# Patient Record
Sex: MALE | Race: WHITE | Employment: OTHER | URBAN - METROPOLITAN AREA
[De-identification: names, ages, dates, MRNs, and addresses within clinical notes are randomized per-mention and may not be internally consistent; named-entity substitution may affect disease eponyms.]

---

## 2017-05-12 ENCOUNTER — HOSPITAL ENCOUNTER (OUTPATIENT)
Age: 57
Discharge: HOME | End: 2017-05-12
Payer: MEDICARE

## 2017-05-12 PROCEDURE — 93005 ELECTROCARDIOGRAM TRACING: CPT

## 2017-05-13 LAB
EKG ATRIAL RATE: 74 BPM
EKG P AXIS: 62 DEGREES
EKG P-R INTERVAL: 144 MS
EKG Q-T INTERVAL: 388 MS
EKG QRS DURATION: 104 MS
EKG QTC CALCULATION (BAZETT): 430 MS
EKG R AXIS: -33 DEGREES
EKG T AXIS: 58 DEGREES
EKG VENTRICULAR RATE: 74 BPM

## 2017-09-16 ENCOUNTER — APPOINTMENT (OUTPATIENT)
Dept: GENERAL RADIOLOGY | Age: 57
End: 2017-09-16
Payer: MEDICARE

## 2017-09-16 ENCOUNTER — HOSPITAL ENCOUNTER (EMERGENCY)
Age: 57
Discharge: HOME | End: 2017-09-16
Attending: EMERGENCY MEDICINE
Payer: MEDICARE

## 2017-09-16 VITALS
HEIGHT: 66 IN | SYSTOLIC BLOOD PRESSURE: 112 MMHG | DIASTOLIC BLOOD PRESSURE: 69 MMHG | OXYGEN SATURATION: 98 % | HEART RATE: 68 BPM | RESPIRATION RATE: 20 BRPM | WEIGHT: 170 LBS | TEMPERATURE: 97.2 F | BODY MASS INDEX: 27.32 KG/M2

## 2017-09-16 DIAGNOSIS — R50.9 FEVER IN ADULT: ICD-10-CM

## 2017-09-16 DIAGNOSIS — L03.116 CELLULITIS OF LEFT FOOT: Primary | ICD-10-CM

## 2017-09-16 LAB
ABSOLUTE EOS #: 0 K/UL (ref 0–0.4)
ABSOLUTE LYMPH #: 0.7 K/UL (ref 1–4.8)
ABSOLUTE MONO #: 0.7 K/UL (ref 0.1–1.2)
ANION GAP SERPL CALCULATED.3IONS-SCNC: 12 MMOL/L (ref 9–17)
BASOPHILS # BLD: 0 %
BASOPHILS ABSOLUTE: 0 K/UL (ref 0–0.2)
BUN BLDV-MCNC: 12 MG/DL (ref 6–20)
BUN/CREAT BLD: ABNORMAL (ref 9–20)
C-REACTIVE PROTEIN: 33.1 MG/L (ref 0–5)
CALCIUM SERPL-MCNC: 8.4 MG/DL (ref 8.6–10.4)
CHLORIDE BLD-SCNC: 102 MMOL/L (ref 98–107)
CO2: 26 MMOL/L (ref 20–31)
CREAT SERPL-MCNC: 0.75 MG/DL (ref 0.7–1.2)
DIFFERENTIAL TYPE: ABNORMAL
EOSINOPHILS RELATIVE PERCENT: 0 %
GFR AFRICAN AMERICAN: >60 ML/MIN
GFR NON-AFRICAN AMERICAN: >60 ML/MIN
GFR SERPL CREATININE-BSD FRML MDRD: ABNORMAL ML/MIN/{1.73_M2}
GFR SERPL CREATININE-BSD FRML MDRD: ABNORMAL ML/MIN/{1.73_M2}
GLUCOSE BLD-MCNC: 109 MG/DL (ref 70–99)
HCT VFR BLD CALC: 41.1 % (ref 41–53)
HEMOGLOBIN: 14.2 G/DL (ref 13.5–17.5)
LYMPHOCYTES # BLD: 10 %
MCH RBC QN AUTO: 31.9 PG (ref 26–34)
MCHC RBC AUTO-ENTMCNC: 34.5 G/DL (ref 31–37)
MCV RBC AUTO: 92.4 FL (ref 80–100)
MONOCYTES # BLD: 9 %
PDW BLD-RTO: 12.8 % (ref 12.5–15.4)
PLATELET # BLD: 175 K/UL (ref 140–450)
PLATELET ESTIMATE: ABNORMAL
PMV BLD AUTO: 7 FL (ref 6–12)
POTASSIUM SERPL-SCNC: 3.9 MMOL/L (ref 3.7–5.3)
RBC # BLD: 4.45 M/UL (ref 4.5–5.9)
RBC # BLD: ABNORMAL 10*6/UL
SEDIMENTATION RATE, ERYTHROCYTE: 8 MM (ref 0–10)
SEG NEUTROPHILS: 81 %
SEGMENTED NEUTROPHILS ABSOLUTE COUNT: 5.9 K/UL (ref 1.8–7.7)
SODIUM BLD-SCNC: 140 MMOL/L (ref 135–144)
WBC # BLD: 7.3 K/UL (ref 3.5–11)
WBC # BLD: ABNORMAL 10*3/UL

## 2017-09-16 PROCEDURE — 99283 EMERGENCY DEPT VISIT LOW MDM: CPT

## 2017-09-16 PROCEDURE — 80048 BASIC METABOLIC PNL TOTAL CA: CPT

## 2017-09-16 PROCEDURE — 85651 RBC SED RATE NONAUTOMATED: CPT

## 2017-09-16 PROCEDURE — 6370000000 HC RX 637 (ALT 250 FOR IP): Performed by: EMERGENCY MEDICINE

## 2017-09-16 PROCEDURE — 85025 COMPLETE CBC W/AUTO DIFF WBC: CPT

## 2017-09-16 PROCEDURE — 86140 C-REACTIVE PROTEIN: CPT

## 2017-09-16 PROCEDURE — 73630 X-RAY EXAM OF FOOT: CPT

## 2017-09-16 RX ORDER — ACETAMINOPHEN 325 MG/1
650 TABLET ORAL ONCE
Status: COMPLETED | OUTPATIENT
Start: 2017-09-16 | End: 2017-09-16

## 2017-09-16 RX ORDER — ACETAMINOPHEN 500 MG
500 TABLET ORAL EVERY 6 HOURS PRN
COMMUNITY
End: 2017-09-16

## 2017-09-16 RX ORDER — OXYCODONE HYDROCHLORIDE AND ACETAMINOPHEN 5; 325 MG/1; MG/1
2 TABLET ORAL ONCE
Status: COMPLETED | OUTPATIENT
Start: 2017-09-16 | End: 2017-09-16

## 2017-09-16 RX ORDER — ACETAMINOPHEN 325 MG/1
650 TABLET ORAL EVERY 6 HOURS PRN
Qty: 60 TABLET | Refills: 0 | Status: SHIPPED | OUTPATIENT
Start: 2017-09-16 | End: 2018-04-04

## 2017-09-16 RX ORDER — DOXYCYCLINE HYCLATE 100 MG
100 TABLET ORAL 2 TIMES DAILY
Qty: 14 TABLET | Refills: 0 | Status: ON HOLD | OUTPATIENT
Start: 2017-09-16 | End: 2017-10-18 | Stop reason: ALTCHOICE

## 2017-09-16 RX ORDER — DOXYCYCLINE HYCLATE 100 MG
100 TABLET ORAL ONCE
Status: COMPLETED | OUTPATIENT
Start: 2017-09-16 | End: 2017-09-16

## 2017-09-16 RX ADMIN — DOXYCYCLINE HYCLATE 100 MG: 100 TABLET ORAL at 04:05

## 2017-09-16 RX ADMIN — OXYCODONE HYDROCHLORIDE AND ACETAMINOPHEN 2 TABLET: 5; 325 TABLET ORAL at 02:35

## 2017-09-16 RX ADMIN — ACETAMINOPHEN 650 MG: 325 TABLET ORAL at 04:06

## 2017-09-16 ASSESSMENT — PAIN DESCRIPTION - PAIN TYPE
TYPE: ACUTE PAIN
TYPE: ACUTE PAIN

## 2017-09-16 ASSESSMENT — PAIN DESCRIPTION - LOCATION
LOCATION: TOE (COMMENT WHICH ONE)
LOCATION: TOE (COMMENT WHICH ONE)

## 2017-09-16 ASSESSMENT — PAIN DESCRIPTION - ONSET: ONSET: GRADUAL

## 2017-09-16 ASSESSMENT — PAIN SCALES - GENERAL
PAINLEVEL_OUTOF10: 4
PAINLEVEL_OUTOF10: 4
PAINLEVEL_OUTOF10: 2
PAINLEVEL_OUTOF10: 3

## 2017-09-16 ASSESSMENT — PAIN DESCRIPTION - PROGRESSION
CLINICAL_PROGRESSION: NOT CHANGED
CLINICAL_PROGRESSION: GRADUALLY IMPROVING

## 2017-09-16 ASSESSMENT — PAIN DESCRIPTION - ORIENTATION
ORIENTATION: RIGHT
ORIENTATION: RIGHT

## 2017-09-16 ASSESSMENT — PAIN DESCRIPTION - DESCRIPTORS: DESCRIPTORS: THROBBING

## 2017-09-16 ASSESSMENT — PAIN DESCRIPTION - FREQUENCY: FREQUENCY: INTERMITTENT

## 2017-09-19 ASSESSMENT — ENCOUNTER SYMPTOMS
ABDOMINAL PAIN: 0
WHEEZING: 0
VOMITING: 0
SORE THROAT: 0
DIARRHEA: 0
SHORTNESS OF BREATH: 0
CONSTIPATION: 0
COUGH: 0
RHINORRHEA: 0
NAUSEA: 0

## 2017-10-17 ENCOUNTER — HOSPITAL ENCOUNTER (OUTPATIENT)
Age: 57
Setting detail: OBSERVATION
Discharge: HOME | End: 2017-10-18
Attending: EMERGENCY MEDICINE | Admitting: EMERGENCY MEDICINE
Payer: MEDICARE

## 2017-10-17 DIAGNOSIS — R20.0 LEFT SIDED NUMBNESS: Primary | ICD-10-CM

## 2017-10-17 PROCEDURE — 99284 EMERGENCY DEPT VISIT MOD MDM: CPT

## 2017-10-18 ENCOUNTER — APPOINTMENT (OUTPATIENT)
Dept: MRI IMAGING | Age: 57
End: 2017-10-18
Payer: MEDICARE

## 2017-10-18 ENCOUNTER — APPOINTMENT (OUTPATIENT)
Dept: CT IMAGING | Age: 57
End: 2017-10-18
Payer: MEDICARE

## 2017-10-18 VITALS
HEART RATE: 65 BPM | HEIGHT: 66 IN | RESPIRATION RATE: 17 BRPM | BODY MASS INDEX: 27.32 KG/M2 | OXYGEN SATURATION: 97 % | DIASTOLIC BLOOD PRESSURE: 67 MMHG | SYSTOLIC BLOOD PRESSURE: 107 MMHG | TEMPERATURE: 97.8 F | WEIGHT: 170 LBS

## 2017-10-18 LAB
ABSOLUTE EOS #: 0.1 K/UL (ref 0–0.4)
ABSOLUTE IMMATURE GRANULOCYTE: NORMAL K/UL (ref 0–0.3)
ABSOLUTE LYMPH #: 1.5 K/UL (ref 1–4.8)
ABSOLUTE MONO #: 0.5 K/UL (ref 0.1–1.2)
ANION GAP SERPL CALCULATED.3IONS-SCNC: 12 MMOL/L (ref 9–17)
BASOPHILS # BLD: 0 %
BASOPHILS ABSOLUTE: 0 K/UL (ref 0–0.2)
BUN BLDV-MCNC: 7 MG/DL (ref 6–20)
BUN/CREAT BLD: NORMAL (ref 9–20)
CALCIUM SERPL-MCNC: 8.6 MG/DL (ref 8.6–10.4)
CHLORIDE BLD-SCNC: 104 MMOL/L (ref 98–107)
CO2: 28 MMOL/L (ref 20–31)
CREAT SERPL-MCNC: 0.7 MG/DL (ref 0.7–1.2)
DIFFERENTIAL TYPE: NORMAL
EOSINOPHILS RELATIVE PERCENT: 2 %
FOLATE: 13.8 NG/ML
GFR AFRICAN AMERICAN: >60 ML/MIN
GFR NON-AFRICAN AMERICAN: >60 ML/MIN
GFR SERPL CREATININE-BSD FRML MDRD: NORMAL ML/MIN/{1.73_M2}
GFR SERPL CREATININE-BSD FRML MDRD: NORMAL ML/MIN/{1.73_M2}
GLUCOSE BLD-MCNC: 90 MG/DL (ref 70–99)
HCT VFR BLD CALC: 43.9 % (ref 41–53)
HEMOGLOBIN: 15 G/DL (ref 13.5–17.5)
IMMATURE GRANULOCYTES: NORMAL %
LYMPHOCYTES # BLD: 25 %
MCH RBC QN AUTO: 31.8 PG (ref 26–34)
MCHC RBC AUTO-ENTMCNC: 34.3 G/DL (ref 31–37)
MCV RBC AUTO: 92.8 FL (ref 80–100)
MONOCYTES # BLD: 8 %
PDW BLD-RTO: 13.2 % (ref 12.5–15.4)
PLATELET # BLD: 163 K/UL (ref 140–450)
PLATELET ESTIMATE: NORMAL
PMV BLD AUTO: 6.6 FL (ref 6–12)
POTASSIUM SERPL-SCNC: 4.2 MMOL/L (ref 3.7–5.3)
RBC # BLD: 4.73 M/UL (ref 4.5–5.9)
RBC # BLD: NORMAL 10*6/UL
SEDIMENTATION RATE, ERYTHROCYTE: 4 MM (ref 0–10)
SEG NEUTROPHILS: 65 %
SEGMENTED NEUTROPHILS ABSOLUTE COUNT: 3.8 K/UL (ref 1.8–7.7)
SODIUM BLD-SCNC: 144 MMOL/L (ref 135–144)
TSH SERPL DL<=0.05 MIU/L-ACNC: 2.24 MIU/L (ref 0.3–5)
VITAMIN B-12: 375 PG/ML (ref 211–946)
WBC # BLD: 5.9 K/UL (ref 3.5–11)
WBC # BLD: NORMAL 10*3/UL

## 2017-10-18 PROCEDURE — 80048 BASIC METABOLIC PNL TOTAL CA: CPT

## 2017-10-18 PROCEDURE — G0378 HOSPITAL OBSERVATION PER HR: HCPCS

## 2017-10-18 PROCEDURE — 82746 ASSAY OF FOLIC ACID SERUM: CPT

## 2017-10-18 PROCEDURE — 70450 CT HEAD/BRAIN W/O DYE: CPT

## 2017-10-18 PROCEDURE — 84443 ASSAY THYROID STIM HORMONE: CPT

## 2017-10-18 PROCEDURE — 85025 COMPLETE CBC W/AUTO DIFF WBC: CPT

## 2017-10-18 PROCEDURE — 36415 COLL VENOUS BLD VENIPUNCTURE: CPT

## 2017-10-18 PROCEDURE — 99219 PR INITIAL OBSERVATION CARE/DAY 50 MINUTES: CPT | Performed by: PSYCHIATRY & NEUROLOGY

## 2017-10-18 PROCEDURE — 86038 ANTINUCLEAR ANTIBODIES: CPT

## 2017-10-18 PROCEDURE — 2580000003 HC RX 258: Performed by: EMERGENCY MEDICINE

## 2017-10-18 PROCEDURE — 82607 VITAMIN B-12: CPT

## 2017-10-18 PROCEDURE — 70551 MRI BRAIN STEM W/O DYE: CPT

## 2017-10-18 PROCEDURE — 85651 RBC SED RATE NONAUTOMATED: CPT

## 2017-10-18 RX ORDER — SODIUM CHLORIDE 0.9 % (FLUSH) 0.9 %
10 SYRINGE (ML) INJECTION PRN
Status: DISCONTINUED | OUTPATIENT
Start: 2017-10-18 | End: 2017-10-18 | Stop reason: HOSPADM

## 2017-10-18 RX ORDER — ACETAMINOPHEN 325 MG/1
650 TABLET ORAL EVERY 4 HOURS PRN
Status: DISCONTINUED | OUTPATIENT
Start: 2017-10-18 | End: 2017-10-18 | Stop reason: HOSPADM

## 2017-10-18 RX ORDER — SODIUM CHLORIDE 0.9 % (FLUSH) 0.9 %
10 SYRINGE (ML) INJECTION EVERY 12 HOURS SCHEDULED
Status: DISCONTINUED | OUTPATIENT
Start: 2017-10-18 | End: 2017-10-18 | Stop reason: HOSPADM

## 2017-10-18 RX ADMIN — Medication 10 ML: at 08:47

## 2017-10-18 ASSESSMENT — ENCOUNTER SYMPTOMS
SHORTNESS OF BREATH: 0
NAUSEA: 0
EYE PAIN: 0
VOMITING: 0
SORE THROAT: 1
ABDOMINAL PAIN: 0
PHOTOPHOBIA: 0
CHEST TIGHTNESS: 0

## 2017-10-18 NOTE — DISCHARGE SUMMARY
CDU Discharge Summary        Patient:  Joce Tolentino  YOB: 1960    MRN: [id number]   Acct: [de-identified]    Primary Care Physician: Garcia Walker MD    Admit date:  10/17/2017 11:45 PM  Discharge date: 10/18/2017  7:20 PM     Discharge Diagnoses:     1.) Acute onset of left lateral leg numbness as well as left radial forearm and hand numbness, unknown etiology, improved with rest will be followed by neurology outpatient. Follow-up:  Call today/tomorrow for a follow up appointment with Garcia Walker MD , or return to the Emergency Room with worsening symptoms    Stressed to patient the importance of following up with primary care doctor for further workup/management of symptoms. Pt verbalizes understanding and agrees with plan.     Discharge Medication Changes:       Medication List      CONTINUE taking these medications    acetaminophen 325 MG tablet  Commonly known as:  TYLENOL  Take 2 tablets by mouth every 6 hours as needed for Pain     ibuprofen 800 MG tablet  Commonly known as:  ADVIL;MOTRIN  Take 1 tablet by mouth every 8 hours as needed for Pain            Diet:   , advance as tolerated     Activity:  As tolerated    Consultants: IP CONSULT TO NEUROLOGY    Procedures:  Not indicated     Diagnostic Test:   Results for orders placed or performed during the hospital encounter of 10/17/17   CBC Auto Differential   Result Value Ref Range    WBC 5.9 3.5 - 11.0 k/uL    RBC 4.73 4.5 - 5.9 m/uL    Hemoglobin 15.0 13.5 - 17.5 g/dL    Hematocrit 43.9 41 - 53 %    MCV 92.8 80 - 100 fL    MCH 31.8 26 - 34 pg    MCHC 34.3 31 - 37 g/dL    RDW 13.2 12.5 - 15.4 %    Platelets 516 062 - 516 k/uL    MPV 6.6 6.0 - 12.0 fL    Differential Type NOT REPORTED     Seg Neutrophils 65 %    Lymphocytes 25 %    Monocytes 8 %    Eosinophils % 2 %    Basophils 0 %    Immature Granulocytes NOT REPORTED 0 %    Segs Absolute 3.80 1.8 - 7.7 k/uL    Absolute Lymph # 1.50 1.0 - 4.8 k/uL    Absolute Mono # 0.50 0.1 - 1.2 acute abnormality. SINUSES: Partial opacification of both maxillary sinuses SOFT TISSUES/SKULL:  No acute abnormality of the visualized skull or soft tissues. No acute intracranial abnormality. Mri Brain Wo Contrast    Result Date: 10/18/2017  EXAMINATION: MRI OF THE BRAIN WITHOUT CONTRAST  10/18/2017 7:38 am TECHNIQUE: Multiplanar multisequence MRI of the brain was performed without the administration of intravenous contrast. COMPARISON: Head CT 10/18/2017 HISTORY: ORDERING SYSTEM PROVIDED HISTORY: left sided numbness tingling Acute symptoms. Initial encounter. FINDINGS: INTRACRANIAL STRUCTURES/VENTRICLES: No acute infarction. No mass effect or midline shift. No abnormal extra-axial fluid collection. The ventricles and sulci are normal in size and configuration. The contents of the sella and suprasellar cistern appear unremarkable. The normal signal voids within the major intracranial vessels appear maintained. ORBITS: The visualized portion of the orbits demonstrate no acute abnormality. SINUSES: Left maxillary sinus mucosal retention cyst or polyp. Mild mucosal thickening of the paranasal sinuses. Tympanomastoid cavities are clear. BONES/SOFT TISSUES: The bone marrow signal intensity appears normal. The craniocervical junction is normal in appearance. No acute intracranial abnormality. Specifically, no acute infarction. Physical Exam:    General appearance - NAD, AOx 3   Lungs -CTAB, no R/R/R  Heart - RRR, no M/R/G  Abdomen - Soft, NT/ND  Neurological:  MAEx4, No focal motor deficit, sensory loss  Extremities - Cap refil <2 sec in all ext., no edema  Skin -warm, dry      Hospital Course:  Clinical course has improved, labs and imaging reviewed. Gretchen Welch originally presented to the hospital on 10/17/2017 11:45 PM with left lateral leg and left radial arm and hand numbness.   At that time it was determined that He required further observation and evaluation by neurology, will be

## 2017-10-18 NOTE — CONSULTS
-       Pupils reactive b/l. Visual fields intact to confrontation  III,IV,VI -  EOMs full, no afferent defect, no                      TRAVON, no ptosis  V     -     Normal facial sensation  VII    -     Normal facial symmetry  VIII   -     Intact hearing  IX,X -     Symmetrical palate  XI    -     Symmetrical shoulder shrug  XII   -     Midline tongue, no atrophy    MOTOR FUNCTION:  Normal bulk, normal tone, normal power;  no involuntary movements, no tremor   SENSORY FUNCTION:  Sensation to light touch decreased in the distal dorsal aspect of left hand and dorsal left index finger. Normal response to pin-prick, vibration, normal proprioception. CEREBELLAR FUNCTION:  Intact fine motor control over upper limbs   REFLEX FUNCTION:  Symmetric, no perverted reflex, no Babinski sign   STATION and GAIT  Not tested            Data:    Lab Results:     Lab Results   Component Value Date    CHOL 169 10/27/2016    LDLCHOLESTEROL 69 10/27/2016    HDL 53 10/27/2016    TRIG 236 (H) 10/27/2016    ALT 18 10/27/2016    AST 19 10/27/2016    TSH 2.47 02/03/2016     Hematology:  Recent Labs      10/18/17   0023   WBC  5.9   HGB  15.0   HCT  43.9   PLT  163     Chemistry:  Recent Labs      10/18/17   0023   NA  144   K  4.2   CL  104   CO2  28   GLUCOSE  90   BUN  7   CREATININE  0.70   CALCIUM  8.6     No results for input(s): PROT, LABALBU, LABA1C, X6OAXXY, I2MLJOZ, FT4, TSH, AST, ALT, LDH, AMMONIA, CHOL, HDL, LDLCHOLESTEROL, CHOLHDLRATIO, TRIG, VLDL, CKTOTAL, CKMB, CKMBINDEX, RF, REJI in the last 72 hours. No results found for: PHENYTOIN, PHENYTOIN, VALPROATE, CBMZ      Diagnostic data reviewed:  CT head w/o contrast (10/18/17): No acute intracranial abnormality  MRI brain w/o contrast (10/18/17): No acute intracranial abnormality.  Specifically no acute infarction.         Impression and Plan: Mr. Inocencia Fonseca is a 55 y.o. male with left sided numbness more pronounced over the distal dorsal aspect of left hand and over the left

## 2017-10-18 NOTE — PLAN OF CARE
Problem: Falls - Risk of:  Goal: Will remain free from falls  Will remain free from falls   Outcome: Ongoing    Goal: Absence of physical injury  Absence of physical injury   Outcome: Ongoing

## 2017-10-18 NOTE — PROGRESS NOTES
CDU Daily Progress Note  Attending Physician       Pt Name: Kristen Zamora  MRN: [id number]  [place] 1960  Date of evaluation: 10/18/17    I performed a history and physical examination of the patient and discussed management with the resident. I reviewed the residents note and agree with the documented findings and plan of care. Any areas of disagreement are noted on the chart. I was personally present for the key portions of any procedures. I have documented in the chart those procedures where I was not present during the key portions. I have reviewed the emergency nurses triage note. I agree with the chief complaint, past medical history, past surgical history, allergies, medications, social and family history as documented unless otherwise noted below. Documentation of the HPI, Physical Exam and Medical Decision Making performed by medical students or scribes is based on my personal performance of the HPI, PE and MDM. For Physician Assistant/ Nurse Practitioner cases/documentation I have personally evaluated this patient and have completed at least one if not all key elements of the E/M (history, physical exam, and MDM). Additional findings are as noted. The Family History, Social History and Review of Systems are unchanged from the previous day. No significant events overnight. Distal LLE numbness and LUE numbness x 3 days assoc w sore throat. Denies weakness, headache, fever chills, no visual changes. PMHx glaucoma. CT head no acute, MRI brain no acute.  Neuro consulted    Deonte Damon MD  Attending Physician  Critical Decision Unit

## 2017-10-18 NOTE — H&P
[facility]  CDU / OBSERVATION eNCOUnter  Resident Note     Pt Name: Joce Tolentino  MRN: [id number]  [place] 1960  Date of evaluation: 10/18/17  Patient's PCP is :  Garcia Walker MD    [address] [place] [address]       Chief Complaint   Patient presents with    Pharyngitis     pt stated he has a sore throat     Other     pt stated he has had some numbness and tingling in left arm and left leg x 2 days          HISTORY OF PRESENT ILLNESS    Joce Tolentino is a 55 y.o. male who presents With 3 day history of acute onset left lateral leg numbness as well as left radial forearm and hand numbness that is intermittent, without modifying factors. Patient is otherwise healthy. He says that he lives in a homeless shelter and is worried that the bedbug infestation could be causing this left-sided numbness. This is never happened to him before. He has not fallen, is not on blood thinners does not have any other neurologic complaints. He has not had fevers chills headaches nausea vomiting.     Location/Symptom: L radial forearm and hand, distal lateral legNumbness  Timing/Onset: 3 days  Provocation: None  Quality: None  Radiation: None  Severity: None  Timing/Duration: Intermittent  Modifying Factors: None    REVIEW OF SYSTEMS       General ROS - No fevers, No malaise   Ophthalmic ROS - No discharge, No changes in vision  ENT ROS -  No sore throat, No rhinorrhea,   Respiratory ROS - no shortness of breath, no cough, no  wheezing  Cardiovascular ROS - No chest pain, no dyspnea on exertion  Gastrointestinal ROS - No abdominal pain, no nausea or vomiting, no change in bowel habits, no black or bloody stools  Genito-Urinary ROS - No dysuria, trouble voiding, or hematuria  Musculoskeletal ROS - No myalgias, No arthalgias  Neurological ROS - No headache, no dizziness/lightheadedness, Left leg weakness, numbness of left lateral leg and radial forearm and hand   Dermatological ROS - No lesions, No rash (PQRS) Advance directives on face sheet per hospital policy. No change unless specifically mentioned in chart    PAST MEDICAL HISTORY    has no past medical history on file. I have reviewed the past medical history with the patient and it is Not pertinent to this complaint. SURGICAL HISTORY      has no past surgical history on file. I have reviewed and agree with Surgical History entered and it is Not pertinent to this complaint. CURRENT MEDICATIONS       sodium chloride flush 0.9 % injection 10 mL 2 times per day   sodium chloride flush 0.9 % injection 10 mL PRN   acetaminophen (TYLENOL) tablet 650 mg Q4H PRN       All medication charted and reviewed. ALLERGIES     has No Known Allergies. FAMILY HISTORY     has no family status information on file. family history is not on file. The patient denies any pertinent family history. I have reviewed and agree with the family history entered. I have reviewed the Family History and it is not significant to the case    SOCIAL HISTORY      reports that he has been smoking Cigarettes. His smokeless tobacco use includes Chew. He reports that he drinks alcohol. He reports that he does not use drugs. I have reviewed and agree with all Social.  There are concerns for Tobacco abuse/use. PHYSICAL EXAM     INITIAL VITALS:  height is 5' 6\" (1.676 m) and weight is 170 lb (77.1 kg). His oral temperature is 98 °F (36.7 °C). His blood pressure is 99/64 and his pulse is 71. His respiration is 16 and oxygen saturation is 97%.       CONSTITUTIONAL: AOx4, no apparent distress, appears stated age    HEAD: normocephalic, atraumatic   EYES: PERRLA, EOMI    ENT: moist mucous membranes, uvula midline   NECK: supple, symmetric   BACK: symmetric   LUNGS: clear to auscultation bilaterally   CARDIOVASCULAR: regular rate and rhythm, no murmurs, rubs or gallops   ABDOMEN: soft, non-tender, non-distended with normal active bowel sounds   NEUROLOGIC:  MAEx4, Deficit to light touch over left lateral leg and left radial arm and hand. Proximal left lower extremity strength 4/5. MUSCULOSKELETAL: no clubbing, cyanosis or edema   SKIN: no rash or wounds       DIFFERENTIAL DIAGNOSIS/MDM:   Weakness:  DDX: CVA, MS, Guillain [place], Transverse myelitis, Myasthenia gravis, cardiac, anemia, electrolytes, infection, change in medications, hypothyroid, rheumatalgic, depression, dehydration      DIAGNOSTIC RESULTS     EKG: All EKG's are interpreted by the Observation Physician who either signs or Co-signs this chart in the absence of a cardiologist.    EKG Interpretation    No EKG ordered by observation team     RADIOLOGY:   I directly visualized the following  images and reviewed the radiologist interpretations:    Ct Head Wo Contrast    Result Date: 10/18/2017  EXAMINATION: CT OF THE HEAD WITHOUT CONTRAST - STROKE ALERT  10/18/2017 1:16 am TECHNIQUE: CT of the head was performed without the administration of intravenous contrast. Dose modulation, iterative reconstruction, and/or weight based adjustment of the mA/kV was utilized to reduce the radiation dose to as low as reasonably achievable. COMPARISON: 1/9/2017 HISTORY: ORDERING SYSTEM PROVIDED HISTORY: Numbness L upper/lower extr TECHNOLOGIST PROVIDED HISTORY: Has a \"code stroke\" or \"stroke alert\" been called? ->No FINDINGS: BRAIN/VENTRICLES: There is no acute intracranial hemorrhage, mass effect or midline shift. No abnormal extra-axial fluid collection. The gray-white differentiation is maintained without evidence of an acute infarct. There is no evidence of hydrocephalus. ORBITS: The visualized portion of the orbits demonstrate no acute abnormality. SINUSES: Partial opacification of both maxillary sinuses SOFT TISSUES/SKULL:  No acute abnormality of the visualized skull or soft tissues. No acute intracranial abnormality. LABS:  I have reviewed and interpreted all available lab results.   Labs Reviewed   CBC WITH AUTO

## 2017-10-18 NOTE — CONSULTS
NEUROLOGY INPATIENT CONSULT NOTE    10/18/2017         Shade Hollins is a  55 y.o. male admitted on 10/17/2017 with  Left sided numbness [R20.0]      History is obtained mostly from the patient and the medical record and from the caregivers. Chart is reviewed and patient is examined. Briefly, this is a  55 y.o. right handed   male with no significant PMH was admitted on 10/17/2017 with c/o  left sided numbness in left hand and left leg for the past two days. Patient states numbness started on both sides of the left forearm and the left side of his left lower leg around his ankle at the same time 2 days ago a few hours after waking up in the morning. Feels like \"tingling\" sensation that is aggravated with movement and alleviated with rest. This is the first time patient has experienced this feeling. This is his 3rd day with the symptoms but states the numbness has decreased compared to ED since he has been resting in bed. Denies associated left sided weakness. Denied neck pain and back pain radiating to left upper extremity and left lower extremity. Denied  h/o strokes, cardiovascular disease. States he has a mild sore throat since 2 days ago, denies cough, dyspnea, chest pain. Patient lives in a shelter and says they have a bed bug infestation and is worried this might be causing the numbness. States his nutrition has been poor since he lives in the shelter. States he does not take any medications. Denies recreational drug and heavy alcohol use, states he drinks on occasion, but chews tobacco. Denies fever, chills, headaches, nausea, vomiting, recent head trauma, changes in vision, changes in hearing, decreased facial sensation, facial droop, dysarthria. No current facility-administered medications on file prior to encounter.       Current Outpatient Prescriptions on File Prior to Encounter   Medication Sig Dispense Refill    acetaminophen (TYLENOL) 325 MG tablet Take 2 tablets by mouth every 6 hours as needed for Pain 60 tablet 0    ibuprofen (ADVIL;MOTRIN) 800 MG tablet Take 1 tablet by mouth every 8 hours as needed for Pain 20 tablet 0     Allergies: Niecy Hernandez has No Known Allergies. History reviewed. No pertinent past medical history. History reviewed. No pertinent surgical history. Social History: Niecy Hernandez  reports that he has been smoking Cigarettes. His smokeless tobacco use includes Chew. He reports that he drinks alcohol. He reports that he does not use drugs. History reviewed. No pertinent family history. Current Medications:     sodium chloride flush  10 mL Intravenous 2 times per day     PRN Meds include: sodium chloride flush, acetaminophen    ROS:   Constitutional Negative for fever and chills   HEENT Negative for ear discharge, ear pain, nosebleed   Eyes Negative for photophobia, pain and discharge   Respiratory Negative for hemoptysis and sputum   Cardiovascular Negative for orthopnea, claudication and PND   Gastrointestinal Negative for abdominal pain, diarrhea, blood in stool   Musculoskeletal Negative for joint pain, negative for myalgia   Skin Negative for rash or itching or lesions   Endo/heme/allergies Negative for polydipsia, environmental allergy   Psychiatric Negative for suicidal ideation. Patient is not anxious       Objective:   /67   Pulse 65   Temp 97.8 °F (36.6 °C) (Oral)   Resp 17   Ht 5' 6\" (1.676 m)   Wt 170 lb (77.1 kg)   SpO2 97%   BMI 27.44 kg/m²     Blood pressure range: Systolic (95QHB), :632 , Min:97 , IIN:419   ; Diastolic (20DXR), GFL:32, Min:64, Max:67      Continuous infusions:      ON EXAMINATION:  GENERAL  Appears comfortable and in no distress. No evidence of bed bug bite marks or other lesions.    HEENT  NC/ AT   NECK  Supple and no bruits heard   MENTAL STATUS:  Alert, oriented, intact memory, no confusion, normal speech, normal language, no hallucination or delusion   CRANIAL NERVES: II     -       Pupils

## 2017-10-18 NOTE — ED PROVIDER NOTES
[facility] ED  Emergency Department Encounter  Emergency Medicine Resident     Pt Name: Diamond Law  MRN: [id number]  [place] 1960  Date of evaluation: 10/17/17  PCP:  Sanford Roy MD    [address] [place] [address]       Chief Complaint   Patient presents with    Pharyngitis     pt stated he has a sore throat     Other     pt stated he has had some numbness and tingling in left arm and left leg x 2 days        HISTORY OF PRESENT ILLNESS  (Location/Symptom, Timing/Onset, Context/Setting, Quality, Duration, Modifying Factors, Severity.)      Diamond aLw is a 55 y.o. male who presents with New onset Three-day history of numbness to left side at distal lateral leg above the ankle, and Left forearm both dorsal and ventral aspects, as well as radial distribution to left hand. Patient denies headaches, neck pain, back pain. No recent history of trauma. Patient denies shortness of breath, chest pain, fever, chills, abdominal pain. Patient is complaining of mild sore throat over the same time course. Patient denies history of diabetes, hypertension and hyperlipidemia. Patient does have a history of glaucoma, denies headaches, vision changes or eye pain. PAST MEDICAL / SURGICAL / SOCIAL / FAMILY HISTORY      has no past medical history on file. Reviewed, none     has no past surgical history on file. Social History     Social History    Marital status: Single     Spouse name: N/A    Number of children: N/A    Years of education: N/A     Occupational History    Not on file. Social History Main Topics    Smoking status: Current Every Day Smoker     Types: Cigarettes    Smokeless tobacco: Current User     Types: Chew    Alcohol use Yes      Comment: daily    Drug use: No    Sexual activity: Not on file     Other Topics Concern    Not on file     Social History Narrative    No narrative on file       History reviewed. No pertinent family history.     Allergies:  Review of patient's allergies indicates no known allergies. Home Medications:  Prior to Admission medications    Medication Sig Start Date End Date Taking? Authorizing Provider   doxycycline hyclate (VIBRA-TABS) 100 MG tablet Take 1 tablet by mouth 2 times daily 9/16/17   Raman Lorenz MD   acetaminophen (TYLENOL) 325 MG tablet Take 2 tablets by mouth every 6 hours as needed for Pain 9/16/17   Raman Lorenz MD   ibuprofen (ADVIL;MOTRIN) 800 MG tablet Take 1 tablet by mouth every 8 hours as needed for Pain 1/9/17   Chase Naranjo PA-C       REVIEW OF SYSTEMS    (2-9 systems for level 4, 10 or more for level 5)      Review of Systems   Constitutional: Negative for chills, diaphoresis and fever. HENT: Positive for sore throat. Eyes: Negative for photophobia, pain and visual disturbance. Respiratory: Negative for chest tightness and shortness of breath. Cardiovascular: Negative for chest pain. Gastrointestinal: Negative for abdominal pain, nausea and vomiting. Musculoskeletal: Negative for neck pain and neck stiffness. Skin:        Excoriations bilateral anterior shin   Neurological: Positive for numbness. Negative for dizziness, weakness and headaches. Psychiatric/Behavioral: Negative for agitation. PHYSICAL EXAM   (up to 7 for level 4, 8 or more for level 5)      INITIAL VITALS:   BP 97/65   Pulse 67   Temp 98.6 °F (37 °C) (Oral)   Resp 15   Ht 5' 6\" (1.676 m)   Wt 170 lb (77.1 kg)   SpO2 97%   BMI 27.44 kg/m²     Physical Exam   Constitutional: He appears well-developed and well-nourished. HENT:   Head: Normocephalic and atraumatic. Eyes: EOM are normal.   Neck: Normal range of motion. No tracheal deviation present. Cardiovascular: Normal rate and normal heart sounds. Pulmonary/Chest: No respiratory distress. He has no wheezes. Abdominal: Soft. There is no tenderness. Musculoskeletal: Normal range of motion. Neurological: He is alert. A sensory deficit is present.  GCS eye subscore is 4. GCS verbal subscore is 5. GCS motor subscore is 6. Area of acute numbness just superior to left lateral malleolus. Numbness and tingling left forearm, ventral and dorsal aspects. Numbness and tingling radial distribution of hand           DIFFERENTIAL  DIAGNOSIS     PLAN (LABS / IMAGING / EKG):  Orders Placed This Encounter   Procedures    CT HEAD WO CONTRAST    CBC Auto Differential    BASIC METABOLIC PANEL    PATIENT STATUS (FROM ED OR OR/PROCEDURAL) Observation       MEDICATIONS ORDERED:  No orders of the defined types were placed in this encounter. DDX: CVA, peripheral nephropathy. Less likely SAH as patient is not describing headaches, worse headache of his life. For concern for acute infectious process, patient is afebrile, negative Kernig's or Brudzinski's.     DIAGNOSTIC RESULTS / EMERGENCY DEPARTMENT COURSE / MDM     LABS:  Results for orders placed or performed during the hospital encounter of 10/17/17   CBC Auto Differential   Result Value Ref Range    WBC 5.9 3.5 - 11.0 k/uL    RBC 4.73 4.5 - 5.9 m/uL    Hemoglobin 15.0 13.5 - 17.5 g/dL    Hematocrit 43.9 41 - 53 %    MCV 92.8 80 - 100 fL    MCH 31.8 26 - 34 pg    MCHC 34.3 31 - 37 g/dL    RDW 13.2 12.5 - 15.4 %    Platelets 170 636 - 101 k/uL    MPV 6.6 6.0 - 12.0 fL    Differential Type NOT REPORTED     Seg Neutrophils 65 %    Lymphocytes 25 %    Monocytes 8 %    Eosinophils % 2 %    Basophils 0 %    Immature Granulocytes NOT REPORTED 0 %    Segs Absolute 3.80 1.8 - 7.7 k/uL    Absolute Lymph # 1.50 1.0 - 4.8 k/uL    Absolute Mono # 0.50 0.1 - 1.2 k/uL    Absolute Eos # 0.10 0.0 - 0.4 k/uL    Basophils # 0.00 0.0 - 0.2 k/uL    Absolute Immature Granulocyte NOT REPORTED 0.00 - 0.30 k/uL    WBC Morphology NOT REPORTED     RBC Morphology NOT REPORTED     Platelet Estimate NOT REPORTED    BASIC METABOLIC PANEL   Result Value Ref Range    Glucose 90 70 - 99 mg/dL    BUN 7 6 - 20 mg/dL    CREATININE 0.70 0.70 - 1.20 mg/dL    Bun/Cre Ratio NOT REPORTED 9 - 20    Calcium 8.6 8.6 - 10.4 mg/dL    Sodium 144 135 - 144 mmol/L    Potassium 4.2 3.7 - 5.3 mmol/L    Chloride 104 98 - 107 mmol/L    CO2 28 20 - 31 mmol/L    Anion Gap 12 9 - 17 mmol/L    GFR Non-African American >60 >60 mL/min    GFR African American >60 >60 mL/min    GFR Comment          GFR Staging NOT REPORTED        IMPRESSION: 30-year-old male presenting with new onset left-sided numbness and tingling in the left forearm and hand and left lateral distal leg. Patient appears stable and comfortable however is concerned of her symptoms. Plan for CBC, BMP, CT head. RADIOLOGY:  Ct Head Wo Contrast    Result Date: 10/18/2017  EXAMINATION: CT OF THE HEAD WITHOUT CONTRAST - STROKE ALERT  10/18/2017 1:16 am TECHNIQUE: CT of the head was performed without the administration of intravenous contrast. Dose modulation, iterative reconstruction, and/or weight based adjustment of the mA/kV was utilized to reduce the radiation dose to as low as reasonably achievable. COMPARISON: 1/9/2017 HISTORY: ORDERING SYSTEM PROVIDED HISTORY: Numbness L upper/lower extr TECHNOLOGIST PROVIDED HISTORY: Has a \"code stroke\" or \"stroke alert\" been called? ->No FINDINGS: BRAIN/VENTRICLES: There is no acute intracranial hemorrhage, mass effect or midline shift. No abnormal extra-axial fluid collection. The gray-white differentiation is maintained without evidence of an acute infarct. There is no evidence of hydrocephalus. ORBITS: The visualized portion of the orbits demonstrate no acute abnormality. SINUSES: Partial opacification of both maxillary sinuses SOFT TISSUES/SKULL:  No acute abnormality of the visualized skull or soft tissues. No acute intracranial abnormality. EKG  None    All EKG's are interpreted by the Emergency Department Physician who either signs or Co-signs this chart in the absence of a cardiologist.    EMERGENCY DEPARTMENT COURSE:  2:18 AM CBC, BMP normal.  CT imaging without acute pathology.

## 2017-10-19 LAB — ANTI-NUCLEAR ANTIBODY (ANA): NEGATIVE

## 2018-04-04 ENCOUNTER — HOSPITAL ENCOUNTER (EMERGENCY)
Age: 58
Discharge: HOME | End: 2018-04-04
Attending: EMERGENCY MEDICINE
Payer: MEDICARE

## 2018-04-04 VITALS
DIASTOLIC BLOOD PRESSURE: 66 MMHG | BODY MASS INDEX: 27.32 KG/M2 | TEMPERATURE: 96.8 F | HEART RATE: 68 BPM | RESPIRATION RATE: 14 BRPM | OXYGEN SATURATION: 97 % | SYSTOLIC BLOOD PRESSURE: 101 MMHG | WEIGHT: 170 LBS | HEIGHT: 66 IN

## 2018-04-04 DIAGNOSIS — K08.89 TOOTH LOOSE: Primary | ICD-10-CM

## 2018-04-04 PROCEDURE — 99282 EMERGENCY DEPT VISIT SF MDM: CPT

## 2018-04-04 PROCEDURE — 6370000000 HC RX 637 (ALT 250 FOR IP): Performed by: EMERGENCY MEDICINE

## 2018-04-04 RX ORDER — ACETAMINOPHEN 325 MG/1
650 TABLET ORAL ONCE
Status: COMPLETED | OUTPATIENT
Start: 2018-04-04 | End: 2018-04-04

## 2018-04-04 RX ORDER — IBUPROFEN 800 MG/1
800 TABLET ORAL ONCE
Status: COMPLETED | OUTPATIENT
Start: 2018-04-04 | End: 2018-04-04

## 2018-04-04 RX ORDER — ACETAMINOPHEN 325 MG/1
650 TABLET ORAL EVERY 6 HOURS PRN
Qty: 60 TABLET | Refills: 0 | Status: SHIPPED | OUTPATIENT
Start: 2018-04-04 | End: 2019-03-06 | Stop reason: SDUPTHER

## 2018-04-04 RX ORDER — IBUPROFEN 800 MG/1
800 TABLET ORAL EVERY 8 HOURS PRN
Qty: 30 TABLET | Refills: 0 | Status: SHIPPED | OUTPATIENT
Start: 2018-04-04 | End: 2019-03-06 | Stop reason: SDUPTHER

## 2018-04-04 RX ADMIN — ACETAMINOPHEN 650 MG: 325 TABLET ORAL at 04:24

## 2018-04-04 RX ADMIN — IBUPROFEN 800 MG: 800 TABLET ORAL at 04:24

## 2018-04-04 ASSESSMENT — PAIN DESCRIPTION - ORIENTATION: ORIENTATION: LEFT;LOWER

## 2018-04-04 ASSESSMENT — ENCOUNTER SYMPTOMS
SORE THROAT: 0
VOMITING: 0
SHORTNESS OF BREATH: 0
COUGH: 0
TROUBLE SWALLOWING: 0
NAUSEA: 0
ABDOMINAL PAIN: 0

## 2018-04-04 ASSESSMENT — PAIN DESCRIPTION - LOCATION: LOCATION: TEETH

## 2018-04-04 ASSESSMENT — PAIN SCALES - GENERAL
PAINLEVEL_OUTOF10: 6
PAINLEVEL_OUTOF10: 6

## 2018-04-04 ASSESSMENT — PAIN DESCRIPTION - FREQUENCY: FREQUENCY: CONTINUOUS

## 2018-04-04 ASSESSMENT — PAIN DESCRIPTION - PAIN TYPE: TYPE: ACUTE PAIN

## 2018-04-23 ENCOUNTER — HOSPITAL ENCOUNTER (EMERGENCY)
Age: 58
Discharge: HOME | End: 2018-04-23
Attending: EMERGENCY MEDICINE
Payer: MEDICARE

## 2018-04-23 VITALS
OXYGEN SATURATION: 100 % | SYSTOLIC BLOOD PRESSURE: 103 MMHG | HEIGHT: 66 IN | HEART RATE: 59 BPM | BODY MASS INDEX: 27.32 KG/M2 | TEMPERATURE: 97.3 F | RESPIRATION RATE: 16 BRPM | WEIGHT: 170 LBS | DIASTOLIC BLOOD PRESSURE: 66 MMHG

## 2018-04-23 DIAGNOSIS — R20.0 NUMBNESS: Primary | ICD-10-CM

## 2018-04-23 LAB
ANION GAP SERPL CALCULATED.3IONS-SCNC: 12 MMOL/L (ref 9–17)
BUN BLDV-MCNC: 5 MG/DL (ref 6–20)
BUN/CREAT BLD: ABNORMAL (ref 9–20)
CALCIUM SERPL-MCNC: 8.2 MG/DL (ref 8.6–10.4)
CHLORIDE BLD-SCNC: 108 MMOL/L (ref 98–107)
CO2: 29 MMOL/L (ref 20–31)
CREAT SERPL-MCNC: 0.74 MG/DL (ref 0.7–1.2)
GFR AFRICAN AMERICAN: >60 ML/MIN
GFR NON-AFRICAN AMERICAN: >60 ML/MIN
GFR SERPL CREATININE-BSD FRML MDRD: ABNORMAL ML/MIN/{1.73_M2}
GFR SERPL CREATININE-BSD FRML MDRD: ABNORMAL ML/MIN/{1.73_M2}
GLUCOSE BLD-MCNC: 91 MG/DL (ref 70–99)
MAGNESIUM: 2.2 MG/DL (ref 1.6–2.6)
POTASSIUM SERPL-SCNC: 4.7 MMOL/L (ref 3.7–5.3)
SODIUM BLD-SCNC: 149 MMOL/L (ref 135–144)

## 2018-04-23 PROCEDURE — 99284 EMERGENCY DEPT VISIT MOD MDM: CPT

## 2018-04-23 PROCEDURE — 80048 BASIC METABOLIC PNL TOTAL CA: CPT

## 2018-04-23 PROCEDURE — 83735 ASSAY OF MAGNESIUM: CPT

## 2018-04-23 ASSESSMENT — ENCOUNTER SYMPTOMS
NAUSEA: 0
VOMITING: 0
COUGH: 0
SINUS PRESSURE: 0
PHOTOPHOBIA: 0
ABDOMINAL PAIN: 0
BLOOD IN STOOL: 0
CONSTIPATION: 0
BACK PAIN: 0
DIARRHEA: 0
SORE THROAT: 0
RHINORRHEA: 0
SHORTNESS OF BREATH: 0

## 2018-04-23 ASSESSMENT — PAIN SCALES - GENERAL: PAINLEVEL_OUTOF10: 5

## 2018-04-23 ASSESSMENT — PAIN DESCRIPTION - ORIENTATION: ORIENTATION: RIGHT;LEFT

## 2018-04-23 ASSESSMENT — PAIN DESCRIPTION - PAIN TYPE: TYPE: CHRONIC PAIN;ACUTE PAIN;NEUROPATHIC PAIN

## 2018-04-23 ASSESSMENT — PAIN DESCRIPTION - LOCATION: LOCATION: LEG;HAND

## 2018-04-23 ASSESSMENT — PAIN DESCRIPTION - DESCRIPTORS: DESCRIPTORS: NUMBNESS;TIGHTNESS

## 2018-04-23 ASSESSMENT — PAIN DESCRIPTION - ONSET: ONSET: ON-GOING

## 2018-04-23 ASSESSMENT — PAIN DESCRIPTION - FREQUENCY: FREQUENCY: INTERMITTENT

## 2018-06-13 ENCOUNTER — APPOINTMENT (OUTPATIENT)
Dept: CT IMAGING | Age: 58
End: 2018-06-13
Payer: MEDICARE

## 2018-06-13 ENCOUNTER — HOSPITAL ENCOUNTER (EMERGENCY)
Age: 58
Discharge: HOME | End: 2018-06-14
Attending: EMERGENCY MEDICINE
Payer: MEDICARE

## 2018-06-13 DIAGNOSIS — S05.01XA ABRASION OF RIGHT CORNEA, INITIAL ENCOUNTER: ICD-10-CM
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
## 2018-06-13 DIAGNOSIS — F10.920 ACUTE ALCOHOLIC INTOXICATION WITHOUT COMPLICATION (HCC): Primary | ICD-10-CM

## 2018-06-13 PROCEDURE — 70486 CT MAXILLOFACIAL W/O DYE: CPT

## 2018-06-13 PROCEDURE — 99284 EMERGENCY DEPT VISIT MOD MDM: CPT

## 2018-06-13 PROCEDURE — 6360000002 HC RX W HCPCS: Performed by: EMERGENCY MEDICINE

## 2018-06-13 PROCEDURE — 90471 IMMUNIZATION ADMIN: CPT | Performed by: EMERGENCY MEDICINE

## 2018-06-13 PROCEDURE — G0480 DRUG TEST DEF 1-7 CLASSES: HCPCS

## 2018-06-13 PROCEDURE — 90715 TDAP VACCINE 7 YRS/> IM: CPT | Performed by: EMERGENCY MEDICINE

## 2018-06-13 PROCEDURE — 70450 CT HEAD/BRAIN W/O DYE: CPT

## 2018-06-13 RX ADMIN — TETANUS TOXOID, REDUCED DIPHTHERIA TOXOID AND ACELLULAR PERTUSSIS VACCINE, ADSORBED 0.5 ML: 5; 2.5; 8; 8; 2.5 SUSPENSION INTRAMUSCULAR at 23:47

## 2018-06-14 VITALS
RESPIRATION RATE: 16 BRPM | SYSTOLIC BLOOD PRESSURE: 122 MMHG | OXYGEN SATURATION: 99 % | HEART RATE: 78 BPM | TEMPERATURE: 98.6 F | DIASTOLIC BLOOD PRESSURE: 84 MMHG | WEIGHT: 160 LBS | BODY MASS INDEX: 25.82 KG/M2

## 2018-06-14 LAB
ETHANOL PERCENT: 0.28 %
ETHANOL: 277 MG/DL

## 2018-06-14 PROCEDURE — 6370000000 HC RX 637 (ALT 250 FOR IP): Performed by: EMERGENCY MEDICINE

## 2018-06-14 RX ORDER — CIPROFLOXACIN HYDROCHLORIDE 3.5 MG/ML
1 SOLUTION/ DROPS TOPICAL
Status: DISCONTINUED | OUTPATIENT
Start: 2018-06-14 | End: 2018-06-14 | Stop reason: HOSPADM

## 2018-06-14 RX ORDER — IBUPROFEN 400 MG/1
400 TABLET ORAL ONCE
Status: COMPLETED | OUTPATIENT
Start: 2018-06-14 | End: 2018-06-14

## 2018-06-14 RX ADMIN — CIPROFLOXACIN HYDROCHLORIDE 1 DROP: 3 SOLUTION/ DROPS OPHTHALMIC at 04:55

## 2018-06-14 RX ADMIN — IBUPROFEN 400 MG: 400 TABLET, FILM COATED ORAL at 04:55

## 2018-06-14 ASSESSMENT — PAIN SCALES - GENERAL: PAINLEVEL_OUTOF10: 8

## 2018-11-23 ENCOUNTER — HOSPITAL ENCOUNTER (EMERGENCY)
Age: 58
Discharge: LEFT BEFORE DISCHARGE | End: 2018-11-24
Attending: EMERGENCY MEDICINE
Payer: MEDICARE

## 2018-11-23 VITALS
HEIGHT: 68 IN | HEART RATE: 76 BPM | RESPIRATION RATE: 18 BRPM | OXYGEN SATURATION: 96 % | WEIGHT: 135 LBS | TEMPERATURE: 98.3 F | DIASTOLIC BLOOD PRESSURE: 85 MMHG | SYSTOLIC BLOOD PRESSURE: 127 MMHG | BODY MASS INDEX: 20.46 KG/M2

## 2018-11-23 DIAGNOSIS — F10.920 ACUTE ALCOHOLIC INTOXICATION WITHOUT COMPLICATION (HCC): Primary | ICD-10-CM

## 2018-11-23 DIAGNOSIS — Z53.20 LEFT BEFORE TREATMENT COMPLETED: ICD-10-CM

## 2018-11-23 LAB
ETHANOL PERCENT: 0.28 %
ETHANOL: 282 MG/DL

## 2018-11-23 PROCEDURE — G0480 DRUG TEST DEF 1-7 CLASSES: HCPCS

## 2018-11-23 PROCEDURE — 99284 EMERGENCY DEPT VISIT MOD MDM: CPT

## 2018-11-23 ASSESSMENT — ENCOUNTER SYMPTOMS
WHEEZING: 0
COUGH: 0
BACK PAIN: 0
EYE PAIN: 0
EYE ITCHING: 0
NAUSEA: 0
SHORTNESS OF BREATH: 0
EYE DISCHARGE: 0
VOMITING: 0
SORE THROAT: 0
RHINORRHEA: 0

## 2018-11-24 NOTE — ED PROVIDER NOTES
Medicine Physician Assistant    (Please note that portions of this note were completed with a voice recognition program.  Efforts were made to edit thedictations but occasionally words are mis-transcribed.)        Abdi Arellaon PA-C  11/24/18 0000

## 2018-11-24 NOTE — ED TRIAGE NOTES
Patient presents to ED with police, ambulatory, speaking full sentences, calm and cooperative. Patient was presented by police and told staff that the patient was too intoxicated to go to correction. Patient states he was arguing with his girlfriend and she called the police. Patient denies any injuries. Patient states he has been drinking today, 10 beers or more. Patient drinks one-two drinks daily of beer. Patient denies drug use. Patient denies wanting social service for his current living situation and patient denies any psych complaints. Patients VSS, NAD noted.  VSS

## 2018-11-24 NOTE — ED NOTES
Patient now requesting phone for ride home. PA aware and provides phone for patient.       German Mcallister RN  11/23/18 [id number]

## 2019-01-26 ENCOUNTER — HOSPITAL ENCOUNTER (EMERGENCY)
Age: 59
Discharge: HOME | End: 2019-01-26
Attending: EMERGENCY MEDICINE
Payer: MEDICARE

## 2019-01-26 VITALS
OXYGEN SATURATION: 97 % | TEMPERATURE: 97 F | HEART RATE: 77 BPM | SYSTOLIC BLOOD PRESSURE: 107 MMHG | RESPIRATION RATE: 18 BRPM | DIASTOLIC BLOOD PRESSURE: 70 MMHG

## 2019-01-26 DIAGNOSIS — S01.81XA FACIAL LACERATION, INITIAL ENCOUNTER: ICD-10-CM

## 2019-01-26 DIAGNOSIS — S00.83XA CONTUSION OF FACE, INITIAL ENCOUNTER: Primary | ICD-10-CM

## 2019-01-26 PROCEDURE — 12011 RPR F/E/E/N/L/M 2.5 CM/<: CPT

## 2019-01-26 PROCEDURE — 99283 EMERGENCY DEPT VISIT LOW MDM: CPT

## 2019-01-26 ASSESSMENT — PAIN DESCRIPTION - LOCATION: LOCATION: HEAD

## 2019-01-26 ASSESSMENT — PAIN DESCRIPTION - FREQUENCY: FREQUENCY: CONTINUOUS

## 2019-01-26 ASSESSMENT — PAIN DESCRIPTION - ONSET: ONSET: ON-GOING

## 2019-01-26 ASSESSMENT — ENCOUNTER SYMPTOMS
SHORTNESS OF BREATH: 0
ABDOMINAL PAIN: 0
NAUSEA: 0
FACIAL SWELLING: 1
BACK PAIN: 0
VOMITING: 0

## 2019-01-26 ASSESSMENT — PAIN DESCRIPTION - PAIN TYPE: TYPE: ACUTE PAIN

## 2019-01-26 ASSESSMENT — PAIN DESCRIPTION - ORIENTATION: ORIENTATION: RIGHT

## 2019-01-26 ASSESSMENT — PAIN SCALES - GENERAL: PAINLEVEL_OUTOF10: 10

## 2019-01-26 ASSESSMENT — PAIN DESCRIPTION - PROGRESSION: CLINICAL_PROGRESSION: NOT CHANGED

## 2019-01-26 ASSESSMENT — PAIN DESCRIPTION - DESCRIPTORS: DESCRIPTORS: ACHING

## 2019-03-06 ENCOUNTER — APPOINTMENT (OUTPATIENT)
Dept: GENERAL RADIOLOGY | Age: 59
End: 2019-03-06
Payer: MEDICARE

## 2019-03-06 ENCOUNTER — HOSPITAL ENCOUNTER (EMERGENCY)
Age: 59
Discharge: HOME | End: 2019-03-06
Attending: EMERGENCY MEDICINE
Payer: MEDICARE

## 2019-03-06 VITALS
SYSTOLIC BLOOD PRESSURE: 109 MMHG | HEIGHT: 64 IN | TEMPERATURE: 98.2 F | BODY MASS INDEX: 25.61 KG/M2 | HEART RATE: 71 BPM | RESPIRATION RATE: 20 BRPM | OXYGEN SATURATION: 98 % | WEIGHT: 150 LBS | DIASTOLIC BLOOD PRESSURE: 79 MMHG

## 2019-03-06 DIAGNOSIS — R07.81 RIB PAIN ON RIGHT SIDE: Primary | ICD-10-CM

## 2019-03-06 PROCEDURE — 6370000000 HC RX 637 (ALT 250 FOR IP): Performed by: EMERGENCY MEDICINE

## 2019-03-06 PROCEDURE — 71046 X-RAY EXAM CHEST 2 VIEWS: CPT

## 2019-03-06 PROCEDURE — 99283 EMERGENCY DEPT VISIT LOW MDM: CPT

## 2019-03-06 RX ORDER — IBUPROFEN 600 MG/1
600 TABLET ORAL EVERY 6 HOURS PRN
Qty: 20 TABLET | Refills: 0 | Status: SHIPPED | OUTPATIENT
Start: 2019-03-06 | End: 2019-04-08 | Stop reason: SDUPTHER

## 2019-03-06 RX ORDER — ACETAMINOPHEN 325 MG/1
650 TABLET ORAL EVERY 6 HOURS PRN
Qty: 20 TABLET | Refills: 0 | Status: SHIPPED | OUTPATIENT
Start: 2019-03-06 | End: 2019-04-08 | Stop reason: SDUPTHER

## 2019-03-06 RX ORDER — IBUPROFEN 800 MG/1
800 TABLET ORAL ONCE
Status: COMPLETED | OUTPATIENT
Start: 2019-03-06 | End: 2019-03-06

## 2019-03-06 RX ORDER — IBUPROFEN 600 MG/1
600 TABLET ORAL EVERY 6 HOURS PRN
Qty: 20 TABLET | Refills: 0 | Status: SHIPPED | OUTPATIENT
Start: 2019-03-06 | End: 2019-03-06 | Stop reason: SDUPTHER

## 2019-03-06 RX ADMIN — IBUPROFEN 800 MG: 800 TABLET ORAL at 20:38

## 2019-03-06 ASSESSMENT — PAIN DESCRIPTION - ORIENTATION: ORIENTATION: RIGHT

## 2019-03-06 ASSESSMENT — PAIN SCALES - GENERAL
PAINLEVEL_OUTOF10: 3
PAINLEVEL_OUTOF10: 3

## 2019-03-06 ASSESSMENT — ENCOUNTER SYMPTOMS
BACK PAIN: 0
SHORTNESS OF BREATH: 0
ABDOMINAL PAIN: 0
VOMITING: 0
NAUSEA: 0

## 2019-03-06 ASSESSMENT — PAIN DESCRIPTION - LOCATION: LOCATION: RIB CAGE

## 2019-03-06 ASSESSMENT — PAIN DESCRIPTION - PAIN TYPE: TYPE: ACUTE PAIN

## 2019-03-07 ASSESSMENT — ENCOUNTER SYMPTOMS: COLOR CHANGE: 0

## 2019-04-08 ENCOUNTER — HOSPITAL ENCOUNTER (EMERGENCY)
Age: 59
Discharge: HOME | End: 2019-04-08
Attending: EMERGENCY MEDICINE
Payer: MEDICARE

## 2019-04-08 VITALS
OXYGEN SATURATION: 99 % | RESPIRATION RATE: 18 BRPM | HEART RATE: 60 BPM | SYSTOLIC BLOOD PRESSURE: 123 MMHG | HEIGHT: 64 IN | DIASTOLIC BLOOD PRESSURE: 76 MMHG | WEIGHT: 140 LBS | BODY MASS INDEX: 23.9 KG/M2 | TEMPERATURE: 99 F

## 2019-04-08 DIAGNOSIS — M79.604 BILATERAL LEG PAIN: Primary | ICD-10-CM

## 2019-04-08 DIAGNOSIS — M79.605 BILATERAL LEG PAIN: Primary | ICD-10-CM

## 2019-04-08 LAB
ANION GAP SERPL CALCULATED.3IONS-SCNC: 11 MMOL/L (ref 9–17)
BUN BLDV-MCNC: 14 MG/DL (ref 6–20)
BUN/CREAT BLD: ABNORMAL (ref 9–20)
CALCIUM SERPL-MCNC: 8.7 MG/DL (ref 8.6–10.4)
CHLORIDE BLD-SCNC: 106 MMOL/L (ref 98–107)
CO2: 26 MMOL/L (ref 20–31)
CREAT SERPL-MCNC: 0.66 MG/DL (ref 0.7–1.2)
GFR AFRICAN AMERICAN: >60 ML/MIN
GFR NON-AFRICAN AMERICAN: >60 ML/MIN
GFR SERPL CREATININE-BSD FRML MDRD: ABNORMAL ML/MIN/{1.73_M2}
GFR SERPL CREATININE-BSD FRML MDRD: ABNORMAL ML/MIN/{1.73_M2}
GLUCOSE BLD-MCNC: 100 MG/DL (ref 70–99)
POTASSIUM SERPL-SCNC: 4.3 MMOL/L (ref 3.7–5.3)
SODIUM BLD-SCNC: 143 MMOL/L (ref 135–144)
TOTAL CK: 69 U/L (ref 39–308)

## 2019-04-08 PROCEDURE — 80048 BASIC METABOLIC PNL TOTAL CA: CPT

## 2019-04-08 PROCEDURE — 82550 ASSAY OF CK (CPK): CPT

## 2019-04-08 PROCEDURE — 93970 EXTREMITY STUDY: CPT

## 2019-04-08 PROCEDURE — 6370000000 HC RX 637 (ALT 250 FOR IP): Performed by: HOSPITALIST

## 2019-04-08 PROCEDURE — 99284 EMERGENCY DEPT VISIT MOD MDM: CPT

## 2019-04-08 RX ORDER — ACETAMINOPHEN 325 MG/1
650 TABLET ORAL EVERY 6 HOURS PRN
Qty: 20 TABLET | Refills: 0 | Status: SHIPPED | OUTPATIENT
Start: 2019-04-08 | End: 2019-11-22 | Stop reason: SDUPTHER

## 2019-04-08 RX ORDER — IBUPROFEN 600 MG/1
600 TABLET ORAL EVERY 6 HOURS PRN
Qty: 20 TABLET | Refills: 0 | Status: SHIPPED | OUTPATIENT
Start: 2019-04-08 | End: 2019-11-22 | Stop reason: SDUPTHER

## 2019-04-08 RX ORDER — IBUPROFEN 400 MG/1
400 TABLET ORAL ONCE
Status: COMPLETED | OUTPATIENT
Start: 2019-04-08 | End: 2019-04-08

## 2019-04-08 RX ADMIN — IBUPROFEN 400 MG: 400 TABLET ORAL at 19:55

## 2019-04-08 ASSESSMENT — PAIN DESCRIPTION - LOCATION: LOCATION: LEG

## 2019-04-08 ASSESSMENT — PAIN DESCRIPTION - PAIN TYPE: TYPE: ACUTE PAIN

## 2019-04-08 ASSESSMENT — PAIN SCALES - GENERAL
PAINLEVEL_OUTOF10: 7
PAINLEVEL_OUTOF10: 7

## 2019-04-08 ASSESSMENT — ENCOUNTER SYMPTOMS
CHEST TIGHTNESS: 0
WHEEZING: 0
NAUSEA: 0
VOMITING: 0
SHORTNESS OF BREATH: 0
CONSTIPATION: 0
DIARRHEA: 0
ABDOMINAL PAIN: 0

## 2019-04-08 ASSESSMENT — PAIN DESCRIPTION - DESCRIPTORS: DESCRIPTORS: ACHING

## 2019-04-08 ASSESSMENT — PAIN DESCRIPTION - ORIENTATION: ORIENTATION: LEFT;RIGHT

## 2019-04-08 NOTE — ED PROVIDER NOTES
[facility] ED  EMERGENCY DEPARTMENT ENCOUNTER  RESIDENT    Pt Name: Savage Orona  MRN: [id number]  [place] 1960  Date of evaluation: 4/8/2019  PCP:  MD Lizette Smith       Chief Complaint   Patient presents with    Leg Pain     bilateral lower leg pain and numbness. HISTORY OF PRESENT ILLNESS    Savage Orona is a 63 y.o. male who presents with complaints of bilateral leg pain. Patient states he has been having this pain for the last year or so intermittently. He describes it as sharp pain with an intensity of 7/10, though he appears comfortable. He states he has tenderness in most of his legs including bilateral anterior, calf, hamstring and thigh. He states he has some numbness as well but denies weakness or decreased sensation in either extremity. He is able to ambulate without difficulty. HPI       REVIEW OF SYSTEMS       Review of Systems   Constitutional: Negative for chills, fatigue and fever. Respiratory: Negative for chest tightness, shortness of breath and wheezing. Cardiovascular: Negative for chest pain and leg swelling. Gastrointestinal: Negative for abdominal pain, constipation, diarrhea, nausea and vomiting. Musculoskeletal: Negative for arthralgias, gait problem, joint swelling, myalgias and neck pain. Neurological: Positive for numbness. Negative for dizziness, tremors, seizures, syncope, weakness, light-headedness and headaches. PAST MEDICAL HISTORY    has a past medical history of Glaucoma and Hypertension. SURGICAL HISTORY      has no past surgical history on file. CURRENT MEDICATIONS       Discharge Medication List as of 4/8/2019  8:56 PM          ALLERGIES     has No Known Allergies. FAMILY HISTORY     has no family status information on file. family history is not on file. SOCIAL HISTORY      reports that he has been smoking cigarettes. His smokeless tobacco use includes chew.  He reports that he drinks alcohol. He reports that he does not use drugs. PHYSICAL EXAM     INITIAL VITALS:  height is 5' 4\" (1.626 m) and weight is 140 lb (63.5 kg). His oral temperature is 99 °F (37.2 °C). His blood pressure is 123/76 and his pulse is 60. His respiration is 18 and oxygen saturation is 99%. Physical Exam   Constitutional: He is oriented to person, place, and time. He appears well-developed and well-nourished. HENT:   Head: Normocephalic and atraumatic. Eyes: Pupils are equal, round, and reactive to light. Conjunctivae and EOM are normal.   Neck: Normal range of motion. Neck supple. No JVD present. Cardiovascular: Normal rate, regular rhythm and normal heart sounds. Pulmonary/Chest: Effort normal and breath sounds normal. No respiratory distress. He has no rales. He exhibits no tenderness. Abdominal: Soft. Bowel sounds are normal. He exhibits no distension. There is no tenderness. Musculoskeletal: Normal range of motion. He exhibits tenderness (states has tenderness over both calves and hamstrings, and anterior part of his legs and knee. ). He exhibits no edema or deformity. Scratch marks over bilateral shins which are old according to patient. Does not appear cellulitic. Neurological: He is alert and oriented to person, place, and time. No cranial nerve deficit or sensory deficit. Skin: Skin is warm and dry. Rash (dry skin and scratch marks) noted. No erythema.      DIFFERENTIAL DIAGNOSIS/MDM:   DVT, muscle aches, radiculopathy?, rhabdomyolysis    DIAGNOSTIC RESULTS     EKG: All EKG's are interpreted by the Emergency Department Physician who either signs or Co-signs this chart in the absence of a cardiologist.    RADIOLOGY:   I directly visualized the following  images and reviewed the radiologist interpretations:  VL DUP LOWER EXTREMITY VENOUS BILATERAL   Final Result          ED BEDSIDE ULTRASOUND:   None     LABS:  Labs Reviewed   BASIC METABOLIC PANEL - Abnormal; Notable for the following

## 2019-04-08 NOTE — ED NOTES
Pt. Arrives to ED via walk-in for c/o bilateral leg pain x1 year. Pt. Reports he was evaluated at this hospital for the same last year and was advised to follow up but did not schedule appointment. Pt. Denies taking anything for pain relief PTA. Pt. Placed in gown for evaluation.      Alannah Mackey RN  04/08/19 [id number]

## 2019-04-09 NOTE — ED PROVIDER NOTES
[facility]     Emergency Department     Faculty Attestation    I performed a history and physical examination of the patient and discussed management with the resident. I reviewed the residents note and agree with the documented findings including all diagnostic interpretations and plan of care. Any areas of disagreement are noted on the chart. I was personally present for the key portions of any procedures. I have documented in the chart those procedures where I was not present during the key portions. I have reviewed the emergency nurses triage note. I agree with the chief complaint, past medical history, past surgical history, allergies, medications, social and family history as documented unless otherwise noted below. Documentation of the HPI, Physical Exam and Medical Decision Making performed by scribkirti is based on my personal performance of the HPI, PE and MDM. For Physician Assistant/ Nurse Practitioner cases/documentation I have personally evaluated this patient and have completed at least one if not all key elements of the E/M (history, physical exam, and MDM). Additional findings are as noted. Primary Care Physician: Kiko Garza MD    History: This is a 63 y.o. male who presents to the Emergency Department with complaint of bilateral leg pain. Present in calves and thighs. Ongoing over the past several days. No weakness. No back pain no falls. No prior history of blood clots in the lungs or legs. Addendum: ROS asked by me that is not included in resident/RIKY charting  Review of Systems   Genitourinary: Negative for difficulty urinating. Musculoskeletal: Positive for myalgias. Skin: Negative for rash. Allergic/Immunologic: Negative for environmental allergies. Neurological: Negative for weakness. Hematological: Does not bruise/bleed easily.      Addendum: Updated PM/S/FH  No significant past surgical history per review with

## 2019-04-09 NOTE — ED NOTES
US at bedside      Rayne Acevedo, [facility]  04/08/19 2030

## 2019-11-22 ENCOUNTER — APPOINTMENT (OUTPATIENT)
Dept: CT IMAGING | Age: 59
End: 2019-11-22
Payer: MEDICARE

## 2019-11-22 ENCOUNTER — HOSPITAL ENCOUNTER (EMERGENCY)
Age: 59
Discharge: HOME | End: 2019-11-22
Attending: EMERGENCY MEDICINE
Payer: MEDICARE

## 2019-11-22 VITALS
TEMPERATURE: 97.8 F | WEIGHT: 130 LBS | HEIGHT: 66 IN | SYSTOLIC BLOOD PRESSURE: 136 MMHG | HEART RATE: 75 BPM | RESPIRATION RATE: 16 BRPM | DIASTOLIC BLOOD PRESSURE: 89 MMHG | BODY MASS INDEX: 20.89 KG/M2 | OXYGEN SATURATION: 97 %

## 2019-11-22 DIAGNOSIS — S09.90XA CLOSED HEAD INJURY, INITIAL ENCOUNTER: Primary | ICD-10-CM

## 2019-11-22 PROCEDURE — 96372 THER/PROPH/DIAG INJ SC/IM: CPT

## 2019-11-22 PROCEDURE — 70450 CT HEAD/BRAIN W/O DYE: CPT

## 2019-11-22 PROCEDURE — 6370000000 HC RX 637 (ALT 250 FOR IP): Performed by: EMERGENCY MEDICINE

## 2019-11-22 PROCEDURE — 6360000002 HC RX W HCPCS: Performed by: EMERGENCY MEDICINE

## 2019-11-22 PROCEDURE — 99284 EMERGENCY DEPT VISIT MOD MDM: CPT

## 2019-11-22 RX ORDER — IBUPROFEN 600 MG/1
600 TABLET ORAL EVERY 8 HOURS PRN
Qty: 20 TABLET | Refills: 0 | Status: ON HOLD | OUTPATIENT
Start: 2019-11-22 | End: 2022-02-16 | Stop reason: HOSPADM

## 2019-11-22 RX ORDER — ACETAMINOPHEN 500 MG
1000 TABLET ORAL EVERY 8 HOURS PRN
Qty: 20 TABLET | Refills: 0 | Status: ON HOLD | OUTPATIENT
Start: 2019-11-22 | End: 2022-02-16 | Stop reason: HOSPADM

## 2019-11-22 RX ORDER — KETOROLAC TROMETHAMINE 30 MG/ML
30 INJECTION, SOLUTION INTRAMUSCULAR; INTRAVENOUS ONCE
Status: COMPLETED | OUTPATIENT
Start: 2019-11-22 | End: 2019-11-22

## 2019-11-22 RX ORDER — ACETAMINOPHEN 500 MG
1000 TABLET ORAL ONCE
Status: COMPLETED | OUTPATIENT
Start: 2019-11-22 | End: 2019-11-22

## 2019-11-22 RX ADMIN — KETOROLAC TROMETHAMINE 30 MG: 30 INJECTION, SOLUTION INTRAMUSCULAR at 23:21

## 2019-11-22 RX ADMIN — ACETAMINOPHEN 1000 MG: 500 TABLET ORAL at 22:26

## 2019-11-22 ASSESSMENT — PAIN SCALES - GENERAL
PAINLEVEL_OUTOF10: 7
PAINLEVEL_OUTOF10: 4
PAINLEVEL_OUTOF10: 7
PAINLEVEL_OUTOF10: 6

## 2019-11-22 ASSESSMENT — ENCOUNTER SYMPTOMS
VOICE CHANGE: 0
BACK PAIN: 0
VOMITING: 0
NAUSEA: 0
TROUBLE SWALLOWING: 0

## 2019-11-22 ASSESSMENT — PAIN DESCRIPTION - FREQUENCY: FREQUENCY: CONTINUOUS

## 2019-11-22 ASSESSMENT — PAIN DESCRIPTION - ORIENTATION: ORIENTATION: UPPER;LEFT

## 2019-11-22 ASSESSMENT — PAIN DESCRIPTION - LOCATION: LOCATION: HEAD

## 2019-11-22 ASSESSMENT — PAIN DESCRIPTION - DESCRIPTORS: DESCRIPTORS: SHARP

## 2019-12-20 ENCOUNTER — HOSPITAL ENCOUNTER (EMERGENCY)
Age: 59
Discharge: HOME | End: 2019-12-20
Attending: EMERGENCY MEDICINE
Payer: MEDICARE

## 2019-12-20 ENCOUNTER — APPOINTMENT (OUTPATIENT)
Dept: GENERAL RADIOLOGY | Age: 59
End: 2019-12-20
Payer: MEDICARE

## 2019-12-20 VITALS
RESPIRATION RATE: 16 BRPM | HEIGHT: 66 IN | WEIGHT: 130 LBS | DIASTOLIC BLOOD PRESSURE: 76 MMHG | OXYGEN SATURATION: 97 % | SYSTOLIC BLOOD PRESSURE: 110 MMHG | TEMPERATURE: 97.5 F | BODY MASS INDEX: 20.89 KG/M2 | HEART RATE: 62 BPM

## 2019-12-20 DIAGNOSIS — S39.012A LUMBOSACRAL STRAIN, INITIAL ENCOUNTER: Primary | ICD-10-CM

## 2019-12-20 PROCEDURE — 72070 X-RAY EXAM THORAC SPINE 2VWS: CPT

## 2019-12-20 PROCEDURE — 72100 X-RAY EXAM L-S SPINE 2/3 VWS: CPT

## 2019-12-20 PROCEDURE — 6370000000 HC RX 637 (ALT 250 FOR IP): Performed by: GENERAL PRACTICE

## 2019-12-20 PROCEDURE — 99283 EMERGENCY DEPT VISIT LOW MDM: CPT

## 2019-12-20 RX ORDER — IBUPROFEN 400 MG/1
400 TABLET ORAL EVERY 6 HOURS PRN
Qty: 30 TABLET | Refills: 0 | Status: SHIPPED | OUTPATIENT
Start: 2019-12-20 | End: 2022-05-25

## 2019-12-20 RX ORDER — IBUPROFEN 400 MG/1
400 TABLET ORAL ONCE
Status: COMPLETED | OUTPATIENT
Start: 2019-12-20 | End: 2019-12-20

## 2019-12-20 RX ADMIN — IBUPROFEN 400 MG: 400 TABLET, FILM COATED ORAL at 06:31

## 2019-12-20 ASSESSMENT — ENCOUNTER SYMPTOMS
COUGH: 0
BACK PAIN: 1
ABDOMINAL PAIN: 0
SHORTNESS OF BREATH: 0

## 2019-12-20 ASSESSMENT — PAIN SCALES - GENERAL
PAINLEVEL_OUTOF10: 7
PAINLEVEL_OUTOF10: 7

## 2019-12-27 ENCOUNTER — APPOINTMENT (OUTPATIENT)
Dept: CT IMAGING | Age: 59
End: 2019-12-27
Payer: MEDICARE

## 2019-12-27 ENCOUNTER — HOSPITAL ENCOUNTER (OUTPATIENT)
Age: 59
Setting detail: OBSERVATION
Discharge: HOME | End: 2019-12-28
Attending: EMERGENCY MEDICINE | Admitting: EMERGENCY MEDICINE
Payer: MEDICARE

## 2019-12-27 DIAGNOSIS — R20.2 NUMBNESS AND TINGLING: Primary | ICD-10-CM

## 2019-12-27 DIAGNOSIS — R20.0 NUMBNESS AND TINGLING: Primary | ICD-10-CM

## 2019-12-27 LAB
% CKMB: 1.3 % (ref 0–3.5)
ABSOLUTE EOS #: 0.04 K/UL (ref 0–0.44)
ABSOLUTE IMMATURE GRANULOCYTE: <0.03 K/UL (ref 0–0.3)
ABSOLUTE LYMPH #: 1.32 K/UL (ref 1.1–3.7)
ABSOLUTE MONO #: 0.44 K/UL (ref 0.1–1.2)
ANION GAP SERPL CALCULATED.3IONS-SCNC: 13 MMOL/L (ref 9–17)
BASOPHILS # BLD: 0 % (ref 0–2)
BASOPHILS ABSOLUTE: <0.03 K/UL (ref 0–0.2)
BUN BLDV-MCNC: 9 MG/DL (ref 6–20)
BUN/CREAT BLD: ABNORMAL (ref 9–20)
CALCIUM SERPL-MCNC: 8.4 MG/DL (ref 8.6–10.4)
CHLORIDE BLD-SCNC: 108 MMOL/L (ref 98–107)
CK MB: 4.9 NG/ML
CKMB INTERPRETATION: ABNORMAL
CO2: 22 MMOL/L (ref 20–31)
CREAT SERPL-MCNC: 0.64 MG/DL (ref 0.7–1.2)
DIFFERENTIAL TYPE: ABNORMAL
EOSINOPHILS RELATIVE PERCENT: 1 % (ref 1–4)
ETHANOL PERCENT: 0.11 %
ETHANOL: 112 MG/DL
GFR AFRICAN AMERICAN: >60 ML/MIN
GFR NON-AFRICAN AMERICAN: >60 ML/MIN
GFR SERPL CREATININE-BSD FRML MDRD: ABNORMAL ML/MIN/{1.73_M2}
GFR SERPL CREATININE-BSD FRML MDRD: ABNORMAL ML/MIN/{1.73_M2}
GLUCOSE BLD-MCNC: 95 MG/DL (ref 70–99)
HCT VFR BLD CALC: 44.2 % (ref 40.7–50.3)
HEMOGLOBIN: 14.6 G/DL (ref 13–17)
IMMATURE GRANULOCYTES: 0 %
INR BLD: 1.1
LYMPHOCYTES # BLD: 18 % (ref 24–43)
MCH RBC QN AUTO: 32.4 PG (ref 25.2–33.5)
MCHC RBC AUTO-ENTMCNC: 33 G/DL (ref 28.4–34.8)
MCV RBC AUTO: 98.2 FL (ref 82.6–102.9)
MONOCYTES # BLD: 6 % (ref 3–12)
MYOGLOBIN: 435 NG/ML (ref 28–72)
NRBC AUTOMATED: 0 PER 100 WBC
PARTIAL THROMBOPLASTIN TIME: 25.1 SEC (ref 20.5–30.5)
PDW BLD-RTO: 11.9 % (ref 11.8–14.4)
PLATELET # BLD: 175 K/UL (ref 138–453)
PLATELET ESTIMATE: ABNORMAL
PMV BLD AUTO: 8.8 FL (ref 8.1–13.5)
POTASSIUM SERPL-SCNC: 4.3 MMOL/L (ref 3.7–5.3)
PROTHROMBIN TIME: 11.4 SEC (ref 9–12)
RBC # BLD: 4.5 M/UL (ref 4.21–5.77)
RBC # BLD: ABNORMAL 10*6/UL
SEG NEUTROPHILS: 75 % (ref 36–65)
SEGMENTED NEUTROPHILS ABSOLUTE COUNT: 5.4 K/UL (ref 1.5–8.1)
SODIUM BLD-SCNC: 143 MMOL/L (ref 135–144)
TOTAL CK: 367 U/L (ref 39–308)
TROPONIN INTERP: ABNORMAL
TROPONIN T: ABNORMAL NG/ML
TROPONIN, HIGH SENSITIVITY: 6 NG/L (ref 0–22)
WBC # BLD: 7.2 K/UL (ref 3.5–11.3)
WBC # BLD: ABNORMAL 10*3/UL

## 2019-12-27 PROCEDURE — 70496 CT ANGIOGRAPHY HEAD: CPT

## 2019-12-27 PROCEDURE — 80048 BASIC METABOLIC PNL TOTAL CA: CPT

## 2019-12-27 PROCEDURE — 85730 THROMBOPLASTIN TIME PARTIAL: CPT

## 2019-12-27 PROCEDURE — G0378 HOSPITAL OBSERVATION PER HR: HCPCS

## 2019-12-27 PROCEDURE — 85610 PROTHROMBIN TIME: CPT

## 2019-12-27 PROCEDURE — 84484 ASSAY OF TROPONIN QUANT: CPT

## 2019-12-27 PROCEDURE — 99285 EMERGENCY DEPT VISIT HI MDM: CPT

## 2019-12-27 PROCEDURE — 99285 EMERGENCY DEPT VISIT HI MDM: CPT | Performed by: PSYCHIATRY & NEUROLOGY

## 2019-12-27 PROCEDURE — 6370000000 HC RX 637 (ALT 250 FOR IP): Performed by: STUDENT IN AN ORGANIZED HEALTH CARE EDUCATION/TRAINING PROGRAM

## 2019-12-27 PROCEDURE — G0480 DRUG TEST DEF 1-7 CLASSES: HCPCS

## 2019-12-27 PROCEDURE — 70450 CT HEAD/BRAIN W/O DYE: CPT

## 2019-12-27 PROCEDURE — 83874 ASSAY OF MYOGLOBIN: CPT

## 2019-12-27 PROCEDURE — 82550 ASSAY OF CK (CPK): CPT

## 2019-12-27 PROCEDURE — 6360000004 HC RX CONTRAST MEDICATION: Performed by: EMERGENCY MEDICINE

## 2019-12-27 PROCEDURE — 85025 COMPLETE CBC W/AUTO DIFF WBC: CPT

## 2019-12-27 PROCEDURE — 82553 CREATINE MB FRACTION: CPT

## 2019-12-27 PROCEDURE — 96360 HYDRATION IV INFUSION INIT: CPT

## 2019-12-27 PROCEDURE — 2580000003 HC RX 258: Performed by: STUDENT IN AN ORGANIZED HEALTH CARE EDUCATION/TRAINING PROGRAM

## 2019-12-27 RX ORDER — THIAMINE MONONITRATE (VIT B1) 100 MG
100 TABLET ORAL DAILY
Status: DISCONTINUED | OUTPATIENT
Start: 2019-12-27 | End: 2019-12-28 | Stop reason: HOSPADM

## 2019-12-27 RX ORDER — 0.9 % SODIUM CHLORIDE 0.9 %
500 INTRAVENOUS SOLUTION INTRAVENOUS ONCE
Status: COMPLETED | OUTPATIENT
Start: 2019-12-27 | End: 2019-12-28

## 2019-12-27 RX ORDER — FOLIC ACID 1 MG/1
1 TABLET ORAL DAILY
Status: DISCONTINUED | OUTPATIENT
Start: 2019-12-27 | End: 2019-12-28 | Stop reason: HOSPADM

## 2019-12-27 RX ADMIN — ASPIRIN 325 MG: 325 TABLET, COATED ORAL at 23:10

## 2019-12-27 RX ADMIN — IOHEXOL 90 ML: 350 INJECTION, SOLUTION INTRAVENOUS at 22:07

## 2019-12-27 RX ADMIN — SODIUM CHLORIDE 500 ML: 9 INJECTION, SOLUTION INTRAVENOUS at 23:10

## 2019-12-27 ASSESSMENT — ENCOUNTER SYMPTOMS
COUGH: 0
ABDOMINAL PAIN: 0
BACK PAIN: 0
CHEST TIGHTNESS: 0
SHORTNESS OF BREATH: 0
VOMITING: 0
NAUSEA: 0

## 2019-12-27 ASSESSMENT — PAIN SCALES - GENERAL: PAINLEVEL_OUTOF10: 6

## 2019-12-27 ASSESSMENT — PAIN DESCRIPTION - ORIENTATION: ORIENTATION: RIGHT

## 2019-12-27 ASSESSMENT — PAIN DESCRIPTION - LOCATION: LOCATION: ARM;LEG

## 2019-12-28 ENCOUNTER — APPOINTMENT (OUTPATIENT)
Dept: MRI IMAGING | Age: 59
End: 2019-12-28
Payer: MEDICARE

## 2019-12-28 VITALS
HEIGHT: 66 IN | BODY MASS INDEX: 20.89 KG/M2 | SYSTOLIC BLOOD PRESSURE: 110 MMHG | WEIGHT: 130 LBS | DIASTOLIC BLOOD PRESSURE: 72 MMHG | OXYGEN SATURATION: 97 % | TEMPERATURE: 99.1 F | HEART RATE: 74 BPM | RESPIRATION RATE: 16 BRPM

## 2019-12-28 LAB
CHOLESTEROL/HDL RATIO: 2.1
CHOLESTEROL: 146 MG/DL
FOLATE: >20 NG/ML
HDLC SERPL-MCNC: 70 MG/DL
LDL CHOLESTEROL: 64 MG/DL (ref 0–130)
LV EF: 65 %
LVEF MODALITY: NORMAL
TRIGL SERPL-MCNC: 59 MG/DL
VITAMIN B-12: 206 PG/ML (ref 232–1245)
VLDLC SERPL CALC-MCNC: NORMAL MG/DL (ref 1–30)

## 2019-12-28 PROCEDURE — 6370000000 HC RX 637 (ALT 250 FOR IP): Performed by: STUDENT IN AN ORGANIZED HEALTH CARE EDUCATION/TRAINING PROGRAM

## 2019-12-28 PROCEDURE — 2580000003 HC RX 258: Performed by: STUDENT IN AN ORGANIZED HEALTH CARE EDUCATION/TRAINING PROGRAM

## 2019-12-28 PROCEDURE — 82746 ASSAY OF FOLIC ACID SERUM: CPT

## 2019-12-28 PROCEDURE — 36415 COLL VENOUS BLD VENIPUNCTURE: CPT

## 2019-12-28 PROCEDURE — 84425 ASSAY OF VITAMIN B-1: CPT

## 2019-12-28 PROCEDURE — 70551 MRI BRAIN STEM W/O DYE: CPT

## 2019-12-28 PROCEDURE — 99219 PR INITIAL OBSERVATION CARE/DAY 50 MINUTES: CPT | Performed by: PSYCHIATRY & NEUROLOGY

## 2019-12-28 PROCEDURE — 93306 TTE W/DOPPLER COMPLETE: CPT

## 2019-12-28 PROCEDURE — 96361 HYDRATE IV INFUSION ADD-ON: CPT

## 2019-12-28 PROCEDURE — G0378 HOSPITAL OBSERVATION PER HR: HCPCS

## 2019-12-28 PROCEDURE — 80061 LIPID PANEL: CPT

## 2019-12-28 PROCEDURE — 83036 HEMOGLOBIN GLYCOSYLATED A1C: CPT

## 2019-12-28 PROCEDURE — 82607 VITAMIN B-12: CPT

## 2019-12-28 PROCEDURE — 96372 THER/PROPH/DIAG INJ SC/IM: CPT

## 2019-12-28 PROCEDURE — 6360000002 HC RX W HCPCS: Performed by: EMERGENCY MEDICINE

## 2019-12-28 RX ORDER — FOLIC ACID 1 MG/1
1 TABLET ORAL DAILY
Qty: 30 TABLET | Refills: 3 | Status: ON HOLD | OUTPATIENT
Start: 2019-12-29 | End: 2022-04-01 | Stop reason: SDUPTHER

## 2019-12-28 RX ORDER — SODIUM CHLORIDE 9 MG/ML
INJECTION, SOLUTION INTRAVENOUS CONTINUOUS
Status: DISCONTINUED | OUTPATIENT
Start: 2019-12-28 | End: 2019-12-28 | Stop reason: HOSPADM

## 2019-12-28 RX ORDER — SODIUM CHLORIDE 0.9 % (FLUSH) 0.9 %
10 SYRINGE (ML) INJECTION EVERY 12 HOURS SCHEDULED
Status: DISCONTINUED | OUTPATIENT
Start: 2019-12-28 | End: 2019-12-28 | Stop reason: HOSPADM

## 2019-12-28 RX ORDER — LANOLIN ALCOHOL/MO/W.PET/CERES
100 CREAM (GRAM) TOPICAL DAILY
Qty: 30 TABLET | Refills: 3 | Status: ON HOLD | OUTPATIENT
Start: 2019-12-29 | End: 2022-04-01 | Stop reason: HOSPADM

## 2019-12-28 RX ORDER — CLOPIDOGREL BISULFATE 75 MG/1
75 TABLET ORAL DAILY
Status: DISCONTINUED | OUTPATIENT
Start: 2019-12-28 | End: 2019-12-28 | Stop reason: HOSPADM

## 2019-12-28 RX ORDER — ASPIRIN 81 MG/1
81 TABLET, CHEWABLE ORAL DAILY
Status: DISCONTINUED | OUTPATIENT
Start: 2019-12-28 | End: 2019-12-28 | Stop reason: HOSPADM

## 2019-12-28 RX ORDER — CLOPIDOGREL BISULFATE 75 MG/1
75 TABLET ORAL DAILY
Qty: 30 TABLET | Refills: 3 | Status: ON HOLD | OUTPATIENT
Start: 2019-12-29 | End: 2022-04-01 | Stop reason: SDUPTHER

## 2019-12-28 RX ORDER — ATORVASTATIN CALCIUM 80 MG/1
40 TABLET, FILM COATED ORAL NIGHTLY
Status: DISCONTINUED | OUTPATIENT
Start: 2019-12-28 | End: 2019-12-28 | Stop reason: HOSPADM

## 2019-12-28 RX ORDER — ATORVASTATIN CALCIUM 40 MG/1
40 TABLET, FILM COATED ORAL NIGHTLY
Qty: 30 TABLET | Refills: 3 | Status: ON HOLD | OUTPATIENT
Start: 2019-12-28 | End: 2022-04-01 | Stop reason: SDUPTHER

## 2019-12-28 RX ORDER — SODIUM CHLORIDE 0.9 % (FLUSH) 0.9 %
10 SYRINGE (ML) INJECTION PRN
Status: DISCONTINUED | OUTPATIENT
Start: 2019-12-28 | End: 2019-12-28 | Stop reason: HOSPADM

## 2019-12-28 RX ADMIN — CLOPIDOGREL 75 MG: 75 TABLET, FILM COATED ORAL at 14:20

## 2019-12-28 RX ADMIN — ENOXAPARIN SODIUM 40 MG: 40 INJECTION SUBCUTANEOUS at 14:20

## 2019-12-28 RX ADMIN — Medication 100 MG: at 00:20

## 2019-12-28 RX ADMIN — ATORVASTATIN CALCIUM 40 MG: 80 TABLET, FILM COATED ORAL at 01:39

## 2019-12-28 RX ADMIN — ASPIRIN 81 MG: 81 TABLET, CHEWABLE ORAL at 14:20

## 2019-12-28 RX ADMIN — Medication 100 MG: at 14:20

## 2019-12-28 RX ADMIN — FOLIC ACID 1 MG: 1 TABLET ORAL at 14:20

## 2019-12-28 RX ADMIN — SODIUM CHLORIDE: 9 INJECTION, SOLUTION INTRAVENOUS at 01:39

## 2019-12-28 RX ADMIN — FOLIC ACID 1 MG: 1 TABLET ORAL at 00:20

## 2019-12-29 LAB
ESTIMATED AVERAGE GLUCOSE: 94 MG/DL
HBA1C MFR BLD: 4.9 % (ref 4–6)

## 2019-12-31 LAB — VITAMIN B1 WHOLE BLOOD: 126 NMOL/L (ref 70–180)

## 2020-01-24 ENCOUNTER — APPOINTMENT (OUTPATIENT)
Dept: GENERAL RADIOLOGY | Age: 60
End: 2020-01-24
Payer: MEDICARE

## 2020-01-24 ENCOUNTER — HOSPITAL ENCOUNTER (EMERGENCY)
Age: 60
Discharge: HOME | End: 2020-01-24
Attending: EMERGENCY MEDICINE
Payer: MEDICARE

## 2020-01-24 VITALS
HEIGHT: 69 IN | WEIGHT: 135 LBS | SYSTOLIC BLOOD PRESSURE: 148 MMHG | BODY MASS INDEX: 19.99 KG/M2 | RESPIRATION RATE: 12 BRPM | DIASTOLIC BLOOD PRESSURE: 88 MMHG | TEMPERATURE: 98.8 F | OXYGEN SATURATION: 98 % | HEART RATE: 65 BPM

## 2020-01-24 PROCEDURE — 73630 X-RAY EXAM OF FOOT: CPT

## 2020-01-24 PROCEDURE — 6360000002 HC RX W HCPCS: Performed by: STUDENT IN AN ORGANIZED HEALTH CARE EDUCATION/TRAINING PROGRAM

## 2020-01-24 PROCEDURE — 6370000000 HC RX 637 (ALT 250 FOR IP): Performed by: STUDENT IN AN ORGANIZED HEALTH CARE EDUCATION/TRAINING PROGRAM

## 2020-01-24 PROCEDURE — 90471 IMMUNIZATION ADMIN: CPT | Performed by: STUDENT IN AN ORGANIZED HEALTH CARE EDUCATION/TRAINING PROGRAM

## 2020-01-24 PROCEDURE — 90715 TDAP VACCINE 7 YRS/> IM: CPT | Performed by: STUDENT IN AN ORGANIZED HEALTH CARE EDUCATION/TRAINING PROGRAM

## 2020-01-24 PROCEDURE — 99283 EMERGENCY DEPT VISIT LOW MDM: CPT

## 2020-01-24 RX ORDER — IBUPROFEN 400 MG/1
400 TABLET ORAL ONCE
Status: COMPLETED | OUTPATIENT
Start: 2020-01-24 | End: 2020-01-24

## 2020-01-24 RX ORDER — CEPHALEXIN 500 MG/1
500 CAPSULE ORAL 4 TIMES DAILY
Qty: 28 CAPSULE | Refills: 0 | Status: SHIPPED | OUTPATIENT
Start: 2020-01-24 | End: 2020-01-31

## 2020-01-24 RX ORDER — CEPHALEXIN 500 MG/1
500 CAPSULE ORAL ONCE
Status: COMPLETED | OUTPATIENT
Start: 2020-01-24 | End: 2020-01-24

## 2020-01-24 RX ADMIN — TETANUS TOXOID, REDUCED DIPHTHERIA TOXOID AND ACELLULAR PERTUSSIS VACCINE, ADSORBED 0.5 ML: 5; 2.5; 8; 8; 2.5 SUSPENSION INTRAMUSCULAR at 05:00

## 2020-01-24 RX ADMIN — IBUPROFEN 400 MG: 400 TABLET, FILM COATED ORAL at 05:00

## 2020-01-24 RX ADMIN — CEPHALEXIN 500 MG: 500 CAPSULE ORAL at 05:00

## 2020-01-24 ASSESSMENT — ENCOUNTER SYMPTOMS
ABDOMINAL PAIN: 0
SHORTNESS OF BREATH: 0
BACK PAIN: 0

## 2020-01-24 ASSESSMENT — PAIN DESCRIPTION - ONSET: ONSET: GRADUAL

## 2020-01-24 ASSESSMENT — PAIN SCALES - GENERAL
PAINLEVEL_OUTOF10: 4
PAINLEVEL_OUTOF10: 6

## 2020-01-24 ASSESSMENT — PAIN DESCRIPTION - FREQUENCY: FREQUENCY: CONTINUOUS

## 2020-01-24 ASSESSMENT — PAIN DESCRIPTION - LOCATION: LOCATION: FOOT

## 2020-01-24 ASSESSMENT — PAIN DESCRIPTION - PROGRESSION: CLINICAL_PROGRESSION: GRADUALLY WORSENING

## 2020-01-24 ASSESSMENT — PAIN - FUNCTIONAL ASSESSMENT: PAIN_FUNCTIONAL_ASSESSMENT: PREVENTS OR INTERFERES SOME ACTIVE ACTIVITIES AND ADLS

## 2020-01-24 ASSESSMENT — PAIN DESCRIPTION - DESCRIPTORS: DESCRIPTORS: SHARP

## 2020-01-24 ASSESSMENT — PAIN DESCRIPTION - PAIN TYPE: TYPE: ACUTE PAIN

## 2020-01-24 NOTE — ED TRIAGE NOTES
64 yo male to the ED with a cc of pain to the right foot after stepping on a foreign body approximately 2 days prior. Patient noted with + msp x 4 and + ROM, pupils PERRLA @ 3, and lung sounds that are clear and equil bilaterally. Patient denies chest pain, sob or difficulty breathing. Patient denies further injury/illness at this time. Vitals noted as recorded.

## 2020-01-24 NOTE — ED PROVIDER NOTES
[facility]     Emergency Department     Faculty Attestation    I performed a history and physical examination of the patient and discussed management with the resident. I have reviewed and agree with the residents findings including all diagnostic interpretations, and treatment plans as written. Any areas of disagreement are noted on the chart. I was personally present for the key portions of any procedures. I have documented in the chart those procedures where I was not present during the key portions. I have reviewed the emergency nurses triage note. I agree with the chief complaint, past medical history, past surgical history, allergies, medications, social and family history as documented unless otherwise noted below. Documentation of the HPI, Physical Exam and Medical Decision Making performed by scribkirti is based on my personal performance of the HPI, PE and MDM. For Physician Assistant/ Nurse Practitioner cases/documentation I have personally evaluated this patient and have completed at least one if not all key elements of the E/M (history, physical exam, and MDM). Additional findings are as noted. 62 yo M c/o r foot pain  fb sensation x 2d, pt not sure of fb, no fever, no vomit,   pe vss r foot plantar surface, healed puncture wound, no fb sensation, nv intact,     DT updated, xr -, starting abx, we stressed pcp follow up,     Pre-hypertension/Hypertension: The patient has been informed that they may have pre-hypertension or Hypertension based on a blood pressure reading in the emergency department. I recommend that the patient call the primary care provider listed on their discharge instructions or a physician of their choice this week to arrange follow up for further evaluation of possible pre-hypertension or Hypertension.       EKG Interpretation    Interpreted by me      CRITICAL CARE: There was a high probability of clinically significant/life threatening deterioration in this patient's condition which required my urgent intervention. Total critical care time was 0 minutes. This excludes any time for separately reportable procedures.        Darrick Davis,   01/24/20 600 Darrick Trevizo DO  01/24/20 [id number]

## 2020-01-24 NOTE — ED PROVIDER NOTES
[facility]  ED  Emergency Department Encounter  Emergency Medicine Resident     Pt Name: Adal Fallon  MRN: [id number]  Jignagfho 1960  Date of evaluation: 1/24/20  PCP:  Landy Covarrubias MD    [address] [place] [address]       Chief Complaint   Patient presents with    Foot Pain     Patient stepped on unknown object two days ago. Feel like something is still in there       [address]  (Location/Symptom, Timing/Onset, Context/Setting, Quality, Duration, Modifying Factors,Severity.)      Adal Fallon is a 63 y. o.yo male who presents with right foot pain. Patient complains that he is having right foot pain secondary to stepping on an object he does not remember 2 days ago. States that the object did puncture his palmar area of the right foot and believes that there might be something lodged in his skin. States that he feels something gritty/irritation when he walks but has not had trouble with the ankle or increased swelling/erythema in the right foot. Denies recent fevers, chills, shortness of breath, chest pain headaches or changes in vision. Does not recall the last time he has had a tetanus booster. PAST MEDICAL / SURGICAL / SOCIAL / FAMILY HISTORY      has a past medical history of Glaucoma and Hypertension. has no past surgical history on file.      Social History     Socioeconomic History    Marital status: Single     Spouse name: Not on file    Number of children: Not on file    Years of education: Not on file    Highest education level: Not on file   Occupational History    Not on file   Social Needs    Financial resource strain: Not on file    Food insecurity:     Worry: Not on file     Inability: Not on file    Transportation needs:     Medical: Not on file     Non-medical: Not on file   Tobacco Use    Smoking status: Light Tobacco Smoker     Types: Cigarettes    Smokeless tobacco: Current User     Types: Chew    Tobacco comment: occasionally   Substance and Sexual Activity    Alcohol use: Yes     Comment: 3 beers daily    Drug use: No    Sexual activity: Not on file   Lifestyle    Physical activity:     Days per week: Not on file     Minutes per session: Not on file    Stress: Not on file   Relationships    Social connections:     Talks on phone: Not on file     Gets together: Not on file     Attends Holiness service: Not on file     Active member of club or organization: Not on file     Attends meetings of clubs or organizations: Not on file     Relationship status: Not on file    Intimate partner violence:     Fear of current or ex partner: Not on file     Emotionally abused: Not on file     Physically abused: Not on file     Forced sexual activity: Not on file   Other Topics Concern    Not on file   Social History Narrative    Not on file       History reviewed. No pertinent family history. Allergies:  Patient has no known allergies. Home Medications:  Prior to Admission medications    Medication Sig Start Date End Date Taking?  Authorizing Provider   cephALEXin (KEFLEX) 500 MG capsule Take 1 capsule by mouth 4 times daily for 7 days 1/24/20 1/31/20 Yes Clayton Edwards MD   clopidogrel (PLAVIX) 75 MG tablet Take 1 tablet by mouth daily 12/29/19   Lynn Tate MD   vitamin B-1 100 MG tablet Take 1 tablet by mouth daily 12/29/19   Lynn Tate MD   folic acid (FOLVITE) 1 MG tablet Take 1 tablet by mouth daily 12/29/19   Lynn Tate MD   atorvastatin (LIPITOR) 40 MG tablet Take 1 tablet by mouth nightly 12/28/19   Lynn Tate MD   ibuprofen (IBU) 400 MG tablet Take 1 tablet by mouth every 6 hours as needed for Pain 12/20/19   Obed Wise,    acetaminophen (TYLENOL) 500 MG tablet Take 2 tablets by mouth every 8 hours as needed for Pain 11/22/19   Cami Katz, DO   ibuprofen (ADVIL;MOTRIN) 600 MG tablet Take 1 tablet by mouth every 8 hours as needed for Pain 11/22/19   Cami Katz, DO       REVIEW OFSYSTEMS    (2-9 systems for level 4, 10 or more for level 5)      Review of Systems   Constitutional: Negative for chills and fever. HENT: Negative for congestion. Eyes: Negative for visual disturbance. Respiratory: Negative for shortness of breath. Cardiovascular: Negative for chest pain. Gastrointestinal: Negative for abdominal pain. Endocrine: Negative for polyuria. Genitourinary: Negative for dysuria. Musculoskeletal: Negative for back pain. Skin: Positive for wound. Neurological: Negative for weakness and headaches. Psychiatric/Behavioral: Negative for confusion. PHYSICAL EXAM   (up to 7 for level 4, 8 or more forlevel 5)      ED TRIAGE VITALS BP: (!) 148/88, Temp: 98.8 °F (37.1 °C), Pulse: 65, Resp: 12, SpO2: 98 %    Vitals:    01/24/20 0449   BP: (!) 148/88   Pulse: 65   Resp: 12   Temp: 98.8 °F (37.1 °C)   TempSrc: Oral   SpO2: 98%   Weight: 135 lb (61.2 kg)   Height: 5' 9\" (1.753 m)       Physical Exam  Constitutional:       General: He is not in acute distress. Appearance: He is well-developed. HENT:      Head: Normocephalic and atraumatic. Nose: Nose normal.   Eyes:      Pupils: Pupils are equal, round, and reactive to light. Neck:      Musculoskeletal: Normal range of motion and neck supple. Cardiovascular:      Rate and Rhythm: Normal rate and regular rhythm. Heart sounds: No murmur. Pulmonary:      Effort: Pulmonary effort is normal. No respiratory distress. Breath sounds: No stridor. No wheezing. Abdominal:      General: There is no distension. Palpations: Abdomen is soft. Tenderness: There is no tenderness. Musculoskeletal: Normal range of motion. Right foot: Tenderness present. Feet:    Skin:     General: Skin is warm and dry. Capillary Refill: Capillary refill takes less than 2 seconds. Findings: No erythema or rash. Neurological:      Mental Status: He is alert and oriented to person, place, and time. Sensory: No sensory deficit. Deep Tendon Reflexes: Reflexes normal.   Psychiatric:         Behavior: Behavior normal.         DIFFERENTIAL  DIAGNOSIS     PLAN (LABS / IMAGING / EKG):  Orders Placed This Encounter   Procedures    XR FOOT RIGHT (MIN 3 VIEWS)       MEDICATIONS ORDERED:  Orders Placed This Encounter   Medications    Tetanus-Diphth-Acell Pertussis (BOOSTRIX) injection 0.5 mL    ibuprofen (ADVIL;MOTRIN) tablet 400 mg    cephALEXin (KEFLEX) capsule 500 mg    cephALEXin (KEFLEX) 500 MG capsule     Sig: Take 1 capsule by mouth 4 times daily for 7 days     Dispense:  28 capsule     Refill:  0       DDX: Foreign body, penetrating trauma to the foot, residual foreign body remaining. Initial MDM/Plan: 63 y.o. male who presents with Patient complains that he is having right foot pain secondary to stepping on an object he does not remember 2 days ago. States that the object did puncture his palmar area of the right foot and believes that there might be something lodged in his skin. States that he feels something gritty/irritation when he walks but has not had trouble with the ankle or increased swelling/erythema in the right foot. Denies recent fevers, chills, shortness of breath, chest pain headaches or changes in vision. DIAGNOSTIC RESULTS / EMERGENCYDEPARTMENT COURSE / MDM     LABS:  No results found for this visit on 01/24/20. RADIOLOGY:  XR FOOT RIGHT (MIN 3 VIEWS)   Final Result   No radiopaque foreign body in the right foot. No acute osseous abnormality. EMERGENCY DEPARTMENT COURSE:  ED Course as of Jan 24 0608 Fri Jan 24, 2020   5552 Patient seen and assessed in the emergency department no acute respiratory or cardiovascular distress. Patient complains that he is having right foot pain secondary to stepping on an object he does not remember 2 days ago.   States that the object did puncture his palmar area of the right foot and believes that there might be

## 2020-01-24 NOTE — LETTER
[facility]  [address] [facility] [address]  Phone: [phone]               January 24, 2020    Patient: Ekaterina Oloms   YOB: 1960   Date of Visit: 1/24/2020       To Whom It May Concern:    Merced Worthington was seen and treated in our emergency department on 1/24/2020. He may return to work on 01/25/2020.       Sincerely,       Gaby Velez MD         Signature:__________________________________

## 2020-03-14 ENCOUNTER — HOSPITAL ENCOUNTER (EMERGENCY)
Age: 60
Discharge: HOME | End: 2020-03-14
Attending: EMERGENCY MEDICINE
Payer: MEDICARE

## 2020-03-14 VITALS
WEIGHT: 120 LBS | BODY MASS INDEX: 19.29 KG/M2 | HEIGHT: 66 IN | TEMPERATURE: 97.2 F | HEART RATE: 75 BPM | OXYGEN SATURATION: 99 % | SYSTOLIC BLOOD PRESSURE: 129 MMHG | RESPIRATION RATE: 18 BRPM | DIASTOLIC BLOOD PRESSURE: 79 MMHG

## 2020-03-14 PROCEDURE — 6370000000 HC RX 637 (ALT 250 FOR IP): Performed by: EMERGENCY MEDICINE

## 2020-03-14 PROCEDURE — 99283 EMERGENCY DEPT VISIT LOW MDM: CPT

## 2020-03-14 RX ORDER — ONDANSETRON 4 MG/1
4 TABLET, ORALLY DISINTEGRATING ORAL ONCE
Status: COMPLETED | OUTPATIENT
Start: 2020-03-14 | End: 2020-03-14

## 2020-03-14 RX ORDER — ONDANSETRON 4 MG/1
4 TABLET, FILM COATED ORAL EVERY 8 HOURS PRN
Qty: 20 TABLET | Refills: 0 | Status: SHIPPED | OUTPATIENT
Start: 2020-03-14 | End: 2020-03-19

## 2020-03-14 RX ADMIN — ONDANSETRON 4 MG: 4 TABLET, ORALLY DISINTEGRATING ORAL at 22:30

## 2020-03-14 ASSESSMENT — ENCOUNTER SYMPTOMS
NAUSEA: 1
SHORTNESS OF BREATH: 0
RHINORRHEA: 0
VOMITING: 0
CONSTIPATION: 0
COUGH: 0
ABDOMINAL PAIN: 0
SORE THROAT: 0
DIARRHEA: 0

## 2020-03-15 NOTE — ED PROVIDER NOTES
[facility]     Emergency Department     Faculty Attestation    I performed a history and physical examination of the patient and discussed management with the resident. I reviewed the residents note and agree with the documented findings and plan of care. Any areas of disagreement are noted on the chart. I was personally present for the key portions of any procedures. I have documented in the chart those procedures where I was not present during the key portions. I have reviewed the emergency nurses triage note. I agree with the chief complaint, past medical history, past surgical history, allergies, medications, social and family history as documented unless otherwise noted below. For Physician Assistant/ Nurse Practitioner cases/documentation I have personally evaluated this patient and have completed at least one if not all key elements of the E/M (history, physical exam, and MDM). Additional findings are as noted. I have personally seen and evaluated the patient. I find the patient's history and physical exam are consistent with the NP/PA documentation. I agree with the care provided, treatment rendered, disposition and follow-up plan. Patient describing chills he is afebrile here no acute distress      Critical Care     Desire Hi M.D.   Attending Emergency  Physician              Simon Craven MD  03/14/20 5438

## 2022-01-31 ENCOUNTER — HOSPITAL ENCOUNTER (EMERGENCY)
Age: 62
Discharge: HOME | End: 2022-01-31
Attending: EMERGENCY MEDICINE
Payer: MEDICARE

## 2022-01-31 ENCOUNTER — APPOINTMENT (OUTPATIENT)
Dept: CT IMAGING | Age: 62
End: 2022-01-31
Payer: MEDICARE

## 2022-01-31 VITALS
TEMPERATURE: 97.6 F | OXYGEN SATURATION: 94 % | RESPIRATION RATE: 18 BRPM | BODY MASS INDEX: 20.4 KG/M2 | SYSTOLIC BLOOD PRESSURE: 119 MMHG | DIASTOLIC BLOOD PRESSURE: 80 MMHG | WEIGHT: 130 LBS | HEART RATE: 86 BPM | HEIGHT: 67 IN

## 2022-01-31 DIAGNOSIS — K05.6 PERIODONTAL DISEASE: ICD-10-CM
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
## 2022-01-31 DIAGNOSIS — S09.90XA CLOSED HEAD INJURY, INITIAL ENCOUNTER: Primary | ICD-10-CM

## 2022-01-31 LAB
ABSOLUTE EOS #: 0.04 K/UL (ref 0–0.44)
ABSOLUTE IMMATURE GRANULOCYTE: <0.03 K/UL (ref 0–0.3)
ABSOLUTE LYMPH #: 1.19 K/UL (ref 1.1–3.7)
ABSOLUTE MONO #: 0.43 K/UL (ref 0.1–1.2)
ALBUMIN SERPL-MCNC: 4.2 G/DL (ref 3.5–5.2)
ALBUMIN/GLOBULIN RATIO: 1.8 (ref 1–2.5)
ALP BLD-CCNC: 41 U/L (ref 40–129)
ALT SERPL-CCNC: 17 U/L (ref 5–41)
ANION GAP SERPL CALCULATED.3IONS-SCNC: 12 MMOL/L (ref 9–17)
AST SERPL-CCNC: 20 U/L
BASOPHILS # BLD: 0 % (ref 0–2)
BASOPHILS ABSOLUTE: <0.03 K/UL (ref 0–0.2)
BILIRUB SERPL-MCNC: 0.2 MG/DL (ref 0.3–1.2)
BUN BLDV-MCNC: 6 MG/DL (ref 8–23)
BUN/CREAT BLD: ABNORMAL (ref 9–20)
CALCIUM SERPL-MCNC: 8.5 MG/DL (ref 8.6–10.4)
CHLORIDE BLD-SCNC: 109 MMOL/L (ref 98–107)
CO2: 22 MMOL/L (ref 20–31)
CREAT SERPL-MCNC: 0.74 MG/DL (ref 0.7–1.2)
DIFFERENTIAL TYPE: ABNORMAL
EOSINOPHILS RELATIVE PERCENT: 1 % (ref 1–4)
GFR AFRICAN AMERICAN: >60 ML/MIN
GFR NON-AFRICAN AMERICAN: >60 ML/MIN
GFR SERPL CREATININE-BSD FRML MDRD: ABNORMAL ML/MIN/{1.73_M2}
GFR SERPL CREATININE-BSD FRML MDRD: ABNORMAL ML/MIN/{1.73_M2}
GLUCOSE BLD-MCNC: 96 MG/DL (ref 70–99)
HCT VFR BLD CALC: 43.5 % (ref 40.7–50.3)
HEMOGLOBIN: 14.8 G/DL (ref 13–17)
IMMATURE GRANULOCYTES: 0 %
LYMPHOCYTES # BLD: 21 % (ref 24–43)
MCH RBC QN AUTO: 32.7 PG (ref 25.2–33.5)
MCHC RBC AUTO-ENTMCNC: 34 G/DL (ref 28.4–34.8)
MCV RBC AUTO: 96.2 FL (ref 82.6–102.9)
MONOCYTES # BLD: 8 % (ref 3–12)
MYOGLOBIN: 38 NG/ML (ref 28–72)
NRBC AUTOMATED: 0 PER 100 WBC
PDW BLD-RTO: 12.4 % (ref 11.8–14.4)
PLATELET # BLD: 208 K/UL (ref 138–453)
PLATELET ESTIMATE: ABNORMAL
PMV BLD AUTO: 9.2 FL (ref 8.1–13.5)
POTASSIUM SERPL-SCNC: 4 MMOL/L (ref 3.7–5.3)
RBC # BLD: 4.52 M/UL (ref 4.21–5.77)
RBC # BLD: ABNORMAL 10*6/UL
SEG NEUTROPHILS: 70 % (ref 36–65)
SEGMENTED NEUTROPHILS ABSOLUTE COUNT: 4 K/UL (ref 1.5–8.1)
SODIUM BLD-SCNC: 143 MMOL/L (ref 135–144)
TOTAL CK: 126 U/L (ref 39–308)
TOTAL PROTEIN: 6.6 G/DL (ref 6.4–8.3)
WBC # BLD: 5.7 K/UL (ref 3.5–11.3)
WBC # BLD: ABNORMAL 10*3/UL

## 2022-01-31 PROCEDURE — 80053 COMPREHEN METABOLIC PANEL: CPT

## 2022-01-31 PROCEDURE — 6360000002 HC RX W HCPCS: Performed by: STUDENT IN AN ORGANIZED HEALTH CARE EDUCATION/TRAINING PROGRAM

## 2022-01-31 PROCEDURE — 6370000000 HC RX 637 (ALT 250 FOR IP): Performed by: STUDENT IN AN ORGANIZED HEALTH CARE EDUCATION/TRAINING PROGRAM

## 2022-01-31 PROCEDURE — 99283 EMERGENCY DEPT VISIT LOW MDM: CPT

## 2022-01-31 PROCEDURE — 85025 COMPLETE CBC W/AUTO DIFF WBC: CPT

## 2022-01-31 PROCEDURE — 72125 CT NECK SPINE W/O DYE: CPT

## 2022-01-31 PROCEDURE — 70486 CT MAXILLOFACIAL W/O DYE: CPT

## 2022-01-31 PROCEDURE — 82550 ASSAY OF CK (CPK): CPT

## 2022-01-31 PROCEDURE — 90471 IMMUNIZATION ADMIN: CPT | Performed by: STUDENT IN AN ORGANIZED HEALTH CARE EDUCATION/TRAINING PROGRAM

## 2022-01-31 PROCEDURE — 90715 TDAP VACCINE 7 YRS/> IM: CPT | Performed by: STUDENT IN AN ORGANIZED HEALTH CARE EDUCATION/TRAINING PROGRAM

## 2022-01-31 PROCEDURE — 83874 ASSAY OF MYOGLOBIN: CPT

## 2022-01-31 PROCEDURE — 70450 CT HEAD/BRAIN W/O DYE: CPT

## 2022-01-31 RX ORDER — CHLORHEXIDINE GLUCONATE 0.12 MG/ML
15 RINSE ORAL 2 TIMES DAILY
Qty: 420 ML | Refills: 0 | Status: ON HOLD | OUTPATIENT
Start: 2022-01-31 | End: 2022-02-16 | Stop reason: HOSPADM

## 2022-01-31 RX ORDER — ACETAMINOPHEN 500 MG
1000 TABLET ORAL ONCE
Status: COMPLETED | OUTPATIENT
Start: 2022-01-31 | End: 2022-01-31

## 2022-01-31 RX ADMIN — ACETAMINOPHEN 1000 MG: 500 TABLET ORAL at 17:21

## 2022-01-31 RX ADMIN — TETANUS TOXOID, REDUCED DIPHTHERIA TOXOID AND ACELLULAR PERTUSSIS VACCINE, ADSORBED 0.5 ML: 5; 2.5; 8; 8; 2.5 SUSPENSION INTRAMUSCULAR at 17:22

## 2022-01-31 ASSESSMENT — ENCOUNTER SYMPTOMS
VOMITING: 0
SHORTNESS OF BREATH: 0
NAUSEA: 0
ABDOMINAL PAIN: 0
FACIAL SWELLING: 1

## 2022-01-31 ASSESSMENT — PAIN SCALES - GENERAL: PAINLEVEL_OUTOF10: 0
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
## 2022-01-31 NOTE — ED NOTES
Bed: 28  Expected date:   Expected time:   Means of arrival:   Comments:  Sally Sena, RN  01/31/22 0414
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
## 2022-01-31 NOTE — ED NOTES
The following labs labeled with pt sticker and tubed to lab:     [] Blue     [x] Lavender   [] on ice  [] Green/yellow  [] Green/black [] on ice  [x] Yellow  [] Red  [] Pink      [] COVID-19 swab    [] Rapid  [] PCR  [] Flu swab  [] Peds Viral Panel     [] Urine Sample  [] Pelvic Cultures  [] Blood Cultures            Cleve Peña RN  01/31/22 [id number]
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
## 2022-02-01 NOTE — ED NOTES
Discharge instructions given. Verbalized understanding. All questions answered.            Юлия Larson, JAHAIRA  06/13/54 0199

## 2022-02-09 ENCOUNTER — HOSPITAL ENCOUNTER (INPATIENT)
Age: 62
LOS: 7 days | Discharge: HOME | DRG: 751 | End: 2022-02-17
Attending: EMERGENCY MEDICINE | Admitting: PSYCHIATRY & NEUROLOGY
Payer: MEDICARE

## 2022-02-09 DIAGNOSIS — R45.851 DEPRESSION WITH SUICIDAL IDEATION: Primary | ICD-10-CM

## 2022-02-09 DIAGNOSIS — F32.A DEPRESSION WITH SUICIDAL IDEATION: Primary | ICD-10-CM

## 2022-02-09 LAB
ABSOLUTE EOS #: 0.1 K/UL (ref 0–0.4)
ABSOLUTE IMMATURE GRANULOCYTE: ABNORMAL K/UL (ref 0–0.3)
ABSOLUTE LYMPH #: 1.1 K/UL (ref 1–4.8)
ABSOLUTE MONO #: 0.4 K/UL (ref 0.1–1.3)
ALBUMIN SERPL-MCNC: 4.3 G/DL (ref 3.5–5.2)
ALBUMIN/GLOBULIN RATIO: ABNORMAL (ref 1–2.5)
ALP BLD-CCNC: 45 U/L (ref 40–129)
ALT SERPL-CCNC: 16 U/L (ref 5–41)
AMPHETAMINE SCREEN URINE: NEGATIVE
ANION GAP SERPL CALCULATED.3IONS-SCNC: 15 MMOL/L (ref 9–17)
AST SERPL-CCNC: 21 U/L
BARBITURATE SCREEN URINE: NEGATIVE
BASOPHILS # BLD: 0 % (ref 0–2)
BASOPHILS ABSOLUTE: 0 K/UL (ref 0–0.2)
BENZODIAZEPINE SCREEN, URINE: NEGATIVE
BILIRUB SERPL-MCNC: 0.18 MG/DL (ref 0.3–1.2)
BUN BLDV-MCNC: 10 MG/DL (ref 8–23)
BUN/CREAT BLD: ABNORMAL (ref 9–20)
BUPRENORPHINE URINE: NORMAL
CALCIUM SERPL-MCNC: 9 MG/DL (ref 8.6–10.4)
CANNABINOID SCREEN URINE: NEGATIVE
CHLORIDE BLD-SCNC: 108 MMOL/L (ref 98–107)
CO2: 22 MMOL/L (ref 20–31)
COCAINE METABOLITE, URINE: NEGATIVE
CREAT SERPL-MCNC: 0.81 MG/DL (ref 0.7–1.2)
DIFFERENTIAL TYPE: ABNORMAL
EOSINOPHILS RELATIVE PERCENT: 2 % (ref 0–4)
ETHANOL PERCENT: 0.24 %
ETHANOL: 237 MG/DL
GFR AFRICAN AMERICAN: >60 ML/MIN
GFR NON-AFRICAN AMERICAN: >60 ML/MIN
GFR SERPL CREATININE-BSD FRML MDRD: ABNORMAL ML/MIN/{1.73_M2}
GFR SERPL CREATININE-BSD FRML MDRD: ABNORMAL ML/MIN/{1.73_M2}
GLUCOSE BLD-MCNC: 117 MG/DL (ref 70–99)
HCT VFR BLD CALC: 43.8 % (ref 41–53)
HEMOGLOBIN: 15 G/DL (ref 13.5–17.5)
IMMATURE GRANULOCYTES: ABNORMAL %
LYMPHOCYTES # BLD: 25 % (ref 24–44)
MCH RBC QN AUTO: 32.5 PG (ref 26–34)
MCHC RBC AUTO-ENTMCNC: 34.3 G/DL (ref 31–37)
MCV RBC AUTO: 95 FL (ref 80–100)
MDMA URINE: NORMAL
METHADONE SCREEN, URINE: NEGATIVE
METHAMPHETAMINE, URINE: NORMAL
MONOCYTES # BLD: 9 % (ref 1–7)
NRBC AUTOMATED: ABNORMAL PER 100 WBC
OPIATES, URINE: NEGATIVE
OXYCODONE SCREEN URINE: NEGATIVE
PDW BLD-RTO: 12.9 % (ref 11.5–14.9)
PHENCYCLIDINE, URINE: NEGATIVE
PLATELET # BLD: 212 K/UL (ref 150–450)
PLATELET ESTIMATE: ABNORMAL
PMV BLD AUTO: 6.8 FL (ref 6–12)
POTASSIUM SERPL-SCNC: 3.9 MMOL/L (ref 3.7–5.3)
PROPOXYPHENE, URINE: NORMAL
RBC # BLD: 4.61 M/UL (ref 4.5–5.9)
RBC # BLD: ABNORMAL 10*6/UL
SARS-COV-2, RAPID: NOT DETECTED
SEG NEUTROPHILS: 64 % (ref 36–66)
SEGMENTED NEUTROPHILS ABSOLUTE COUNT: 2.8 K/UL (ref 1.3–9.1)
SODIUM BLD-SCNC: 145 MMOL/L (ref 135–144)
SPECIMEN DESCRIPTION: NORMAL
TEST INFORMATION: NORMAL
TOTAL PROTEIN: 6.7 G/DL (ref 6.4–8.3)
TRICYCLIC ANTIDEPRESSANTS, UR: NORMAL
WBC # BLD: 4.4 K/UL (ref 3.5–11)
WBC # BLD: ABNORMAL 10*3/UL

## 2022-02-09 PROCEDURE — 80307 DRUG TEST PRSMV CHEM ANLYZR: CPT

## 2022-02-09 PROCEDURE — 99285 EMERGENCY DEPT VISIT HI MDM: CPT

## 2022-02-09 PROCEDURE — 85025 COMPLETE CBC W/AUTO DIFF WBC: CPT

## 2022-02-09 PROCEDURE — G0480 DRUG TEST DEF 1-7 CLASSES: HCPCS

## 2022-02-09 PROCEDURE — 36415 COLL VENOUS BLD VENIPUNCTURE: CPT

## 2022-02-09 PROCEDURE — 87635 SARS-COV-2 COVID-19 AMP PRB: CPT

## 2022-02-09 PROCEDURE — 80053 COMPREHEN METABOLIC PANEL: CPT

## 2022-02-09 ASSESSMENT — ENCOUNTER SYMPTOMS
SHORTNESS OF BREATH: 0
EYE PAIN: 0
ABDOMINAL PAIN: 0
BACK PAIN: 0
COLOR CHANGE: 0

## 2022-02-09 NOTE — ED TRIAGE NOTES
Mode of arrival (squad #, walk in, police, etc) :[employer]      Chief complaint(s): mental health problems         Arrival Note (brief scenario, treatment PTA, etc). : Homeless states tried of it end it no plan voiced to this writer also states abuses alcohol    C= \"Have you ever felt that you should Cut down on your drinking? \"  Yes  A= \"Have people Annoyed you by criticizing your drinking? \"  No  G= \"Have you ever felt bad or Guilty about your drinking? \"  No  E= \"Have you ever had a drink as an Eye-opener first thing in the morning to steady your nerves or to help a hangover? \"  Yes      Deferred []      Reason for deferring: N/A    *If yes to two or more: probable alcohol abuse. *

## 2022-02-09 NOTE — ED NOTES
Patient admits to consuming alcohol and will need a BAL before assessment. Patient self reported he is tired of being homeless and wants to end it all, did not provide no plan.

## 2022-02-09 NOTE — ED PROVIDER NOTES
chlorhexidine (PERIDEX) 0.12 % solution Take 15 mLs by mouth 2 times daily for 14 days  Qty: 420 mL, Refills: 0      clopidogrel (PLAVIX) 75 MG tablet Take 1 tablet by mouth daily  Qty: 30 tablet, Refills: 3      vitamin B-1 100 MG tablet Take 1 tablet by mouth daily  Qty: 30 tablet, Refills: 3      folic acid (FOLVITE) 1 MG tablet Take 1 tablet by mouth daily  Qty: 30 tablet, Refills: 3      atorvastatin (LIPITOR) 40 MG tablet Take 1 tablet by mouth nightly  Qty: 30 tablet, Refills: 3      !! ibuprofen (IBU) 400 MG tablet Take 1 tablet by mouth every 6 hours as needed for Pain  Qty: 30 tablet, Refills: 0      acetaminophen (TYLENOL) 500 MG tablet Take 2 tablets by mouth every 8 hours as needed for Pain  Qty: 20 tablet, Refills: 0      !! ibuprofen (ADVIL;MOTRIN) 600 MG tablet Take 1 tablet by mouth every 8 hours as needed for Pain  Qty: 20 tablet, Refills: 0       !! - Potential duplicate medications found. Please discuss with provider. ALLERGIES     has No Known Allergies. FAMILY HISTORY     has no family status information on file. SOCIAL HISTORY       Social History     Tobacco Use    Smoking status: Light Tobacco Smoker     Types: Cigarettes    Smokeless tobacco: Current User     Types: Chew    Tobacco comment: occasionally   Vaping Use    Vaping Use: Never used   Substance Use Topics    Alcohol use: Yes     Comment: 3 beers daily    Drug use: No     PHYSICAL EXAM     INITIAL VITALS: /66   Pulse 79   Temp 98 °F (36.7 °C) (Oral)   Resp 14   Ht 5' 4\" (1.626 m)   Wt 134 lb (60.8 kg)   SpO2 98%   BMI 23.00 kg/m²    Physical Exam  Vitals and nursing note reviewed. Constitutional:       Appearance: Normal appearance. HENT:      Head: Normocephalic and atraumatic.       Right Ear: External ear normal.      Left Ear: External ear normal.      Nose: Nose normal.      Mouth/Throat:      Mouth: Mucous membranes are moist.   Eyes:      Pupils: Pupils are equal, round, and reactive to light. Cardiovascular:      Rate and Rhythm: Normal rate and regular rhythm. Pulses: Normal pulses. Heart sounds: Normal heart sounds. Pulmonary:      Effort: Pulmonary effort is normal.      Breath sounds: Normal breath sounds. Abdominal:      General: Abdomen is flat. Palpations: Abdomen is soft. Tenderness: There is no abdominal tenderness. Musculoskeletal:         General: No tenderness. Normal range of motion. Cervical back: Neck supple. Skin:     General: Skin is warm and dry. Capillary Refill: Capillary refill takes less than 2 seconds. Neurological:      General: No focal deficit present. Mental Status: He is alert and oriented to person, place, and time. Cranial Nerves: Cranial nerves are intact. Sensory: Sensation is intact. Motor: Motor function is intact. Coordination: Coordination is intact. Psychiatric:         Behavior: Behavior normal.         Thought Content: Thought content includes suicidal ideation. Thought content includes suicidal plan. MEDICAL DECISION MAKING:   11year-old male presents for mental health evaluation. On initial exam patient in no acute distress vitals are stable, patient endorsing active suicidal thoughts with a plan, will check labs, we will have social work evaluate as well    Patient found to have an alcohol level of 237, remaining other labs unremarkable    Patient was signed out to oncoming physician pending reevaluation sober         CRITICAL CARE:       PROCEDURES:    Procedures    DIAGNOSTIC RESULTS   EKG:All EKG's are interpreted by the Emergency Department Physician who either signs or Co-signs this chart in the absence of a cardiologist.        RADIOLOGY:All plain film, CT, MRI, and formal ultrasound images (except ED bedside ultrasound) are read by the radiologist, see reports below, unless otherwisenoted in MDM or here.   No orders to display     LABS: All lab results were reviewed by myself, and all abnormals are listed below. Labs Reviewed   CBC WITH AUTO DIFFERENTIAL - Abnormal; Notable for the following components:       Result Value    Monocytes 9 (*)     All other components within normal limits   COMPREHENSIVE METABOLIC PANEL W/ REFLEX TO MG FOR LOW K - Abnormal; Notable for the following components:    Glucose 117 (*)     Sodium 145 (*)     Chloride 108 (*)     Total Bilirubin 0.18 (*)     All other components within normal limits   ETHANOL - Abnormal; Notable for the following components:    Ethanol 237 (*)     All other components within normal limits   COVID-19, RAPID   URINE DRUG SCREEN       EMERGENCY DEPARTMENTCOURSE:         Vitals:    Vitals:    02/09/22 1841 02/10/22 0258   BP: 111/72 116/66   Pulse: 104 79   Resp: 16 14   Temp: 98.2 °F (36.8 °C) 98 °F (36.7 °C)   TempSrc: Oral Oral   SpO2: 98%    Weight: 134 lb (60.8 kg) 134 lb (60.8 kg)   Height: 5' 4\" (1.626 m) 5' 4\" (1.626 m)       The patient was given the following medications while in the emergency department:  Orders Placed This Encounter   Medications    acetaminophen (TYLENOL) tablet 650 mg    aluminum & magnesium hydroxide-simethicone (MAALOX) 200-200-20 MG/5ML suspension 30 mL    AND Linked Order Group     haloperidol lactate (HALDOL) injection 5 mg     diphenhydrAMINE (BENADRYL) injection 50 mg     LORazepam (ATIVAN) injection 2 mg    AND Linked Order Group     haloperidol (HALDOL) tablet 5 mg     LORazepam (ATIVAN) tablet 2 mg    hydrOXYzine (ATARAX) tablet 50 mg    traZODone (DESYREL) tablet 50 mg    polyethylene glycol (GLYCOLAX) packet 17 g    nicotine polacrilex (NICORETTE) gum 2 mg    ibuprofen (ADVIL;MOTRIN) tablet 400 mg     CONSULTS:  IP CONSULT TO INTERNAL MEDICINE    FINAL IMPRESSION      1. Depression with suicidal ideation          DISPOSITION/PLAN   DISPOSITION        PATIENT REFERRED TO:  No follow-up provider specified.   DISCHARGE MEDICATIONS:  Current Discharge Medication List The care is provided during an unprecedented national emergency due to the novel coronavirus, COVID 19.   DO Idalia Guillory DO  02/10/22 1016

## 2022-02-10 PROBLEM — R45.851 DEPRESSION WITH SUICIDAL IDEATION: Status: ACTIVE | Noted: 2022-02-10

## 2022-02-10 PROBLEM — F32.A DEPRESSION WITH SUICIDAL IDEATION: Status: ACTIVE | Noted: 2022-02-10

## 2022-02-10 PROCEDURE — 1240000000 HC EMOTIONAL WELLNESS R&B

## 2022-02-10 PROCEDURE — 6370000000 HC RX 637 (ALT 250 FOR IP): Performed by: PSYCHIATRY & NEUROLOGY

## 2022-02-10 PROCEDURE — 99223 1ST HOSP IP/OBS HIGH 75: CPT | Performed by: PSYCHIATRY & NEUROLOGY

## 2022-02-10 RX ORDER — HYDROXYZINE 50 MG/1
50 TABLET, FILM COATED ORAL 3 TIMES DAILY PRN
Status: DISCONTINUED | OUTPATIENT
Start: 2022-02-10 | End: 2022-02-17 | Stop reason: HOSPADM

## 2022-02-10 RX ORDER — LORAZEPAM 1 MG/1
2 TABLET ORAL EVERY 4 HOURS PRN
Status: DISCONTINUED | OUTPATIENT
Start: 2022-02-10 | End: 2022-02-17 | Stop reason: HOSPADM

## 2022-02-10 RX ORDER — IBUPROFEN 400 MG/1
400 TABLET ORAL EVERY 6 HOURS PRN
Status: DISCONTINUED | OUTPATIENT
Start: 2022-02-10 | End: 2022-02-17 | Stop reason: HOSPADM

## 2022-02-10 RX ORDER — MAGNESIUM HYDROXIDE/ALUMINUM HYDROXICE/SIMETHICONE 120; 1200; 1200 MG/30ML; MG/30ML; MG/30ML
30 SUSPENSION ORAL EVERY 6 HOURS PRN
Status: DISCONTINUED | OUTPATIENT
Start: 2022-02-10 | End: 2022-02-17 | Stop reason: HOSPADM

## 2022-02-10 RX ORDER — LORAZEPAM 2 MG/ML
1 INJECTION INTRAMUSCULAR
Status: DISCONTINUED | OUTPATIENT
Start: 2022-02-10 | End: 2022-02-17 | Stop reason: HOSPADM

## 2022-02-10 RX ORDER — LORAZEPAM 2 MG/ML
3 INJECTION INTRAMUSCULAR
Status: DISCONTINUED | OUTPATIENT
Start: 2022-02-10 | End: 2022-02-17 | Stop reason: HOSPADM

## 2022-02-10 RX ORDER — LORAZEPAM 1 MG/1
3 TABLET ORAL
Status: DISCONTINUED | OUTPATIENT
Start: 2022-02-10 | End: 2022-02-17 | Stop reason: HOSPADM

## 2022-02-10 RX ORDER — HALOPERIDOL 5 MG
5 TABLET ORAL EVERY 4 HOURS PRN
Status: DISCONTINUED | OUTPATIENT
Start: 2022-02-10 | End: 2022-02-17 | Stop reason: HOSPADM

## 2022-02-10 RX ORDER — POLYETHYLENE GLYCOL 3350 17 G/17G
17 POWDER, FOR SOLUTION ORAL DAILY PRN
Status: DISCONTINUED | OUTPATIENT
Start: 2022-02-10 | End: 2022-02-17 | Stop reason: HOSPADM

## 2022-02-10 RX ORDER — SERTRALINE HYDROCHLORIDE 25 MG/1
25 TABLET, FILM COATED ORAL DAILY
Status: DISCONTINUED | OUTPATIENT
Start: 2022-02-10 | End: 2022-02-11

## 2022-02-10 RX ORDER — LORAZEPAM 2 MG/ML
2 INJECTION INTRAMUSCULAR
Status: DISCONTINUED | OUTPATIENT
Start: 2022-02-10 | End: 2022-02-17 | Stop reason: HOSPADM

## 2022-02-10 RX ORDER — DIPHENHYDRAMINE HYDROCHLORIDE 50 MG/ML
50 INJECTION INTRAMUSCULAR; INTRAVENOUS EVERY 4 HOURS PRN
Status: DISCONTINUED | OUTPATIENT
Start: 2022-02-10 | End: 2022-02-17 | Stop reason: HOSPADM

## 2022-02-10 RX ORDER — GAUZE BANDAGE 2" X 2"
100 BANDAGE TOPICAL DAILY
Status: DISCONTINUED | OUTPATIENT
Start: 2022-02-10 | End: 2022-02-17 | Stop reason: HOSPADM

## 2022-02-10 RX ORDER — LORAZEPAM 1 MG/1
2 TABLET ORAL
Status: DISCONTINUED | OUTPATIENT
Start: 2022-02-10 | End: 2022-02-17 | Stop reason: HOSPADM

## 2022-02-10 RX ORDER — LORAZEPAM 2 MG/ML
4 INJECTION INTRAMUSCULAR
Status: DISCONTINUED | OUTPATIENT
Start: 2022-02-10 | End: 2022-02-17 | Stop reason: HOSPADM

## 2022-02-10 RX ORDER — LORAZEPAM 2 MG/ML
2 INJECTION INTRAMUSCULAR EVERY 4 HOURS PRN
Status: DISCONTINUED | OUTPATIENT
Start: 2022-02-10 | End: 2022-02-17 | Stop reason: HOSPADM

## 2022-02-10 RX ORDER — HALOPERIDOL 5 MG/ML
5 INJECTION INTRAMUSCULAR EVERY 4 HOURS PRN
Status: DISCONTINUED | OUTPATIENT
Start: 2022-02-10 | End: 2022-02-17 | Stop reason: HOSPADM

## 2022-02-10 RX ORDER — LORAZEPAM 1 MG/1
1 TABLET ORAL
Status: DISCONTINUED | OUTPATIENT
Start: 2022-02-10 | End: 2022-02-17 | Stop reason: HOSPADM

## 2022-02-10 RX ORDER — TRAZODONE HYDROCHLORIDE 50 MG/1
50 TABLET ORAL NIGHTLY PRN
Status: DISCONTINUED | OUTPATIENT
Start: 2022-02-10 | End: 2022-02-17 | Stop reason: HOSPADM

## 2022-02-10 RX ORDER — LORAZEPAM 1 MG/1
4 TABLET ORAL
Status: DISCONTINUED | OUTPATIENT
Start: 2022-02-10 | End: 2022-02-17 | Stop reason: HOSPADM

## 2022-02-10 RX ORDER — ACETAMINOPHEN 325 MG/1
650 TABLET ORAL EVERY 4 HOURS PRN
Status: DISCONTINUED | OUTPATIENT
Start: 2022-02-10 | End: 2022-02-17 | Stop reason: HOSPADM

## 2022-02-10 RX ORDER — M-VIT,TX,IRON,MINS/CALC/FOLIC 27MG-0.4MG
1 TABLET ORAL DAILY
Status: DISCONTINUED | OUTPATIENT
Start: 2022-02-10 | End: 2022-02-17 | Stop reason: HOSPADM

## 2022-02-10 RX ADMIN — NICOTINE POLACRILEX 2 MG: 2 GUM, CHEWING BUCCAL at 17:12

## 2022-02-10 RX ADMIN — THIAMINE HCL TAB 100 MG 100 MG: 100 TAB at 20:57

## 2022-02-10 RX ADMIN — SERTRALINE HYDROCHLORIDE 25 MG: 25 TABLET ORAL at 17:11

## 2022-02-10 RX ADMIN — MULTIPLE VITAMINS W/ MINERALS TAB 1 TABLET: TAB at 20:57

## 2022-02-10 ASSESSMENT — SLEEP AND FATIGUE QUESTIONNAIRES
RESTFUL SLEEP: NO
AVERAGE NUMBER OF SLEEP HOURS: 4
DO YOU HAVE DIFFICULTY SLEEPING: YES
SLEEP PATTERN: DIFFICULTY FALLING ASLEEP
DIFFICULTY FALLING ASLEEP: YES
DIFFICULTY ARISING: YES
DO YOU USE A SLEEP AID: NO
DIFFICULTY STAYING ASLEEP: YES

## 2022-02-10 ASSESSMENT — PATIENT HEALTH QUESTIONNAIRE - PHQ9: SUM OF ALL RESPONSES TO PHQ QUESTIONS 1-9: 8

## 2022-02-10 ASSESSMENT — LIFESTYLE VARIABLES
HISTORY_ALCOHOL_USE: YES
HISTORY_ALCOHOL_USE: YES

## 2022-02-10 ASSESSMENT — PAIN SCALES - GENERAL: PAINLEVEL_OUTOF10: 0

## 2022-02-10 ASSESSMENT — PAIN - FUNCTIONAL ASSESSMENT: PAIN_FUNCTIONAL_ASSESSMENT: 0-10

## 2022-02-10 NOTE — CARE COORDINATION
[facility] Biopsychosocial Assessment    Current Level of Psychosocial Functioning     Independent   Dependent  X   Minimal Assist     Psychosocial High Risk Factors (check all that apply)    Unable to obtain med s   Chronic illness/pain    Substance abuse X Alcohol   Lack of Family Support X  Financial stress   Isolation X  Inadequate Community ResourcesX  Suicide attempt(s)  Not taking medications X  Victim of crime   Developmental Delay  Unable to manage personal needs  X  Age 72 or older   Homeless X  No transportation X  Readmission within 30 days   Unemployment   Traumatic Event    Psychiatric Advanced Directives: none reported     Family to Involve in Treatment: Lack of family support     Sexual Orientation:  GENARO    Patient Strengths:SSDI income, insurance     Patient Barriers: Alcohol abuse, homeless, lack of family support, not linked with [facility], Not being prescribed any Psychiatric medications. Opiate Education Provided: N/A Pt denies and does not have a documented history of Opiate or Heroin use/abuse. Pt reports Alcohol abuse. Pt Ethanol level on admission to the Ed was . 237. [facility]/mental health history: Pt is not currently linked with a [facility] and not currently being prescribed any Psychiatric medications. He denies any past mental health treatment or any past Psychiatric hospitalizations. Plan of Care   medication management, group/individual therapies, family meetings, psycho -education, treatment team meetings to assist with stabilization    Initial Discharge Plan:  Pt reports a plan to return to [address] [place] [address] at discharge. Pt reports a plan to follow up with [facility] in the area at discharge. Clinical Summary:     Chadd Covington is a 62 y.o. male with significant past medical history of alcohol abuse who presents on admission with suicidal ideation with a plan to jump off a bridge.   States he was feeling depressed and suicidal.Pt reports that he  has been homeless off and on for 11 years when he first lost his job. The latest time being homeless was when he lost his previous residence in September. Pt reports that he  started feeling depressed a couple months ago and started feeling suicidal 3-4 days ago. He has no appetite, he feels tired and like he has no energy and just hopeless.    Pt reports a plan to return to [facility] at discharge. Pt reports a plan to follow up with [facility] in the area at discharge. Pt reports Alcohol abuse. Pt Ethanol level on admission to the Ed was . 237.  Pt reports drinking on a daily basis.

## 2022-02-10 NOTE — BH NOTE
Patient given tobacco quitline number 43235401836 at this time, refusing to call at this time, states \" I just dont want to quit now\"- patient given information as to the dangers of long term tobacco use. Continue to reinforce the importance of tobacco cessation.

## 2022-02-10 NOTE — BH NOTE
[facility]  Admission Note     Admission Type:   Admission Type: Voluntary    Reason for admission:  Reason for Admission: Suicidal, no plan, homeless. Not linked to outpatient provider. PATIENT STRENGTHS:  Strengths: Communication,Motivated    Patient Strengths and Limitations:  Limitations: General negative or hopeless attitude about future/recovery    Addictive Behavior:   Addictive Behavior  In the past 3 months, have you felt or has someone told you that you have a problem with:  : None  Do you have a history of Chemical Use?: No  Do you have a history of Alcohol Use?: Yes  Do you have a history of Street Drug Abuse?: No  Histroy of Prescripton Drug Abuse?: No    Medical Problems:   Past Medical History:   Diagnosis Date    Depression with suicidal ideation 2/10/2022    Glaucoma     Hypertension        Status EXAM:  Status and Exam  Normal: No  Facial Expression: Flat  Affect: Blunt  Level of Consciousness: Alert  Mood:Normal: No  Mood: Depressed,Anxious  Motor Activity:Normal: Yes  Interview Behavior: Cooperative  Preception: [place] to Person,[place] to Time,[place] to Place,[place] to Situation  Attention:Normal: No  Attention: Distractible  Thought Processes: Circumstantial  Thought Content:Normal: No  Thought Content: Preoccupations  Hallucinations: None  Delusions: No  Memory:Normal: Yes  Insight and Judgment: No  Insight and Judgment: Poor Judgment,Poor Insight  Present Suicidal Ideation: Yes (no plan. , contracts)  Present Homicidal Ideation: No    Tobacco Screening:  Practical Counseling, on admission, srinivasa X, if applicable and completed (first 3 are required if patient doesn't refuse):            ( )  Recognizing danger situations (included triggers and roadblocks)                    ( )  Coping skills (new ways to manage stress, exercise, relaxation techniques, changing routine, distraction)                                                           ( )  Basic information about quitting (benefits of quitting, techniques in how to quit, available resources  ( ) Referral for counseling faxed to [facility]                                           ( ) Patient refused counseling  ( ) Patient has not smoked in the last 30 days    Metabolic Screening:    Lab Results   Component Value Date    LABA1C 4.9 12/28/2019       Lab Results   Component Value Date    CHOL 146 12/28/2019    CHOL 169 10/27/2016    CHOL 152 02/03/2016     Lab Results   Component Value Date    TRIG 59 12/28/2019    TRIG 236 (H) 10/27/2016    TRIG 72 02/03/2016     Lab Results   Component Value Date    HDL 70 12/28/2019    HDL 53 10/27/2016    HDL 56 02/03/2016     No components found for: LDLCAL  No results found for: LABVLDL      Body mass index is 23 kg/m². BP Readings from Last 2 Encounters:   02/10/22 116/66   01/31/22 119/80           Pt admitted with followings belongings:  Dental Appliances: None  Vision - Corrective Lenses: Glasses  Hearing Aid: None  Jewelry: Necklace  Body Piercings Removed: N/A  Clothing: Footwear,Jacket / coat,Pants,Shirt,Socks,Undergarments (Comment) (0.65 cents)  Were All Patient Medications Collected?: Yes  Other Valuables: Cell phone,Money (Comment),Wallet       Patient oriented to surroundings and program expectations and copy of patient rights given. Received admission packet. Patient verbalized understanding. Patient education on precautions. Patient [facility] voluntarily after having suicidal ideations, denies a plan. Denies any thoughts to harm others, denies any hallucinations. States he is daily drinker. Denies any drug use. States he stays at [employer]. Denies being on any medications. Denies being linked to any outpatient services. Denies any history of trauma. Cooperative with admission process.                    Ursula Myers RN

## 2022-02-10 NOTE — GROUP NOTE
Group Therapy Note    Date: 2/10/2022    Group Start Time: 2489  Group End Time: 1430  Group Topic: Healthy Living/Wellness    CZ MILTON Carrasco, CTRS    Pt did not attend [id number] wellness group d/t resting in room despite staff invitation to attend. 1:1 talk time offered as alternative to group session, pt declined.             Signature:  Rosy Kang

## 2022-02-10 NOTE — PROGRESS NOTES
Behavioral Services  Medicare Certification Upon Admission    I certify that this patient's inpatient psychiatric hospital admission is medically necessary for:    [x] (1) Treatment which could reasonably be expected to improve this patient's condition,       [x] (2) Or for diagnostic study;     AND     [x](2) The inpatient psychiatric services are provided while the individual is under the care of a physician and are included in the individualized plan of care.     Estimated length of stay/service 3-9 days    Plan for post-hospital care -outpatient care    Electronically signed by Cheryl Joseph MD on 2/10/2022 at 5:42 PM
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
## 2022-02-10 NOTE — PLAN OF CARE
[facility]  Initial Interdisciplinary Treatment Plan NO      Original treatment plan Date & Time: 2/10/2022  0755    Admission Type:  Admission Type: Voluntary    Reason for admission:   Reason for Admission: Suicidal, no plan, homeless. Not linked to outpatient provider. Estimated Length of Stay:  5-7days  Estimated Discharge Date: to be determined by physician    PATIENT STRENGTHS:  Patient Strengths:Strengths: Communication,Motivated  Patient Strengths and Limitations:Limitations: General negative or hopeless attitude about future/recovery  Addictive Behavior: Addictive Behavior  In the past 3 months, have you felt or has someone told you that you have a problem with:  : None  Do you have a history of Chemical Use?: No  Do you have a history of Alcohol Use?: Yes  Do you have a history of Street Drug Abuse?: No  Histroy of Prescripton Drug Abuse?: No  Medical Problems:  Past Medical History:   Diagnosis Date    Depression with suicidal ideation 2/10/2022    Glaucoma     Hypertension      Status EXAM:Status and Exam  Normal: No  Facial Expression: Flat  Affect: Blunt  Level of Consciousness: Alert  Mood:Normal: No  Mood: Depressed,Anxious  Motor Activity:Normal: Yes  Interview Behavior: Cooperative  Preception: [place] to Person,[place] to Time,[place] to Place,[place] to Situation  Attention:Normal: No  Attention: Distractible  Thought Processes: Circumstantial  Thought Content:Normal: No  Thought Content: Preoccupations  Hallucinations: None  Delusions: No  Memory:Normal: Yes  Insight and Judgment: No  Insight and Judgment: Poor Judgment,Poor Insight  Present Suicidal Ideation: Yes (no plan. , contracts)  Present Homicidal Ideation: No    EDUCATION:   Learner Progress Toward Treatment Goals: reviewed group plans and strategies for care    Method:group therapy, medication compliance, individualized assessments and care planning    Outcome: needs reinforcement    PATIENT GOALS: to be discussed with patient within 72 hours    PLAN/TREATMENT RECOMMENDATIONS:     continue group therapy , medications compliance, goal setting, individualized assessments and care, continue to monitor pt on unit      SHORT-TERM GOALS:   Time frame for Short-Term Goals: 5-7 days    LONG-TERM GOALS:  Time frame for Long-Term Goals: 6 months  Members Present in Team Meeting: See Signature Sheet    BRIAN Ngo
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
## 2022-02-11 PROCEDURE — 99223 1ST HOSP IP/OBS HIGH 75: CPT | Performed by: INTERNAL MEDICINE

## 2022-02-11 PROCEDURE — 1240000000 HC EMOTIONAL WELLNESS R&B

## 2022-02-11 PROCEDURE — 6370000000 HC RX 637 (ALT 250 FOR IP): Performed by: PSYCHIATRY & NEUROLOGY

## 2022-02-11 PROCEDURE — 99232 SBSQ HOSP IP/OBS MODERATE 35: CPT | Performed by: PSYCHIATRY & NEUROLOGY

## 2022-02-11 PROCEDURE — 6370000000 HC RX 637 (ALT 250 FOR IP): Performed by: INTERNAL MEDICINE

## 2022-02-11 RX ORDER — DORZOLAMIDE HYDROCHLORIDE AND TIMOLOL MALEATE 20; 5 MG/ML; MG/ML
1 SOLUTION/ DROPS OPHTHALMIC 2 TIMES DAILY
Status: DISCONTINUED | OUTPATIENT
Start: 2022-02-11 | End: 2022-02-11 | Stop reason: CLARIF

## 2022-02-11 RX ORDER — CLOPIDOGREL BISULFATE 75 MG/1
75 TABLET ORAL DAILY
Status: DISCONTINUED | OUTPATIENT
Start: 2022-02-11 | End: 2022-02-17 | Stop reason: HOSPADM

## 2022-02-11 RX ORDER — LANOLIN ALCOHOL/MO/W.PET/CERES
100 CREAM (GRAM) TOPICAL DAILY
Status: DISCONTINUED | OUTPATIENT
Start: 2022-02-11 | End: 2022-02-11 | Stop reason: CLARIF

## 2022-02-11 RX ORDER — FOLIC ACID 1 MG/1
1 TABLET ORAL DAILY
Status: DISCONTINUED | OUTPATIENT
Start: 2022-02-11 | End: 2022-02-17 | Stop reason: HOSPADM

## 2022-02-11 RX ORDER — POTASSIUM CHLORIDE 20 MEQ/1
40 TABLET, EXTENDED RELEASE ORAL ONCE
Status: COMPLETED | OUTPATIENT
Start: 2022-02-11 | End: 2022-02-11

## 2022-02-11 RX ORDER — GAUZE BANDAGE 2" X 2"
100 BANDAGE TOPICAL DAILY
Status: DISCONTINUED | OUTPATIENT
Start: 2022-02-11 | End: 2022-02-11 | Stop reason: SDUPTHER

## 2022-02-11 RX ORDER — ATORVASTATIN CALCIUM 20 MG/1
40 TABLET, FILM COATED ORAL NIGHTLY
Status: DISCONTINUED | OUTPATIENT
Start: 2022-02-11 | End: 2022-02-17 | Stop reason: HOSPADM

## 2022-02-11 RX ORDER — TIMOLOL MALEATE 5 MG/ML
1 SOLUTION/ DROPS OPHTHALMIC 2 TIMES DAILY
Status: DISCONTINUED | OUTPATIENT
Start: 2022-02-11 | End: 2022-02-17 | Stop reason: HOSPADM

## 2022-02-11 RX ORDER — DORZOLAMIDE HCL 20 MG/ML
1 SOLUTION/ DROPS OPHTHALMIC 2 TIMES DAILY
Status: DISCONTINUED | OUTPATIENT
Start: 2022-02-11 | End: 2022-02-17 | Stop reason: HOSPADM

## 2022-02-11 RX ADMIN — TIMOLOL MALEATE 1 DROP: 5 SOLUTION OPHTHALMIC at 17:26

## 2022-02-11 RX ADMIN — TIMOLOL MALEATE 1 DROP: 5 SOLUTION OPHTHALMIC at 20:14

## 2022-02-11 RX ADMIN — THIAMINE HCL TAB 100 MG 100 MG: 100 TAB at 08:51

## 2022-02-11 RX ADMIN — FOLIC ACID 1 MG: 1 TABLET ORAL at 17:05

## 2022-02-11 RX ADMIN — MULTIPLE VITAMINS W/ MINERALS TAB 1 TABLET: TAB at 08:51

## 2022-02-11 RX ADMIN — NICOTINE POLACRILEX 2 MG: 2 GUM, CHEWING BUCCAL at 08:57

## 2022-02-11 RX ADMIN — ATORVASTATIN CALCIUM 40 MG: 20 TABLET, FILM COATED ORAL at 20:14

## 2022-02-11 RX ADMIN — NICOTINE POLACRILEX 2 MG: 2 GUM, CHEWING BUCCAL at 17:14

## 2022-02-11 RX ADMIN — DORZOLAMIDE HYDROCHLORIDE 1 DROP: 20 SOLUTION/ DROPS OPHTHALMIC at 17:26

## 2022-02-11 RX ADMIN — POTASSIUM CHLORIDE 40 MEQ: 1500 TABLET, EXTENDED RELEASE ORAL at 17:05

## 2022-02-11 RX ADMIN — NICOTINE POLACRILEX 2 MG: 2 GUM, CHEWING BUCCAL at 20:14

## 2022-02-11 RX ADMIN — DORZOLAMIDE HYDROCHLORIDE 1 DROP: 20 SOLUTION/ DROPS OPHTHALMIC at 20:14

## 2022-02-11 RX ADMIN — CLOPIDOGREL BISULFATE 75 MG: 75 TABLET ORAL at 17:05

## 2022-02-11 RX ADMIN — SERTRALINE HYDROCHLORIDE 25 MG: 25 TABLET ORAL at 08:51

## 2022-02-11 NOTE — PLAN OF CARE
Problem: Tobacco Use:  Goal: Inpatient tobacco use cessation counseling participation  Description: Inpatient tobacco use cessation counseling participation  2/11/2022 1536 by Keller Hamman, LPN  Outcome: Ongoing   patient has Nicorette gum for tobacco control  Problem: Altered Mood, Depressive Behavior:  Goal: Ability to disclose and discuss suicidal ideas will improve  Description: Ability to disclose and discuss suicidal ideas will improve  2/11/2022 1536 by Keller Hamman, LPN  Outcome: Ongoing   Mr Savanah Adkins is seen in his room affect is flat and mood is stable, patient reports depression and anxiety, Reports fleeting suicidal thought, agrees to be safe on the unit. Took medications, reports some withdrawal symptoms, tremors, denies all else. Isolative to room.  15 minute safety checks continue

## 2022-02-11 NOTE — H&P
hours as needed for Pain 19   Adamaris Olivares,    acetaminophen (TYLENOL) 500 MG tablet Take 2 tablets by mouth every 8 hours as needed for Pain 19   Cami Modesta Bath, DO   ibuprofen (ADVIL;MOTRIN) 600 MG tablet Take 1 tablet by mouth every 8 hours as needed for Pain 19   Cami Modesta Bath, DO        Allergies:     Patient has no known allergies. Social History:     Tobacco:    reports that he has been smoking cigarettes. His smokeless tobacco use includes chew. Alcohol:      reports current alcohol use. Drug Use:  reports no history of drug use. Family History:     History reviewed. No pertinent family history. Review of Systems:     Positive and Negative as described in HPI. CONSTITUTIONAL:  negative for fevers, chills, sweats, fatigue, weight loss  HEENT:  negative for vision, hearing changes, runny nose, throat pain  RESPIRATORY:  negative for shortness of breath, cough, congestion, wheezing. CARDIOVASCULAR:  negative for chest pain, palpitations.   GASTROINTESTINAL:  negative for nausea, vomiting, diarrhea, constipation, change in bowel habits, abdominal pain   GENITOURINARY:  negative for difficulty of urination, burning with urination, frequency   INTEGUMENT:  negative for rash, skin lesions, easy bruising   HEMATOLOGIC/LYMPHATIC:  negative for swelling/edema   ALLERGIC/IMMUNOLOGIC:  negative for urticaria , itching  ENDOCRINE:  negative increase in drinking, increase in urination, hot or cold intolerance  MUSCULOSKELETAL:  negative joint pains, muscle aches, swelling of joints  NEUROLOGICAL:  negative for headaches, dizziness, lightheadedness, numbness, pain, tingling extremities      Physical Exam:     /78   Pulse 70   Temp 97.5 °F (36.4 °C) (Tympanic)   Resp 14   Ht 5' 4\" (1.626 m)   Wt 134 lb (60.8 kg)   SpO2 100%   BMI 23.00 kg/m²   Temp (24hrs), Av.4 °F (36.3 °C), Min:97.2 °F (36.2 °C), Max:97.5 °F (36.4 °C)    No results for input(s): POCGLU in the last 72 hours. No intake or output data in the 24 hours ending 02/11/22 1420    General Appearance:  alert, well appearing, and in no acute distress  Mental status: oriented to person, place, and time with normal affect  Head:  normocephalic, atraumatic. Eye: no icterus, redness, pupils equal and reactive, extraocular eye movements intact, conjunctiva clear  Ear: normal external ear, no discharge, hearing intact  Nose:  no drainage noted  Mouth: mucous membranes moist  Neck: supple, no carotid bruits, thyroid not palpable  Lungs: Bilateral equal air entry, clear to ausculation, no wheezing, rales or rhonchi, normal effort  Cardiovascular: normal rate, regular rhythm, no murmur, gallop, rub. Abdomen: Soft, nontender, nondistended, normal bowel sounds, no hepatomegaly or splenomegaly  Neurologic: There are no new focal motor or sensory deficits, normal muscle tone and bulk, no abnormal sensation, normal speech, cranial nerves II through XII grossly intact  Skin: No gross lesions, rashes, bruising or bleeding on exposed skin area  Extremities:  peripheral pulses palpable, no pedal edema or calf pain with palpation      Investigations:      Laboratory Testing:  No results found for this or any previous visit (from the past 24 hour(s)). Consultations:   IP CONSULT TO INTERNAL MEDICINE  IP CONSULT TO SOCIAL WORK  Assessment :      Primary Problem  <principal problem not specified>    Active Hospital Problems    Diagnosis Date Noted    Depression with suicidal ideation [F32. A, R45.851] 02/10/2022       Plan:     81-year-old gentleman with history of glaucoma start dorzolamide and timolol combination  History of hyperlipidemia restart Lipitor 40 mg  History of coronary artery disease Plavix 75 mg  History of alcohol abuse folic acid 1 mg and thiamine 100 mg  Hypokalemia potassium 3.9 replace 40 M EQ and recheck in a.m.       Ameena Paige MD  2/11/2022  2:20 PM    Copy sent to Dr. Jeevan Dominguez MD    Please note that this chart was generated using voice recognition Dragon dictation software. Although every effort was made to ensure the accuracy of this automated transcription, some errors in transcription may have occurred.

## 2022-02-11 NOTE — PLAN OF CARE
[facility]  Day 3 Interdisciplinary Treatment Plan NOTE    Review Date & Time: 2/11/2022  1307    Admission Type:   Admission Type: Voluntary    Reason for admission:  Reason for Admission: Suicidal, no plan, homeless. Not linked to outpatient provider.   Estimated Length of Stay: 5-7 days  Estimated Discharge Date Update: to be determined by physician    PATIENT STRENGTHS:  Patient Strengths Strengths: Communication,Motivated  Patient Strengths and Limitations:Limitations: Difficulty problem solving/relies on others to help solve problems,Inappropriate/potentially harmful leisure interests,Apathetic / unmotivated,Multiple barriers to leisure interests,Hopeless about future  Addictive Behavior:Addictive Behavior  In the past 3 months, have you felt or has someone told you that you have a problem with:  : None  Do you have a history of Chemical Use?: No  Do you have a history of Alcohol Use?: Yes  Do you have a history of Street Drug Abuse?: No  Histroy of Prescripton Drug Abuse?: No  Medical Problems:  Past Medical History:   Diagnosis Date    Depression with suicidal ideation 2/10/2022    Glaucoma     Hypertension        Risk:  Fall RiskTotal: 75  Krishna Scale Krishna Scale Score: 22  BVC    Change in scores no Changes to plan of Care no    Status EXAM:   Status and Exam  Normal: No  Facial Expression: Flat  Affect: Blunt  Level of Consciousness: Alert  Mood:Normal: No  Mood: Anxious,Depressed  Motor Activity:Normal: Yes  Interview Behavior: Cooperative  Preception: [place]ena to Person,[place]ena to Time,Ravena to Place,[place] to Situation  Attention:Normal: No  Attention: Distractible  Thought Processes: Circumstantial  Thought Content:Normal: No  Thought Content: Preoccupations  Hallucinations: None  Delusions: No  Memory:Normal: Yes  Insight and Judgment: No  Insight and Judgment: Poor Judgment,Poor Insight  Present Suicidal Ideation: Yes (fleeting SI, contracts for safety)  Present Homicidal Ideation: No    Daily Assessment Last Entry:   Daily Sleep (WDL): Within Defined Limits         Patient Currently in Pain: Denies  Daily Nutrition (WDL): Exceptions to WDL (did not eat breakfast or lunch)  Barriers to Nutrition: None  Level of Assistance: Independent/Self    Patient Monitoring:  Frequency of Checks: 4 times per hour, close    Psychiatric Symptoms:   Depression Symptoms  Depression Symptoms: Impaired concentration,Loss of interest  Anxiety Symptoms  Anxiety Symptoms: Generalized  Nancy Symptoms  Nancy Symptoms: No problems reported or observed. Psychosis Symptoms  Delusion Type: No problems reported or observed. Suicide Risk CSSR-S:  1) Within the past month, have you wished you were dead or wished you could go to sleep and not wake up? : Yes  2) Have you actually had any thoughts of killing yourself? : Yes  3) Have you been thinking about how you might kill yourself? : No  5) Have you started to work out or worked out the details of how to kill yourself?  Do you intend to carry out this plan? : No  6) Have you ever done anything, started to do anything, or prepared to do anything to end your life?: No  Change in Result no Change in Plan of care no      EDUCATION:   EDUCATION:   Learner Progress Toward Treatment Goals: Reviewed results and recommendations of this team, Reviewed group plan and strategies, Reviewed signs, symptoms and risk of self harm and violent behavior, Reviewed goals and plan of care    Method:small group, individual verbal education    Outcome:verbalized by patient, but needs reinforcement to obtain goals    PATIENT GOALS:  Short term: exploring sobriety, decrease suicidal thoughts   Long term: find permanent housing     PLAN/TREATMENT RECOMMENDATIONS UPDATE: continue with group therapies, increased socialization, continue planning for after discharge goals, continue with medication compliance    SHORT-TERM GOALS UPDATE:   Time frame for Short-Term Goals: 5-7 days    LONG-TERM GOALS UPDATE:   Time frame for Long-Term Goals: 6 months  Members Present in Team Meeting: See Signature Sheet    BRIAN Boggs

## 2022-02-11 NOTE — GROUP NOTE
Group Therapy Note    Date: 2/11/2022    Group Start Time: 1330  Group End Time: 3443  Group Topic: Cognitive Skills    NICHOLAS Sepulveda, SOFIS    Pt did not attend 1330 cognitive skills group d/t resting in room despite staff invitation to attend. 1:1 talk time offered as alternative to group session, pt declined.                 Signature:  Noah Matta

## 2022-02-11 NOTE — PROGRESS NOTES
Daily Progress Note  2/11/2022    Patient Name: Hiwot Samaniego    CHIEF COMPLAINT:  Depression with suicidal ideation         Emergency Medications: None in past 24 hours    SUBJECTIVE:   Patient is seen for follow up assessment in assigned room on unit. Nursing staff report patient been isolative to his room, declined attending group, medication adherent and behaviorally controlled. Upon approach, patient is laying on L side in bed, responds to verbal stimuli, agreeable to assessment. Patient reports low mood. He endorses suicidal ideation without plan. Patient rates his depression as 7/10 (10 being worst) and anxiety as 5/10 (10 being worst). He states he had adequate sleep last night and ate >50% of his breakfast this morning. He reports he has been using the nicotine gum for his cravings. He plans to shower today. Patient is able to contract for safety on the unit, is unable to contract for safety in the community.      Appetite:  [x] Normal/Adequate/Unchanged  [] Increased  [] Decreased      Sleep:       [x] Normal/Adequate/Unchanged  [] Fair  [] Poor      Group Attendance on Unit:   [] Yes  [] Selectively    [x] No    Medication Side Effects: Denies         Mental Status Exam  Level of consciousness: Alert and awake   Appearance: Appropriate attire for setting, resting in bed, with poor grooming and hygiene   Behavior/Motor: Approachable, no psychomotor abnormalities observed  Attitude toward examiner: Cooperative, attentive, fair eye contact noted  Speech: Normal volume, rate, rhythm, and tone  Mood:  Euthymic  Affect: Mood congruent  Thought processes: Linear and goal directed   Thought content: Denies homicidal ideation  Suicidal Ideation: Present; without plan or intent   Delusions: Not evident  Perceptual Disturbance: patient is not observed responding to internal stimuli  Cognition: Oriented to self, location, time, and situation  Memory: Intact  Insight & Judgement: Poor    Data   height is 5' 4\" (1.626 mmol/L Final    Potassium 02/09/2022 3.9  3.7 - 5.3 mmol/L Final    Chloride 02/09/2022 108* 98 - 107 mmol/L Final    CO2 02/09/2022 22  20 - 31 mmol/L Final    Anion Gap 02/09/2022 15  9 - 17 mmol/L Final    Alkaline Phosphatase 02/09/2022 45  40 - 129 U/L Final    ALT 02/09/2022 16  5 - 41 U/L Final    AST 02/09/2022 21  <40 U/L Final    Total Bilirubin 02/09/2022 0.18* 0.3 - 1.2 mg/dL Final    Total Protein 02/09/2022 6.7  6.4 - 8.3 g/dL Final    Albumin 02/09/2022 4.3  3.5 - 5.2 g/dL Final    Albumin/Globulin Ratio 02/09/2022 NOT REPORTED  1.0 - 2.5 Final    GFR Non- 02/09/2022 >60  >60 mL/min Final    GFR  02/09/2022 >60  >60 mL/min Final    GFR Comment 02/09/2022        Final    Comment: Average GFR for 61-76 years old:   80 mL/min/1.73sq m  Chronic Kidney Disease:   <60 mL/min/1.73sq m  Kidney failure:   <15 mL/min/1.73sq m              eGFR calculated using average adult body mass.  Additional eGFR calculator available at:        [employer]            GFR Staging 02/09/2022 NOT REPORTED   Final    Ethanol 02/09/2022 237* <10 mg/dL Final    Ethanol percent 02/09/2022 0.237  % Final    Amphetamine Screen, Ur 02/09/2022 NEGATIVE  NEGATIVE Final    Comment:       (Positive cutoff 1000 ng/mL)                  Barbiturate Screen, Ur 02/09/2022 NEGATIVE  NEGATIVE Final    Comment:       (Positive cutoff 200 ng/mL)                  Benzodiazepine Screen, Urine 02/09/2022 NEGATIVE  NEGATIVE Final    Comment:       (Positive cutoff 200 ng/mL)                  Cocaine Metabolite, Urine 02/09/2022 NEGATIVE  NEGATIVE Final    Comment:       (Positive cutoff 300 ng/mL)                  Methadone Screen, Urine 02/09/2022 NEGATIVE  NEGATIVE Final    Comment:       (Positive cutoff 300 ng/mL)                  Opiates, Urine 02/09/2022 NEGATIVE  NEGATIVE Final    Comment:       (Positive cutoff 300 ng/mL)                  Phencyclidine, Urine 02/09/2022 NEGATIVE  NEGATIVE Final    Comment:       (Positive cutoff 25 ng/mL)                  Propoxyphene, Urine 02/09/2022 NOT REPORTED  NEGATIVE Final    Cannabinoid Scrn, Ur 02/09/2022 NEGATIVE  NEGATIVE Final    Comment:       (Positive cutoff 50 ng/mL)                  Oxycodone Screen, Ur 02/09/2022 NEGATIVE  NEGATIVE Final    Comment:       (Positive cutoff 100 ng/mL)                  Methamphetamine, Urine 02/09/2022 NOT REPORTED  NEGATIVE Final    Tricyclic Antidepressants, Urine 02/09/2022 NOT REPORTED  NEGATIVE Final    MDMA, Urine 02/09/2022 NOT REPORTED  NEGATIVE Final    Buprenorphine Urine 02/09/2022 NOT REPORTED  NEGATIVE Final    Test Information 02/09/2022 Assay provides medical screening only. The absence of expected drug(s) and/or metabolite(s) may indicate diluted or adulterated urine, limitations of testing or timing of collection. Final    Comment: Testing for legal purposes should be confirmed by another method. To request confirmation   of test result, please call the lab within 7 days of sample submission.  Specimen Description 02/09/2022 . NASOPHARYNGEAL SWAB   Final    SARS-CoV-2, Rapid 02/09/2022 Not Detected  Not Detected Final    Comment:       Rapid NAAT:  The specimen is NEGATIVE for SARS-CoV-2, the novel coronavirus associated with   COVID-19. The ID NOW COVID-19 assay is designed to detect the virus that causes COVID-19 in patients   with signs and symptoms of infection who are suspected of COVID-19. An individual without symptoms of COVID-19 and who is not shedding SARS-CoV-2 virus would   expect to have a negative (not detected) result in this assay. Negative results should be treated as presumptive and, if inconsistent with clinical signs   and symptoms or necessary for patient management,  should be tested with an alternative molecular assay.  Negative results do not preclude   SARS-CoV-2 infection and   should not be used as the sole basis for patient management decisions. Fact sheet for Healthcare Providers: [employer]kirti  Fact sheet for Patients: [employer]kirti          Methodology: Isothermal Nucleic Acid Amplification           Reviewed patient's current plan of care and vital signs with nursing staff.     Labs reviewed: [x] Yes  Last EKG in EMR reviewed: [x] Yes    Medications  Current Facility-Administered Medications: potassium chloride (KLOR-CON M) extended release tablet 40 mEq, 40 mEq, Oral, Once  clopidogrel (PLAVIX) tablet 75 mg, 75 mg, Oral, Daily  atorvastatin (LIPITOR) tablet 40 mg, 40 mg, Oral, Nightly  folic acid (FOLVITE) tablet 1 mg, 1 mg, Oral, Daily  dorzolamide (TRUSOPT) 2 % ophthalmic solution 1 drop, 1 drop, Both Eyes, BID **AND** timolol (TIMOPTIC) 0.5 % ophthalmic solution 1 drop, 1 drop, Both Eyes, BID  acetaminophen (TYLENOL) tablet 650 mg, 650 mg, Oral, Q4H PRN  aluminum & magnesium hydroxide-simethicone (MAALOX) 200-200-20 MG/5ML suspension 30 mL, 30 mL, Oral, Q6H PRN  haloperidol lactate (HALDOL) injection 5 mg, 5 mg, IntraMUSCular, Q4H PRN **AND** diphenhydrAMINE (BENADRYL) injection 50 mg, 50 mg, IntraMUSCular, Q4H PRN **AND** LORazepam (ATIVAN) injection 2 mg, 2 mg, IntraMUSCular, Q4H PRN  haloperidol (HALDOL) tablet 5 mg, 5 mg, Oral, Q4H PRN **AND** LORazepam (ATIVAN) tablet 2 mg, 2 mg, Oral, Q4H PRN  hydrOXYzine (ATARAX) tablet 50 mg, 50 mg, Oral, TID PRN  traZODone (DESYREL) tablet 50 mg, 50 mg, Oral, Nightly PRN  polyethylene glycol (GLYCOLAX) packet 17 g, 17 g, Oral, Daily PRN  nicotine polacrilex (NICORETTE) gum 2 mg, 2 mg, Oral, PRN  ibuprofen (ADVIL;MOTRIN) tablet 400 mg, 400 mg, Oral, Q6H PRN  sertraline (ZOLOFT) tablet 25 mg, 25 mg, Oral, Daily  LORazepam (ATIVAN) tablet 1 mg, 1 mg, Oral, Q1H PRN **OR** LORazepam (ATIVAN) injection 1 mg, 1 mg, IntraMUSCular, Q1H PRN **OR** LORazepam (ATIVAN) tablet 2 mg, 2 mg, Oral, Q1H PRN **OR** LORazepam (ATIVAN) injection 2 mg, 2 mg, IntraMUSCular, Q1H PRN **OR** LORazepam (ATIVAN) tablet 3 mg, 3 mg, Oral, Q1H PRN **OR** LORazepam (ATIVAN) injection 3 mg, 3 mg, IntraMUSCular, Q1H PRN **OR** LORazepam (ATIVAN) tablet 4 mg, 4 mg, Oral, Q1H PRN **OR** LORazepam (ATIVAN) injection 4 mg, 4 mg, IntraMUSCular, Q1H PRN  thiamine mononitrate tablet 100 mg, 100 mg, Oral, Daily  therapeutic multivitamin-minerals 1 tablet, 1 tablet, Oral, Daily    ASSESSMENT  <principal problem not specified>         HANDOFF  Patient symptoms are: Remain unstable  Medications as determined by attending physician  Encourage participation in groups and milieu. Probable discharge is to be determined by MD      Electronically signed by Grcielda Pratt RN, student nurse practitioner, on 2/11/2022 at 3:20 PM    **This report has been created using voice recognition software. It may contain minor errors which are inherent in voice recognition technology. **

## 2022-02-11 NOTE — PROGRESS NOTES
Daily Progress Note  2/11/2022    Patient Name: Anya Fountain    CHIEF COMPLAINT:  Depression with suicidal ideation         Emergency Medications: None in past 24 hours    SUBJECTIVE:   Patient is seen for follow up assessment in assigned room on unit. Nursing staff report patient been isolative to his room, declined attending group, medication adherent and behaviorally controlled. Upon approach, patient is laying on L side in bed, responds to verbal stimuli, agreeable to assessment. Patient reports low mood. He endorses suicidal ideation without plan. Patient rates his depression as 7/10 (10 being worst) and anxiety as 5/10 (10 being worst). He states he had adequate sleep last night and ate >50% of his breakfast this morning. He reports he has been using the nicotine gum for his cravings. He plans to shower today. Patient is able to contract for safety on the unit, is unable to contract for safety in the community.      Appetite:  [x] Normal/Adequate/Unchanged  [] Increased  [] Decreased      Sleep:       [x] Normal/Adequate/Unchanged  [] Fair  [] Poor      Group Attendance on Unit:   [] Yes  [] Selectively    [x] No    Medication Side Effects: Denies         Mental Status Exam  Level of consciousness: Alert and awake   Appearance: Appropriate attire for setting, resting in bed, with poor grooming and hygiene   Behavior/Motor: Approachable, no psychomotor abnormalities observed  Attitude toward examiner: Cooperative, attentive, fair eye contact noted  Speech: Normal volume, rate, rhythm, and tone  Mood:  Euthymic  Affect: Mood congruent  Thought processes: Linear and goal directed   Thought content: Denies homicidal ideation  Suicidal Ideation: Present; without plan or intent   Delusions: Not evident  Perceptual Disturbance: patient is not observed responding to internal stimuli  Cognition: Oriented to self, location, time, and situation  Memory: Intact  Insight & Judgement: Poor    Data   height is 5' 4\" (1.626 m) and weight is 134 lb (60.8 kg). His tympanic temperature is 97.5 °F (36.4 °C). His blood pressure is 113/78 and his pulse is 70. His respiration is 14 and oxygen saturation is 100%.    Labs:   Admission on 02/09/2022   Component Date Value Ref Range Status    WBC 02/09/2022 4.4  3.5 - 11.0 k/uL Final    RBC 02/09/2022 4.61  4.5 - 5.9 m/uL Final    Hemoglobin 02/09/2022 15.0  13.5 - 17.5 g/dL Final    Hematocrit 02/09/2022 43.8  41 - 53 % Final    MCV 02/09/2022 95.0  80 - 100 fL Final    MCH 02/09/2022 32.5  26 - 34 pg Final    MCHC 02/09/2022 34.3  31 - 37 g/dL Final    RDW 02/09/2022 12.9  11.5 - 14.9 % Final    Platelets 72/37/6563 212  150 - 450 k/uL Final    MPV 02/09/2022 6.8  6.0 - 12.0 fL Final    NRBC Automated 02/09/2022 NOT REPORTED  per 100 WBC Final    Differential Type 02/09/2022 NOT REPORTED   Final    Seg Neutrophils 02/09/2022 64  36 - 66 % Final    Lymphocytes 02/09/2022 25  24 - 44 % Final    Monocytes 02/09/2022 9* 1 - 7 % Final    Eosinophils % 02/09/2022 2  0 - 4 % Final    Basophils 02/09/2022 0  0 - 2 % Final    Immature Granulocytes 02/09/2022 NOT REPORTED  0 % Final    Segs Absolute 02/09/2022 2.80  1.3 - 9.1 k/uL Final    Absolute Lymph # 02/09/2022 1.10  1.0 - 4.8 k/uL Final    Absolute Mono # 02/09/2022 0.40  0.1 - 1.3 k/uL Final    Absolute Eos # 02/09/2022 0.10  0.0 - 0.4 k/uL Final    Basophils Absolute 02/09/2022 0.00  0.0 - 0.2 k/uL Final    Absolute Immature Granulocyte 02/09/2022 NOT REPORTED  0.00 - 0.30 k/uL Final    WBC Morphology 02/09/2022 NOT REPORTED   Final    RBC Morphology 02/09/2022 NOT REPORTED   Final    Platelet Estimate 80/96/8181 NOT REPORTED   Final    Glucose 02/09/2022 117* 70 - 99 mg/dL Final    BUN 02/09/2022 10  8 - 23 mg/dL Final    CREATININE 02/09/2022 0.81  0.70 - 1.20 mg/dL Final    Bun/Cre Ratio 02/09/2022 NOT REPORTED  9 - 20 Final    Calcium 02/09/2022 9.0  8.6 - 10.4 mg/dL Final    Sodium 02/09/2022 145* 135 - 144 mmol/L Final    Potassium 02/09/2022 3.9  3.7 - 5.3 mmol/L Final    Chloride 02/09/2022 108* 98 - 107 mmol/L Final    CO2 02/09/2022 22  20 - 31 mmol/L Final    Anion Gap 02/09/2022 15  9 - 17 mmol/L Final    Alkaline Phosphatase 02/09/2022 45  40 - 129 U/L Final    ALT 02/09/2022 16  5 - 41 U/L Final    AST 02/09/2022 21  <40 U/L Final    Total Bilirubin 02/09/2022 0.18* 0.3 - 1.2 mg/dL Final    Total Protein 02/09/2022 6.7  6.4 - 8.3 g/dL Final    Albumin 02/09/2022 4.3  3.5 - 5.2 g/dL Final    Albumin/Globulin Ratio 02/09/2022 NOT REPORTED  1.0 - 2.5 Final    GFR Non- 02/09/2022 >60  >60 mL/min Final    GFR  02/09/2022 >60  >60 mL/min Final    GFR Comment 02/09/2022        Final    Comment: Average GFR for 61-76 years old:   80 mL/min/1.73sq m  Chronic Kidney Disease:   <60 mL/min/1.73sq m  Kidney failure:   <15 mL/min/1.73sq m              eGFR calculated using average adult body mass.  Additional eGFR calculator available at:        [employer]            GFR Staging 02/09/2022 NOT REPORTED   Final    Ethanol 02/09/2022 237* <10 mg/dL Final    Ethanol percent 02/09/2022 0.237  % Final    Amphetamine Screen, Ur 02/09/2022 NEGATIVE  NEGATIVE Final    Comment:       (Positive cutoff 1000 ng/mL)                  Barbiturate Screen, Ur 02/09/2022 NEGATIVE  NEGATIVE Final    Comment:       (Positive cutoff 200 ng/mL)                  Benzodiazepine Screen, Urine 02/09/2022 NEGATIVE  NEGATIVE Final    Comment:       (Positive cutoff 200 ng/mL)                  Cocaine Metabolite, Urine 02/09/2022 NEGATIVE  NEGATIVE Final    Comment:       (Positive cutoff 300 ng/mL)                  Methadone Screen, Urine 02/09/2022 NEGATIVE  NEGATIVE Final    Comment:       (Positive cutoff 300 ng/mL)                  Opiates, Urine 02/09/2022 NEGATIVE  NEGATIVE Final    Comment:       (Positive cutoff 300 ng/mL)                  Phencyclidine, Urine 02/09/2022 NEGATIVE  NEGATIVE Final    Comment:       (Positive cutoff 25 ng/mL)                  Propoxyphene, Urine 02/09/2022 NOT REPORTED  NEGATIVE Final    Cannabinoid Scrn, Ur 02/09/2022 NEGATIVE  NEGATIVE Final    Comment:       (Positive cutoff 50 ng/mL)                  Oxycodone Screen, Ur 02/09/2022 NEGATIVE  NEGATIVE Final    Comment:       (Positive cutoff 100 ng/mL)                  Methamphetamine, Urine 02/09/2022 NOT REPORTED  NEGATIVE Final    Tricyclic Antidepressants, Urine 02/09/2022 NOT REPORTED  NEGATIVE Final    MDMA, Urine 02/09/2022 NOT REPORTED  NEGATIVE Final    Buprenorphine Urine 02/09/2022 NOT REPORTED  NEGATIVE Final    Test Information 02/09/2022 Assay provides medical screening only. The absence of expected drug(s) and/or metabolite(s) may indicate diluted or adulterated urine, limitations of testing or timing of collection. Final    Comment: Testing for legal purposes should be confirmed by another method. To request confirmation   of test result, please call the lab within 7 days of sample submission.  Specimen Description 02/09/2022 . NASOPHARYNGEAL SWAB   Final    SARS-CoV-2, Rapid 02/09/2022 Not Detected  Not Detected Final    Comment:       Rapid NAAT:  The specimen is NEGATIVE for SARS-CoV-2, the novel coronavirus associated with   COVID-19. The ID NOW COVID-19 assay is designed to detect the virus that causes COVID-19 in patients   with signs and symptoms of infection who are suspected of COVID-19. An individual without symptoms of COVID-19 and who is not shedding SARS-CoV-2 virus would   expect to have a negative (not detected) result in this assay. Negative results should be treated as presumptive and, if inconsistent with clinical signs   and symptoms or necessary for patient management,  should be tested with an alternative molecular assay.  Negative results do not preclude   SARS-CoV-2 infection and   should not be used as the sole basis for patient management decisions. Fact sheet for Healthcare Providers: [employer]kirti  Fact sheet for Patients: [employer]irti          Methodology: Isothermal Nucleic Acid Amplification           Reviewed patient's current plan of care and vital signs with nursing staff.     Labs reviewed: [x] Yes  Last EKG in EMR reviewed: [x] Yes    Medications  Current Facility-Administered Medications: potassium chloride (KLOR-CON M) extended release tablet 40 mEq, 40 mEq, Oral, Once  clopidogrel (PLAVIX) tablet 75 mg, 75 mg, Oral, Daily  atorvastatin (LIPITOR) tablet 40 mg, 40 mg, Oral, Nightly  folic acid (FOLVITE) tablet 1 mg, 1 mg, Oral, Daily  dorzolamide (TRUSOPT) 2 % ophthalmic solution 1 drop, 1 drop, Both Eyes, BID **AND** timolol (TIMOPTIC) 0.5 % ophthalmic solution 1 drop, 1 drop, Both Eyes, BID  acetaminophen (TYLENOL) tablet 650 mg, 650 mg, Oral, Q4H PRN  aluminum & magnesium hydroxide-simethicone (MAALOX) 200-200-20 MG/5ML suspension 30 mL, 30 mL, Oral, Q6H PRN  haloperidol lactate (HALDOL) injection 5 mg, 5 mg, IntraMUSCular, Q4H PRN **AND** diphenhydrAMINE (BENADRYL) injection 50 mg, 50 mg, IntraMUSCular, Q4H PRN **AND** LORazepam (ATIVAN) injection 2 mg, 2 mg, IntraMUSCular, Q4H PRN  haloperidol (HALDOL) tablet 5 mg, 5 mg, Oral, Q4H PRN **AND** LORazepam (ATIVAN) tablet 2 mg, 2 mg, Oral, Q4H PRN  hydrOXYzine (ATARAX) tablet 50 mg, 50 mg, Oral, TID PRN  traZODone (DESYREL) tablet 50 mg, 50 mg, Oral, Nightly PRN  polyethylene glycol (GLYCOLAX) packet 17 g, 17 g, Oral, Daily PRN  nicotine polacrilex (NICORETTE) gum 2 mg, 2 mg, Oral, PRN  ibuprofen (ADVIL;MOTRIN) tablet 400 mg, 400 mg, Oral, Q6H PRN  sertraline (ZOLOFT) tablet 25 mg, 25 mg, Oral, Daily  LORazepam (ATIVAN) tablet 1 mg, 1 mg, Oral, Q1H PRN **OR** LORazepam (ATIVAN) injection 1 mg, 1 mg, IntraMUSCular, Q1H PRN **OR** LORazepam (ATIVAN) tablet 2 mg, 2 mg, Oral, Q1H PRN **OR** LORazepam (ATIVAN) injection 2 mg, 2 mg, IntraMUSCular, Q1H PRN **OR** LORazepam (ATIVAN) tablet 3 mg, 3 mg, Oral, Q1H PRN **OR** LORazepam (ATIVAN) injection 3 mg, 3 mg, IntraMUSCular, Q1H PRN **OR** LORazepam (ATIVAN) tablet 4 mg, 4 mg, Oral, Q1H PRN **OR** LORazepam (ATIVAN) injection 4 mg, 4 mg, IntraMUSCular, Q1H PRN  thiamine mononitrate tablet 100 mg, 100 mg, Oral, Daily  therapeutic multivitamin-minerals 1 tablet, 1 tablet, Oral, Daily    ASSESSMENT  <principal problem not specified>         HANDOFF  Patient symptoms are: Remain unstable  Medications as determined by attending physician  Encourage participation in groups and milieu. Probable discharge is to be determined by MD      Electronically signed by Poonam Landaverde RN, student nurse practitioner, on 2/11/2022 at 4:00 PM    **This report has been created using voice recognition software. It may contain minor errors which are inherent in voice recognition technology. **    I independently saw and evaluated the patient. I reviewed the  documentation above. Any additional comments or changes to the midlevel provider's documentation are stated below otherwise agree with assessment. The patient was seen at bedside. He has tolerated his medications without any problems. He is denying any side effect from his medications. He has not noted any improvement in his mood. He continues to have passive suicidal thoughts. PLAN  Zoloft increased to 50mg daily. Attempt to develop insight  Psycho-education conducted. Supportive Therapy conducted. Probable discharge is 3-9 days.    Follow-up daily while on inpatient unit    Electronically signed by Mendel Soda, MD on 2/11/22 at 4:01 PM EST

## 2022-02-11 NOTE — GROUP NOTE
Group Therapy Note    Date: 2/11/2022    Group Start Time: 1100  Group End Time: 1130  Group Topic: Psychoeducation    [facility]    [facility], [facility]    Pt did not attend 1100 psycheducation group d/t resting in room despite staff invitation to attend. 1:1 talk time offered as alternative to group session, pt declined.             Signature:  Estefanía Cage

## 2022-02-11 NOTE — PLAN OF CARE
Problem: Altered Mood, Depressive Behavior:  Goal: Ability to disclose and discuss suicidal ideas will improve  Description: Ability to disclose and discuss suicidal ideas will improve  2/10/2022 2106 by Rachid Mosher RN  Outcome: Ongoing   Pt reports fleeting suicidal ideations. Pt contracts for safety on the unit. Pt reports ongoing depression and anxiety. He denies auditory or visual hallucinations. Pt denies withdrawal symptoms. Pt is isolative to room. Pt is medication compliant. Problem: Tobacco Use:  Goal: Inpatient tobacco use cessation counseling participation  Description: Inpatient tobacco use cessation counseling participation  2/10/2022 2106 by Rachid Mosher, DANI  Outcome: Ongoing   Patient given tobacco quitline number 20878127892 at this time, refusing to call at this time, states \" I just dont want to quit now\"- patient given information as to the dangers of long term tobacco use. Continue to reinforce the importance of tobacco cessation.

## 2022-02-12 PROBLEM — F32.2 MAJOR DEPRESSIVE DISORDER, SINGLE EPISODE, SEVERE WITHOUT PSYCHOTIC FEATURES (HCC): Status: ACTIVE | Noted: 2022-02-12

## 2022-02-12 PROBLEM — F10.130 ALCOHOL ABUSE WITH WITHDRAWAL, UNCOMPLICATED (HCC): Status: ACTIVE | Noted: 2022-02-12

## 2022-02-12 LAB
ANION GAP SERPL CALCULATED.3IONS-SCNC: 8 MMOL/L (ref 9–17)
BUN BLDV-MCNC: 7 MG/DL (ref 8–23)
BUN/CREAT BLD: ABNORMAL (ref 9–20)
CALCIUM SERPL-MCNC: 8.6 MG/DL (ref 8.6–10.4)
CHLORIDE BLD-SCNC: 105 MMOL/L (ref 98–107)
CO2: 27 MMOL/L (ref 20–31)
CREAT SERPL-MCNC: 0.88 MG/DL (ref 0.7–1.2)
GFR AFRICAN AMERICAN: >60 ML/MIN
GFR NON-AFRICAN AMERICAN: >60 ML/MIN
GFR SERPL CREATININE-BSD FRML MDRD: ABNORMAL ML/MIN/{1.73_M2}
GFR SERPL CREATININE-BSD FRML MDRD: ABNORMAL ML/MIN/{1.73_M2}
GLUCOSE BLD-MCNC: 143 MG/DL (ref 70–99)
POTASSIUM SERPL-SCNC: 4.8 MMOL/L (ref 3.7–5.3)
SODIUM BLD-SCNC: 140 MMOL/L (ref 135–144)

## 2022-02-12 PROCEDURE — 1240000000 HC EMOTIONAL WELLNESS R&B

## 2022-02-12 PROCEDURE — 6370000000 HC RX 637 (ALT 250 FOR IP): Performed by: PSYCHIATRY & NEUROLOGY

## 2022-02-12 PROCEDURE — 6370000000 HC RX 637 (ALT 250 FOR IP): Performed by: INTERNAL MEDICINE

## 2022-02-12 PROCEDURE — 99231 SBSQ HOSP IP/OBS SF/LOW 25: CPT | Performed by: INTERNAL MEDICINE

## 2022-02-12 PROCEDURE — 99231 SBSQ HOSP IP/OBS SF/LOW 25: CPT | Performed by: NURSE PRACTITIONER

## 2022-02-12 PROCEDURE — 80048 BASIC METABOLIC PNL TOTAL CA: CPT

## 2022-02-12 PROCEDURE — 93005 ELECTROCARDIOGRAM TRACING: CPT

## 2022-02-12 PROCEDURE — 36415 COLL VENOUS BLD VENIPUNCTURE: CPT

## 2022-02-12 RX ADMIN — NICOTINE POLACRILEX 2 MG: 2 GUM, CHEWING BUCCAL at 08:15

## 2022-02-12 RX ADMIN — FOLIC ACID 1 MG: 1 TABLET ORAL at 08:15

## 2022-02-12 RX ADMIN — SERTRALINE HYDROCHLORIDE 50 MG: 50 TABLET ORAL at 08:14

## 2022-02-12 RX ADMIN — THIAMINE HCL TAB 100 MG 100 MG: 100 TAB at 08:14

## 2022-02-12 RX ADMIN — NICOTINE POLACRILEX 2 MG: 2 GUM, CHEWING BUCCAL at 20:48

## 2022-02-12 RX ADMIN — NICOTINE POLACRILEX 2 MG: 2 GUM, CHEWING BUCCAL at 14:04

## 2022-02-12 RX ADMIN — DORZOLAMIDE HYDROCHLORIDE 1 DROP: 20 SOLUTION/ DROPS OPHTHALMIC at 20:38

## 2022-02-12 RX ADMIN — TIMOLOL MALEATE 1 DROP: 5 SOLUTION OPHTHALMIC at 20:38

## 2022-02-12 RX ADMIN — MULTIPLE VITAMINS W/ MINERALS TAB 1 TABLET: TAB at 08:14

## 2022-02-12 RX ADMIN — DORZOLAMIDE HYDROCHLORIDE 1 DROP: 20 SOLUTION/ DROPS OPHTHALMIC at 08:16

## 2022-02-12 RX ADMIN — ATORVASTATIN CALCIUM 40 MG: 20 TABLET, FILM COATED ORAL at 20:38

## 2022-02-12 RX ADMIN — TIMOLOL MALEATE 1 DROP: 5 SOLUTION OPHTHALMIC at 08:16

## 2022-02-12 RX ADMIN — CLOPIDOGREL BISULFATE 75 MG: 75 TABLET ORAL at 08:14

## 2022-02-12 NOTE — PLAN OF CARE
Problem: Altered Mood, Depressive Behavior:  Goal: Ability to disclose and discuss suicidal ideas will improve  Description: Ability to disclose and discuss suicidal ideas will improve  Outcome: Ongoing   1:1 with pt x ten minutes. Pt encouraged to attend unit programming and interact with peers and staff. Pt also encouraged to tend to hygiene and ADLs. Pt encouraged to discuss feelings with staff and feedback and reassurance provided. Pt admits to having thoughts of self harm. Agreeable to seeking out staff should feelings increase. Able to identify positive coping skills and plan for safety. Will cont to monitor q15 minutes for safety and provide support and reassurance as needed. Pt is seclusive to room. Out for meals briefly. Compliant with medications.

## 2022-02-12 NOTE — PROGRESS NOTES
Daily Progress Note  2/12/2022    Patient Name: Autumn Hunt    CHIEF COMPLAINT:  Depression with suicidal ideation         Emergency Medications: None in past 24 hours     SUBJECTIVE:   Patient is seen for follow up assessment in assigned room. Nursing staff report patient continues to be isolative to his room and reporting some ongoing symptoms of withdrawal including tremors. He was resting on approach but awake and agreeable to conversation. Patient continues to present as depressed and made minimal eye contact. He reports having \"a lot of my mind\" today and had trouble sleeping overnight. He reports that he \"tossed and turned\". As needed medication for sleep was encouraged. Patient continues to endorse suicidal ideation and states that \"sometimes I think it is the best way to fix everything\". Patient is also endorsing significant anxiety related to discharge and wondering where he will live. He is not looking forward to having to go back to a shelter. He has not been attending unit programming and was encouraged to do so his interaction with peers and staff may help to improve his mood. He continues to feel depressed and anxious and rates both a 6/10, 10 being the worst.  He denies auditory or visual hallucinations and makes no delusional or paranoid statements during conversation. He was encouraged to spend more time out of bed even if it is just to watch TV in the day area. He was also encouraged to shower and attend to hygiene needs and ADLs. Patient agrees to contract for safety on the unit however at this time he is unable to contract for safety in the community.      Appetite:  [x] Normal/Adequate/Unchanged  [] Increased  [] Decreased      Sleep:       [] Normal/Adequate/Unchanged  [] Fair  [x] Poor      Group Attendance on Unit:   [] Yes  [] Selectively    [x] No    Medication Side Effects: Denies         Mental Status Exam  Level of consciousness: Alert and awake   Appearance: Appropriate attire for setting, resting in bed, with poor grooming and hygiene   Behavior/Motor: Approachable, no psychomotor abnormalities observed  Attitude toward examiner: Cooperative, attentive, minimal eye contact noted  Speech: Normal volume, rate, rhythm, and tone  Mood:  Euthymic  Affect: Mood congruent  Thought processes: Linear and goal directed   Thought content: Denies homicidal ideation  Suicidal Ideation: Present; contracts for safety on unit  Delusions: Not evident  Perceptual Disturbance: patient is not observed responding to internal stimuli  Cognition: Oriented to self, location, time, and situation  Memory: Intact  Insight & Judgement: Poor    Data   height is 5' 4\" (1.626 m) and weight is 134 lb (60.8 kg). His temperature is 97.5 °F (36.4 °C). His blood pressure is 122/71 and his pulse is 60. His respiration is 14 and oxygen saturation is 100%.    Labs:   Admission on 02/09/2022   Component Date Value Ref Range Status    WBC 02/09/2022 4.4  3.5 - 11.0 k/uL Final    RBC 02/09/2022 4.61  4.5 - 5.9 m/uL Final    Hemoglobin 02/09/2022 15.0  13.5 - 17.5 g/dL Final    Hematocrit 02/09/2022 43.8  41 - 53 % Final    MCV 02/09/2022 95.0  80 - 100 fL Final    MCH 02/09/2022 32.5  26 - 34 pg Final    MCHC 02/09/2022 34.3  31 - 37 g/dL Final    RDW 02/09/2022 12.9  11.5 - 14.9 % Final    Platelets 94/81/5716 212  150 - 450 k/uL Final    MPV 02/09/2022 6.8  6.0 - 12.0 fL Final    NRBC Automated 02/09/2022 NOT REPORTED  per 100 WBC Final    Differential Type 02/09/2022 NOT REPORTED   Final    Seg Neutrophils 02/09/2022 64  36 - 66 % Final    Lymphocytes 02/09/2022 25  24 - 44 % Final    Monocytes 02/09/2022 9* 1 - 7 % Final    Eosinophils % 02/09/2022 2  0 - 4 % Final    Basophils 02/09/2022 0  0 - 2 % Final    Immature Granulocytes 02/09/2022 NOT REPORTED  0 % Final    Segs Absolute 02/09/2022 2.80  1.3 - 9.1 k/uL Final    Absolute Lymph # 02/09/2022 1.10  1.0 - 4.8 k/uL Final    Absolute Mono # 02/09/2022 0.40  0.1 - 1.3 k/uL Final    Absolute Eos # 02/09/2022 0.10  0.0 - 0.4 k/uL Final    Basophils Absolute 02/09/2022 0.00  0.0 - 0.2 k/uL Final    Absolute Immature Granulocyte 02/09/2022 NOT REPORTED  0.00 - 0.30 k/uL Final    WBC Morphology 02/09/2022 NOT REPORTED   Final    RBC Morphology 02/09/2022 NOT REPORTED   Final    Platelet Estimate 51/16/9435 NOT REPORTED   Final    Glucose 02/09/2022 117* 70 - 99 mg/dL Final    BUN 02/09/2022 10  8 - 23 mg/dL Final    CREATININE 02/09/2022 0.81  0.70 - 1.20 mg/dL Final    Bun/Cre Ratio 02/09/2022 NOT REPORTED  9 - 20 Final    Calcium 02/09/2022 9.0  8.6 - 10.4 mg/dL Final    Sodium 02/09/2022 145* 135 - 144 mmol/L Final    Potassium 02/09/2022 3.9  3.7 - 5.3 mmol/L Final    Chloride 02/09/2022 108* 98 - 107 mmol/L Final    CO2 02/09/2022 22  20 - 31 mmol/L Final    Anion Gap 02/09/2022 15  9 - 17 mmol/L Final    Alkaline Phosphatase 02/09/2022 45  40 - 129 U/L Final    ALT 02/09/2022 16  5 - 41 U/L Final    AST 02/09/2022 21  <40 U/L Final    Total Bilirubin 02/09/2022 0.18* 0.3 - 1.2 mg/dL Final    Total Protein 02/09/2022 6.7  6.4 - 8.3 g/dL Final    Albumin 02/09/2022 4.3  3.5 - 5.2 g/dL Final    Albumin/Globulin Ratio 02/09/2022 NOT REPORTED  1.0 - 2.5 Final    GFR Non- 02/09/2022 >60  >60 mL/min Final    GFR  02/09/2022 >60  >60 mL/min Final    GFR Comment 02/09/2022        Final    Comment: Average GFR for 61-76 years old:   80 mL/min/1.73sq m  Chronic Kidney Disease:   <60 mL/min/1.73sq m  Kidney failure:   <15 mL/min/1.73sq m              eGFR calculated using average adult body mass.  Additional eGFR calculator available at:        [email or web address]            GFR Staging 02/09/2022 NOT REPORTED   Final    Ethanol 02/09/2022 237* <10 mg/dL Final    Ethanol percent 02/09/2022 0.237  % Final    Amphetamine Screen, Ur 02/09/2022 NEGATIVE  NEGATIVE Final    Comment: (Positive cutoff 1000 ng/mL)                  Barbiturate Screen, Ur 02/09/2022 NEGATIVE  NEGATIVE Final    Comment:       (Positive cutoff 200 ng/mL)                  Benzodiazepine Screen, Urine 02/09/2022 NEGATIVE  NEGATIVE Final    Comment:       (Positive cutoff 200 ng/mL)                  Cocaine Metabolite, Urine 02/09/2022 NEGATIVE  NEGATIVE Final    Comment:       (Positive cutoff 300 ng/mL)                  Methadone Screen, Urine 02/09/2022 NEGATIVE  NEGATIVE Final    Comment:       (Positive cutoff 300 ng/mL)                  Opiates, Urine 02/09/2022 NEGATIVE  NEGATIVE Final    Comment:       (Positive cutoff 300 ng/mL)                  Phencyclidine, Urine 02/09/2022 NEGATIVE  NEGATIVE Final    Comment:       (Positive cutoff 25 ng/mL)                  Propoxyphene, Urine 02/09/2022 NOT REPORTED  NEGATIVE Final    Cannabinoid Scrn, Ur 02/09/2022 NEGATIVE  NEGATIVE Final    Comment:       (Positive cutoff 50 ng/mL)                  Oxycodone Screen, Ur 02/09/2022 NEGATIVE  NEGATIVE Final    Comment:       (Positive cutoff 100 ng/mL)                  Methamphetamine, Urine 02/09/2022 NOT REPORTED  NEGATIVE Final    Tricyclic Antidepressants, Urine 02/09/2022 NOT REPORTED  NEGATIVE Final    MDMA, Urine 02/09/2022 NOT REPORTED  NEGATIVE Final    Buprenorphine Urine 02/09/2022 NOT REPORTED  NEGATIVE Final    Test Information 02/09/2022 Assay provides medical screening only. The absence of expected drug(s) and/or metabolite(s) may indicate diluted or adulterated urine, limitations of testing or timing of collection. Final    Comment: Testing for legal purposes should be confirmed by another method. To request confirmation   of test result, please call the lab within 7 days of sample submission.  Specimen Description 02/09/2022 . NASOPHARYNGEAL SWAB   Final    SARS-CoV-2, Rapid 02/09/2022 Not Detected  Not Detected Final    Comment:       Rapid NAAT:  The specimen is NEGATIVE for SARS-CoV-2, the novel coronavirus associated with   COVID-19. The ID NOW COVID-19 assay is designed to detect the virus that causes COVID-19 in patients   with signs and symptoms of infection who are suspected of COVID-19. An individual without symptoms of COVID-19 and who is not shedding SARS-CoV-2 virus would   expect to have a negative (not detected) result in this assay. Negative results should be treated as presumptive and, if inconsistent with clinical signs   and symptoms or necessary for patient management,  should be tested with an alternative molecular assay. Negative results do not preclude   SARS-CoV-2 infection and   should not be used as the sole basis for patient management decisions. Fact sheet for Healthcare Providers: [employer]es  Fact sheet for Patients: [employer].es          Methodology: Isothermal Nucleic Acid Amplification      Glucose 02/12/2022 143* 70 - 99 mg/dL Final    BUN 02/12/2022 7* 8 - 23 mg/dL Final    CREATININE 02/12/2022 0.88  0.70 - 1.20 mg/dL Final    Bun/Cre Ratio 02/12/2022 NOT REPORTED  9 - 20 Final    Calcium 02/12/2022 8.6  8.6 - 10.4 mg/dL Final    Sodium 02/12/2022 140  135 - 144 mmol/L Final    Potassium 02/12/2022 4.8  3.7 - 5.3 mmol/L Final    Chloride 02/12/2022 105  98 - 107 mmol/L Final    CO2 02/12/2022 27  20 - 31 mmol/L Final    Anion Gap 02/12/2022 8* 9 - 17 mmol/L Final    GFR Non- 02/12/2022 >60  >60 mL/min Final    GFR  02/12/2022 >60  >60 mL/min Final    GFR Comment 02/12/2022        Final    Comment: Average GFR for 61-76 years old:   80 mL/min/1.73sq m  Chronic Kidney Disease:   <60 mL/min/1.73sq m  Kidney failure:   <15 mL/min/1.73sq m              eGFR calculated using average adult body mass.  Additional eGFR calculator available at:        [email or web address]            GFR Staging 02/12/2022 NOT REPORTED Final         Reviewed patient's current plan of care and vital signs with nursing staff.     Labs reviewed: [x] Yes  Last EKG in EMR reviewed: [x] Yes    Medications  Current Facility-Administered Medications: clopidogrel (PLAVIX) tablet 75 mg, 75 mg, Oral, Daily  atorvastatin (LIPITOR) tablet 40 mg, 40 mg, Oral, Nightly  folic acid (FOLVITE) tablet 1 mg, 1 mg, Oral, Daily  dorzolamide (TRUSOPT) 2 % ophthalmic solution 1 drop, 1 drop, Both Eyes, BID **AND** timolol (TIMOPTIC) 0.5 % ophthalmic solution 1 drop, 1 drop, Both Eyes, BID  sertraline (ZOLOFT) tablet 50 mg, 50 mg, Oral, Daily  acetaminophen (TYLENOL) tablet 650 mg, 650 mg, Oral, Q4H PRN  aluminum & magnesium hydroxide-simethicone (MAALOX) 200-200-20 MG/5ML suspension 30 mL, 30 mL, Oral, Q6H PRN  haloperidol lactate (HALDOL) injection 5 mg, 5 mg, IntraMUSCular, Q4H PRN **AND** diphenhydrAMINE (BENADRYL) injection 50 mg, 50 mg, IntraMUSCular, Q4H PRN **AND** LORazepam (ATIVAN) injection 2 mg, 2 mg, IntraMUSCular, Q4H PRN  haloperidol (HALDOL) tablet 5 mg, 5 mg, Oral, Q4H PRN **AND** LORazepam (ATIVAN) tablet 2 mg, 2 mg, Oral, Q4H PRN  hydrOXYzine (ATARAX) tablet 50 mg, 50 mg, Oral, TID PRN  traZODone (DESYREL) tablet 50 mg, 50 mg, Oral, Nightly PRN  polyethylene glycol (GLYCOLAX) packet 17 g, 17 g, Oral, Daily PRN  nicotine polacrilex (NICORETTE) gum 2 mg, 2 mg, Oral, PRN  ibuprofen (ADVIL;MOTRIN) tablet 400 mg, 400 mg, Oral, Q6H PRN  LORazepam (ATIVAN) tablet 1 mg, 1 mg, Oral, Q1H PRN **OR** LORazepam (ATIVAN) injection 1 mg, 1 mg, IntraMUSCular, Q1H PRN **OR** LORazepam (ATIVAN) tablet 2 mg, 2 mg, Oral, Q1H PRN **OR** LORazepam (ATIVAN) injection 2 mg, 2 mg, IntraMUSCular, Q1H PRN **OR** LORazepam (ATIVAN) tablet 3 mg, 3 mg, Oral, Q1H PRN **OR** LORazepam (ATIVAN) injection 3 mg, 3 mg, IntraMUSCular, Q1H PRN **OR** LORazepam (ATIVAN) tablet 4 mg, 4 mg, Oral, Q1H PRN **OR** LORazepam (ATIVAN) injection 4 mg, 4 mg, IntraMUSCular, Q1H PRN  thiamine mononitrate tablet 100 mg, 100 mg, Oral, Daily  therapeutic multivitamin-minerals 1 tablet, 1 tablet, Oral, Daily    ASSESSMENT  Major depressive disorder, single episode, severe without psychotic features ([address]  Alcohol abuse with withdrawal, uncomplicated         PLAN  Patient symptoms: Remain unstable  Monitor need and frequency of PRN medications. Monitor patient for tolerability and efficacy of recent Zoloft titration  Encourage participation in groups and milieu. Attempt to develop insight. Psycho-education conducted. Supportive Therapy conducted. Probable discharge: To be determined by attending physician   Follow-up daily while inpatient.      Patient continues to be monitored in the inpatient psychiatric facility at [facility], [place] for safety and stabilization. Patient continues to need, on a daily basis, active treatment furnished directly by or requiring the supervision of inpatient psychiatric personnel.     Electronically signed by ALLISON Norwood CNP, on 2/12/2022 at 15:18     **This report has been created using voice recognition software. It may contain minor errors which are inherent in voice recognition technology. **

## 2022-02-12 NOTE — PLAN OF CARE
Problem: Altered Mood, Depressive Behavior:  Goal: Ability to disclose and discuss suicidal ideas will improve  Description: Ability to disclose and discuss suicidal ideas will improve  2/11/2022 2258 by Jonatan Galicia RN  Outcome: Ongoing   Pt reports fleeting suicidal ideations. He contracts for safety on the unit. Pt denies auditory or visual hallucinations. Pt reports adequate sleep and appetite. Problem: Tobacco Use:  Goal: Inpatient tobacco use cessation counseling participation  Description: Inpatient tobacco use cessation counseling participation  2/11/2022 2258 by Jonatan Galicia RN  Outcome: Ongoing   Patient given information as to the dangers of long term tobacco use. Pt uses nicotine gum.

## 2022-02-12 NOTE — PROGRESS NOTES
[facility]  Progress note            Date:   2/12/2022  Patient name:  Concepcion Flores  Date of admission:  2/9/2022  6:27 PM  MRN:   [id number]  Account:  [de-identified]  YOB: 1960  PCP:    No primary care provider on file. Room:   [address] [place] [address]  Code Status:    Full Code    Physician Requesting Consult: Claudia Galeano MD    Reason for Consult:  medical management    Chief Complaint:     Chief Complaint   Patient presents with    Mental Health Problem   htn  hld  glaucoma    History Obtained From:     Patient medical record nursing staff    History of Present Illness:     HTN  Onset more than 2 years ago  robb mild to mod  Controlled with current po meds  Not associated with headaches or blurry vision  No chest pain    HLD  Onset more than 5 years ago  Severity is mild, not getting worse  Not associated with pancreatitis  Tolerating statin well no muscle pain    Glaucoma  Not getting his meds drops    Past Medical History:     Past Medical History:   Diagnosis Date    Depression with suicidal ideation 2/10/2022    Glaucoma     Hypertension         Past Surgical History:     History reviewed. No pertinent surgical history. Medications Prior to Admission:     Prior to Admission medications    Medication Sig Start Date End Date Taking?  Authorizing Provider   chlorhexidine (PERIDEX) 0.12 % solution Take 15 mLs by mouth 2 times daily for 14 days 1/31/22 2/14/22  Yessenia Betters, DO   clopidogrel (PLAVIX) 75 MG tablet Take 1 tablet by mouth daily 12/29/19   Marya Rodriguez MD   vitamin B-1 100 MG tablet Take 1 tablet by mouth daily 12/29/19   Marya Rodriguez MD   folic acid (FOLVITE) 1 MG tablet Take 1 tablet by mouth daily 12/29/19   Marya Rodriguez MD   atorvastatin (LIPITOR) 40 MG tablet Take 1 tablet by mouth nightly 12/28/19   Marya Rodriguez MD   ibuprofen (IBU) 400 MG tablet Take 1 tablet by mouth every 6 hours as needed for Pain 19   Urmila Ludwin, DO   acetaminophen (TYLENOL) 500 MG tablet Take 2 tablets by mouth every 8 hours as needed for Pain 19   Cami Whitfield, DO   ibuprofen (ADVIL;MOTRIN) 600 MG tablet Take 1 tablet by mouth every 8 hours as needed for Pain 19   Cami Whitfield, DO        Allergies:     Patient has no known allergies. Social History:     Tobacco:    reports that he has been smoking cigarettes. His smokeless tobacco use includes chew. Alcohol:      reports current alcohol use. Drug Use:  reports no history of drug use. Family History:     History reviewed. No pertinent family history. Review of Systems:     Positive and Negative as described in HPI. CONSTITUTIONAL:  negative for fevers, chills, sweats, fatigue, weight loss  HEENT:  negative for vision, hearing changes, runny nose, throat pain  RESPIRATORY:  negative for shortness of breath, cough, congestion, wheezing. CARDIOVASCULAR:  negative for chest pain, palpitations. GASTROINTESTINAL:  negative for nausea, vomiting, diarrhea, constipation, change in bowel habits, abdominal pain   GENITOURINARY:  negative for difficulty of urination, burning with urination, frequency   INTEGUMENT:  negative for rash, skin lesions, easy bruising   HEMATOLOGIC/LYMPHATIC:  negative for swelling/edema   ALLERGIC/IMMUNOLOGIC:  negative for urticaria , itching  ENDOCRINE:  negative increase in drinking, increase in urination, hot or cold intolerance  MUSCULOSKELETAL:  negative joint pains, muscle aches, swelling of joints  NEUROLOGICAL:  negative for headaches, dizziness, lightheadedness, numbness, pain, tingling extremities      Physical Exam:     /71   Pulse 60   Temp 97.5 °F (36.4 °C)   Resp 14   Ht 5' 4\" (1.626 m)   Wt 134 lb (60.8 kg)   SpO2 100%   BMI 23.00 kg/m²   Temp (24hrs), Av.6 °F (36.4 °C), Min:97.5 °F (36.4 °C), Max:97.6 °F (36.4 °C)    No results for input(s): POCGLU in the last 72 hours.   No intake or output data in the 24 hours ending 02/12/22 1605    General Appearance:  alert, well appearing, and in no acute distress  Mental status: oriented to person, place, and time with normal affect  Head:  normocephalic, atraumatic. Eye: no icterus, redness, pupils equal and reactive, extraocular eye movements intact, conjunctiva clear  Ear: normal external ear, no discharge, hearing intact  Nose:  no drainage noted  Mouth: mucous membranes moist  Neck: supple, no carotid bruits, thyroid not palpable  Lungs: Bilateral equal air entry, clear to ausculation, no wheezing, rales or rhonchi, normal effort  Cardiovascular: normal rate, regular rhythm, no murmur, gallop, rub.   Abdomen: Soft, nontender, nondistended, normal bowel sounds, no hepatomegaly or splenomegaly  Neurologic: There are no new focal motor or sensory deficits, normal muscle tone and bulk, no abnormal sensation, normal speech, cranial nerves II through XII grossly intact  Skin: No gross lesions, rashes, bruising or bleeding on exposed skin area  Extremities:  peripheral pulses palpable, no pedal edema or calf pain with palpation      Investigations:      Laboratory Testing:  Recent Results (from the past 24 hour(s))   BASIC METABOLIC PANEL    Collection Time: 02/12/22  8:33 AM   Result Value Ref Range    Glucose 143 (H) 70 - 99 mg/dL    BUN 7 (L) 8 - 23 mg/dL    CREATININE 0.88 0.70 - 1.20 mg/dL    Bun/Cre Ratio NOT REPORTED 9 - 20    Calcium 8.6 8.6 - 10.4 mg/dL    Sodium 140 135 - 144 mmol/L    Potassium 4.8 3.7 - 5.3 mmol/L    Chloride 105 98 - 107 mmol/L    CO2 27 20 - 31 mmol/L    Anion Gap 8 (L) 9 - 17 mmol/L    GFR Non-African American >60 >60 mL/min    GFR African American >60 >60 mL/min    GFR Comment          GFR Staging NOT REPORTED            Consultations:   IP CONSULT TO INTERNAL MEDICINE  IP CONSULT TO SOCIAL WORK  Assessment :      Primary Problem  Major depressive disorder, single episode, severe without psychotic features ([facility] [address]    Active Hospital Problems    Diagnosis Date Noted    Major depressive disorder, single episode, severe without psychotic features ([facility] [F32.2] 02/12/2022    Alcohol abuse with withdrawal, uncomplicated ([facility] [O84.993] 02/12/2022       Plan:     60-year-old gentleman with history of glaucoma start dorzolamide and timolol combination  History of hyperlipidemia restart Lipitor 40 mg  History of coronary artery disease Plavix 75 mg  History of alcohol abuse folic acid 1 mg and thiamine 100 mg  Hypokalemia potassium 3.9 replace 40 M EQ and recheck in a.m.    2/12/22    · BP control improved   Will sign off . Thanks . · Please call again , if neeeded . Results for Vern Aguirre (MRN [id number]) as of 2/12/2022 16:05   Ref. Range 2/12/2022 08:33   Sodium Latest Ref Range: 135 - 144 mmol/L 140   Potassium Latest Ref Range: 3.7 - 5.3 mmol/L 4.8   Chloride Latest Ref Range: 98 - 107 mmol/L 105   CO2 Latest Ref Range: 20 - 31 mmol/L 27   BUN Latest Ref Range: 8 - 23 mg/dL 7 (L)   Creatinine Latest Ref Range: 0.70 - 1.20 mg/dL 0.88   Bun/Cre Ratio Latest Ref Range: 9 - 20  NOT REPORTED   Anion Gap Latest Ref Range: 9 - 17 mmol/L 8 (L)   GFR Non- Latest Ref Range: >60 mL/min >60   GFR African American Latest Ref Range: >60 mL/min >60   Glucose Latest Ref Range: 70 - 99 mg/dL 143 (H) 1.             Charisse Vasquez MD  2/12/2022  4:05 PM    Copy sent to Dr. Annia Donnelly primary care provider on file. Please note that this chart was generated using voice recognition Dragon dictation software. Although every effort was made to ensure the accuracy of this automated transcription, some errors in transcription may have occurred.

## 2022-02-13 PROCEDURE — 6370000000 HC RX 637 (ALT 250 FOR IP): Performed by: PSYCHIATRY & NEUROLOGY

## 2022-02-13 PROCEDURE — 99231 SBSQ HOSP IP/OBS SF/LOW 25: CPT | Performed by: NURSE PRACTITIONER

## 2022-02-13 PROCEDURE — 6370000000 HC RX 637 (ALT 250 FOR IP): Performed by: INTERNAL MEDICINE

## 2022-02-13 PROCEDURE — 1240000000 HC EMOTIONAL WELLNESS R&B

## 2022-02-13 RX ADMIN — MULTIPLE VITAMINS W/ MINERALS TAB 1 TABLET: TAB at 07:41

## 2022-02-13 RX ADMIN — TIMOLOL MALEATE 1 DROP: 5 SOLUTION OPHTHALMIC at 07:42

## 2022-02-13 RX ADMIN — ATORVASTATIN CALCIUM 40 MG: 20 TABLET, FILM COATED ORAL at 20:41

## 2022-02-13 RX ADMIN — FOLIC ACID 1 MG: 1 TABLET ORAL at 07:41

## 2022-02-13 RX ADMIN — DORZOLAMIDE HYDROCHLORIDE 1 DROP: 20 SOLUTION/ DROPS OPHTHALMIC at 07:42

## 2022-02-13 RX ADMIN — THIAMINE HCL TAB 100 MG 100 MG: 100 TAB at 07:41

## 2022-02-13 RX ADMIN — CLOPIDOGREL BISULFATE 75 MG: 75 TABLET ORAL at 07:41

## 2022-02-13 RX ADMIN — NICOTINE POLACRILEX 2 MG: 2 GUM, CHEWING BUCCAL at 07:41

## 2022-02-13 RX ADMIN — NICOTINE POLACRILEX 2 MG: 2 GUM, CHEWING BUCCAL at 20:52

## 2022-02-13 RX ADMIN — TIMOLOL MALEATE 1 DROP: 5 SOLUTION OPHTHALMIC at 20:41

## 2022-02-13 RX ADMIN — DORZOLAMIDE HYDROCHLORIDE 1 DROP: 20 SOLUTION/ DROPS OPHTHALMIC at 20:41

## 2022-02-13 RX ADMIN — SERTRALINE HYDROCHLORIDE 50 MG: 50 TABLET ORAL at 07:41

## 2022-02-13 NOTE — GROUP NOTE
Group Therapy Note    Date: 2/13/2022    Group Start Time: 1400  Group End Time: 1500  Group Topic: Cognitive Skills    NICHOLAS Cuba, CTRS        Group Therapy Note    Attendees: 11/15         Pt did not participate in Cognitive Skills Group at 1400 when encouraged by RT due to resting in room. Pt was offered talk time as an alternative to group but declined.          Discipline Responsible: Psychoeducational Specialist        Signature:  Maria De Jesus Mendez

## 2022-02-13 NOTE — PROGRESS NOTES
Daily Progress Note  2/13/2022    Patient Name: Chadd Covington    CHIEF COMPLAINT:  Depression with suicidal ideation         Emergency Medications: None in past 24 hours     SUBJECTIVE:   Patient is seen for follow up assessment in assigned room. Nursing staff report patient continues to be isolative to his room, declines attending group and reporting some ongoing symptoms of withdrawal including slight tremors. Patient was resting on L side in bed, responded to verbal stimuli and agreeable to assessment. Patient states his mood is \"about the same. \" He continues to endorse suicidal ideation, \"wanting to die, no reason to live. \" Patient endorses depressive symptoms, feeling \"alone. \" Patient states he feels worthless and helpless. Patient reports reduced withdrawal symptoms, stating \"not really. \" He denies dry mouth. He states he purposefully stayed awake yesterday to get a better sleep schedule and slept \"okay\" last night. Patient reports he did go out into the milieu for a period of time yesterday and took a shower. Patient agrees to contract for safety on the unit however at this time he is unable to contract for safety in the community.      Appetite:  [x] Normal/Adequate/Unchanged  [] Increased  [] Decreased      Sleep:       [] Normal/Adequate/Unchanged  [x] Fair  [] Poor      Group Attendance on Unit:   [] Yes  [] Selectively    [x] No    Medication Side Effects: Denies         Mental Status Exam  Level of consciousness: Alert and awake   Appearance: Appropriate attire for setting, resting in bed, with fair  grooming and hygiene   Behavior/Motor: Approachable, no psychomotor abnormalities observed  Attitude toward examiner: Cooperative, attentive, minimal eye contact noted  Speech: Normal volume, rate, rhythm, and depressed tone  Mood:  depressed  Affect: Mood congruent  Thought processes: Linear and goal directed   Thought content: Denies homicidal ideation  Suicidal Ideation: Present; contracts for safety on unit  Delusions: Not evident  Perceptual Disturbance: patient is not observed responding to internal stimuli  Cognition: Oriented to self, location, time, and situation  Memory: Intact  Insight & Judgement: Poor    Data   height is 5' 4\" (1.626 m) and weight is 134 lb (60.8 kg). His temperature is 97.5 °F (36.4 °C). His blood pressure is 111/85 and his pulse is 60. His respiration is 14 and oxygen saturation is 100%.    Labs:   Admission on 02/09/2022   Component Date Value Ref Range Status    WBC 02/09/2022 4.4  3.5 - 11.0 k/uL Final    RBC 02/09/2022 4.61  4.5 - 5.9 m/uL Final    Hemoglobin 02/09/2022 15.0  13.5 - 17.5 g/dL Final    Hematocrit 02/09/2022 43.8  41 - 53 % Final    MCV 02/09/2022 95.0  80 - 100 fL Final    MCH 02/09/2022 32.5  26 - 34 pg Final    MCHC 02/09/2022 34.3  31 - 37 g/dL Final    RDW 02/09/2022 12.9  11.5 - 14.9 % Final    Platelets 29/02/9810 212  150 - 450 k/uL Final    MPV 02/09/2022 6.8  6.0 - 12.0 fL Final    NRBC Automated 02/09/2022 NOT REPORTED  per 100 WBC Final    Differential Type 02/09/2022 NOT REPORTED   Final    Seg Neutrophils 02/09/2022 64  36 - 66 % Final    Lymphocytes 02/09/2022 25  24 - 44 % Final    Monocytes 02/09/2022 9* 1 - 7 % Final    Eosinophils % 02/09/2022 2  0 - 4 % Final    Basophils 02/09/2022 0  0 - 2 % Final    Immature Granulocytes 02/09/2022 NOT REPORTED  0 % Final    Segs Absolute 02/09/2022 2.80  1.3 - 9.1 k/uL Final    Absolute Lymph # 02/09/2022 1.10  1.0 - 4.8 k/uL Final    Absolute Mono # 02/09/2022 0.40  0.1 - 1.3 k/uL Final    Absolute Eos # 02/09/2022 0.10  0.0 - 0.4 k/uL Final    Basophils Absolute 02/09/2022 0.00  0.0 - 0.2 k/uL Final    Absolute Immature Granulocyte 02/09/2022 NOT REPORTED  0.00 - 0.30 k/uL Final    WBC Morphology 02/09/2022 NOT REPORTED   Final    RBC Morphology 02/09/2022 NOT REPORTED   Final    Platelet Estimate 56/57/9637 NOT REPORTED   Final    Glucose 02/09/2022 117* 70 - 99 mg/dL Final    BUN 02/09/2022 10  8 - 23 mg/dL Final    CREATININE 02/09/2022 0.81  0.70 - 1.20 mg/dL Final    Bun/Cre Ratio 02/09/2022 NOT REPORTED  9 - 20 Final    Calcium 02/09/2022 9.0  8.6 - 10.4 mg/dL Final    Sodium 02/09/2022 145* 135 - 144 mmol/L Final    Potassium 02/09/2022 3.9  3.7 - 5.3 mmol/L Final    Chloride 02/09/2022 108* 98 - 107 mmol/L Final    CO2 02/09/2022 22  20 - 31 mmol/L Final    Anion Gap 02/09/2022 15  9 - 17 mmol/L Final    Alkaline Phosphatase 02/09/2022 45  40 - 129 U/L Final    ALT 02/09/2022 16  5 - 41 U/L Final    AST 02/09/2022 21  <40 U/L Final    Total Bilirubin 02/09/2022 0.18* 0.3 - 1.2 mg/dL Final    Total Protein 02/09/2022 6.7  6.4 - 8.3 g/dL Final    Albumin 02/09/2022 4.3  3.5 - 5.2 g/dL Final    Albumin/Globulin Ratio 02/09/2022 NOT REPORTED  1.0 - 2.5 Final    GFR Non- 02/09/2022 >60  >60 mL/min Final    GFR  02/09/2022 >60  >60 mL/min Final    GFR Comment 02/09/2022        Final    Comment: Average GFR for 61-76 years old:   80 mL/min/1.73sq m  Chronic Kidney Disease:   <60 mL/min/1.73sq m  Kidney failure:   <15 mL/min/1.73sq m              eGFR calculated using average adult body mass.  Additional eGFR calculator available at:        [employer]            GFR Staging 02/09/2022 NOT REPORTED   Final    Ethanol 02/09/2022 237* <10 mg/dL Final    Ethanol percent 02/09/2022 0.237  % Final    Amphetamine Screen, Ur 02/09/2022 NEGATIVE  NEGATIVE Final    Comment:       (Positive cutoff 1000 ng/mL)                  Barbiturate Screen, Ur 02/09/2022 NEGATIVE  NEGATIVE Final    Comment:       (Positive cutoff 200 ng/mL)                  Benzodiazepine Screen, Urine 02/09/2022 NEGATIVE  NEGATIVE Final    Comment:       (Positive cutoff 200 ng/mL)                  Cocaine Metabolite, Urine 02/09/2022 NEGATIVE  NEGATIVE Final    Comment:       (Positive cutoff 300 ng/mL)                  Methadone Screen, Urine 02/09/2022 NEGATIVE  NEGATIVE Final    Comment:       (Positive cutoff 300 ng/mL)                  Opiates, Urine 02/09/2022 NEGATIVE  NEGATIVE Final    Comment:       (Positive cutoff 300 ng/mL)                  Phencyclidine, Urine 02/09/2022 NEGATIVE  NEGATIVE Final    Comment:       (Positive cutoff 25 ng/mL)                  Propoxyphene, Urine 02/09/2022 NOT REPORTED  NEGATIVE Final    Cannabinoid Scrn, Ur 02/09/2022 NEGATIVE  NEGATIVE Final    Comment:       (Positive cutoff 50 ng/mL)                  Oxycodone Screen, Ur 02/09/2022 NEGATIVE  NEGATIVE Final    Comment:       (Positive cutoff 100 ng/mL)                  Methamphetamine, Urine 02/09/2022 NOT REPORTED  NEGATIVE Final    Tricyclic Antidepressants, Urine 02/09/2022 NOT REPORTED  NEGATIVE Final    MDMA, Urine 02/09/2022 NOT REPORTED  NEGATIVE Final    Buprenorphine Urine 02/09/2022 NOT REPORTED  NEGATIVE Final    Test Information 02/09/2022 Assay provides medical screening only. The absence of expected drug(s) and/or metabolite(s) may indicate diluted or adulterated urine, limitations of testing or timing of collection. Final    Comment: Testing for legal purposes should be confirmed by another method. To request confirmation   of test result, please call the lab within 7 days of sample submission.  Specimen Description 02/09/2022 . NASOPHARYNGEAL SWAB   Final    SARS-CoV-2, Rapid 02/09/2022 Not Detected  Not Detected Final    Comment:       Rapid NAAT:  The specimen is NEGATIVE for SARS-CoV-2, the novel coronavirus associated with   COVID-19. The ID NOW COVID-19 assay is designed to detect the virus that causes COVID-19 in patients   with signs and symptoms of infection who are suspected of COVID-19. An individual without symptoms of COVID-19 and who is not shedding SARS-CoV-2 virus would   expect to have a negative (not detected) result in this assay.   Negative results should be treated as presumptive and, if inconsistent with clinical signs   and symptoms or necessary for patient management,  should be tested with an alternative molecular assay. Negative results do not preclude   SARS-CoV-2 infection and   should not be used as the sole basis for patient management decisions. Fact sheet for Healthcare Providers: Varun.kirti  Fact sheet for Patients: Varun.kirti          Methodology: Isothermal Nucleic Acid Amplification      Glucose 02/12/2022 143* 70 - 99 mg/dL Final    BUN 02/12/2022 7* 8 - 23 mg/dL Final    CREATININE 02/12/2022 0.88  0.70 - 1.20 mg/dL Final    Bun/Cre Ratio 02/12/2022 NOT REPORTED  9 - 20 Final    Calcium 02/12/2022 8.6  8.6 - 10.4 mg/dL Final    Sodium 02/12/2022 140  135 - 144 mmol/L Final    Potassium 02/12/2022 4.8  3.7 - 5.3 mmol/L Final    Chloride 02/12/2022 105  98 - 107 mmol/L Final    CO2 02/12/2022 27  20 - 31 mmol/L Final    Anion Gap 02/12/2022 8* 9 - 17 mmol/L Final    GFR Non- 02/12/2022 >60  >60 mL/min Final    GFR  02/12/2022 >60  >60 mL/min Final    GFR Comment 02/12/2022        Final    Comment: Average GFR for 61-76 years old:   80 mL/min/1.73sq m  Chronic Kidney Disease:   <60 mL/min/1.73sq m  Kidney failure:   <15 mL/min/1.73sq m              eGFR calculated using average adult body mass. Additional eGFR calculator available at:        [employer]            GFR Staging 02/12/2022 NOT REPORTED   Final         Reviewed patient's current plan of care and vital signs with nursing staff.     Labs reviewed: [x] Yes  Last EKG in EMR reviewed: [x] Yes    Medications  Current Facility-Administered Medications: clopidogrel (PLAVIX) tablet 75 mg, 75 mg, Oral, Daily  atorvastatin (LIPITOR) tablet 40 mg, 40 mg, Oral, Nightly  folic acid (FOLVITE) tablet 1 mg, 1 mg, Oral, Daily  dorzolamide (TRUSOPT) 2 % ophthalmic solution 1 drop, 1 drop, Both Eyes, BID **AND** timolol (TIMOPTIC) 0.5 % ophthalmic solution 1 drop, 1 drop, Both Eyes, BID  sertraline (ZOLOFT) tablet 50 mg, 50 mg, Oral, Daily  acetaminophen (TYLENOL) tablet 650 mg, 650 mg, Oral, Q4H PRN  aluminum & magnesium hydroxide-simethicone (MAALOX) 200-200-20 MG/5ML suspension 30 mL, 30 mL, Oral, Q6H PRN  haloperidol lactate (HALDOL) injection 5 mg, 5 mg, IntraMUSCular, Q4H PRN **AND** diphenhydrAMINE (BENADRYL) injection 50 mg, 50 mg, IntraMUSCular, Q4H PRN **AND** LORazepam (ATIVAN) injection 2 mg, 2 mg, IntraMUSCular, Q4H PRN  haloperidol (HALDOL) tablet 5 mg, 5 mg, Oral, Q4H PRN **AND** LORazepam (ATIVAN) tablet 2 mg, 2 mg, Oral, Q4H PRN  hydrOXYzine (ATARAX) tablet 50 mg, 50 mg, Oral, TID PRN  traZODone (DESYREL) tablet 50 mg, 50 mg, Oral, Nightly PRN  polyethylene glycol (GLYCOLAX) packet 17 g, 17 g, Oral, Daily PRN  nicotine polacrilex (NICORETTE) gum 2 mg, 2 mg, Oral, PRN  ibuprofen (ADVIL;MOTRIN) tablet 400 mg, 400 mg, Oral, Q6H PRN  LORazepam (ATIVAN) tablet 1 mg, 1 mg, Oral, Q1H PRN **OR** LORazepam (ATIVAN) injection 1 mg, 1 mg, IntraMUSCular, Q1H PRN **OR** LORazepam (ATIVAN) tablet 2 mg, 2 mg, Oral, Q1H PRN **OR** LORazepam (ATIVAN) injection 2 mg, 2 mg, IntraMUSCular, Q1H PRN **OR** LORazepam (ATIVAN) tablet 3 mg, 3 mg, Oral, Q1H PRN **OR** LORazepam (ATIVAN) injection 3 mg, 3 mg, IntraMUSCular, Q1H PRN **OR** LORazepam (ATIVAN) tablet 4 mg, 4 mg, Oral, Q1H PRN **OR** LORazepam (ATIVAN) injection 4 mg, 4 mg, IntraMUSCular, Q1H PRN  thiamine mononitrate tablet 100 mg, 100 mg, Oral, Daily  therapeutic multivitamin-minerals 1 tablet, 1 tablet, Oral, Daily    ASSESSMENT  Major depressive disorder, single episode, severe without psychotic features (HCC)  Alcohol abuse with withdrawal, uncomplicated         PLAN  Patient symptoms: show no change   Monitor need and frequency of PRN medications.   Monitor patient for tolerability and efficacy of recent Zoloft titration  Encourage participation in groups and milieu. Attempt to develop insight. Psycho-education conducted. Supportive Therapy conducted. Probable discharge: To be determined by attending physician   Follow-up daily while inpatient.      Patient continues to be monitored in the inpatient psychiatric facility at [facility] for safety and stabilization. Patient continues to need, on a daily basis, active treatment furnished directly by or requiring the supervision of inpatient psychiatric personnel.     Electronically signed by Shira Cartagena RN, student nurse practitioner, on 2/13/2022 at 12:00    Electronically signed by ALLISON Wong CNP on 2/13/2022  98:55    **This report has been created using voice recognition software. It may contain minor errors which are inherent in voice recognition technology. **

## 2022-02-13 NOTE — PLAN OF CARE
Problem: Altered Mood, Depressive Behavior:  Goal: Ability to disclose and discuss suicidal ideas will improve  Description: Ability to disclose and discuss suicidal ideas will improve  2/12/2022 2055 by Namita Zepeda LPN  Outcome: Ongoing   Patient verbalized fleeting suicidal ideas at this time. Patient denies homicidal ideas at this time. Patient verbalized depressive symptoms at this time. Patient has been in room. Patient is free of self harm at this time. Patient agrees to seek out staff if thoughts to harm self arise. Staff will provide support and reassurance as needed. Safety checks maintained every 15 minutes.

## 2022-02-14 LAB
EKG ATRIAL RATE: 59 BPM
EKG P AXIS: 152 DEGREES
EKG P-R INTERVAL: 136 MS
EKG Q-T INTERVAL: 424 MS
EKG QRS DURATION: 108 MS
EKG QTC CALCULATION (BAZETT): 419 MS
EKG R AXIS: -135 DEGREES
EKG T AXIS: 149 DEGREES
EKG VENTRICULAR RATE: 59 BPM

## 2022-02-14 PROCEDURE — 6370000000 HC RX 637 (ALT 250 FOR IP): Performed by: INTERNAL MEDICINE

## 2022-02-14 PROCEDURE — 1240000000 HC EMOTIONAL WELLNESS R&B

## 2022-02-14 PROCEDURE — 6370000000 HC RX 637 (ALT 250 FOR IP): Performed by: PSYCHIATRY & NEUROLOGY

## 2022-02-14 PROCEDURE — 99232 SBSQ HOSP IP/OBS MODERATE 35: CPT | Performed by: PSYCHIATRY & NEUROLOGY

## 2022-02-14 RX ORDER — THIAMINE MONONITRATE (VIT B1) 100 MG
100 TABLET ORAL DAILY
Qty: 30 TABLET | Refills: 0 | Status: CANCELLED | OUTPATIENT
Start: 2022-02-15

## 2022-02-14 RX ADMIN — THIAMINE HCL TAB 100 MG 100 MG: 100 TAB at 08:45

## 2022-02-14 RX ADMIN — FOLIC ACID 1 MG: 1 TABLET ORAL at 08:44

## 2022-02-14 RX ADMIN — ACETAMINOPHEN 650 MG: 325 TABLET, FILM COATED ORAL at 08:49

## 2022-02-14 RX ADMIN — NICOTINE POLACRILEX 2 MG: 2 GUM, CHEWING BUCCAL at 08:51

## 2022-02-14 RX ADMIN — CLOPIDOGREL BISULFATE 75 MG: 75 TABLET ORAL at 08:44

## 2022-02-14 RX ADMIN — MULTIPLE VITAMINS W/ MINERALS TAB 1 TABLET: TAB at 08:44

## 2022-02-14 RX ADMIN — DORZOLAMIDE HYDROCHLORIDE 1 DROP: 20 SOLUTION/ DROPS OPHTHALMIC at 08:47

## 2022-02-14 RX ADMIN — NICOTINE POLACRILEX 2 MG: 2 GUM, CHEWING BUCCAL at 20:55

## 2022-02-14 RX ADMIN — SERTRALINE HYDROCHLORIDE 50 MG: 50 TABLET ORAL at 08:45

## 2022-02-14 RX ADMIN — TIMOLOL MALEATE 1 DROP: 5 SOLUTION OPHTHALMIC at 08:47

## 2022-02-14 RX ADMIN — NICOTINE POLACRILEX 2 MG: 2 GUM, CHEWING BUCCAL at 17:47

## 2022-02-14 RX ADMIN — ATORVASTATIN CALCIUM 40 MG: 20 TABLET, FILM COATED ORAL at 20:55

## 2022-02-14 RX ADMIN — DORZOLAMIDE HYDROCHLORIDE 1 DROP: 20 SOLUTION/ DROPS OPHTHALMIC at 20:55

## 2022-02-14 RX ADMIN — TIMOLOL MALEATE 1 DROP: 5 SOLUTION OPHTHALMIC at 20:55

## 2022-02-14 ASSESSMENT — PAIN SCALES - GENERAL
PAINLEVEL_OUTOF10: 1
PAINLEVEL_OUTOF10: 3

## 2022-02-14 NOTE — PLAN OF CARE
Problem: Altered Mood, Depressive Behavior:  Goal: Ability to disclose and discuss suicidal ideas will improve  Description: Ability to disclose and discuss suicidal ideas will improve  2/13/2022 2019 by Ariadna Wells LPN  Outcome: Ongoing   Patient verbalized fleeting suicidal ideas at this time. Patient denies homicidal ideas at this time. Patient verbalized depressive symptoms at this time. Patient has been in room. Patient is free of self harm at this time. Patient agrees to seek out staff if thoughts to harm self arise. Staff will provide support and reassurance as needed. Safety checks maintained every 15 minutes.

## 2022-02-14 NOTE — PLAN OF CARE
Problem: Altered Mood, Depressive Behavior:  Goal: Ability to disclose and discuss suicidal ideas will improve  Description: Ability to disclose and discuss suicidal ideas will improve  Outcome: Ongoing   Patient is working on the ability to disclose and discuss suicidal ideas will improve. Problem: Tobacco Use:  Goal: Inpatient tobacco use cessation counseling participation  Description: Inpatient tobacco use cessation counseling participation  Outcome: Ongoing   Inpatient tobacco use cessation counseling participation.

## 2022-02-14 NOTE — GROUP NOTE
Group Therapy Note    Date: 2/14/2022    Group Start Time: 1100  Group End Time: 3021  Group Topic: Cognitive Skills    STCZ MILTON Presley, CTRS    Pt did not attend 1100 healthy living group d/t resting in room despite staff invitation to attend. 1:1 talk time offered as alternative to group session, pt declined.               Signature:  Vivian De La Rosa

## 2022-02-14 NOTE — GROUP NOTE
Group Therapy Note    Date: 2/14/2022    Group Start Time: 1000  Group End Time: 7250  Group Topic: Psychotherapy    [facility]    Deanne Snyder        Group Therapy Note           Patient refused to attend psychotherapy group after encouragement from staff. 1:1 talk time offered but refused. Signature:   Deanne Snyder

## 2022-02-14 NOTE — GROUP NOTE
Group Therapy Note    Date: 2/14/2022    Group Start Time: 1430  Group End Time: 6445  Group Topic: Cognitive Skills    NICK Tapia      Pt did not attend 1430 cognitive skills group d/t resting in room despite staff invitation to attend. 1:1 talk time offered as alternative to group session, pt declined.             Signature:  Antonia Tarango

## 2022-02-14 NOTE — BH NOTE
Patient is complaining of feeling general discomfort and nicotine cravings. Patient requested their 650mg oral of tylenol and nicorette gum per orders. Patient is currently sitting in the day area, and showing no signs of distress. Safety checks are maintained every 15 minutes and at irregular intervals.

## 2022-02-15 PROCEDURE — 1240000000 HC EMOTIONAL WELLNESS R&B

## 2022-02-15 PROCEDURE — 6370000000 HC RX 637 (ALT 250 FOR IP): Performed by: PSYCHIATRY & NEUROLOGY

## 2022-02-15 PROCEDURE — 99232 SBSQ HOSP IP/OBS MODERATE 35: CPT | Performed by: PSYCHIATRY & NEUROLOGY

## 2022-02-15 PROCEDURE — 6370000000 HC RX 637 (ALT 250 FOR IP): Performed by: INTERNAL MEDICINE

## 2022-02-15 RX ORDER — NALTREXONE HYDROCHLORIDE 50 MG/1
50 TABLET, FILM COATED ORAL DAILY
Status: DISCONTINUED | OUTPATIENT
Start: 2022-02-15 | End: 2022-02-17 | Stop reason: HOSPADM

## 2022-02-15 RX ADMIN — TIMOLOL MALEATE 1 DROP: 5 SOLUTION OPHTHALMIC at 20:53

## 2022-02-15 RX ADMIN — NICOTINE POLACRILEX 2 MG: 2 GUM, CHEWING BUCCAL at 20:53

## 2022-02-15 RX ADMIN — DORZOLAMIDE HYDROCHLORIDE 1 DROP: 20 SOLUTION/ DROPS OPHTHALMIC at 20:53

## 2022-02-15 RX ADMIN — ATORVASTATIN CALCIUM 40 MG: 20 TABLET, FILM COATED ORAL at 20:53

## 2022-02-15 RX ADMIN — MULTIPLE VITAMINS W/ MINERALS TAB 1 TABLET: TAB at 08:42

## 2022-02-15 RX ADMIN — DORZOLAMIDE HYDROCHLORIDE 1 DROP: 20 SOLUTION/ DROPS OPHTHALMIC at 08:42

## 2022-02-15 RX ADMIN — SERTRALINE HYDROCHLORIDE 50 MG: 50 TABLET ORAL at 08:42

## 2022-02-15 RX ADMIN — FOLIC ACID 1 MG: 1 TABLET ORAL at 08:42

## 2022-02-15 RX ADMIN — NICOTINE POLACRILEX 2 MG: 2 GUM, CHEWING BUCCAL at 08:51

## 2022-02-15 RX ADMIN — CLOPIDOGREL BISULFATE 75 MG: 75 TABLET ORAL at 08:42

## 2022-02-15 RX ADMIN — NICOTINE POLACRILEX 2 MG: 2 GUM, CHEWING BUCCAL at 18:10

## 2022-02-15 RX ADMIN — THIAMINE HCL TAB 100 MG 100 MG: 100 TAB at 08:42

## 2022-02-15 RX ADMIN — TIMOLOL MALEATE 1 DROP: 5 SOLUTION OPHTHALMIC at 08:42

## 2022-02-15 NOTE — PLAN OF CARE
Problem: Altered Mood, Depressive Behavior:  Goal: Ability to disclose and discuss suicidal ideas will improve  Description: Ability to disclose and discuss suicidal ideas will improve  2/14/2022 2107 by Mariajose Bates LPN  Outcome: Ongoing   Patient verbalized fleeting suicidal ideas at this time. Patient denies homicidal ideas at this time. Patient denies depressive symptoms at this time. Patient has been in room. Patient is free of self harm at this time. Patient agrees to seek out staff if thoughts to harm self arise. Staff will provide support and reassurance as needed. Safety checks maintained every 15 minutes. Transposition Flap Text: The defect edges were debeveled with a #15 scalpel blade.  Given the location of the defect and the proximity to free margins a transposition flap was deemed most appropriate.  Using a sterile surgical marker, an appropriate transposition flap was drawn incorporating the defect.    The area thus outlined was incised deep to adipose tissue with a #15 scalpel blade.  The skin margins were undermined to an appropriate distance in all directions utilizing iris scissors.

## 2022-02-15 NOTE — GROUP NOTE
Group Therapy Note    Date: 2/15/2022    Group Start Time: 1430  Group End Time: 1525  Group Topic: Cognitive Skills    NICHOLAS Thakkar, CTRS        Group Therapy Note    Attendees: 8/18         Pt did not participate in Cognitive Skills Group at 1430 when encouraged by RT due to resting in room. Pt was offered talk time as an alternative to group but declined.          Discipline Responsible: Psychoeducational Specialist        Signature:  Rosalba Leiva

## 2022-02-15 NOTE — PLAN OF CARE
Problem: Altered Mood, Depressive Behavior:  Goal: Ability to disclose and discuss suicidal ideas will improve  Description: Ability to disclose and discuss suicidal ideas will improve  Outcome: Ongoing   At this time patient denies having any suicidal  Problem: Tobacco Use:  Goal: Inpatient tobacco use cessation counseling participation  Description: Inpatient tobacco use cessation counseling participation  Outcome: Ongoing    Thoughts. Problem: Tobacco Use:  Goal: Inpatient tobacco use cessation counseling participation  Description: Inpatient tobacco use cessation counseling participation  Outcome: Ongoing   Patient is using tobacco gum to hep with cessations.

## 2022-02-15 NOTE — GROUP NOTE
Group Therapy Note    Date: 2/15/2022    Group Start Time: 1100  Group End Time: 1140  Group Topic: Cognitive Skills    NICHOLAS Sykes, CTRS        Group Therapy Note    Attendees: 8/19         Pt did not participate in Cognitive Skills Group at 1100AM when encouraged by RT due to resting in room. Pt was offered talk time as an alternative to group but declined.          Discipline Responsible: Psychoeducational Specialist        Signature:  Jose E Heath

## 2022-02-15 NOTE — CARE COORDINATION
SOCIAL SERVICE NOTE: PT gives SW permission to call [facility] to see if he is able to return there. SW speaks with Jolly Metz who is the  who reports that PT is manned indefinitely from the shelter and is unable to return there. SW will continue to monitor the situation.

## 2022-02-15 NOTE — GROUP NOTE
Group Therapy Note    Date: 2/15/2022    Group Start Time: 4841  Group End Time: 1945  Group Topic: Psychotherapy    [facility]    Tennis Boop        Group Therapy Note         Patient refused to attend psychotherapy group after encouragement from staff. 1:1 talk time offered but refused. Signature:   Earl Ferris

## 2022-02-15 NOTE — PROGRESS NOTES
Daily Progress Note  2/14/2022    Patient Name: Karyna Tyler    CHIEF COMPLAINT:  Depression with suicidal ideation         Emergency Medications: None in past 24 hours     SUBJECTIVE:   The patient was seen at bedside. He continues to be depressed and hopeless. He is unmotivated and shows poor self-care. We discussed that the patient has been homeless for a long time. The patient states that he has burned his bridges with the local shelters because of running his mouth. He is not allowed back there. 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 We discussed that that is usually not a criterion for nursing home placement. The patient reports some benefit with his antidepressant medications. He reports no side effects      Appetite:  [x] Normal/Adequate/Unchanged  [] Increased  [] Decreased      Sleep:       [] Normal/Adequate/Unchanged  [x] Fair  [] Poor      Group Attendance on Unit:   [] Yes  [] Selectively    [x] No    Medication Side Effects: Denies         Mental Status Exam  Level of consciousness: Awake  Appearance: Lying in bed in hospital attire. Behavior/Motor: Approachable, no psychomotor abnormalities observed  Attitude toward examiner: Cooperative, attentive, minimal eye contact noted  Speech: Normal volume, rate, rhythm, and depressed tone  Mood:  depressed  Affect: Mood congruent  Thought processes: Linear and goal directed   Thought content: Denies homicidal ideation  Suicidal Ideation:Ongoing suicidal thoughts; contracts for safety on unit  Delusions: Not evident  Perceptual Disturbance: patient is not observed responding to internal stimuli  Cognition: Oriented to self, location, time, and situation  Memory: Intact  Insight & Judgement: Poor    Data   height is 5' 4\" (1.626 m) and weight is 134 lb (60.8 kg). His temperature is 97.4 °F (36.3 °C). His blood pressure is 117/82 and his pulse is 67. His respiration is 14 and oxygen saturation is 100%.    Labs: Admission on 02/09/2022   Component Date Value Ref Range Status    WBC 02/09/2022 4.4  3.5 - 11.0 k/uL Final    RBC 02/09/2022 4.61  4.5 - 5.9 m/uL Final    Hemoglobin 02/09/2022 15.0  13.5 - 17.5 g/dL Final    Hematocrit 02/09/2022 43.8  41 - 53 % Final    MCV 02/09/2022 95.0  80 - 100 fL Final    MCH 02/09/2022 32.5  26 - 34 pg Final    MCHC 02/09/2022 34.3  31 - 37 g/dL Final    RDW 02/09/2022 12.9  11.5 - 14.9 % Final    Platelets 05/78/5681 212  150 - 450 k/uL Final    MPV 02/09/2022 6.8  6.0 - 12.0 fL Final    NRBC Automated 02/09/2022 NOT REPORTED  per 100 WBC Final    Differential Type 02/09/2022 NOT REPORTED   Final    Seg Neutrophils 02/09/2022 64  36 - 66 % Final    Lymphocytes 02/09/2022 25  24 - 44 % Final    Monocytes 02/09/2022 9* 1 - 7 % Final    Eosinophils % 02/09/2022 2  0 - 4 % Final    Basophils 02/09/2022 0  0 - 2 % Final    Immature Granulocytes 02/09/2022 NOT REPORTED  0 % Final    Segs Absolute 02/09/2022 2.80  1.3 - 9.1 k/uL Final    Absolute Lymph # 02/09/2022 1.10  1.0 - 4.8 k/uL Final    Absolute Mono # 02/09/2022 0.40  0.1 - 1.3 k/uL Final    Absolute Eos # 02/09/2022 0.10  0.0 - 0.4 k/uL Final    Basophils Absolute 02/09/2022 0.00  0.0 - 0.2 k/uL Final    Absolute Immature Granulocyte 02/09/2022 NOT REPORTED  0.00 - 0.30 k/uL Final    WBC Morphology 02/09/2022 NOT REPORTED   Final    RBC Morphology 02/09/2022 NOT REPORTED   Final    Platelet Estimate 83/57/5516 NOT REPORTED   Final    Glucose 02/09/2022 117* 70 - 99 mg/dL Final    BUN 02/09/2022 10  8 - 23 mg/dL Final    CREATININE 02/09/2022 0.81  0.70 - 1.20 mg/dL Final    Bun/Cre Ratio 02/09/2022 NOT REPORTED  9 - 20 Final    Calcium 02/09/2022 9.0  8.6 - 10.4 mg/dL Final    Sodium 02/09/2022 145* 135 - 144 mmol/L Final    Potassium 02/09/2022 3.9  3.7 - 5.3 mmol/L Final    Chloride 02/09/2022 108* 98 - 107 mmol/L Final    CO2 02/09/2022 22  20 - 31 mmol/L Final    Anion Gap 02/09/2022 15  9 - 17 mmol/L Final    Alkaline Phosphatase 02/09/2022 45  40 - 129 U/L Final    ALT 02/09/2022 16  5 - 41 U/L Final    AST 02/09/2022 21  <40 U/L Final    Total Bilirubin 02/09/2022 0.18* 0.3 - 1.2 mg/dL Final    Total Protein 02/09/2022 6.7  6.4 - 8.3 g/dL Final    Albumin 02/09/2022 4.3  3.5 - 5.2 g/dL Final    Albumin/Globulin Ratio 02/09/2022 NOT REPORTED  1.0 - 2.5 Final    GFR Non- 02/09/2022 >60  >60 mL/min Final    GFR  02/09/2022 >60  >60 mL/min Final    GFR Comment 02/09/2022        Final    Comment: Average GFR for 61-76 years old:   80 mL/min/1.73sq m  Chronic Kidney Disease:   <60 mL/min/1.73sq m  Kidney failure:   <15 mL/min/1.73sq m              eGFR calculated using average adult body mass.  Additional eGFR calculator available at:        Comparisign.com.br            GFR Staging 02/09/2022 NOT REPORTED   Final    Ethanol 02/09/2022 237* <10 mg/dL Final    Ethanol percent 02/09/2022 0.237  % Final    Amphetamine Screen, Ur 02/09/2022 NEGATIVE  NEGATIVE Final    Comment:       (Positive cutoff 1000 ng/mL)                  Barbiturate Screen, Ur 02/09/2022 NEGATIVE  NEGATIVE Final    Comment:       (Positive cutoff 200 ng/mL)                  Benzodiazepine Screen, Urine 02/09/2022 NEGATIVE  NEGATIVE Final    Comment:       (Positive cutoff 200 ng/mL)                  Cocaine Metabolite, Urine 02/09/2022 NEGATIVE  NEGATIVE Final    Comment:       (Positive cutoff 300 ng/mL)                  Methadone Screen, Urine 02/09/2022 NEGATIVE  NEGATIVE Final    Comment:       (Positive cutoff 300 ng/mL)                  Opiates, Urine 02/09/2022 NEGATIVE  NEGATIVE Final    Comment:       (Positive cutoff 300 ng/mL)                  Phencyclidine, Urine 02/09/2022 NEGATIVE  NEGATIVE Final    Comment:       (Positive cutoff 25 ng/mL)                  Propoxyphene, Urine 02/09/2022 NOT REPORTED  NEGATIVE Final    Cannabinoid Scrn, Ur 02/09/2022 NEGATIVE  NEGATIVE Final    Comment:       (Positive cutoff 50 ng/mL)                  Oxycodone Screen, Ur 02/09/2022 NEGATIVE  NEGATIVE Final    Comment:       (Positive cutoff 100 ng/mL)                  Methamphetamine, Urine 02/09/2022 NOT REPORTED  NEGATIVE Final    Tricyclic Antidepressants, Urine 02/09/2022 NOT REPORTED  NEGATIVE Final    MDMA, Urine 02/09/2022 NOT REPORTED  NEGATIVE Final    Buprenorphine Urine 02/09/2022 NOT REPORTED  NEGATIVE Final    Test Information 02/09/2022 Assay provides medical screening only. The absence of expected drug(s) and/or metabolite(s) may indicate diluted or adulterated urine, limitations of testing or timing of collection. Final    Comment: Testing for legal purposes should be confirmed by another method. To request confirmation   of test result, please call the lab within 7 days of sample submission.  Specimen Description 02/09/2022 . NASOPHARYNGEAL SWAB   Final    SARS-CoV-2, Rapid 02/09/2022 Not Detected  Not Detected Final    Comment:       Rapid NAAT:  The specimen is NEGATIVE for SARS-CoV-2, the novel coronavirus associated with   COVID-19. The ID NOW COVID-19 assay is designed to detect the virus that causes COVID-19 in patients   with signs and symptoms of infection who are suspected of COVID-19. An individual without symptoms of COVID-19 and who is not shedding SARS-CoV-2 virus would   expect to have a negative (not detected) result in this assay. Negative results should be treated as presumptive and, if inconsistent with clinical signs   and symptoms or necessary for patient management,  should be tested with an alternative molecular assay. Negative results do not preclude   SARS-CoV-2 infection and   should not be used as the sole basis for patient management decisions.          Fact sheet for Healthcare Providers: Varun.kirti  Fact sheet for Patients: Varun.es Methodology: Isothermal Nucleic Acid Amplification      Ventricular Rate 02/12/2022 59  BPM Final    Atrial Rate 02/12/2022 59  BPM Final    P-R Interval 02/12/2022 136  ms Final    QRS Duration 02/12/2022 108  ms Final    Q-T Interval 02/12/2022 424  ms Final    QTc Calculation (Bazett) 02/12/2022 419  ms Final    P Axis 02/12/2022 152  degrees Final    R Axis 02/12/2022 -135  degrees Final    T Axis 02/12/2022 149  degrees Final    Glucose 02/12/2022 143* 70 - 99 mg/dL Final    BUN 02/12/2022 7* 8 - 23 mg/dL Final    CREATININE 02/12/2022 0.88  0.70 - 1.20 mg/dL Final    Bun/Cre Ratio 02/12/2022 NOT REPORTED  9 - 20 Final    Calcium 02/12/2022 8.6  8.6 - 10.4 mg/dL Final    Sodium 02/12/2022 140  135 - 144 mmol/L Final    Potassium 02/12/2022 4.8  3.7 - 5.3 mmol/L Final    Chloride 02/12/2022 105  98 - 107 mmol/L Final    CO2 02/12/2022 27  20 - 31 mmol/L Final    Anion Gap 02/12/2022 8* 9 - 17 mmol/L Final    GFR Non- 02/12/2022 >60  >60 mL/min Final    GFR  02/12/2022 >60  >60 mL/min Final    GFR Comment 02/12/2022        Final    Comment: Average GFR for 61-76 years old:   80 mL/min/1.73sq m  Chronic Kidney Disease:   <60 mL/min/1.73sq m  Kidney failure:   <15 mL/min/1.73sq m              eGFR calculated using average adult body mass. Additional eGFR calculator available at:        Magiq.br            GFR Staging 02/12/2022 NOT REPORTED   Final         Reviewed patient's current plan of care and vital signs with nursing staff.     Labs reviewed: [x] Yes  Last EKG in EMR reviewed: [x] Yes    Medications  Current Facility-Administered Medications: clopidogrel (PLAVIX) tablet 75 mg, 75 mg, Oral, Daily  atorvastatin (LIPITOR) tablet 40 mg, 40 mg, Oral, Nightly  folic acid (FOLVITE) tablet 1 mg, 1 mg, Oral, Daily  dorzolamide (TRUSOPT) 2 % ophthalmic solution 1 drop, 1 drop, Both Eyes, BID **AND** timolol (TIMOPTIC) 0.5 % ophthalmic solution 1 drop, 1 drop, Both Eyes, BID  sertraline (ZOLOFT) tablet 50 mg, 50 mg, Oral, Daily  acetaminophen (TYLENOL) tablet 650 mg, 650 mg, Oral, Q4H PRN  aluminum & magnesium hydroxide-simethicone (MAALOX) 200-200-20 MG/5ML suspension 30 mL, 30 mL, Oral, Q6H PRN  haloperidol lactate (HALDOL) injection 5 mg, 5 mg, IntraMUSCular, Q4H PRN **AND** diphenhydrAMINE (BENADRYL) injection 50 mg, 50 mg, IntraMUSCular, Q4H PRN **AND** LORazepam (ATIVAN) injection 2 mg, 2 mg, IntraMUSCular, Q4H PRN  haloperidol (HALDOL) tablet 5 mg, 5 mg, Oral, Q4H PRN **AND** LORazepam (ATIVAN) tablet 2 mg, 2 mg, Oral, Q4H PRN  hydrOXYzine (ATARAX) tablet 50 mg, 50 mg, Oral, TID PRN  traZODone (DESYREL) tablet 50 mg, 50 mg, Oral, Nightly PRN  polyethylene glycol (GLYCOLAX) packet 17 g, 17 g, Oral, Daily PRN  nicotine polacrilex (NICORETTE) gum 2 mg, 2 mg, Oral, PRN  ibuprofen (ADVIL;MOTRIN) tablet 400 mg, 400 mg, Oral, Q6H PRN  LORazepam (ATIVAN) tablet 1 mg, 1 mg, Oral, Q1H PRN **OR** LORazepam (ATIVAN) injection 1 mg, 1 mg, IntraMUSCular, Q1H PRN **OR** LORazepam (ATIVAN) tablet 2 mg, 2 mg, Oral, Q1H PRN **OR** LORazepam (ATIVAN) injection 2 mg, 2 mg, IntraMUSCular, Q1H PRN **OR** LORazepam (ATIVAN) tablet 3 mg, 3 mg, Oral, Q1H PRN **OR** LORazepam (ATIVAN) injection 3 mg, 3 mg, IntraMUSCular, Q1H PRN **OR** LORazepam (ATIVAN) tablet 4 mg, 4 mg, Oral, Q1H PRN **OR** LORazepam (ATIVAN) injection 4 mg, 4 mg, IntraMUSCular, Q1H PRN  thiamine mononitrate tablet 100 mg, 100 mg, Oral, Daily  therapeutic multivitamin-minerals 1 tablet, 1 tablet, Oral, Daily    ASSESSMENT  Major depressive disorder, single episode, severe without psychotic features (HCC)  Alcohol abuse with withdrawal, uncomplicated         PLAN  Patient symptoms: show no change   Monitor need and frequency of PRN medications. No changes to medications  Encourage participation in groups and milieu. Attempt to develop insight.   Psycho-education conducted. Supportive Therapy conducted. Probable discharge: 1-2 days   Follow-up daily while inpatient.      Patient continues to be monitored in the inpatient psychiatric facility at [facility] for safety and stabilization.  Patient continues to need, on a daily basis, active treatment furnished directly by or requiring the supervision of inpatient psychiatric personnel.     Electronically signed by Kayode Cha MD on 2/14/2022 at 7:18 PM

## 2022-02-15 NOTE — PROGRESS NOTES
BEHAVIORAL HEALTH FOLLOW-UP NOTE     2/15/2022     Patient was seen and examined in person, Chart reviewed   Patient's case discussed with staff/team    Chief Complaint: major depressive disorder, single episode, severe without psychotic features    Interim History: 61 yo male follow up MDD without psychotic features;  Per SW, this pt afflicted with chronic homelessness unable to attain a position at [facility] [employer] due to poor behavior in the past; Per nursing, Pt has not been participating in group therapy and psychotherapy; Pt states that may be able to spend a night with son; Pt admits to anxiety in not knowing where he can go following discharge- this anxiety further compounded by fact that it is cold outside; Pt admits to poor sleep and no change in his appetite. Pt admits that the stress of not knowing where he can go has lead to thoughts of suicidal ideation. Pt denies homicidal ideation. Pt denies hallucinations- auditory and visual in nature. Pt has been taking medications and denies adverse effects to given medications. Pt expressed interest in receiving medication to maintain his alcohol sobriety. Pt has been sober for the past 5 mo and would like to maintain sobriety come discharge. Writer questioned pt about participation in [place]. Pt states that AA is difficult given his blindness.      /79   Pulse 66   Temp 97.4 °F (36.3 °C)   Resp 14   Ht 5' 4\" (1.626 m)   Wt 134 lb (60.8 kg)   SpO2 100%   BMI 23.00 kg/m²   Appetite:   [x] Normal/Unchanged  [] Increased  [] Decreased      Sleep:       [] Normal/Unchanged  [] Fair       [x] Poor              Energy:    [] Normal/Unchanged  [] Increased  [x] Decreased        Aggression:  [] yes  [x] no    Patient is [x] able  [] unable to CONTRACT FOR SAFETY ON THE UNIT    PAST MEDICAL/PSYCHIATRIC HISTORY:   Past Medical History:   Diagnosis Date    Depression with suicidal ideation 2/10/2022    Glaucoma     Hypertension        FAMILY/SOCIAL HISTORY:  History reviewed. No pertinent family history. Social History     Socioeconomic History    Marital status: Single     Spouse name: Not on file    Number of children: Not on file    Years of education: Not on file    Highest education level: Not on file   Occupational History    Not on file   Tobacco Use    Smoking status: Light Tobacco Smoker     Types: Cigarettes    Smokeless tobacco: Current User     Types: Chew    Tobacco comment: occasionally   Vaping Use    Vaping Use: Never used   Substance and Sexual Activity    Alcohol use: Yes     Comment: 3 beers daily    Drug use: No    Sexual activity: Not on file   Other Topics Concern    Not on file   Social History Narrative    Not on file     Social Determinants of Health     Financial Resource Strain:     Difficulty of Paying Living Expenses: Not on file   Food Insecurity:     Worried About Running Out of Food in the Last Year: Not on file    Valentin of Food in the Last Year: Not on file   Transportation Needs:     Lack of Transportation (Medical): Not on file    Lack of Transportation (Non-Medical):  Not on file   Physical Activity:     Days of Exercise per Week: Not on file    Minutes of Exercise per Session: Not on file   Stress:     Feeling of Stress : Not on file   Social Connections:     Frequency of Communication with Friends and Family: Not on file    Frequency of Social Gatherings with Friends and Family: Not on file    Attends Synagogue Services: Not on file    Active Member of Clubs or Organizations: Not on file    Attends Club or Organization Meetings: Not on file    Marital Status: Not on file   Intimate Partner Violence:     Fear of Current or Ex-Partner: Not on file    Emotionally Abused: Not on file    Physically Abused: Not on file    Sexually Abused: Not on file   Housing Stability:     Unable to Pay for Housing in the Last Year: Not on file    Number of [place] in the Last Year: Not on file    Unstable Housing in the Last Year: Not on file           ROS:  [x] All negative/unchanged except if checked.  Explain positive(checked items) below:  [] Constitutional  [] Eyes  [] Ear/Nose/Mouth/Throat  [] Respiratory  [] CV  [] GI  []   [] Musculoskeletal  [] Skin/Breast  [] Neurological  [] Endocrine  [] Heme/Lymph  [] Allergic/Immunologic    Explanation:     MEDICATIONS:    Current Facility-Administered Medications:     clopidogrel (PLAVIX) tablet 75 mg, 75 mg, Oral, Daily, Jackelyn Vigil MD, 75 mg at 02/15/22 0842    atorvastatin (LIPITOR) tablet 40 mg, 40 mg, Oral, Nightly, Jackelyn Vigil MD, 40 mg at 08/62/30 4018    folic acid (FOLVITE) tablet 1 mg, 1 mg, Oral, Daily, Jackelyn Vigil MD, 1 mg at 02/15/22 0842    dorzolamide (TRUSOPT) 2 % ophthalmic solution 1 drop, 1 drop, Both Eyes, BID, 1 drop at 02/15/22 0842 **AND** timolol (TIMOPTIC) 0.5 % ophthalmic solution 1 drop, 1 drop, Both Eyes, BID, Jackelyn Vigil MD, 1 drop at 02/15/22 0842    sertraline (ZOLOFT) tablet 50 mg, 50 mg, Oral, Daily, Dawn Wesley MD, 50 mg at 02/15/22 0842    acetaminophen (TYLENOL) tablet 650 mg, 650 mg, Oral, Q4H PRN, Dawn Wesley MD, 650 mg at 02/14/22 0849    aluminum & magnesium hydroxide-simethicone (MAALOX) 200-200-20 MG/5ML suspension 30 mL, 30 mL, Oral, Q6H PRN, Dawn Wesley MD    haloperidol lactate (HALDOL) injection 5 mg, 5 mg, IntraMUSCular, Q4H PRN **AND** diphenhydrAMINE (BENADRYL) injection 50 mg, 50 mg, IntraMUSCular, Q4H PRN **AND** LORazepam (ATIVAN) injection 2 mg, 2 mg, IntraMUSCular, Q4H PRN, Dawn Wesley MD    haloperidol (HALDOL) tablet 5 mg, 5 mg, Oral, Q4H PRN **AND** LORazepam (ATIVAN) tablet 2 mg, 2 mg, Oral, Q4H PRN, Dawn Wesley MD    hydrOXYzine (ATARAX) tablet 50 mg, 50 mg, Oral, TID PRN, Dawn Wesley MD    traZODone (DESYREL) tablet 50 mg, 50 mg, Oral, Nightly PRN, Dawn Wesley MD    polyethylene glycol (GLYCOLAX) packet 17 g, 17 g, Oral, Daily PRN, Dawn Wesley MD    nicotine polacrilex (NICORETTE) gum 2 mg, 2 mg, Oral, PRN, Carlette Buerger, MD, 2 mg at 02/15/22 0851    ibuprofen (ADVIL;MOTRIN) tablet 400 mg, 400 mg, Oral, Q6H PRN, Carlette Buerger, MD    LORazepam (ATIVAN) tablet 1 mg, 1 mg, Oral, Q1H PRN **OR** LORazepam (ATIVAN) injection 1 mg, 1 mg, IntraMUSCular, Q1H PRN **OR** LORazepam (ATIVAN) tablet 2 mg, 2 mg, Oral, Q1H PRN **OR** LORazepam (ATIVAN) injection 2 mg, 2 mg, IntraMUSCular, Q1H PRN **OR** LORazepam (ATIVAN) tablet 3 mg, 3 mg, Oral, Q1H PRN **OR** LORazepam (ATIVAN) injection 3 mg, 3 mg, IntraMUSCular, Q1H PRN **OR** LORazepam (ATIVAN) tablet 4 mg, 4 mg, Oral, Q1H PRN **OR** LORazepam (ATIVAN) injection 4 mg, 4 mg, IntraMUSCular, Q1H PRN, Carlette Buerger, MD    thiamine mononitrate tablet 100 mg, 100 mg, Oral, Daily, Carlette Buerger, MD, 100 mg at 02/15/22 4693    therapeutic multivitamin-minerals 1 tablet, 1 tablet, Oral, Daily, Carlette Buerger, MD, 1 tablet at 02/15/22 5830      Examination:  /79   Pulse 66   Temp 97.4 °F (36.3 °C)   Resp 14   Ht 5' 4\" (1.626 m)   Wt 134 lb (60.8 kg)   SpO2 100%   BMI 23.00 kg/m²   Gait - steady  Medication side effects(SE): pt denies adverse effects to medication    Mental Status Examination:    Level of consciousness:  within normal limits   Appearance:  poor grooming and poor hygiene  Behavior/Motor:  no abnormalities noted  Attitude toward examiner:  cooperative  Speech:  normal rate   Mood: anxious   Affect:  mood congruent  Thought processes:  linear and goal oriented  Thought content:  Homicidal ideation - none  Suicidal Ideation:  active  Delusions:  no evidence of delusions  Perceptual Disturbance:  denies any perceptual disturbance  Cognition:  oriented to person, place, and time   Concentration intact  Insight fair   Judgement fair     ASSESSMENT:   Patient symptoms are:  [] Well controlled  [x] Improving  [] Worsening  [] No change      Diagnosis:   Principal Problem:    Major depressive disorder, single episode, severe without psychotic features ([facility]  Active Problems:    Alcohol abuse with withdrawal, uncomplicated ([facility] [facility]  Resolved Problems:    * No resolved hospital problems. *      LABS:    No results for input(s): WBC, HGB, PLT in the last 72 hours. No results for input(s): NA, K, CL, CO2, BUN, CREATININE, GLUCOSE in the last 72 hours. No results for input(s): BILITOT, ALKPHOS, AST, ALT in the last 72 hours. Lab Results   Component Value Date    BARBSCNU NEGATIVE 02/09/2022    LABBENZ NEGATIVE 02/09/2022    LABMETH NEGATIVE 02/09/2022    PPXUR NOT REPORTED 02/09/2022     Lab Results   Component Value Date    TSH 2.24 10/18/2017     No results found for: LITHIUM  No results found for: VALPROATE, CBMZ    RISK ASSESSMENT: Pt poses minimal threat to self and others. Treatment Plan:  Reviewed current Medications with the patient. Risks, benefits, side effects, drug-to-drug interactions and alternatives to treatment were discussed. Encourage patient to attend group and other milieu activities. Discharge planning discussed with the patient and treatment team.    PSYCHOTHERAPY/COUNSELING:  [] Therapeutic interview  [x] Supportive  [] CBT  [] Ongoing  [] Other    [x] Patient continues to need, on a daily basis, active treatment furnished directly by or requiring the supervision of inpatient psychiatric personnel      Anticipated Length of stay:1-2 days                                          Nargis Slater is a 62 y.o. male being evaluated face to face. --Martinez Fields OMS-III on 2/15/2022 at 1:03 PM    An electronic signature was used to authenticate this note. **This report has been created using voice recognition software. It may contain minor errors which are inherent in voice recognition technology. **    I independently saw and evaluated the patient. I reviewed the  documentation above.   Any additional comments or changes to the midlevel provider's documentation are stated below otherwise agree with assessment. The patient continues to report some anxiety and depressive symptoms but reports a slight improvement in his mood. He recognizes the impact of alcohol on his mental state and as willing to have treatment. He has considered getting the Vivitrol shot. We discussed that we have to try a oral naltrexone first which has been ordered for the patient. PLAN  Naltrexone oral ordered  Attempt to develop insight  Psycho-education conducted. Supportive Therapy conducted. Probable discharge is in 1 day.   Follow-up daily while on inpatient unit    Electronically signed by Phong Smyth MD on 2/15/22 at 8:14 PM EST

## 2022-02-16 PROCEDURE — 6370000000 HC RX 637 (ALT 250 FOR IP): Performed by: PSYCHIATRY & NEUROLOGY

## 2022-02-16 PROCEDURE — 99232 SBSQ HOSP IP/OBS MODERATE 35: CPT | Performed by: PSYCHIATRY & NEUROLOGY

## 2022-02-16 PROCEDURE — 6370000000 HC RX 637 (ALT 250 FOR IP): Performed by: INTERNAL MEDICINE

## 2022-02-16 PROCEDURE — 1240000000 HC EMOTIONAL WELLNESS R&B

## 2022-02-16 RX ORDER — DORZOLAMIDE HCL 20 MG/ML
1 SOLUTION/ DROPS OPHTHALMIC 2 TIMES DAILY
Qty: 10 ML | Refills: 2 | Status: ON HOLD | OUTPATIENT
Start: 2022-02-16 | End: 2022-04-01 | Stop reason: SDUPTHER

## 2022-02-16 RX ORDER — TIMOLOL MALEATE 5 MG/ML
1 SOLUTION/ DROPS OPHTHALMIC 2 TIMES DAILY
Qty: 10 ML | Refills: 2 | Status: SHIPPED | OUTPATIENT
Start: 2022-02-16 | End: 2022-03-18

## 2022-02-16 RX ORDER — NALTREXONE HYDROCHLORIDE 50 MG/1
50 TABLET, FILM COATED ORAL DAILY
Qty: 30 TABLET | Refills: 2 | Status: ON HOLD | OUTPATIENT
Start: 2022-02-17 | End: 2022-04-01 | Stop reason: HOSPADM

## 2022-02-16 RX ORDER — TRAZODONE HYDROCHLORIDE 50 MG/1
50 TABLET ORAL NIGHTLY PRN
Qty: 30 TABLET | Refills: 2 | Status: ON HOLD | OUTPATIENT
Start: 2022-02-16 | End: 2022-04-01 | Stop reason: SDUPTHER

## 2022-02-16 RX ADMIN — THIAMINE HCL TAB 100 MG 100 MG: 100 TAB at 08:40

## 2022-02-16 RX ADMIN — TIMOLOL MALEATE 1 DROP: 5 SOLUTION OPHTHALMIC at 08:41

## 2022-02-16 RX ADMIN — NICOTINE POLACRILEX 2 MG: 2 GUM, CHEWING BUCCAL at 10:51

## 2022-02-16 RX ADMIN — DORZOLAMIDE HYDROCHLORIDE 1 DROP: 20 SOLUTION/ DROPS OPHTHALMIC at 08:41

## 2022-02-16 RX ADMIN — CLOPIDOGREL BISULFATE 75 MG: 75 TABLET ORAL at 08:40

## 2022-02-16 RX ADMIN — TIMOLOL MALEATE 1 DROP: 5 SOLUTION OPHTHALMIC at 20:36

## 2022-02-16 RX ADMIN — MULTIPLE VITAMINS W/ MINERALS TAB 1 TABLET: TAB at 08:40

## 2022-02-16 RX ADMIN — DORZOLAMIDE HYDROCHLORIDE 1 DROP: 20 SOLUTION/ DROPS OPHTHALMIC at 20:36

## 2022-02-16 RX ADMIN — ATORVASTATIN CALCIUM 40 MG: 20 TABLET, FILM COATED ORAL at 20:36

## 2022-02-16 RX ADMIN — NICOTINE POLACRILEX 2 MG: 2 GUM, CHEWING BUCCAL at 20:36

## 2022-02-16 RX ADMIN — SERTRALINE HYDROCHLORIDE 50 MG: 50 TABLET ORAL at 08:40

## 2022-02-16 RX ADMIN — NICOTINE POLACRILEX 2 MG: 2 GUM, CHEWING BUCCAL at 08:50

## 2022-02-16 RX ADMIN — FOLIC ACID 1 MG: 1 TABLET ORAL at 08:40

## 2022-02-16 RX ADMIN — NALTREXONE HYDROCHLORIDE 50 MG: 50 TABLET, FILM COATED ORAL at 08:40

## 2022-02-16 RX ADMIN — NICOTINE POLACRILEX 2 MG: 2 GUM, CHEWING BUCCAL at 13:12

## 2022-02-16 NOTE — PLAN OF CARE
Problem: Altered Mood, Depressive Behavior:  Goal: Ability to disclose and discuss suicidal ideas will improve  Description: Ability to disclose and discuss suicidal ideas will improve  2/15/2022 2144 by Se Gutierrez RN  Outcome: Ongoing  Note: Patient denies suicidal ideation and verbally contracts for safety. Patient remains safe during Q15 min and random safety checks. Patient remains in hospital appropriate clothing at all times and free from harmful objects. Patient is accepting of talk time with writer. Denies any thoughts to harm others, denies any hallucinations. States he is eating well and sleeping well. Isolative to his room this shift. Denies any needs.
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
## 2022-02-16 NOTE — DISCHARGE INSTR - DIET

## 2022-02-16 NOTE — GROUP NOTE
Group Therapy Note    Date: 2/16/2022    Group Start Time: 1000  Group End Time: 1030  Group Topic: Psychotherapy    NICHOLAS Diaz        Group Therapy Note         Patient refused to attend psychotherapy group after encouragement from staff. 1:1 talk time offered but refused. Signature:   Eileen Diaz

## 2022-02-16 NOTE — GROUP NOTE
Group Therapy Note    Date: 2/16/2022    Group Start Time: 1100  Group End Time: 0686  Group Topic: Cognitive Skills    NICHOLAS Thakkar, CTRS        Group Therapy Note    Attendees: 10/19         Pt did not participate in Cognitive Skills Group at 1100am when encouraged by RT due to resting in room. Pt was offered talk time as an alternative to group but declined.        Discipline Responsible: Psychoeducational Specialist      Signature:  Rosalba Leiva

## 2022-02-16 NOTE — PROGRESS NOTES
CLINICAL PHARMACY NOTE: MEDS TO BEDS    Total # of Prescriptions Filled: 5   The following medications were delivered to the patient:  · Sertraline HCL 50mg  · Trazodone HCL 50mg  · Naltrexone HCL 50mg  · Dorzolamide  · Timolol    Additional Documentation:  Delivered Medication to Nurse at Unit Door

## 2022-02-16 NOTE — PLAN OF CARE
Problem: Altered Mood, Depressive Behavior:  Goal: Ability to disclose and discuss suicidal ideas will improve  Description: Ability to disclose and discuss suicidal ideas will improve  2/16/2022 1141 by Marisa Cleveland RN  Outcome: Ongoing  Note: Patient denies any thoughts of self harm or hallucinations. Not attending groups. Behavior controlled.

## 2022-02-17 VITALS
HEIGHT: 64 IN | RESPIRATION RATE: 14 BRPM | BODY MASS INDEX: 22.88 KG/M2 | SYSTOLIC BLOOD PRESSURE: 118 MMHG | OXYGEN SATURATION: 100 % | WEIGHT: 134 LBS | DIASTOLIC BLOOD PRESSURE: 65 MMHG | TEMPERATURE: 97.2 F | HEART RATE: 71 BPM

## 2022-02-17 PROCEDURE — 6370000000 HC RX 637 (ALT 250 FOR IP): Performed by: INTERNAL MEDICINE

## 2022-02-17 PROCEDURE — 99239 HOSP IP/OBS DSCHRG MGMT >30: CPT | Performed by: PSYCHIATRY & NEUROLOGY

## 2022-02-17 PROCEDURE — 6370000000 HC RX 637 (ALT 250 FOR IP): Performed by: PSYCHIATRY & NEUROLOGY

## 2022-02-17 RX ADMIN — THIAMINE HCL TAB 100 MG 100 MG: 100 TAB at 08:59

## 2022-02-17 RX ADMIN — NALTREXONE HYDROCHLORIDE 50 MG: 50 TABLET, FILM COATED ORAL at 08:59

## 2022-02-17 RX ADMIN — CLOPIDOGREL BISULFATE 75 MG: 75 TABLET ORAL at 08:59

## 2022-02-17 RX ADMIN — DORZOLAMIDE HYDROCHLORIDE 1 DROP: 20 SOLUTION/ DROPS OPHTHALMIC at 09:57

## 2022-02-17 RX ADMIN — FOLIC ACID 1 MG: 1 TABLET ORAL at 08:59

## 2022-02-17 RX ADMIN — MULTIPLE VITAMINS W/ MINERALS TAB 1 TABLET: TAB at 08:59

## 2022-02-17 RX ADMIN — SERTRALINE HYDROCHLORIDE 50 MG: 50 TABLET ORAL at 08:59

## 2022-02-17 RX ADMIN — TIMOLOL MALEATE 1 DROP: 5 SOLUTION OPHTHALMIC at 09:57

## 2022-02-17 NOTE — GROUP NOTE
Group Therapy Note    Date: 2/17/2022    Group Start Time: 1100  Group End Time: 9582  Group Topic: Cognitive Skills    NICHOLAS Freeman, CTRS        Group Therapy Note    Attendees: 7/16         Pt did not participate in Cognitive Skills Group at 1100am when encouraged by RT due to resting in room. Pt was offered talk time as an alternative to group but declined.        Discipline Responsible: Psychoeducational Specialist      Signature:  Keturah Umana

## 2022-02-17 NOTE — BH NOTE
Patient given tobacco quitline number 43782290114 at this time, refusing to call at this time, states \" I just dont want to quit now\"- patient given information as to the dangers of long term tobacco use. Continue to reinforce the importance of tobacco cessation.

## 2022-02-17 NOTE — BH NOTE
[facility]  Discharge Note    Pt discharged with followings belongings:   Dental Appliances: None  Vision - Corrective Lenses: Glasses  Hearing Aid: None  Jewelry: Necklace  Body Piercings Removed: N/A  Clothing: Footwear,Jacket / coat,Pants,Shirt,Socks,Undergarments (Comment) (0.65 cents)  Were All Patient Medications Collected?: Yes  Other Valuables: Cell phone,Money (Comment),Wallet   Valuables returned to patient. Patient education on aftercare instructions: yes, meds and follow up appointments. Information faxed to [facility] by nurse  at 1:32 PM .Patient verbalize understanding of AVS:  Yes. Patient discharged to shelter.      Status EXAM upon discharge:  Status and Exam  Normal: No  Facial Expression: Flat  Affect: Appropriate  Level of Consciousness: Alert  Mood:Normal: No  Mood: Depressed,Anxious  Motor Activity:Normal: Yes  Interview Behavior: Cooperative  Preception: [address] to Person,[address] to Time,[address] to Place,[address] to Situation  Attention:Normal: No  Attention: Distractible  Thought Processes: Circumstantial  Thought Content:Normal: No  Thought Content: Preoccupations  Hallucinations: None  Delusions: No  Memory:Normal: No  Memory: Poor Recent  Insight and Judgment: No  Insight and Judgment: Poor Judgment,Poor Insight,Unmotivated  Present Suicidal Ideation: No  Present Homicidal Ideation: No      Metabolic Screening:    Lab Results   Component Value Date    LABA1C 4.9 12/28/2019       Lab Results   Component Value Date    CHOL 146 12/28/2019    CHOL 169 10/27/2016    CHOL 152 02/03/2016     Lab Results   Component Value Date    TRIG 59 12/28/2019    TRIG 236 (H) 10/27/2016    TRIG 72 02/03/2016     Lab Results   Component Value Date    HDL 70 12/28/2019    HDL 53 10/27/2016    HDL 56 02/03/2016     No components found for: LDLCAL  No results found for: Rodríguez Laboy LPN

## 2022-02-17 NOTE — PROGRESS NOTES
BEHAVIORAL HEALTH FOLLOW-UP NOTE     2/16/2022     Patient was seen and examined in person, Chart reviewed   Patient's case discussed with staff/team    Chief Complaint: major depressive disorder, single episode, severe without psychotic features    Interim History: The patient was discharged this morning but he did not leave the hospital because he says that he is banned from all local shelters and has nowhere to go. The patient continues to be hopeless and helpless. The patient has been compliant with medication. His mood is improved. We do not have a safe discharge plan at this time. We will hold the patient overnight. /66   Pulse 52   Temp 98 °F (36.7 °C)   Resp 14   Ht 5' 4\" (1.626 m)   Wt 134 lb (60.8 kg)   SpO2 100%   BMI 23.00 kg/m²   Appetite:   [x] Normal/Unchanged  [] Increased  [] Decreased      Sleep:       [] Normal/Unchanged  [] Fair       [x] Poor              Energy:    [] Normal/Unchanged  [] Increased  [x] Decreased        Aggression:  [] yes  [x] no    Patient is [x] able  [] unable to CONTRACT FOR SAFETY ON THE UNIT    PAST MEDICAL/PSYCHIATRIC HISTORY:   Past Medical History:   Diagnosis Date    Depression with suicidal ideation 2/10/2022    Glaucoma     Hypertension        FAMILY/SOCIAL HISTORY:  History reviewed. No pertinent family history.   Social History     Socioeconomic History    Marital status: Single     Spouse name: Not on file    Number of children: Not on file    Years of education: Not on file    Highest education level: Not on file   Occupational History    Not on file   Tobacco Use    Smoking status: Light Tobacco Smoker     Types: Cigarettes    Smokeless tobacco: Current User     Types: Chew    Tobacco comment: occasionally   Vaping Use    Vaping Use: Never used   Substance and Sexual Activity    Alcohol use: Yes     Comment: 3 beers daily    Drug use: No    Sexual activity: Not on file   Other Topics Concern    Not on file   Social History Narrative    Not on file     Social Determinants of Health     Financial Resource Strain:     Difficulty of Paying Living Expenses: Not on file   Food Insecurity:     Worried About Running Out of Food in the Last Year: Not on file    Valentin of Food in the Last Year: Not on file   Transportation Needs:     Lack of Transportation (Medical): Not on file    Lack of Transportation (Non-Medical): Not on file   Physical Activity:     Days of Exercise per Week: Not on file    Minutes of Exercise per Session: Not on file   Stress:     Feeling of Stress : Not on file   Social Connections:     Frequency of Communication with Friends and Family: Not on file    Frequency of Social Gatherings with Friends and Family: Not on file    Attends Congregation Services: Not on file    Active Member of [address] [place] [address] [employer] or Organizations: Not on file    Attends Club or Organization Meetings: Not on file    Marital Status: Not on file   Intimate Partner Violence:     Fear of Current or Ex-Partner: Not on file    Emotionally Abused: Not on file    Physically Abused: Not on file    Sexually Abused: Not on file   Housing Stability:     Unable to Pay for Housing in the Last Year: Not on file    Number of [place] in the Last Year: Not on file    Unstable Housing in the Last Year: Not on file           ROS:  [x] All negative/unchanged except if checked.  Explain positive(checked items) below:  [] Constitutional  [] Eyes  [] Ear/Nose/Mouth/Throat  [] Respiratory  [] CV  [] GI  []   [] Musculoskeletal  [] Skin/Breast  [] Neurological  [] Endocrine  [] Heme/Lymph  [] Allergic/Immunologic    Explanation:     MEDICATIONS:    Current Facility-Administered Medications:     naltrexone (DEPADE) tablet 50 mg, 50 mg, Oral, Daily, Henrik Frances MD, 50 mg at 02/16/22 0840    clopidogrel (PLAVIX) tablet 75 mg, 75 mg, Oral, Daily, Leela Fish MD, 75 mg at 02/16/22 0840    atorvastatin (LIPITOR) tablet 40 mg, 40 mg, Oral, Nightly, Bishop Munguia MD, 40 mg at 36/44/46 0590    folic acid (FOLVITE) tablet 1 mg, 1 mg, Oral, Daily, Bishop Munguia MD, 1 mg at 02/16/22 0840    dorzolamide (TRUSOPT) 2 % ophthalmic solution 1 drop, 1 drop, Both Eyes, BID, 1 drop at 02/16/22 0841 **AND** timolol (TIMOPTIC) 0.5 % ophthalmic solution 1 drop, 1 drop, Both Eyes, BID, Bishop Munguia MD, 1 drop at 02/16/22 0841    sertraline (ZOLOFT) tablet 50 mg, 50 mg, Oral, Daily, Marvel Najjar, MD, 50 mg at 02/16/22 0840    acetaminophen (TYLENOL) tablet 650 mg, 650 mg, Oral, Q4H PRN, Marvel Najjar, MD, 650 mg at 02/14/22 0849    aluminum & magnesium hydroxide-simethicone (MAALOX) 200-200-20 MG/5ML suspension 30 mL, 30 mL, Oral, Q6H PRN, Marvel Najjar, MD    haloperidol lactate (HALDOL) injection 5 mg, 5 mg, IntraMUSCular, Q4H PRN **AND** diphenhydrAMINE (BENADRYL) injection 50 mg, 50 mg, IntraMUSCular, Q4H PRN **AND** LORazepam (ATIVAN) injection 2 mg, 2 mg, IntraMUSCular, Q4H PRN, Marvel Najjar, MD    haloperidol (HALDOL) tablet 5 mg, 5 mg, Oral, Q4H PRN **AND** LORazepam (ATIVAN) tablet 2 mg, 2 mg, Oral, Q4H PRN, Marvel Najjar, MD    hydrOXYzine (ATARAX) tablet 50 mg, 50 mg, Oral, TID PRN, Marvel Najjar, MD    traZODone (DESYREL) tablet 50 mg, 50 mg, Oral, Nightly PRN, Marvel Najjar, MD    polyethylene glycol (GLYCOLAX) packet 17 g, 17 g, Oral, Daily PRN, Marvel Najjar, MD    nicotine polacrilex (NICORETTE) gum 2 mg, 2 mg, Oral, PRN, Marvel Najjar, MD, 2 mg at 02/16/22 1312    ibuprofen (ADVIL;MOTRIN) tablet 400 mg, 400 mg, Oral, Q6H PRN, Marvel Najjar, MD    LORazepam (ATIVAN) tablet 1 mg, 1 mg, Oral, Q1H PRN **OR** LORazepam (ATIVAN) injection 1 mg, 1 mg, IntraMUSCular, Q1H PRN **OR** LORazepam (ATIVAN) tablet 2 mg, 2 mg, Oral, Q1H PRN **OR** LORazepam (ATIVAN) injection 2 mg, 2 mg, IntraMUSCular, Q1H PRN **OR** LORazepam (ATIVAN) tablet 3 mg, 3 mg, Oral, Q1H PRN **OR** LORazepam (ATIVAN) injection 3 mg, 3 mg, IntraMUSCular, Q1H PRN **OR** LORazepam (ATIVAN) tablet 4 mg, 4 mg, Oral, Q1H PRN **OR** LORazepam (ATIVAN) injection 4 mg, 4 mg, IntraMUSCular, Q1H PRN, Manolo Mcgee MD    thiamine mononitrate tablet 100 mg, 100 mg, Oral, Daily, Manolo Mcgee MD, 100 mg at 02/16/22 0840    therapeutic multivitamin-minerals 1 tablet, 1 tablet, Oral, Daily, Manolo Mcgee MD, 1 tablet at 02/16/22 0840      Examination:  /66   Pulse 52   Temp 98 °F (36.7 °C)   Resp 14   Ht 5' 4\" (1.626 m)   Wt 134 lb (60.8 kg)   SpO2 100%   BMI 23.00 kg/m²   Gait - steady  Medication side effects(SE): pt denies adverse effects to medication    Mental Status Examination:    Level of consciousness:  within normal limits   Appearance:  poor grooming and poor hygiene  Behavior/Motor:  no abnormalities noted  Attitude toward examiner:  cooperative  Speech:  normal rate   Mood: anxious and depressed. Affect:  mood congruent  Thought processes:  linear and goal oriented  Thought content:  Homicidal ideation - none  Suicidal Ideation:  passive  Delusions:  no evidence of delusions  Perceptual Disturbance:  denies any perceptual disturbance  Cognition:  oriented to person, place, and time   Concentration intact  Insight fair   Judgement fair     ASSESSMENT:   Patient symptoms are:  [] Well controlled  [x] Improving  [] Worsening  [] No change      Diagnosis:   Active Problems:    Major depressive disorder, single episode, severe without psychotic features ([facility]  Resolved Problems:    * No resolved hospital problems. *      LABS:    No results for input(s): WBC, HGB, PLT in the last 72 hours. No results for input(s): NA, K, CL, CO2, BUN, CREATININE, GLUCOSE in the last 72 hours. No results for input(s): BILITOT, ALKPHOS, AST, ALT in the last 72 hours.   Lab Results   Component Value Date    BARBSCNU NEGATIVE 02/09/2022    LABBENZ NEGATIVE 02/09/2022    LABMETH NEGATIVE 02/09/2022    PPXUR NOT REPORTED 02/09/2022     Lab Results   Component Value Date    TSH 2.24 10/18/2017     No results found for: LITHIUM  No results found for: VALPROATE, CBMZ    RISK ASSESSMENT: Pt poses minimal threat to self and others. Treatment Plan:  Reviewed current Medications with the patient. Risks, benefits, side effects, drug-to-drug interactions and alternatives to treatment were discussed. Encourage patient to attend group and other milieu activities. Discharge planning discussed with the patient and treatment team.    PSYCHOTHERAPY/COUNSELING:  [] Therapeutic interview  [x] Supportive  [] CBT  [] Ongoing  [] Other    [x] Patient continues to need, on a daily basis, active treatment furnished directly by or requiring the supervision of inpatient psychiatric personnel      Anticipated Length of stay:1-2 days                                          Melissa Sen is a 62 y.o. male being evaluated face to face.    Electronically signed by Ally Serrano MD on 2/16/2022 at 8:09 PM
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
## 2022-02-17 NOTE — GROUP NOTE
Group Therapy Note    Date: 2/17/2022    Group Start Time: 1000  Group End Time: 1030  Group Topic: Psychotherapy    [facility]ON[facility] Y[facility]        Group Therapy Note         Patient refused to attend psychotherapy group after encouragement from staff. 1:1 talk time offered but refused. Signature:   Rose Yin

## 2022-02-17 NOTE — PROGRESS NOTES
Behavioral Services                                              Medicare Re-Certification    I certify that the inpatient psychiatric hospital services furnished since the previous certification/re-certification were, and continue to be, medically necessary for;    [x] (1) Treatment which could reasonably be expected to improve the patient's condition,    [x] (2) Or for diagnostic study. Estimated length of stay/service 1-3 days    Plan for post-hospital care -outpatient care    This patient continues to need, on a daily basis, active treatment furnished directly by or requiring the supervision of inpatient psychiatric personnel.     Electronically signed by Bertha Mazariegos MD on 2/16/2022 at 8:08 PM

## 2022-02-18 ENCOUNTER — HOSPITAL ENCOUNTER (EMERGENCY)
Age: 62
Discharge: ANOTHER HOSPITAL | End: 2022-02-19
Attending: EMERGENCY MEDICINE
Payer: MEDICARE

## 2022-02-18 DIAGNOSIS — R45.851 SUICIDAL IDEATION: Primary | ICD-10-CM

## 2022-02-18 LAB
ABSOLUTE EOS #: 0.09 K/UL (ref 0–0.44)
ABSOLUTE IMMATURE GRANULOCYTE: 0.04 K/UL (ref 0–0.3)
ABSOLUTE LYMPH #: 1.37 K/UL (ref 1.1–3.7)
ABSOLUTE MONO #: 0.67 K/UL (ref 0.1–1.2)
ACETAMINOPHEN LEVEL: <5 UG/ML (ref 10–30)
ALBUMIN SERPL-MCNC: 4.7 G/DL (ref 3.5–5.2)
ALBUMIN/GLOBULIN RATIO: 1.6 (ref 1–2.5)
ALP BLD-CCNC: 55 U/L (ref 40–129)
ALT SERPL-CCNC: 40 U/L (ref 5–41)
AMPHETAMINE SCREEN URINE: NEGATIVE
ANION GAP SERPL CALCULATED.3IONS-SCNC: 13 MMOL/L (ref 9–17)
AST SERPL-CCNC: 32 U/L
BARBITURATE SCREEN URINE: NEGATIVE
BASOPHILS # BLD: 0 % (ref 0–2)
BASOPHILS ABSOLUTE: 0.03 K/UL (ref 0–0.2)
BENZODIAZEPINE SCREEN, URINE: NEGATIVE
BILIRUB SERPL-MCNC: 0.99 MG/DL (ref 0.3–1.2)
BILIRUBIN DIRECT: 0.27 MG/DL
BILIRUBIN, INDIRECT: 0.72 MG/DL (ref 0–1)
BUN BLDV-MCNC: 13 MG/DL (ref 8–23)
BUN/CREAT BLD: ABNORMAL (ref 9–20)
BUPRENORPHINE URINE: NORMAL
CALCIUM SERPL-MCNC: 9 MG/DL (ref 8.6–10.4)
CANNABINOID SCREEN URINE: NEGATIVE
CHLORIDE BLD-SCNC: 102 MMOL/L (ref 98–107)
CO2: 25 MMOL/L (ref 20–31)
COCAINE METABOLITE, URINE: NEGATIVE
CREAT SERPL-MCNC: 0.58 MG/DL (ref 0.7–1.2)
DIFFERENTIAL TYPE: ABNORMAL
EOSINOPHILS RELATIVE PERCENT: 1 % (ref 1–4)
ETHANOL PERCENT: <0.01 %
ETHANOL: <10 MG/DL
GFR AFRICAN AMERICAN: >60 ML/MIN
GFR NON-AFRICAN AMERICAN: >60 ML/MIN
GFR SERPL CREATININE-BSD FRML MDRD: ABNORMAL ML/MIN/{1.73_M2}
GFR SERPL CREATININE-BSD FRML MDRD: ABNORMAL ML/MIN/{1.73_M2}
GLOBULIN: NORMAL G/DL (ref 1.5–3.8)
GLUCOSE BLD-MCNC: 136 MG/DL (ref 75–110)
GLUCOSE BLD-MCNC: 143 MG/DL (ref 70–99)
HCT VFR BLD CALC: 52.9 % (ref 40.7–50.3)
HEMOGLOBIN: 17.6 G/DL (ref 13–17)
IMMATURE GRANULOCYTES: 1 %
LYMPHOCYTES # BLD: 18 % (ref 24–43)
MCH RBC QN AUTO: 32.1 PG (ref 25.2–33.5)
MCHC RBC AUTO-ENTMCNC: 33.3 G/DL (ref 28.4–34.8)
MCV RBC AUTO: 96.5 FL (ref 82.6–102.9)
MDMA URINE: NORMAL
METHADONE SCREEN, URINE: NEGATIVE
METHAMPHETAMINE, URINE: NORMAL
MONOCYTES # BLD: 9 % (ref 3–12)
NRBC AUTOMATED: 0 PER 100 WBC
OPIATES, URINE: NEGATIVE
OXYCODONE SCREEN URINE: NEGATIVE
PDW BLD-RTO: 11.9 % (ref 11.8–14.4)
PHENCYCLIDINE, URINE: NEGATIVE
PLATELET # BLD: 228 K/UL (ref 138–453)
PLATELET ESTIMATE: ABNORMAL
PMV BLD AUTO: 9.3 FL (ref 8.1–13.5)
POTASSIUM SERPL-SCNC: 3.4 MMOL/L (ref 3.7–5.3)
PROPOXYPHENE, URINE: NORMAL
RBC # BLD: 5.48 M/UL (ref 4.21–5.77)
RBC # BLD: ABNORMAL 10*6/UL
SALICYLATE LEVEL: <1 MG/DL (ref 3–10)
SARS-COV-2, RAPID: NOT DETECTED
SEG NEUTROPHILS: 71 % (ref 36–65)
SEGMENTED NEUTROPHILS ABSOLUTE COUNT: 5.34 K/UL (ref 1.5–8.1)
SODIUM BLD-SCNC: 140 MMOL/L (ref 135–144)
SPECIMEN DESCRIPTION: NORMAL
TEST INFORMATION: NORMAL
TOTAL PROTEIN: 7.7 G/DL (ref 6.4–8.3)
TOXIC TRICYCLIC SC,BLOOD: NEGATIVE
TRICYCLIC ANTIDEPRESSANTS, UR: NORMAL
WBC # BLD: 7.5 K/UL (ref 3.5–11.3)
WBC # BLD: ABNORMAL 10*3/UL

## 2022-02-18 PROCEDURE — 85025 COMPLETE CBC W/AUTO DIFF WBC: CPT

## 2022-02-18 PROCEDURE — 80143 DRUG ASSAY ACETAMINOPHEN: CPT

## 2022-02-18 PROCEDURE — 80179 DRUG ASSAY SALICYLATE: CPT

## 2022-02-18 PROCEDURE — 99285 EMERGENCY DEPT VISIT HI MDM: CPT

## 2022-02-18 PROCEDURE — 82947 ASSAY GLUCOSE BLOOD QUANT: CPT

## 2022-02-18 PROCEDURE — 6370000000 HC RX 637 (ALT 250 FOR IP): Performed by: STUDENT IN AN ORGANIZED HEALTH CARE EDUCATION/TRAINING PROGRAM

## 2022-02-18 PROCEDURE — 80307 DRUG TEST PRSMV CHEM ANLYZR: CPT

## 2022-02-18 PROCEDURE — 87635 SARS-COV-2 COVID-19 AMP PRB: CPT

## 2022-02-18 PROCEDURE — 80076 HEPATIC FUNCTION PANEL: CPT

## 2022-02-18 PROCEDURE — G0480 DRUG TEST DEF 1-7 CLASSES: HCPCS

## 2022-02-18 PROCEDURE — 80048 BASIC METABOLIC PNL TOTAL CA: CPT

## 2022-02-18 RX ORDER — TRAZODONE HYDROCHLORIDE 50 MG/1
50 TABLET ORAL ONCE
Status: COMPLETED | OUTPATIENT
Start: 2022-02-18 | End: 2022-02-19

## 2022-02-18 RX ADMIN — NICOTINE POLACRILEX 2 MG: 2 GUM, CHEWING BUCCAL at 23:02

## 2022-02-18 ASSESSMENT — PAIN SCALES - GENERAL: PAINLEVEL_OUTOF10: 8

## 2022-02-18 ASSESSMENT — ENCOUNTER SYMPTOMS
CONSTIPATION: 0
COUGH: 0
COLOR CHANGE: 0
NAUSEA: 0
VOMITING: 0
TROUBLE SWALLOWING: 0
CHEST TIGHTNESS: 0
DIARRHEA: 0
ABDOMINAL PAIN: 0
SHORTNESS OF BREATH: 0
BACK PAIN: 0

## 2022-02-18 ASSESSMENT — PAIN DESCRIPTION - LOCATION: LOCATION: FOOT;LEG

## 2022-02-18 ASSESSMENT — PAIN DESCRIPTION - FREQUENCY: FREQUENCY: CONTINUOUS

## 2022-02-18 ASSESSMENT — PAIN DESCRIPTION - DESCRIPTORS: DESCRIPTORS: NUMBNESS
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
## 2022-02-18 NOTE — ED NOTES
MARSHALL presented case to [facility]. Dr. Carmita Conroy denied admission due to just being discharge and states patient is homeless. SW to discuss with provider next steps. SW spoke with provider. Indicates would be ok to dc to [facility] if patient agreeable. SW spoke with patient. States agreeable to being discharged to [facility] and to have SW call to speak with Al. SW called [facility]. States that patient is not allowed there. SW spoke with patient about possible admission to other hospital and he states that will be a better plan for him. SW called [facility]. Looking for placement.       FERNANDA Ying  02/18/22 9839 [place], [place]  02/18/22 5731

## 2022-02-18 NOTE — ED NOTES
Pt resting on stretcher. RN asked pt if he could give a urine sample. Pt to restroom without assistance. Pt denies any other needs/wants this time. Will continue to monitor.      Blossom Yanes RN  02/18/22 0002

## 2022-02-18 NOTE — ED NOTES
Pt resting on stretcher watching TV. Pt denies any needs/wants at this time. Pt updated on plan of care.      Liliana Jernigan RN  02/18/22 6432

## 2022-02-18 NOTE — ED NOTES
Pt awake and sitting up in bed. Pt given box lunch to eat     Enloematthew Mcdermottois  02/18/22 9777
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
## 2022-02-18 NOTE — CARE COORDINATION
Name: Macarena Hinton    : 1960    Discharge Date: 2022    Primary Auth/Cert #: [id number]    [facility]    Discharge Medications:      Medication List      START taking these medications    dorzolamide 2 % ophthalmic solution  Commonly known as: TRUSOPT  Place 1 drop into both eyes 2 times daily  Notes to patient: For eyes     naltrexone 50 MG tablet  Commonly known as: DEPADE  Take 1 tablet by mouth daily  Notes to patient: Used to help you keep opiate/alcohol free     sertraline 50 MG tablet  Commonly known as: ZOLOFT  Take 1 tablet by mouth daily  Notes to patient: Mood/depression     timolol 0.5 % ophthalmic solution  Commonly known as: TIMOPTIC  Place 1 drop into both eyes 2 times daily for 60 doses  Notes to patient: For eyes     traZODone 50 MG tablet  Commonly known as: DESYREL  Take 1 tablet by mouth nightly as needed for Sleep  Notes to patient: For sleep        CHANGE how you take these medications    ibuprofen 400 MG tablet  Commonly known as: IBU  Take 1 tablet by mouth every 6 hours as needed for Pain  What changed: Another medication with the same name was removed. Continue taking this medication, and follow the directions you see here. Notes to patient: For pain        CONTINUE taking these medications    atorvastatin 40 MG tablet  Commonly known as: LIPITOR  Take 1 tablet by mouth nightly  Notes to patient: For cholestrol     clopidogrel 75 MG tablet  Commonly known as: PLAVIX  Take 1 tablet by mouth daily  Notes to patient:  To help prevent heart problems     folic acid 1 MG tablet  Commonly known as: FOLVITE  Take 1 tablet by mouth daily  Notes to patient: supplement     thiamine 100 MG tablet  Take 1 tablet by mouth daily  Notes to patient: supplement        STOP taking these medications    acetaminophen 500 MG tablet  Commonly known as: Tylenol  Notes to patient: discontinued     chlorhexidine 0.12 % solution  Commonly known as: Peridex  Notes to patient: discontinued Where to Get Your Medications      These medications were sent to [facility], [address], [facility] [address]    Phone: [phone]   · dorzolamide 2 % ophthalmic solution  · naltrexone 50 MG tablet  · sertraline 50 MG tablet  · timolol 0.5 % ophthalmic solution  · traZODone 50 MG tablet         Follow Up Appointment: [address] [place] [address]  [phone]    On 2/22/2022  10am assessment (please take your ID and Insurance Card)    [facility]  [address], [address] [facility]  On 2/16/2022  D/C to shelter

## 2022-02-19 VITALS
SYSTOLIC BLOOD PRESSURE: 96 MMHG | OXYGEN SATURATION: 95 % | RESPIRATION RATE: 12 BRPM | TEMPERATURE: 97.3 F | DIASTOLIC BLOOD PRESSURE: 60 MMHG | HEART RATE: 62 BPM

## 2022-02-19 PROCEDURE — 6370000000 HC RX 637 (ALT 250 FOR IP): Performed by: STUDENT IN AN ORGANIZED HEALTH CARE EDUCATION/TRAINING PROGRAM

## 2022-02-19 RX ADMIN — SERTRALINE HYDROCHLORIDE 50 MG: 50 TABLET, FILM COATED ORAL at 00:10

## 2022-02-19 RX ADMIN — TRAZODONE HYDROCHLORIDE 50 MG: 50 TABLET ORAL at 00:10

## 2022-02-19 NOTE — ED NOTES
Patient given medications at this time by RN, Patient took medication well with no complaints. Patient able to swallow medication.      [facility]  02/19/22 9923

## 2022-02-19 NOTE — ED NOTES
Pt resting comfortably in no acute distress. Respirations even and unlabored. No needs at this time. Safe guard at bedside.       Rosangela Kruger RN  02/19/22 9756

## 2022-02-19 NOTE — ED NOTES
[] Manan    [] [facility]    [x]  [facility] ASSESSMENT      Y  N     [x] [] In the past two weeks have you had thoughts of hurting yourself in any way? [x] [] In the past two weeks have you had thoughts that you would be better off dead? [x] [] Have you made a suicide attempt in the past two months? [x] [] Do you have a plan for hurting yourself or suicide? [] [x] Presence of hallucinations/voices related to hurting himself or herself or someone else. SUICIDE/SECURITY WATCH PRECAUTION CHECKLIST     Orders    [x]  Suicide/Security Watch Precautions initiated as checked below:   2/19/22 5:46 AM EST BH31/BH31A    [x] Notified physician:  Alexandra Gleason DO  2/19/22 5:46 AM EST    [x] Orders obtained as appropriate:     [x] 1:1 Observer     [] Psych Consult     [] Psych Consult    Name:  Date:  Time:    [x] 1:1 Observer, Notified by:  Yogesh Moore RN    Contact Nurse Supervisor    [x] Remove all personal clothes from room and place in snap/paper gown/pants. Slipper only    [x] Remove all personal belongings from room and secured away from patient. Documentation    [x] Initiate Suicide/Security Watch Precaution Flow Sheet    [x] Initiate individualized Care Plan/Problem    [x] Document why precautions initiated on flow sheet (Initiate Nursing Care Plan/Problem)    [x] 1:1 Observer in place; instructions provided. Suicide precautions require observer be within arms length. [x] Nurse-Observer Communication Hand-off initiated by RN, reviewed with Observer. Subsequently used as Hand Off between Observers. [x] Initiate every 15 minute observations per observer as delegated by the RN.     [x] Initiate RN assessment and documentation    Environmental Scan  Search Criteria and Process: OPTIONAL, see Search Policy    [] Reason for search:    [] Nursing in presence of second person to search patient    [] Patient notified of reason for body assessment and belongings search:     Persons present during search:   Results of search and disposition:       Searchers Name:     These items or items similar should be removed from the room:   [x] Chairs   [x] Telephone   [x] Trash cans and liners   [x] Plastic utensils (order Patient Safety tray)   [x] Empty or remove Sharps containers   [x] All personal clothing/belongings removed   [x] All unnecessary lead wires, electrical cords, draw cords, etc.   [x] Flowers and plants   [x] Double check for lighters, matches, razors, any glass items etc that can be used as weapons. Person completing Checklist: Whitney Johnson RN       GENERAL INFORMATION     Y  N     [x] [] Has the patient been informed that they are on a watch and what that means? [] [x] Can the patient get out of Bed without nursing assistance? [] [x] Can the patient use the restroom without nursing assistance? [] [x] Can the patient walk the halls to [place] their legs? \"   [] [x] Does the patient have metal utensils? [] [x] Have the patient's belongings been placed out of control of the patient? [] [x] Have the patient and his/her belongings been checked for contraband? [x] [] Is the patient under any visitor restrictions? If Yes, explain:   [] [x] Is the patient under an alias? [facility] Name:   Authorized visitors (no more than two are to be on the list)   Name/Relationship:   Name/Relationship:    Name of Staff member that you  Received this information from?:   General Description:    Elian Correa male 62 y.o. Admission weight:      Race: [x]  [] Black  []   []   [] [place] [] Other  Facial Hair:  [] Yes  [x] No  If yes, please describe: Identifying Marks (i.e. Visible tattoos, scars, etc... ):     NURSING CARE PLAN    Nursing Diagnosis: Risk of Self Directed Harm  [] Actual  [x] Potential  Date Started: 2/19/22      Etiological Factors: (related to)  [] Expressed or implied suicidal ideation/behavior  [x] Depression  [] Suicide attempt      [x] Low self-esteem  [] Hallucinations      [x] Feeling of Hopelessness  [] Substance abuse or withdrawal    [] Dysfunctional family  [] Major traumatic event, eg., divorce, etc   [] Excessive stress/anxiety    2/19/22    Expected Outcomes    Patient will:   [x] Patient will remain safe for the duration of their stay   [x] Patient's environment will be safe, eg. Free of potential suicide weapons   [] Verbalize Recovery from suicidal episode and improvement in self-worth   [x] Discuss feeling that precipitated suicide attempt/thoughts/behavior   [] Will describe available resources for crisis prevention and management   [] Will verbalize positive coping skills     Nursing Intervention   [x] Assessment and Observations hourly   [x] Suicide Precautions implemented with patient, should be 1:1 observation   [x] Document observation [id number] and RN assessment hourly   [] Consult physician for:    [] Psychiatric consult    [] Pharmacological therapy    [] Other:    [x] Patient search completed by security   [x] Initiated appropriate safety protocols by removing from the patient's environment anything that could be used to inflict self injury, eg. Order safe tray, snap gown, etc   [x] Maintain open, warm, caring, non-judgmental attitude/manner towards patient   [] Discuss advantages and disadvantages of existing coping methods/skills   [x] Assist and educate patient with identifying present strengths and coping skills   [x] Keep patient informed regarding plan of care and provide clear concise explanations. Provide the patient/family education information as well as telephone numbers and other information about crisis centers, hot lines, and counselors.     Discharge Planning:   [] Referral  [] Groups [] Health agencies  [] Other:          Taylor Rihcard RN  02/19/22 2424

## 2022-02-19 NOTE — ED NOTES
Pt resting comfortably in no acute distress. Respirations even and unlabored. No needs at this time.   Safe guard at bedside      [facility]  02/19/22 9095 Research DANI Odom  02/19/22 4706

## 2022-02-19 NOTE — ED NOTES
Pt resting comfortably in no acute distress. Respirations even and unlabored. No needs at this time. Safe guard at bedside.      Sanjeev Rivera RN  02/19/22 DANI Rasmussen  02/19/22 5833

## 2022-02-19 NOTE — ED NOTES
Pt resting comfortably in no acute distress. Respirations even and unlabored. Call light remains within reach. No needs at this time.        Michelle Mathis RN  02/19/22 5584

## 2022-02-19 NOTE — ED NOTES
MARSHALL called [facility] for update and left message on placement. MARSHALL asked [phone] [facility] to possibly try [facility] if pt continues to not secure placement.       Natacha Liu, FERNANDA  02/18/22 1019

## 2022-02-19 NOTE — ED NOTES
[place] slip faxed to [facility]. Original pink slip and transport papers placed in safer area folder near nurses station in Zone 3. ETA 1200.      Odalis Castillo, LSW  02/19/22 6155

## 2022-02-19 NOTE — ED PROVIDER NOTES
FACULTY SIGN-OUT  ADDENDUM       Patient: Todd Diamond   MRN: [id number]  PCP:  No primary care provider on file. Attestation  I was available and discussed any additional care issues that arose and coordinated the management plans with the resident(s) caring for the patient during my duty period. Any areas of disagreement with resident's documentation of care or procedures are noted on the chart. I was personally present for the key portions of any/all procedures during my duty period. I have documented in the chart those procedures where I was not present during the key portions. The patient's initial evaluation and plan have been discussed with the prior provider who initially evaluated the patient. Pertinent Comments:   The patient is a 62 y.o. male taken in signout with suicidal ideation with [facility] stay [facility] the [facility]  We are awaiting psychiatric evaluation    ED COURSE      The patient was given the following medications:  Orders Placed This Encounter   Medications    potassium bicarb-citric acid (EFFER-K) effervescent tablet 40 mEq    nicotine polacrilex (NICORETTE) gum 2 mg    sertraline (ZOLOFT) tablet 50 mg    traZODone (DESYREL) tablet 50 mg       RECENT VITALS:   BP: (!) 87/58  Pulse: 64  Resp: 20  Temp: 97.9 °F (36.6 °C) SpO2: 98 %    (Please note that portions of this note were completed with a voice recognition program.  Efforts were made to edit the dictations but occasionally words are mis-transcribed.)    MD Denis Hicks  Attending Emergency Medicine Physician       Parmod Johnston MD  02/19/22 8495

## 2022-02-19 NOTE — ED NOTES
Pt accepted at [facility] [address] [address], [address] . Pink slip needs faxed to [phone]. RN to RN report [phone]. Can't accept pt till after 0800. Facility phone is [phone]. Dr. Florentin Musa is accepting Doctor.  will arrange transportation.       Odalis Castillo, [facility]  02/19/22 3[address], [facility]  02/19/22 3988

## 2022-02-19 NOTE — ED NOTES
Pt resting comfortably in no acute distress. Respirations even and unlabored. No needs at this time.   Safe guard at bedside      [facility]  02/19/22 8882

## 2022-02-19 NOTE — ED PROVIDER NOTES
Faculty Sign-Out Attestation  Handoff taken on the following patient from prior Attending Physician: Gricelda Stockton    I was available and discussed any additional care issues that arose and coordinated the management plans with the resident(s) caring for the patient during my duty period. Any areas of disagreement with residents documentation of care or procedures are noted on the chart. I was personally present for the key portions of any/all procedures during my duty period. I have documented in the chart those procedures where I was not present during the key portions.     Suicidal / just seen & discharged from [facility], they defer repeat admit,   Will need to overnight & have telepsych in am,     Goldie Carpenter DO  Attending Physician     Goldie Carpenter,   02/18/22 0723    Care turned over to day shift       Goldie Carpenter DO  02/19/22 0969

## 2022-02-19 NOTE — ED NOTES
No change in earlier status  Continues to rest kalpana   Denies needs at present time, covered with blanket  One on one sitter with patient     Tamia Retana RN  02/19/22 7437

## 2022-02-19 NOTE — ED NOTES
Patient resting on cot, alert, oriented, no distress noted, rr even and non labored. Patient updated on plan of care and duration, pt denies needs, will continue to monitor.        Belle WhartonChan [facility]  02/18/22 0084

## 2022-02-19 NOTE — ED NOTES
Pt resting comfortably in no acute distress. Respirations even and unlabored. No needs at this time.   Safegaurd at bedside      Taylor Richard RN  02/19/22 0636

## 2022-03-11 ENCOUNTER — HOSPITAL ENCOUNTER (EMERGENCY)
Age: 62
Discharge: PSYCHIATRIC OR SUBSTANCE USE FACILITY | End: 2022-03-12
Attending: EMERGENCY MEDICINE
Payer: MEDICARE

## 2022-03-11 DIAGNOSIS — R45.851 SUICIDAL IDEATION: Primary | ICD-10-CM

## 2022-03-11 LAB
ABSOLUTE EOS #: 0.07 K/UL (ref 0–0.44)
ABSOLUTE IMMATURE GRANULOCYTE: 0.05 K/UL (ref 0–0.3)
ABSOLUTE LYMPH #: 1.34 K/UL (ref 1.1–3.7)
ABSOLUTE MONO #: 0.39 K/UL (ref 0.1–1.2)
ALBUMIN SERPL-MCNC: 4.4 G/DL (ref 3.5–5.2)
ALBUMIN/GLOBULIN RATIO: 1.5 (ref 1–2.5)
ALP BLD-CCNC: 52 U/L (ref 40–129)
ALT SERPL-CCNC: 31 U/L (ref 5–41)
AMPHETAMINE SCREEN URINE: NEGATIVE
ANION GAP SERPL CALCULATED.3IONS-SCNC: 16 MMOL/L (ref 9–17)
AST SERPL-CCNC: 24 U/L
BARBITURATE SCREEN URINE: NEGATIVE
BASOPHILS # BLD: 0 % (ref 0–2)
BASOPHILS ABSOLUTE: <0.03 K/UL (ref 0–0.2)
BENZODIAZEPINE SCREEN, URINE: NEGATIVE
BILIRUB SERPL-MCNC: 0.54 MG/DL (ref 0.3–1.2)
BUN BLDV-MCNC: 9 MG/DL (ref 8–23)
CALCIUM SERPL-MCNC: 8.5 MG/DL (ref 8.6–10.4)
CANNABINOID SCREEN URINE: NEGATIVE
CHLORIDE BLD-SCNC: 107 MMOL/L (ref 98–107)
CO2: 21 MMOL/L (ref 20–31)
COCAINE METABOLITE, URINE: NEGATIVE
CREAT SERPL-MCNC: 0.73 MG/DL (ref 0.7–1.2)
EOSINOPHILS RELATIVE PERCENT: 1 % (ref 1–4)
ETHANOL PERCENT: 0.14 %
ETHANOL: 137 MG/DL
GFR AFRICAN AMERICAN: >60 ML/MIN
GFR NON-AFRICAN AMERICAN: >60 ML/MIN
GFR SERPL CREATININE-BSD FRML MDRD: ABNORMAL ML/MIN/{1.73_M2}
GLUCOSE BLD-MCNC: 70 MG/DL (ref 70–99)
HCT VFR BLD CALC: 45.9 % (ref 40.7–50.3)
HEMOGLOBIN: 15.6 G/DL (ref 13–17)
IMMATURE GRANULOCYTES: 1 %
LYMPHOCYTES # BLD: 23 % (ref 24–43)
MCH RBC QN AUTO: 32.8 PG (ref 25.2–33.5)
MCHC RBC AUTO-ENTMCNC: 34 G/DL (ref 28.4–34.8)
MCV RBC AUTO: 96.4 FL (ref 82.6–102.9)
METHADONE SCREEN, URINE: NEGATIVE
MONOCYTES # BLD: 7 % (ref 3–12)
NRBC AUTOMATED: 0 PER 100 WBC
OPIATES, URINE: NEGATIVE
OXYCODONE SCREEN URINE: NEGATIVE
PDW BLD-RTO: 11.9 % (ref 11.8–14.4)
PHENCYCLIDINE, URINE: NEGATIVE
PLATELET # BLD: 180 K/UL (ref 138–453)
PMV BLD AUTO: 8.4 FL (ref 8.1–13.5)
POTASSIUM SERPL-SCNC: 4.3 MMOL/L (ref 3.7–5.3)
RBC # BLD: 4.76 M/UL (ref 4.21–5.77)
SARS-COV-2, RAPID: NOT DETECTED
SEG NEUTROPHILS: 68 % (ref 36–65)
SEGMENTED NEUTROPHILS ABSOLUTE COUNT: 4.03 K/UL (ref 1.5–8.1)
SODIUM BLD-SCNC: 144 MMOL/L (ref 135–144)
SPECIMEN DESCRIPTION: NORMAL
TEST INFORMATION: NORMAL
TOTAL PROTEIN: 7.3 G/DL (ref 6.4–8.3)
WBC # BLD: 5.9 K/UL (ref 3.5–11.3)

## 2022-03-11 PROCEDURE — 80053 COMPREHEN METABOLIC PANEL: CPT

## 2022-03-11 PROCEDURE — 87635 SARS-COV-2 COVID-19 AMP PRB: CPT

## 2022-03-11 PROCEDURE — G0480 DRUG TEST DEF 1-7 CLASSES: HCPCS

## 2022-03-11 PROCEDURE — 80307 DRUG TEST PRSMV CHEM ANLYZR: CPT

## 2022-03-11 PROCEDURE — 99285 EMERGENCY DEPT VISIT HI MDM: CPT

## 2022-03-11 PROCEDURE — 85025 COMPLETE CBC W/AUTO DIFF WBC: CPT

## 2022-03-12 ENCOUNTER — HOSPITAL ENCOUNTER (EMERGENCY)
Age: 62
Discharge: HOME | End: 2022-03-12
Attending: EMERGENCY MEDICINE
Payer: MEDICARE

## 2022-03-12 VITALS
OXYGEN SATURATION: 98 % | DIASTOLIC BLOOD PRESSURE: 77 MMHG | RESPIRATION RATE: 20 BRPM | TEMPERATURE: 97.9 F | SYSTOLIC BLOOD PRESSURE: 124 MMHG | HEIGHT: 66 IN | WEIGHT: 170 LBS | HEART RATE: 64 BPM | BODY MASS INDEX: 27.32 KG/M2

## 2022-03-12 VITALS
BODY MASS INDEX: 22.49 KG/M2 | TEMPERATURE: 97.7 F | WEIGHT: 135 LBS | HEART RATE: 69 BPM | OXYGEN SATURATION: 97 % | RESPIRATION RATE: 16 BRPM | HEIGHT: 65 IN | SYSTOLIC BLOOD PRESSURE: 104 MMHG | DIASTOLIC BLOOD PRESSURE: 67 MMHG

## 2022-03-12 DIAGNOSIS — Z76.89 ENCOUNTER FOR PSYCHIATRIC ASSESSMENT: Primary | ICD-10-CM

## 2022-03-12 PROCEDURE — 99284 EMERGENCY DEPT VISIT MOD MDM: CPT

## 2022-03-12 ASSESSMENT — ENCOUNTER SYMPTOMS
BLOOD IN STOOL: 0
CONSTIPATION: 0
RHINORRHEA: 0
DIARRHEA: 0
SHORTNESS OF BREATH: 0
VOMITING: 0
ABDOMINAL PAIN: 0
SORE THROAT: 0
NAUSEA: 0

## 2022-03-12 NOTE — ED NOTES
The patient is AOx4 and reports SI with no plan. Patient calm/cooperative.   Awaiting further instructions from MD. Janina Miguel RN  03/12/22 9389

## 2022-03-12 NOTE — ED NOTES
MARSHALL met with patient to let him know that SW can set up a cab to [facility] for him at discharge. Patient recently discharged from the [facility] and did not follow-up with community mental health so he could benefit from being connected back to [facility]. Kat Sprain.  Ashleigh Fong, [place]  03/12/22 0828

## 2022-03-12 NOTE — ED NOTES
Discussed the patient with Dr Bessy Coburn. Recommendations to safety plan the patient and send to [facility] for evaluation.
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
## 2022-03-12 NOTE — ED NOTES
MRASHALL met with the patient to discuss disposition. Patient agreeable to go to [facility] in the morning. MARSHALL will provide cab service to [facility] for mental health outpatient assessment.
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
## 2022-03-12 NOTE — ED PROVIDER NOTES
[facility] ED  Emergency Department  Emergency Medicine Resident Sign-out     Care of Nolberto Brock was assumed from Dr. Jose Eduardo Lozano and is being seen for Suicidal  .  The patient's initial evaluation and plan have been discussed with the prior provider who initially evaluated the patient. EMERGENCY DEPARTMENT COURSE / MEDICAL DECISION MAKING:       MEDICATIONS GIVEN:  No orders of the defined types were placed in this encounter. LABS / RADIOLOGY:     Labs Reviewed   ETHANOL - Abnormal; Notable for the following components:       Result Value    Ethanol 137 (*)     Ethanol percent 0.137 (*)     All other components within normal limits   CBC WITH AUTO DIFFERENTIAL - Abnormal; Notable for the following components:    Seg Neutrophils 68 (*)     Lymphocytes 23 (*)     Immature Granulocytes 1 (*)     All other components within normal limits   COMPREHENSIVE METABOLIC PANEL W/ REFLEX TO MG FOR LOW K - Abnormal; Notable for the following components:    Calcium 8.5 (*)     All other components within normal limits   COVID-19, RAPID   URINE DRUG SCREEN   PREVIOUS SPECIMEN       No results found. RECENT VITALS:     Temp: 97.3 °F (36.3 °C),  Pulse: 71, Resp: 15, BP: 104/68, SpO2: 99 %    This patient is a 62 y.o. Male with suicidal ideation. No specific plan. Patient will be sober at sober time noted. After sober time patient will be cab to [facility] for further workup and management for suicidal ideation. OUTSTANDING TASKS / RECOMMENDATIONS:    1. Discharge to [facility] center      FINAL IMPRESSION:     1. Suicidal ideation        DISPOSITION:         DISPOSITION:  [x]  Discharge to be cabbed to [facility]    []  Transfer -    []  Admission -     []  Against Medical Advice   []  Eloped   FOLLOW-UP: No follow-up provider specified.    DISCHARGE MEDICATIONS: New Prescriptions    No medications on file           Brock Romo DO  Emergency Medicine Resident  [facility] Cherry Morgan, DO  Resident  03/17/22 4694
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
## 2022-03-12 NOTE — ED NOTES
The patient reports having suicidal ideations, but denies plan to harm himself. Patient denies auditory/visual hallucinations. Patient resting calmly.      Donnell Roberts RN  03/12/22 5245

## 2022-03-12 NOTE — ED PROVIDER NOTES
[facility]     Emergency Department     Faculty Attestation    I performed a history and physical examination of the patient and discussed management with the resident. I reviewed the residents note and agree with the documented findings and plan of care. Any areas of disagreement are noted on the chart. I was personally present for the key portions of any procedures. I have documented in the chart those procedures where I was not present during the key portions. I have reviewed the emergency nurses triage note. I agree with the chief complaint, past medical history, past surgical history, allergies, medications, social and family history as documented unless otherwise noted below. For Physician Assistant/ Nurse Practitioner cases/documentation I have personally evaluated this patient and have completed at least one if not all key elements of the E/M (history, physical exam, and MDM). Additional findings are as noted. I have personally seen and evaluated the patient. I find the patient's history and physical exam are consistent with the NP/PA documentation. I agree with the care provided, treatment rendered, disposition and follow-up plan. This patient was evaluated in the Emergency Department for symptoms described in the history of present illness. The patient was evaluated in the context of the global COVID-19 pandemic, which necessitated consideration that the patient might be at risk for infection with the SARS-CoV-2 virus that causes COVID-19. Institutional protocols and algorithms that pertain to the evaluation of patients at risk for COVID-19 are in a state of rapid change based on information released by regulatory bodies including the CDC and federal and state organizations. These policies and algorithms were followed during the patient's care in the ED. 41-year-old male presenting with suicidal ideation.   No specific plan, but has been feeling more suicidal since his medications for anxiety and depression were started. He does not have an outpatient psychiatrist, and was recently admitted inpatient psych at [facility] (2/18/22).     Exam:  General: Laying on the bed, awake, alert and in no acute distress  CV: normal rate and regular rhythm  Lungs: Breathing comfortably on room air with no tachypnea, hypoxia, or increased work of breathing    Plan:  Labs for medical clearance for [facility]  Will collaborate with social work for appropriate psychiatric care        Tish Metz MD   Attending Emergency  Physician    (Please note that portions of this note were completed with a voice recognition program. Efforts were made to edit the dictations but occasionally words are mis-transcribed.)             Tish Metz MD  03/11/22 4465

## 2022-03-12 NOTE — ED NOTES
Urine obtained and sent to lab. Pt. Was also swabbed for COVID and sent to lab.      Gisela Stack RN  03/11/22 3947

## 2022-03-12 NOTE — ED PROVIDER NOTES
[facility] [address] [facility]  Emergency Department  Emergency Medicine Resident Sign-out     Care of Marya Blandon was assumed from Dr. Maribell Greenberg and is being seen for Suicidal  .  The patient's initial evaluation and plan have been discussed with the prior provider who initially evaluated the patient. EMERGENCY DEPARTMENT COURSE / MEDICAL DECISION MAKING:       MEDICATIONS GIVEN:  No orders of the defined types were placed in this encounter. LABS / RADIOLOGY:     Labs Reviewed   ETHANOL - Abnormal; Notable for the following components:       Result Value    Ethanol 137 (*)     Ethanol percent 0.137 (*)     All other components within normal limits   CBC WITH AUTO DIFFERENTIAL - Abnormal; Notable for the following components:    Seg Neutrophils 68 (*)     Lymphocytes 23 (*)     Immature Granulocytes 1 (*)     All other components within normal limits   COMPREHENSIVE METABOLIC PANEL W/ REFLEX TO MG FOR LOW K - Abnormal; Notable for the following components:    Calcium 8.5 (*)     All other components within normal limits   COVID-19, RAPID   URINE DRUG SCREEN   PREVIOUS SPECIMEN       No results found. RECENT VITALS:     Temp: 97.3 °F (36.3 °C),  Pulse: 66, Resp: 18, BP: 104/73, SpO2: 99 %    This patient is a 62 y.o. Male with suicidal ideation. Patient presents with suicidal ideation, no specific plan, obtained labs, Kathrynn Reasons refused patient for transfer for psychiatric care. On reevaluation, patient reports that he feels like he would be safe to go to the [facility] or Rescue when they open up in the morning. Plan on discharge after reevaluation in the morning. OUTSTANDING TASKS / RECOMMENDATIONS:    1. Reevaluation  2. Dispo     FINAL IMPRESSION:     1. Suicidal ideation        DISPOSITION:         DISPOSITION:  []  Discharge   []  Transfer -    []  Admission -     []  Against Medical Advice   []  Eloped   FOLLOW-UP: No follow-up provider specified.    DISCHARGE MEDICATIONS: New Prescriptions    No medications on file          Rolly Sumner DO  Emergency Medicine Resident  [facility]        Rolly Sumner [place]  Resident  03/12/22 7442

## 2022-03-12 NOTE — ED PROVIDER NOTES
[facility] [address] [facility]  Emergency Department  Faculty Sign-Out Addendum     Care of Allan Torres was assumed from previous attending and is being seen for Suicidal  .  The patient's initial evaluation and plan have been discussed with the prior provider who initially evaluated the patient. Handoff taken on the following patient from prior Attending Physician:    [address]    I was available and discussed any additional care issues that arose and coordinated the management plans with the resident(s) caring for the patient during my duty period. Any areas of disagreement with residents documentation of care or procedures are noted on the chart. I was personally present for the key portions of any/all procedures during my duty period. I have documented in the chart those procedures where I was not present during the key portions. EMERGENCY DEPARTMENT COURSE / MEDICAL DECISION MAKING:       MEDICATIONS GIVEN:  No orders of the defined types were placed in this encounter. LABS / RADIOLOGY:     Labs Reviewed   ETHANOL - Abnormal; Notable for the following components:       Result Value    Ethanol 137 (*)     Ethanol percent 0.137 (*)     All other components within normal limits   CBC WITH AUTO DIFFERENTIAL - Abnormal; Notable for the following components:    Seg Neutrophils 68 (*)     Lymphocytes 23 (*)     Immature Granulocytes 1 (*)     All other components within normal limits   COMPREHENSIVE METABOLIC PANEL W/ REFLEX TO MG FOR LOW K - Abnormal; Notable for the following components:    Calcium 8.5 (*)     All other components within normal limits   COVID-19, RAPID   URINE DRUG SCREEN   PREVIOUS SPECIMEN       No results found. RECENT VITALS:     Temp: 97.3 °F (36.3 °C),  Pulse: 71, Resp: 16, BP: 116/65, SpO2: 98 %    This patient is a 62 y.o. Male with psychiatric issue.   Reportedly suicidal ideation did not attempt suicide prior to arrival.  Psychiatry involved but reports no indication for inpatient admission. [address] opening and plan to discharge to [facility] for substance abuse treatment assessment    OUTSTANDING TASKS / RECOMMENDATIONS:    1.  Awaiting opening of [address] MD, Kulwinder Ma  Attending Emergency Physician  [facility] ED       Mimi Garvin MD  03/12/22 5697

## 2022-03-12 NOTE — ED NOTES
Pt. Resting comfortably in his bed. Pt. Provided with a blanket.      Vicki Reveles RN  03/12/22 010

## 2022-03-12 NOTE — ED NOTES
Patient ready for discharge so SW set up a [employer] to transport patient to THE [facility]. SW will escort patient to the cab from the Safer area to ensure a safe discharge. Greg Chou.  [place], [place]  03/12/22 1132

## 2022-03-13 NOTE — ED TRIAGE NOTES
Patient arrives c/o SI without real plan. States he will do whatever comes to him in the moment. Feels he needs Inpatient follow-up and he is only being given outpatient follow-up.

## 2022-03-13 NOTE — ED PROVIDER NOTES
[address]  ED  Emergency Department Encounter  EmergencyMedicine Resident     Pt Name:Niko Bangura  MRN: [id number]  [place] 1960  Date of evaluation: 3/12/22  PCP:  Michael Castorena MD    CHIEF COMPLAINT       Chief Complaint   Patient presents with    Psychiatric Evaluation       HISTORY OF PRESENT ILLNESS  (Location/Symptom, Timing/Onset, Context/Setting, Quality, Duration, Modifying Factors, Severity.)      Allan Torres is a 62 y.o. male with pmhx of   WT: Weight: 170 lb (77.1 kg)    Dx: <principal problem not specified>     Patient Active Problem List   Diagnosis    Numbness and tingling    Depression with suicidal ideation    Major depressive disorder, single episode, severe without psychotic features ([address]    Alcohol abuse with withdrawal, uncomplicated ([address]    Major depressive disorder, recurrent severe without psychotic features ([address]    Overweight (BMI 25.0-29. 9)    Mixed hyperlipidemia     PMH:  has a past medical history of Depression with suicidal ideation, Glaucoma, Hypertension, and Mixed hyperlipidemia. who presents with suicidal ideation. He presents as a walk-in to the ED. He states that his intention is to walk in front of traffic or jump off a bridge. He relates he does not have a place to stay. Denies he homicidal thoughts, denies visual or auditory hallucinations. States he is out of his medications. Is aware of his appointment on Monday at [facility]. Denies chest pain or shortness of breath, denies abdominal pain. States he is hungry and cold. PAST MEDICAL / SURGICAL / SOCIAL / FAMILY HISTORY      has a past medical history of Depression with suicidal ideation, Glaucoma, Hypertension, and Mixed hyperlipidemia. Review     has no past surgical history on file.   Reviewed    Social History     Socioeconomic History    Marital status: Single     Spouse name: Not on file    Number of children: Not on file    Years of education: Not on file    Highest education level: Not on file   Occupational History    Not on file   Tobacco Use    Smoking status: Light Tobacco Smoker     Types: Cigarettes    Smokeless tobacco: Current User     Types: Chew    Tobacco comment: smokes daily   Vaping Use    Vaping Use: Never used   Substance and Sexual Activity    Alcohol use: Yes     Comment: pack of beer a day    Drug use: No    Sexual activity: Not Currently   Other Topics Concern    Not on file   Social History Narrative    Not on file     Social Determinants of Health     Financial Resource Strain:     Difficulty of Paying Living Expenses: Not on file   Food Insecurity:     Worried About Running Out of Food in the Last Year: Not on file    Valentin of Food in the Last Year: Not on file   Transportation Needs:     Lack of Transportation (Medical): Not on file    Lack of Transportation (Non-Medical): Not on file   Physical Activity:     Days of Exercise per Week: Not on file    Minutes of Exercise per Session: Not on file   Stress:     Feeling of Stress : Not on file   Social Connections:     Frequency of Communication with Friends and Family: Not on file    Frequency of Social Gatherings with Friends and Family: Not on file    Attends Yazidism Services: Not on file    Active Member of [address] [place] [address] [employer] or Organizations: Not on file    Attends Club or Organization Meetings: Not on file    Marital Status: Not on file   Intimate Partner Violence:     Fear of Current or Ex-Partner: Not on file    Emotionally Abused: Not on file    Physically Abused: Not on file    Sexually Abused: Not on file   Housing Stability:     Unable to Pay for Housing in the Last Year: Not on file    Number of [place] in the Last Year: Not on file    Unstable Housing in the Last Year: Not on file       No family history on file. Allergies:  Patient has no known allergies. Home Medications:  Prior to Admission medications    Medication Sig Start Date End Date Taking? Authorizing Provider   sertraline (ZOLOFT) 50 MG tablet Take 1 tablet by mouth daily 2/17/22   Alicia Lockhart MD   traZODone (DESYREL) 50 MG tablet Take 1 tablet by mouth nightly as needed for Sleep 2/16/22   Alicia Lockhart MD   naltrexone (DEPADE) 50 MG tablet Take 1 tablet by mouth daily 2/17/22   Alicia Lockhart MD   dorzolamide (TRUSOPT) 2 % ophthalmic solution Place 1 drop into both eyes 2 times daily 2/16/22   Alicia Lockhart MD   clopidogrel (PLAVIX) 75 MG tablet Take 1 tablet by mouth daily 12/29/19   Pina King MD   vitamin B-1 100 MG tablet Take 1 tablet by mouth daily 12/29/19   Pina King MD   folic acid (FOLVITE) 1 MG tablet Take 1 tablet by mouth daily 12/29/19   Pina King MD   atorvastatin (LIPITOR) 40 MG tablet Take 1 tablet by mouth nightly 12/28/19   Pina King MD   ibuprofen (IBU) 400 MG tablet Take 1 tablet by mouth every 6 hours as needed for Pain 12/20/19   Josi Chamberlain,        REVIEW OF SYSTEMS    (2-9 systems for level 4, 10 or more for level 5)      Review of Systems   Constitutional: Negative for activity change, appetite change and fever. HENT: Negative for congestion, ear pain, rhinorrhea and sore throat. Eyes: Negative for discharge and redness. Respiratory: Negative for cough, choking, shortness of breath and wheezing. Gastrointestinal: Negative for constipation, diarrhea and vomiting. Endocrine: Negative for polydipsia and polyuria. Genitourinary: Negative for decreased urine volume, difficulty urinating, dysuria and frequency. Musculoskeletal: Negative for gait problem and joint swelling. Skin: Negative for rash and wound. Allergic/Immunologic: Negative for food allergies. Neurological: Negative for speech difficulty and headaches. Psychiatric/Behavioral: Positive for suicidal ideas. Negative for behavioral problems.        PHYSICAL EXAM   (up to 7 for level 4, 8 or more for level 5)      INITIAL VITALS:   /77 Pulse 64   Temp 97.9 °F (36.6 °C) (Oral)   Resp 20   Ht 5' 6\" (1.676 m)   Wt 170 lb (77.1 kg)   SpO2 98%   BMI 27.44 kg/m²     Physical Exam  Vitals reviewed. Constitutional:       General: He is not in acute distress. Appearance: Normal appearance. He is well-developed and normal weight. He is not ill-appearing, toxic-appearing or diaphoretic. Comments: /77   Pulse 64   Temp 97.9 °F (36.6 °C) (Oral)   Resp 20   Ht 5' 6\" (1.676 m)   Wt 170 lb (77.1 kg)   SpO2 98%   BMI 27.44 kg/m²      HENT:      Head: Normocephalic. Right Ear: Tympanic membrane and external ear normal.      Left Ear: Tympanic membrane and external ear normal.      Nose: Nose normal.      Mouth/Throat:      Mouth: Mucous membranes are moist.   Eyes:      General:         Right eye: No discharge. Left eye: No discharge. Conjunctiva/sclera: Conjunctivae normal.      Pupils: Pupils are equal, round, and reactive to light. Cardiovascular:      Rate and Rhythm: Normal rate and regular rhythm. Pulses: Normal pulses. Pulmonary:      Effort: Pulmonary effort is normal. No respiratory distress. Breath sounds: Normal breath sounds. No wheezing. Musculoskeletal:      Cervical back: Normal range of motion and neck supple. Lymphadenopathy:      Cervical: No cervical adenopathy. Skin:     General: Skin is warm. Capillary Refill: Capillary refill takes less than 2 seconds. Findings: No rash. Neurological:      General: No focal deficit present. Mental Status: He is alert. Psychiatric:         Attention and Perception: Attention normal. He is attentive. Mood and Affect: Mood and affect normal.         Speech: Speech normal. He is communicative. Behavior: Behavior is not agitated or aggressive. Behavior is cooperative. Thought Content: Thought content includes suicidal ideation. Thought content does not include homicidal ideation.  Thought content does not include homicidal or suicidal plan. Judgment: Judgment is not impulsive. DIFFERENTIAL  DIAGNOSIS     PLAN (LABS / IMAGING / EKG):  No orders of the defined types were placed in this encounter. MEDICATIONS ORDERED:  No orders of the defined types were placed in this encounter. DDX: Suicidal ideation, homicidal ideation, malingering  DIAGNOSTIC RESULTS / EMERGENCY DEPARTMENT COURSE / MDM   LAB RESULTS:  No results found for this visit on 03/12/22. IMPRESSION: Encounter for psychiatric evaluation    EMERGENCY DEPARTMENT COURSE:     Patient was evaluated in the emergency department, stable vital signs. He was evaluated with complaint of suicidal ideation. He has poor relations with homeless shelters and does not have a place to stay this evening. He states that he is requesting inpatient treatment. He does have follow-up with [facility] on Monday morning that was scheduled for him during his last encounter. He was seen in [facility] today morning and was evaluated for SI. He did not meet inpatient criteria and presented to the ED today for further evaluation. Plan to discharge with outpatient follow-up at the [facility]        CONSULTS:  None    CRITICAL CARE:  Please see attending note    FINAL IMPRESSION      1.  Encounter for psychiatric assessment          DISPOSITION / PLAN     DISPOSITION Decision To Discharge 03/12/2022 11:01:10 PM      PATIENT REFERRED TO:  [facility]  [address] [place] [address]  [phone]    If symptoms worsen    [facility], Monday, 3/14/2022 to start rehab for alcoholism    Go on 3/14/2022  As scheduled      DISCHARGE MEDICATIONS:  Discharge Medication List as of 3/12/2022 11:03 PM          Francesca Kumar MD  Emergency Medicine Resident    (Please note that portions of thisnote were completed with a voice recognition program.  Efforts were made to edit the dictations but occasionally words are mis-transcribed.)       Nyasia Drummond Anmol Smith MD  Resident  03/14/22 Tyler Aguirre MD  Resident  03/29/22 [id number]

## 2022-03-13 NOTE — ED PROVIDER NOTES
[facility]     Emergency Department     Faculty Attestation    I performed a history and physical examination of the patient and discussed management with the resident. I reviewed the residents note and agree with the documented findings and plan of care. Any areas of disagreement are noted on the chart. I was personally present for the key portions of any procedures. I have documented in the chart those procedures where I was not present during the key portions. I have reviewed the emergency nurses triage note. I agree with the chief complaint, past medical history, past surgical history, allergies, medications, social and family history as documented unless otherwise noted below. For Physician Assistant/ Nurse Practitioner cases/documentation I have personally evaluated this patient and have completed at least one if not all key elements of the E/M (history, physical exam, and MDM). Additional findings are as noted. I have personally seen and evaluated the patient. I find the patient's history and physical exam are consistent with the NP/PA documentation. I agree with the care provided, treatment rendered, disposition and follow-up plan. This patient was evaluated in the Emergency Department for symptoms described in the history of present illness. The patient was evaluated in the context of the global COVID-19 pandemic, which necessitated consideration that the patient might be at risk for infection with the SARS-CoV-2 virus that causes COVID-19. Institutional protocols and algorithms that pertain to the evaluation of patients at risk for COVID-19 are in a state of rapid change based on information released by regulatory bodies including the CDC and federal and state organizations. These policies and algorithms were followed during the patient's care in the ED. 28-year-old male presenting with ongoing suicidal ideation.   Patient was seen in the ER yesterday, and discharged to [facility] crisis center this morning, who evaluated him and set him up for outpatient follow-up on Monday. Patient feeling like he needs other services. He is unable to go to a shelter due to poor relations with the shelters in [facility], and being on probation unable to leave the [facility]. He states to me that sometimes he would like to put a bullet in his head, but will not do that because of the financial burden that would place on his family. He does not know if he has hit rock bottom yet, and does not know if treatment will help. No medical concerns at this time. Exam:  General: Sitting on the bed, awake, alert and in no acute distress  CV: normal rate and regular rhythm  Lungs: Breathing comfortably on room air with no tachypnea, hypoxia, or increased work of breathing  Neuro: Awake, alert, answering questions appropriately, moving all extremities. Plan: We will collaborate with social work for appropriate disposition. At this time, I do not believe the patient is an imminent danger to himself or others. Patient is medically clear.         Genevieve Rosas MD   Attending Emergency  Physician    (Please note that portions of this note were completed with a voice recognition program. Efforts were made to edit the dictations but occasionally words are mis-transcribed.)              Genevieve Rosas MD  03/12/22 [id number]

## 2022-03-14 ASSESSMENT — ENCOUNTER SYMPTOMS
EYE DISCHARGE: 0
CONSTIPATION: 0
COUGH: 0
SORE THROAT: 0
DIARRHEA: 0
RHINORRHEA: 0
CHOKING: 0
EYE REDNESS: 0
SHORTNESS OF BREATH: 0
WHEEZING: 0
VOMITING: 0

## 2022-03-27 ENCOUNTER — HOSPITAL ENCOUNTER (INPATIENT)
Age: 62
LOS: 7 days | Discharge: HOME | DRG: 751 | End: 2022-04-04
Attending: EMERGENCY MEDICINE | Admitting: PSYCHIATRY & NEUROLOGY
Payer: MEDICARE

## 2022-03-27 DIAGNOSIS — F32.A DEPRESSION WITH SUICIDAL IDEATION: ICD-10-CM

## 2022-03-27 DIAGNOSIS — R45.851 DEPRESSION WITH SUICIDAL IDEATION: ICD-10-CM

## 2022-03-27 DIAGNOSIS — R45.850 HOMICIDAL IDEATIONS: ICD-10-CM

## 2022-03-27 DIAGNOSIS — F10.920 ALCOHOLIC INTOXICATION WITHOUT COMPLICATION (HCC): Primary | ICD-10-CM

## 2022-03-27 LAB
ABSOLUTE EOS #: 0.1 K/UL (ref 0–0.4)
ABSOLUTE LYMPH #: 1.1 K/UL (ref 1–4.8)
ABSOLUTE MONO #: 0.4 K/UL (ref 0.1–1.3)
ACETAMINOPHEN LEVEL: <5 UG/ML (ref 10–30)
ALBUMIN SERPL-MCNC: 4.3 G/DL (ref 3.5–5.2)
ALP BLD-CCNC: 45 U/L (ref 40–129)
ALT SERPL-CCNC: 25 U/L (ref 5–41)
AMPHETAMINE SCREEN URINE: NEGATIVE
ANION GAP SERPL CALCULATED.3IONS-SCNC: 12 MMOL/L (ref 9–17)
AST SERPL-CCNC: 28 U/L
BARBITURATE SCREEN URINE: NEGATIVE
BASOPHILS # BLD: 1 % (ref 0–2)
BASOPHILS ABSOLUTE: 0 K/UL (ref 0–0.2)
BENZODIAZEPINE SCREEN, URINE: NEGATIVE
BILIRUB SERPL-MCNC: 0.23 MG/DL (ref 0.3–1.2)
BILIRUBIN URINE: NEGATIVE
BUN BLDV-MCNC: 9 MG/DL (ref 8–23)
CALCIUM SERPL-MCNC: 8.2 MG/DL (ref 8.6–10.4)
CANNABINOID SCREEN URINE: NEGATIVE
CHLORIDE BLD-SCNC: 106 MMOL/L (ref 98–107)
CO2: 23 MMOL/L (ref 20–31)
COCAINE METABOLITE, URINE: NEGATIVE
COLOR: YELLOW
COMMENT UA: NORMAL
CREAT SERPL-MCNC: 0.66 MG/DL (ref 0.7–1.2)
EOSINOPHILS RELATIVE PERCENT: 1 % (ref 0–4)
ETHANOL PERCENT: 0.16 %
ETHANOL: 161 MG/DL
GFR AFRICAN AMERICAN: >60 ML/MIN
GFR NON-AFRICAN AMERICAN: >60 ML/MIN
GFR SERPL CREATININE-BSD FRML MDRD: ABNORMAL ML/MIN/{1.73_M2}
GLUCOSE BLD-MCNC: 88 MG/DL (ref 70–99)
GLUCOSE URINE: NEGATIVE
HCT VFR BLD CALC: 44.1 % (ref 41–53)
HEMOGLOBIN: 15 G/DL (ref 13.5–17.5)
KETONES, URINE: NEGATIVE
LEUKOCYTE ESTERASE, URINE: NEGATIVE
LIPASE: 211 U/L (ref 13–60)
LYMPHOCYTES # BLD: 21 % (ref 24–44)
MCH RBC QN AUTO: 32.9 PG (ref 26–34)
MCHC RBC AUTO-ENTMCNC: 34 G/DL (ref 31–37)
MCV RBC AUTO: 96.8 FL (ref 80–100)
METHADONE SCREEN, URINE: NEGATIVE
MONOCYTES # BLD: 7 % (ref 1–7)
NITRITE, URINE: NEGATIVE
OPIATES, URINE: NEGATIVE
OXYCODONE SCREEN URINE: NEGATIVE
PDW BLD-RTO: 13 % (ref 11.5–14.9)
PH UA: 5 (ref 5–8)
PHENCYCLIDINE, URINE: NEGATIVE
PLATELET # BLD: 172 K/UL (ref 150–450)
PMV BLD AUTO: 6.3 FL (ref 6–12)
POTASSIUM SERPL-SCNC: 4 MMOL/L (ref 3.7–5.3)
PROTEIN UA: NEGATIVE
RBC # BLD: 4.56 M/UL (ref 4.5–5.9)
SALICYLATE LEVEL: <1 MG/DL (ref 3–10)
SARS-COV-2, RAPID: NOT DETECTED
SEG NEUTROPHILS: 70 % (ref 36–66)
SEGMENTED NEUTROPHILS ABSOLUTE COUNT: 3.7 K/UL (ref 1.3–9.1)
SODIUM BLD-SCNC: 141 MMOL/L (ref 135–144)
SPECIFIC GRAVITY UA: 1.01 (ref 1–1.03)
SPECIMEN DESCRIPTION: NORMAL
TEST INFORMATION: NORMAL
TOTAL PROTEIN: 7.1 G/DL (ref 6.4–8.3)
TOXIC TRICYCLIC SC,BLOOD: ABNORMAL
TRICYCLIC ANTIDEP,URINE: NEGATIVE
TURBIDITY: CLEAR
URINE HGB: NEGATIVE
UROBILINOGEN, URINE: NORMAL
WBC # BLD: 5.2 K/UL (ref 3.5–11)

## 2022-03-27 PROCEDURE — 36415 COLL VENOUS BLD VENIPUNCTURE: CPT

## 2022-03-27 PROCEDURE — G0480 DRUG TEST DEF 1-7 CLASSES: HCPCS

## 2022-03-27 PROCEDURE — 85025 COMPLETE CBC W/AUTO DIFF WBC: CPT

## 2022-03-27 PROCEDURE — 80179 DRUG ASSAY SALICYLATE: CPT

## 2022-03-27 PROCEDURE — 83690 ASSAY OF LIPASE: CPT

## 2022-03-27 PROCEDURE — 80307 DRUG TEST PRSMV CHEM ANLYZR: CPT

## 2022-03-27 PROCEDURE — 87635 SARS-COV-2 COVID-19 AMP PRB: CPT

## 2022-03-27 PROCEDURE — 81003 URINALYSIS AUTO W/O SCOPE: CPT

## 2022-03-27 PROCEDURE — 99284 EMERGENCY DEPT VISIT MOD MDM: CPT

## 2022-03-27 PROCEDURE — 80143 DRUG ASSAY ACETAMINOPHEN: CPT

## 2022-03-27 PROCEDURE — 80053 COMPREHEN METABOLIC PANEL: CPT

## 2022-03-27 ASSESSMENT — PAIN SCALES - GENERAL: PAINLEVEL_OUTOF10: 5

## 2022-03-27 ASSESSMENT — PAIN - FUNCTIONAL ASSESSMENT: PAIN_FUNCTIONAL_ASSESSMENT: 0-10

## 2022-03-27 ASSESSMENT — PAIN DESCRIPTION - LOCATION: LOCATION: HAND;FOOT

## 2022-03-27 NOTE — ED NOTES
Patient presented to ED via [employer] on an application for emergency admission stating \"Niko said he wanted to kill himself, he said he was depressed, and he said he wanted to find a random person and beat them to death\". Patient is obviously intoxicated. Patient told ED physician that he \"doesn't go through withdrawals because he is never sober\". Patient to obtain lab work and to be reassessed upon sobriety.
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
## 2022-03-27 NOTE — ED PROVIDER NOTES
[address]  eMERGENCY dEPARTMENT eNCOUnter      Pt Name: Leela Joseph  MRN: [id number]  YOB: 1960  Date of evaluation: 3/27/22  PCP: Antwan Blancas MD    CHIEF COMPLAINT:   Chief Complaint   Patient presents with    Suicidal    Homicidal     HISTORY OF PRESENT ILLNESS    Leela Joseph is a 62 y.o. male who presents with a chief complaint of suicidal and homicidal ideations. Patient states that over the past month he has felt suicidal and homicidal.  States he has multiple plans including jumping off a bridge or running in front of traffic. Has not attempted suicide today. He was placed on a pink slip by police. He states he is not homicidal towards anybody specific. States he has been drinking alcohol every day and drink a lot of alcohol today. No recent fevers, chills or other illnesses. Symptoms are acute on chronic. Symptomatically nothing make symptoms better or worse. Patient states he has never gone through withdrawals before because \"I just keep drinking. \"  Patient has no other complaints at this time. REVIEW OF SYSTEMS       Constitutional: Denies recent fever, chills. Neck: No neck pain. Respiratory: Denies recent shortness of breath. Cardiac:  Denies recent chest pain. GI: Denies any recent abdominal pain nausea or vomiting. : Denies dysuria. Musculoskeletal: Denies focal weakness. Neurologic: Denies headache or focal weakness. Skin:  Denies any rash. Psychiatric: Positive for suicidal and homicidal ideations    Negative in 10 essential Systems except as mentioned above and in the HPI. PAST MEDICAL HISTORY   PMH:  has a past medical history of Depression with suicidal ideation, Glaucoma, and Hypertension. Surgical History:  has no past surgical history on file. Social History:  reports that he has been smoking cigarettes. His smokeless tobacco use includes chew. He reports current alcohol use.  He reports that he does not use drugs. Family History: Noncontributory at this time  Psychiatric History: See PMH  Allergies:has No Known Allergies. PHYSICAL EXAM     INITIAL VITALS: BP: 128/79  Pulse: 77  Resp: 20  Temp: 98 °F (36.7 °C) SpO2: 97 %     Constitutional:  Well developed, no acute distress   Eyes:  Pupils equal and readily reactive to light  HENT:  Atraumatic, external ears normal, nose normal, oropharynx moist. Neck- supple    Respiratory:  Clear to auscultation bilaterally with good air exchange  Cardiovascular:  RRR with normal S1 and S2  GI:  Soft, nondistended and nontender   Musculoskeletal:  No edema, no tenderness, no deformities  Integument:  No rash  Neurologic:  Alert & oriented x 4, no focal deficits noted   Psychiatric: Normal mood and affect      EMERGENCY DEPARTMENT COURSE     73-year-old male comes in intoxicated on a pink slip by police. He is suicidal with a plan to jump off a bridge or run in front of traffic. He is homicidal towards no one specific. He is afebrile, nontoxic, normal vital signs. He is answering all questions appropriately however does smell of alcohol and admits to drinking a lot of alcohol today. We will get lab work, reevaluate when sober. FINAL IMPRESSION     1. Alcoholic intoxication without complication ([facility] [address]          DISPOSITION:  DISPOSITION          PATIENT REFERRED TO:  No follow-up provider specified. DISCHARGE MEDICATIONS:  New Prescriptions    No medications on file       The care is provided during an unprecedented national emergency due to the novel coronavirus, COVID-19. (Please note that portions of this note were completed with a voice recognition program. Efforts were made to edit the dictations but occasionally words are mis-transcribed.  Whenever words are used in this note in any gender, they shall be construed as though they were used in the gender appropriate to the circumstances; and whenever words are used in this note in the singular or plural form, they shall be construed as though they were used in the form appropriate to the circumstances.)    Noelle Lewis DO  Attending Emergency Medicine Physician        Noelle Lewis DO  03/27/22 1905

## 2022-03-28 PROBLEM — F33.2 MAJOR DEPRESSIVE DISORDER, RECURRENT SEVERE WITHOUT PSYCHOTIC FEATURES (HCC): Status: ACTIVE | Noted: 2022-03-28

## 2022-03-28 PROBLEM — E78.2 MIXED HYPERLIPIDEMIA: Status: ACTIVE | Noted: 2022-03-28

## 2022-03-28 PROBLEM — E66.3 OVERWEIGHT (BMI 25.0-29.9): Status: ACTIVE | Noted: 2022-03-28

## 2022-03-28 PROCEDURE — 99223 1ST HOSP IP/OBS HIGH 75: CPT | Performed by: INTERNAL MEDICINE

## 2022-03-28 PROCEDURE — 6370000000 HC RX 637 (ALT 250 FOR IP): Performed by: PSYCHIATRY & NEUROLOGY

## 2022-03-28 PROCEDURE — 1240000000 HC EMOTIONAL WELLNESS R&B

## 2022-03-28 PROCEDURE — APPSS180 APP SPLIT SHARED TIME > 60 MINUTES: Performed by: NURSE PRACTITIONER

## 2022-03-28 PROCEDURE — 90792 PSYCH DIAG EVAL W/MED SRVCS: CPT | Performed by: PSYCHIATRY & NEUROLOGY

## 2022-03-28 PROCEDURE — 6370000000 HC RX 637 (ALT 250 FOR IP): Performed by: NURSE PRACTITIONER

## 2022-03-28 RX ORDER — DORZOLAMIDE HCL 20 MG/ML
1 SOLUTION/ DROPS OPHTHALMIC 2 TIMES DAILY
Status: DISCONTINUED | OUTPATIENT
Start: 2022-03-28 | End: 2022-04-04 | Stop reason: HOSPADM

## 2022-03-28 RX ORDER — LANOLIN ALCOHOL/MO/W.PET/CERES
100 CREAM (GRAM) TOPICAL DAILY
Status: DISCONTINUED | OUTPATIENT
Start: 2022-03-28 | End: 2022-03-28 | Stop reason: CLARIF

## 2022-03-28 RX ORDER — LORAZEPAM 2 MG/ML
4 INJECTION INTRAMUSCULAR
Status: DISCONTINUED | OUTPATIENT
Start: 2022-03-28 | End: 2022-03-30

## 2022-03-28 RX ORDER — POLYETHYLENE GLYCOL 3350 17 G/17G
17 POWDER, FOR SOLUTION ORAL DAILY PRN
Status: DISCONTINUED | OUTPATIENT
Start: 2022-03-28 | End: 2022-04-04 | Stop reason: HOSPADM

## 2022-03-28 RX ORDER — IBUPROFEN 400 MG/1
400 TABLET ORAL EVERY 6 HOURS PRN
Status: DISCONTINUED | OUTPATIENT
Start: 2022-03-28 | End: 2022-04-04 | Stop reason: HOSPADM

## 2022-03-28 RX ORDER — TRAZODONE HYDROCHLORIDE 50 MG/1
50 TABLET ORAL NIGHTLY PRN
Status: DISCONTINUED | OUTPATIENT
Start: 2022-03-28 | End: 2022-04-04 | Stop reason: HOSPADM

## 2022-03-28 RX ORDER — LORAZEPAM 1 MG/1
4 TABLET ORAL
Status: DISCONTINUED | OUTPATIENT
Start: 2022-03-28 | End: 2022-03-30

## 2022-03-28 RX ORDER — FOLIC ACID 1 MG/1
1 TABLET ORAL DAILY
Status: DISCONTINUED | OUTPATIENT
Start: 2022-03-28 | End: 2022-04-04 | Stop reason: HOSPADM

## 2022-03-28 RX ORDER — HYDROXYZINE 50 MG/1
50 TABLET, FILM COATED ORAL 3 TIMES DAILY PRN
Status: DISCONTINUED | OUTPATIENT
Start: 2022-03-28 | End: 2022-04-04 | Stop reason: HOSPADM

## 2022-03-28 RX ORDER — LORAZEPAM 2 MG/ML
2 INJECTION INTRAMUSCULAR
Status: DISCONTINUED | OUTPATIENT
Start: 2022-03-28 | End: 2022-03-30

## 2022-03-28 RX ORDER — LORAZEPAM 1 MG/1
3 TABLET ORAL
Status: DISCONTINUED | OUTPATIENT
Start: 2022-03-28 | End: 2022-03-30

## 2022-03-28 RX ORDER — CLOPIDOGREL BISULFATE 75 MG/1
75 TABLET ORAL DAILY
Status: DISCONTINUED | OUTPATIENT
Start: 2022-03-28 | End: 2022-04-02

## 2022-03-28 RX ORDER — GAUZE BANDAGE 2" X 2"
100 BANDAGE TOPICAL DAILY
Status: DISCONTINUED | OUTPATIENT
Start: 2022-03-28 | End: 2022-04-04 | Stop reason: HOSPADM

## 2022-03-28 RX ORDER — LORAZEPAM 2 MG/ML
1 INJECTION INTRAMUSCULAR
Status: DISCONTINUED | OUTPATIENT
Start: 2022-03-28 | End: 2022-03-30

## 2022-03-28 RX ORDER — LORAZEPAM 2 MG/ML
3 INJECTION INTRAMUSCULAR
Status: DISCONTINUED | OUTPATIENT
Start: 2022-03-28 | End: 2022-03-30

## 2022-03-28 RX ORDER — LORAZEPAM 1 MG/1
1 TABLET ORAL
Status: DISCONTINUED | OUTPATIENT
Start: 2022-03-28 | End: 2022-03-30

## 2022-03-28 RX ORDER — ACETAMINOPHEN 325 MG/1
650 TABLET ORAL EVERY 4 HOURS PRN
Status: DISCONTINUED | OUTPATIENT
Start: 2022-03-28 | End: 2022-04-04 | Stop reason: HOSPADM

## 2022-03-28 RX ORDER — ATORVASTATIN CALCIUM 20 MG/1
40 TABLET, FILM COATED ORAL NIGHTLY
Status: DISCONTINUED | OUTPATIENT
Start: 2022-03-28 | End: 2022-04-04 | Stop reason: HOSPADM

## 2022-03-28 RX ORDER — MAGNESIUM HYDROXIDE/ALUMINUM HYDROXICE/SIMETHICONE 120; 1200; 1200 MG/30ML; MG/30ML; MG/30ML
30 SUSPENSION ORAL EVERY 6 HOURS PRN
Status: DISCONTINUED | OUTPATIENT
Start: 2022-03-28 | End: 2022-04-04 | Stop reason: HOSPADM

## 2022-03-28 RX ORDER — LORAZEPAM 1 MG/1
2 TABLET ORAL
Status: DISCONTINUED | OUTPATIENT
Start: 2022-03-28 | End: 2022-03-30

## 2022-03-28 RX ADMIN — ATORVASTATIN CALCIUM 40 MG: 20 TABLET, FILM COATED ORAL at 21:00

## 2022-03-28 RX ADMIN — CLOPIDOGREL BISULFATE 75 MG: 75 TABLET ORAL at 16:33

## 2022-03-28 RX ADMIN — DORZOLAMIDE HCL 1 DROP: 20 SOLUTION/ DROPS OPHTHALMIC at 16:32

## 2022-03-28 RX ADMIN — NICOTINE POLACRILEX 2 MG: 2 GUM, CHEWING BUCCAL at 14:28

## 2022-03-28 RX ADMIN — NICOTINE POLACRILEX 2 MG: 2 GUM, CHEWING BUCCAL at 16:33

## 2022-03-28 RX ADMIN — DORZOLAMIDE HCL 1 DROP: 20 SOLUTION/ DROPS OPHTHALMIC at 21:00

## 2022-03-28 RX ADMIN — THIAMINE HCL TAB 100 MG 100 MG: 100 TAB at 14:28

## 2022-03-28 RX ADMIN — FOLIC ACID 1 MG: 1 TABLET ORAL at 14:28

## 2022-03-28 RX ADMIN — SERTRALINE HYDROCHLORIDE 50 MG: 50 TABLET ORAL at 14:28

## 2022-03-28 ASSESSMENT — SLEEP AND FATIGUE QUESTIONNAIRES
DIFFICULTY ARISING: NO
RESTFUL SLEEP: NO
SLEEP PATTERN: DIFFICULTY FALLING ASLEEP;DISTURBED/INTERRUPTED SLEEP;INSOMNIA
DIFFICULTY FALLING ASLEEP: YES
DIFFICULTY STAYING ASLEEP: YES
DO YOU USE A SLEEP AID: NO
AVERAGE NUMBER OF SLEEP HOURS: 4
DO YOU HAVE DIFFICULTY SLEEPING: YES

## 2022-03-28 ASSESSMENT — PAIN SCALES - GENERAL
PAINLEVEL_OUTOF10: 0
PAINLEVEL_OUTOF10: 0

## 2022-03-28 ASSESSMENT — LIFESTYLE VARIABLES: HISTORY_ALCOHOL_USE: YES

## 2022-03-28 NOTE — PLAN OF CARE
Problem: Falls - Risk of:  Goal: Will remain free from falls  Description: Will remain free from falls  3/28/2022 1423 by Kathy Cerda  Outcome: Ongoing     Problem: Falls - Risk of:  Goal: Absence of physical injury  Description: Absence of physical injury  3/28/2022 1423 by Ciro Cerda  Outcome: Ongoing     Problem: Depressive Behavior With or Without Suicide Precautions:  Goal: Able to verbalize acceptance of life and situations over which he or she has no control  Description: Able to verbalize acceptance of life and situations over which he or she has no control  Outcome: Ongoing     Problem: Depressive Behavior With or Without Suicide Precautions:  Goal: Able to verbalize and/or display a decrease in depressive symptoms  Description: Able to verbalize and/or display a decrease in depressive symptoms  Outcome: Ongoing

## 2022-03-28 NOTE — ED NOTES
Report called to [facility] ([facility]).

## 2022-03-28 NOTE — GROUP NOTE
Group Therapy Note    Date: 3/28/2022    Group Start Time: 1330  Group End Time: 4752  Group Topic: Psychoeducation    SOFI AceS    Patient refused to attend creative expression group at 1330 after encouragement from staff. 1:1 talk time offered by staff as alternative to group session.

## 2022-03-28 NOTE — GROUP NOTE
Group Therapy Note    Date: 3/28/2022    Group Start Time: 1100  Group End Time: 3877  Group Topic: Psychoeducation    SOFI MoralesS    Patient refused to attend leisure skills group at 1100 after encouragement from staff. 1:1 talk time offered by staff as alternative to group session.

## 2022-03-28 NOTE — BH NOTE
Patient stated he is too tired to sign paperwork this evening. He allowed vitals to be done and went to bed.

## 2022-03-28 NOTE — PROGRESS NOTES
Behavioral Services  Medicare Certification Upon Admission    I certify that this patient's inpatient psychiatric hospital admission is medically necessary for:    [x] (1) Treatment which could reasonably be expected to improve this patient's condition,       [x] (2) Or for diagnostic study;     AND     [x](2) The inpatient psychiatric services are provided while the individual is under the care of a physician and are included in the individualized plan of care.     Estimated length of stay/service 4 to 7 days    Plan for post-hospital care Home with outpatient community mental health follow-up    Electronically signed by Hien Portillo MD on 3/28/2022 at 7:00 PM
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
## 2022-03-28 NOTE — GROUP NOTE
Group Therapy Note    Date: 3/28/2022    Group Start Time: 1000  Group End Time: 0452  Group Topic: Psychotherapy    [address], LSW    patient refused to attend psychotherapy group at [facility] after encouragement from staff.   1:1 talk time provided as alternative to group session

## 2022-03-28 NOTE — PLAN OF CARE
[facility]  Initial Interdisciplinary Treatment Plan NO      Original treatment plan Date & Time: 3/28/2022 0850    Admission Type:  Admission Type: Involuntary    Reason for admission:   Reason for Admission: suicidal ideations to \"jump off a bridge or something\", reports intent to harm someone by \"beating them up. \"    Estimated Length of Stay:  5-7days  Estimated Discharge Date: to be determined by physician    PATIENT STRENGTHS:  Patient Strengths:Strengths: Motivated,Social Skills  Patient Strengths and Limitations:Limitations: Difficulty problem solving/relies on others to help solve problems,Inappropriate/potentially harmful leisure interests,Tendency to isolate self  Addictive Behavior: Addictive Behavior  In the past 3 months, have you felt or has someone told you that you have a problem with:  : None  Do you have a history of Chemical Use?: No  Do you have a history of Alcohol Use?: Yes  Do you have a history of Street Drug Abuse?: No  Histroy of Prescripton Drug Abuse?: No  Medical Problems:  Past Medical History:   Diagnosis Date    Depression with suicidal ideation 2/10/2022    Glaucoma     Hypertension      Status EXAM:Status and Exam  Normal: No  Facial Expression: Flat  Affect: Appropriate  Level of Consciousness: Alert  Mood:Normal: No  Mood: Depressed,Anxious,Helpless  Motor Activity:Normal: No  Motor Activity: Decreased  Interview Behavior: Cooperative  Preception: [place] to Person,[place] to Time,[place] to Place,[place] to Situation  Attention:Normal: Yes  Thought Processes: Circumstantial  Thought Content:Normal: No  Thought Content: Preoccupations  Hallucinations: None  Delusions: No  Memory:Normal: Yes  Insight and Judgment: No  Insight and Judgment: Poor Judgment,Poor Insight  Present Suicidal Ideation: Yes (no plan or intent while on unit, contracts for safety)  Present Homicidal Ideation: No    EDUCATION:   Learner Progress Toward Treatment Goals: reviewed group plans and strategies for care    Method:group therapy, medication compliance, individualized assessments and care planning    Outcome: needs reinforcement    PATIENT GOALS: to be discussed with patient within 72 hours    PLAN/TREATMENT RECOMMENDATIONS:     continue group therapy , medications compliance, goal setting, individualized assessments and care, continue to monitor pt on unit      SHORT-TERM GOALS:   Time frame for Short-Term Goals: 5-7 days    LONG-TERM GOALS:  Time frame for Long-Term Goals: 6 months  Members Present in Team Meeting: See Signature Sheet    Mulugeta Veliz

## 2022-03-28 NOTE — ED PROVIDER NOTES
ADDENDUM:        Care of this patient was assumed from Dr. Eleanor Mcdermott   at  830 pm    .  The patient was seen for Suicidal and Homicidal  .  The patient's initial evaluation and plan have been discussed with the prior provider who initially evaluated the patient. Nursing Notes, Past Medical Hx, Past Surgical Hx, Social Hx, Allergies, and Family Hx were all reviewed. I performed a repeat evaluation of the patient and reviewed tests completed so far. Patient continues to admit to suicidal thoughts and homicidal ideations. He is not intoxicated at this time, 9:46 PM EDT  I do think he is appropriate for [facility] admission. He is medically clear. ED Course        RECENT VITALS:  BP: 128/79, Temp: 98 °F (36.7 °C), Pulse: 77, Resp: 20     RADIOLOGY:All plain film, CT, MRI, and formal ultrasound images (except ED bedside ultrasound) are read by the radiologist and the images and interpretations are directly viewed by the emergency physician. No orders to display         LABS: All lab results were reviewed by myself, and all abnormals are listed below.   Labs Reviewed   CBC WITH AUTO DIFFERENTIAL - Abnormal; Notable for the following components:       Result Value    Seg Neutrophils 70 (*)     Lymphocytes 21 (*)     All other components within normal limits   COMPREHENSIVE METABOLIC PANEL - Abnormal; Notable for the following components:    CREATININE 0.66 (*)     Calcium 8.2 (*)     Total Bilirubin 0.23 (*)     All other components within normal limits   LIPASE - Abnormal; Notable for the following components:    Lipase 211 (*)     All other components within normal limits   TOX SCR, BLD, ED - Abnormal; Notable for the following components:    Acetaminophen Level <5 (*)     Ethanol 501 (*)     Salicylate Lvl <1 (*)     All other components within normal limits   COVID-19, RAPID   URINE DRUG SCREEN   URINALYSIS WITH REFLEX TO CULTURE   DRUG SCREEN TRICYCLIC URINE           Disposition   DISPOSITION: DISPOSITION        CLINICAL IMPRESSION:  1. Alcoholic intoxication without complication ([facility] [address]    2. Depression with suicidal ideation    3. Homicidal ideations        PATIENT REFERRED TO:  No follow-up provider specified. DISCHARGE MEDICATIONS:  New Prescriptions    No medications on file     The care is provided during an unprecedented national emergency due to the novel coronavirus, COVID 19.   Demond Castaneda MD  Attending Emergency Physician           Demond Castaneda MD  03/27/22 8486
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
## 2022-03-29 PROCEDURE — 1240000000 HC EMOTIONAL WELLNESS R&B

## 2022-03-29 PROCEDURE — 6370000000 HC RX 637 (ALT 250 FOR IP): Performed by: NURSE PRACTITIONER

## 2022-03-29 PROCEDURE — 6370000000 HC RX 637 (ALT 250 FOR IP): Performed by: PSYCHIATRY & NEUROLOGY

## 2022-03-29 PROCEDURE — 99232 SBSQ HOSP IP/OBS MODERATE 35: CPT | Performed by: PSYCHIATRY & NEUROLOGY

## 2022-03-29 PROCEDURE — APPSS30 APP SPLIT SHARED TIME 16-30 MINUTES: Performed by: NURSE PRACTITIONER

## 2022-03-29 RX ORDER — BRIMONIDINE TARTRATE 2 MG/ML
1 SOLUTION/ DROPS OPHTHALMIC 3 TIMES DAILY
Status: DISCONTINUED | OUTPATIENT
Start: 2022-03-29 | End: 2022-04-04 | Stop reason: HOSPADM

## 2022-03-29 RX ORDER — TIMOLOL MALEATE 5 MG/ML
1 SOLUTION/ DROPS OPHTHALMIC 2 TIMES DAILY
Status: DISCONTINUED | OUTPATIENT
Start: 2022-03-29 | End: 2022-04-04 | Stop reason: HOSPADM

## 2022-03-29 RX ORDER — LATANOPROST 50 UG/ML
1 SOLUTION/ DROPS OPHTHALMIC NIGHTLY
Status: ON HOLD | COMMUNITY
End: 2022-04-01 | Stop reason: SDUPTHER

## 2022-03-29 RX ORDER — TIMOLOL MALEATE 5 MG/ML
1 SOLUTION/ DROPS OPHTHALMIC 2 TIMES DAILY
Status: ON HOLD | COMMUNITY
End: 2022-04-01 | Stop reason: SDUPTHER

## 2022-03-29 RX ORDER — LATANOPROST 50 UG/ML
1 SOLUTION/ DROPS OPHTHALMIC NIGHTLY
Status: DISCONTINUED | OUTPATIENT
Start: 2022-03-29 | End: 2022-04-04 | Stop reason: HOSPADM

## 2022-03-29 RX ORDER — BRIMONIDINE TARTRATE 2 MG/ML
1 SOLUTION/ DROPS OPHTHALMIC 3 TIMES DAILY
Status: ON HOLD | COMMUNITY
End: 2022-04-01 | Stop reason: SDUPTHER

## 2022-03-29 RX ADMIN — TIMOLOL MALEATE 1 DROP: 5 SOLUTION OPHTHALMIC at 21:05

## 2022-03-29 RX ADMIN — NICOTINE POLACRILEX 2 MG: 2 GUM, CHEWING BUCCAL at 08:29

## 2022-03-29 RX ADMIN — TIMOLOL MALEATE 1 DROP: 5 SOLUTION OPHTHALMIC at 12:44

## 2022-03-29 RX ADMIN — BRIMONIDINE TARTRATE 1 DROP: 2 SOLUTION OPHTHALMIC at 14:45

## 2022-03-29 RX ADMIN — SERTRALINE HYDROCHLORIDE 50 MG: 50 TABLET ORAL at 08:29

## 2022-03-29 RX ADMIN — DORZOLAMIDE HCL 1 DROP: 20 SOLUTION/ DROPS OPHTHALMIC at 08:31

## 2022-03-29 RX ADMIN — NICOTINE POLACRILEX 2 MG: 2 GUM, CHEWING BUCCAL at 12:15

## 2022-03-29 RX ADMIN — NICOTINE POLACRILEX 2 MG: 2 GUM, CHEWING BUCCAL at 17:39

## 2022-03-29 RX ADMIN — ATORVASTATIN CALCIUM 40 MG: 20 TABLET, FILM COATED ORAL at 21:04

## 2022-03-29 RX ADMIN — NICOTINE POLACRILEX 2 MG: 2 GUM, CHEWING BUCCAL at 21:11

## 2022-03-29 RX ADMIN — THIAMINE HCL TAB 100 MG 100 MG: 100 TAB at 08:29

## 2022-03-29 RX ADMIN — LATANOPROST 1 DROP: 50 SOLUTION OPHTHALMIC at 21:05

## 2022-03-29 RX ADMIN — FOLIC ACID 1 MG: 1 TABLET ORAL at 08:29

## 2022-03-29 RX ADMIN — DORZOLAMIDE HCL 1 DROP: 20 SOLUTION/ DROPS OPHTHALMIC at 21:05

## 2022-03-29 RX ADMIN — CLOPIDOGREL BISULFATE 75 MG: 75 TABLET ORAL at 08:29

## 2022-03-29 RX ADMIN — BRIMONIDINE TARTRATE 1 DROP: 2 SOLUTION OPHTHALMIC at 21:05

## 2022-03-29 NOTE — PROGRESS NOTES
Daily Progress Note  3/29/2022    Patient Name: Roxana Webb    CHIEF COMPLAINT:  Depression with suicidal ideation and a plan to jump off a bridge. Homicidal ideation with a plan to beat someone up. Emergency Medications: Not required     Scheduled medications: Adherent with sertraline, and thiamine    SUBJECTIVE:     Patient seen face-to-face for follow-up assessment. He has been compliant with his scheduled medications. Reviewed side effects and patient denies all. Patient is on [facility] protocol for alcohol withdrawal.  Has not required any lorazepam.  Patient is mildly hypertensive today with a blood pressure of 131/92, heart rate within normal limits. No pupillary dilation observed. Patient endorses anxiety, but no other withdrawal symptoms noted during time of assessment. Patient is endorsing continued suicidal ideation. States that he feels hopeless and helpless. Unable to contract for safety off unit. Does report modest improvement in his homicidal ideation and no longer feels angry or agitated towards others. Patient is denying any perceptual disturbances or psychotic phenomena. He has been resting in his room. Not engaged in the milieu or attending group programming. Grooming and hygiene are poor and he appears disheveled. Did encourage patient to be more active in the milieu, but he verbalizes feeling socially awkward and has difficulty conversing with peers. Discussed patient's thoughts regarding AOD treatment. He is unsure at this time. Encouraged patient to speak with social work to gain information regarding services available. At this time, patient has yet to demonstrate stability and requires inpatient admission for safety.     Appetite:  [x] Normal/Adequate/Unchanged  [] Increased  [] Decreased      Sleep:       [x] Normal/Adequate/Unchanged  [] Fair  [] Poor      Group Attendance on Unit:   [] Yes  [] Selectively    [x] No    Medication Side Effects: Denies         Mental Status Exam  Level of consciousness: Alert and awake   Appearance: Appropriate attire for setting, resting in bed, with poor grooming and hygiene, disheveled  Behavior/Motor: Approachable, poor energy and motivation  Attitude toward examiner: Cooperative, attentive, fair eye contact  Speech: spontaneous, normal rate, normal volume and well articulated   Mood: Patient reports \"depressed and anxious\"  Affect: Blunted  Thought processes: linear and goal directed   Thought content: Reports improvement in homicidal ideation  Suicidal Ideation: Endorses active suicidal ideation with multiple plans, unable to contract for safety off unit  Delusions: Denies  Perceptual Disturbance: Denies. Does not appear to be responding to internal stimuli  Cognition: Oriented to self, location, time, and situation  Memory: intact  Insight & Judgement: poor     Data   height is 5' 4\" (1.626 m) and weight is 165 lb (74.8 kg). His oral temperature is 97.8 °F (36.6 °C). His blood pressure is 131/92 (abnormal) and his pulse is 73. His respiration is 14 and oxygen saturation is 97%.    Labs:   Admission on 03/27/2022   Component Date Value Ref Range Status    WBC 03/27/2022 5.2  3.5 - 11.0 k/uL Final    RBC 03/27/2022 4.56  4.5 - 5.9 m/uL Final    Hemoglobin 03/27/2022 15.0  13.5 - 17.5 g/dL Final    Hematocrit 03/27/2022 44.1  41 - 53 % Final    MCV 03/27/2022 96.8  80 - 100 fL Final    MCH 03/27/2022 32.9  26 - 34 pg Final    MCHC 03/27/2022 34.0  31 - 37 g/dL Final    RDW 03/27/2022 13.0  11.5 - 14.9 % Final    Platelets 07/15/9981 172  150 - 450 k/uL Final    MPV 03/27/2022 6.3  6.0 - 12.0 fL Final    Seg Neutrophils 03/27/2022 70* 36 - 66 % Final    Lymphocytes 03/27/2022 21* 24 - 44 % Final    Monocytes 03/27/2022 7  1 - 7 % Final    Eosinophils % 03/27/2022 1  0 - 4 % Final    Basophils 03/27/2022 1  0 - 2 % Final    Segs Absolute 03/27/2022 3.70  1.3 - 9.1 k/uL Final    Absolute Lymph # 03/27/2022 1.10  1.0 - 4.8 k/uL Final    Absolute Mono # 03/27/2022 0.40  0.1 - 1.3 k/uL Final    Absolute Eos # 03/27/2022 0.10  0.0 - 0.4 k/uL Final    Basophils Absolute 03/27/2022 0.00  0.0 - 0.2 k/uL Final    Glucose 03/27/2022 88  70 - 99 mg/dL Final    BUN 03/27/2022 9  8 - 23 mg/dL Final    CREATININE 03/27/2022 0.66* 0.70 - 1.20 mg/dL Final    Calcium 03/27/2022 8.2* 8.6 - 10.4 mg/dL Final    Sodium 03/27/2022 141  135 - 144 mmol/L Final    Potassium 03/27/2022 4.0  3.7 - 5.3 mmol/L Final    Chloride 03/27/2022 106  98 - 107 mmol/L Final    CO2 03/27/2022 23  20 - 31 mmol/L Final    Anion Gap 03/27/2022 12  9 - 17 mmol/L Final    Alkaline Phosphatase 03/27/2022 45  40 - 129 U/L Final    ALT 03/27/2022 25  5 - 41 U/L Final    AST 03/27/2022 28  <40 U/L Final    Total Bilirubin 03/27/2022 0.23* 0.3 - 1.2 mg/dL Final    Total Protein 03/27/2022 7.1  6.4 - 8.3 g/dL Final    Albumin 03/27/2022 4.3  3.5 - 5.2 g/dL Final    GFR Non- 03/27/2022 >60  >60 mL/min Final    GFR  03/27/2022 >60  >60 mL/min Final    GFR Comment 03/27/2022        Final    Comment: Average GFR for 61-76 years old:   80 mL/min/1.73sq m  Chronic Kidney Disease:   <60 mL/min/1.73sq m  Kidney failure:   <15 mL/min/1.73sq m              eGFR calculated using average adult body mass.  Additional eGFR calculator available at:        [employer]            Lipase 03/27/2022 211* 13 - 60 U/L Final    Acetaminophen Level 03/27/2022 <5* 10 - 30 ug/mL Final    Ethanol 03/27/2022 161* <10 mg/dL Final    Ethanol percent 03/27/2022 2.539  % Final    Salicylate Lvl 35/43/7679 <1* 3 - 10 mg/dL Final    Toxic Tricyclic Sc,Blood 87/02/2229 WRONG TEST ORDERED  NEGATIVE Final    Amphetamine Screen, Ur 03/27/2022 NEGATIVE  NEGATIVE Final    Comment:       (Positive cutoff 1000 ng/mL)                  Barbiturate Screen, Ur 03/27/2022 NEGATIVE  NEGATIVE Final    Comment:       (Positive cutoff 200 ng/mL)                  Benzodiazepine Screen, Urine 03/27/2022 NEGATIVE  NEGATIVE Final    Comment:       (Positive cutoff 200 ng/mL)                  Cocaine Metabolite, Urine 03/27/2022 NEGATIVE  NEGATIVE Final    Comment:       (Positive cutoff 300 ng/mL)                  Methadone Screen, Urine 03/27/2022 NEGATIVE  NEGATIVE Final    Comment:       (Positive cutoff 300 ng/mL)                  Opiates, Urine 03/27/2022 NEGATIVE  NEGATIVE Final    Comment:       (Positive cutoff 300 ng/mL)                  Phencyclidine, Urine 03/27/2022 NEGATIVE  NEGATIVE Final    Comment:       (Positive cutoff 25 ng/mL)                  Cannabinoid Scrn, Ur 03/27/2022 NEGATIVE  NEGATIVE Final    Comment:       (Positive cutoff 50 ng/mL)                  Oxycodone Screen, Ur 03/27/2022 NEGATIVE  NEGATIVE Final    Comment:       (Positive cutoff 100 ng/mL)                  Test Information 03/27/2022 Assay provides medical screening only. The absence of expected drug(s) and/or metabolite(s) may indicate diluted or adulterated urine, limitations of testing or timing of collection. Final    Comment: Testing for legal purposes should be confirmed by another method. To request confirmation   of test result, please call the lab within 7 days of sample submission.       Color, UA 03/27/2022 Yellow  Yellow Final    Turbidity UA 03/27/2022 Clear  Clear Final    Glucose, Ur 03/27/2022 NEGATIVE  NEGATIVE Final    Bilirubin Urine 03/27/2022 NEGATIVE  NEGATIVE Final    Ketones, Urine 03/27/2022 NEGATIVE  NEGATIVE Final    Specific Gravity, UA 03/27/2022 1.009  1.000 - 1.030 Final    Urine Hgb 03/27/2022 NEGATIVE  NEGATIVE Final    pH, UA 03/27/2022 5.0  5.0 - 8.0 Final    Protein, UA 03/27/2022 NEGATIVE  NEGATIVE Final    Urobilinogen, Urine 03/27/2022 Normal  Normal Final    Nitrite, Urine 03/27/2022 NEGATIVE  NEGATIVE Final    Leukocyte Esterase, Urine 03/27/2022 NEGATIVE  NEGATIVE Final    Urinalysis Comments 03/27/2022 Microscopic exam not performed based on chemical results unless requested in original order. Final    Specimen Description 03/27/2022 . NASOPHARYNGEAL SWAB   Final    SARS-CoV-2, Rapid 03/27/2022 Not Detected  Not Detected Final    Comment:       Rapid NAAT:  The specimen is NEGATIVE for SARS-CoV-2, the novel coronavirus associated with   COVID-19. The ID NOW COVID-19 assay is designed to detect the virus that causes COVID-19 in patients   with signs and symptoms of infection who are suspected of COVID-19. An individual without symptoms of COVID-19 and who is not shedding SARS-CoV-2 virus would   expect to have a negative (not detected) result in this assay. Negative results should be treated as presumptive and, if inconsistent with clinical signs   and symptoms or necessary for patient management,  should be tested with an alternative molecular assay. Negative results do not preclude   SARS-CoV-2 infection and   should not be used as the sole basis for patient management decisions. Fact sheet for Healthcare Providers: Kobi  Fact sheet for Patients: Kobi          Methodology: Isothermal Nucleic Acid Amplification      Tricyclic Antidep,Urine 76/38/5614 NEGATIVE  NEGATIVE Final    Comment:       (Positive cutoff 1000 ng/mL)  Assay provides rapid clinical screening only. Presumptive positive results for legal   purposes should be confirmed by another method. To request confirmation, please call the   lab within 7 days of sample submission. Reviewed patient's current plan of care and vital signs with nursing staff.     Labs reviewed: [x] Yes  Last EKG in EMR reviewed: [x] Yes    Medications  Current Facility-Administered Medications: brimonidine (ALPHAGAN) 0.2 % ophthalmic solution 1 drop, 1 drop, Right Eye, TID  latanoprost (XALATAN) 0.005 % ophthalmic solution 1 drop, 1 drop, Right Eye, Nightly  timolol (TIMOPTIC) 0.5 % ophthalmic solution 1 drop, 1 drop, Right Eye, BID  acetaminophen (TYLENOL) tablet 650 mg, 650 mg, Oral, Q4H PRN  aluminum & magnesium hydroxide-simethicone (MAALOX) 200-200-20 MG/5ML suspension 30 mL, 30 mL, Oral, Q6H PRN  hydrOXYzine (ATARAX) tablet 50 mg, 50 mg, Oral, TID PRN  traZODone (DESYREL) tablet 50 mg, 50 mg, Oral, Nightly PRN  polyethylene glycol (GLYCOLAX) packet 17 g, 17 g, Oral, Daily PRN  ibuprofen (ADVIL;MOTRIN) tablet 400 mg, 400 mg, Oral, Q6H PRN  nicotine polacrilex (NICORETTE) gum 2 mg, 2 mg, Oral, PRN  sertraline (ZOLOFT) tablet 50 mg, 50 mg, Oral, Daily  folic acid (FOLVITE) tablet 1 mg, 1 mg, Oral, Daily  thiamine mononitrate tablet 100 mg, 100 mg, Oral, Daily  atorvastatin (LIPITOR) tablet 40 mg, 40 mg, Oral, Nightly  clopidogrel (PLAVIX) tablet 75 mg, 75 mg, Oral, Daily  dorzolamide (TRUSOPT) 2 % ophthalmic solution 1 drop, 1 drop, Both Eyes, BID  LORazepam (ATIVAN) tablet 1 mg, 1 mg, Oral, Q1H PRN **OR** LORazepam (ATIVAN) injection 1 mg, 1 mg, IntraMUSCular, Q1H PRN **OR** LORazepam (ATIVAN) tablet 2 mg, 2 mg, Oral, Q1H PRN **OR** LORazepam (ATIVAN) injection 2 mg, 2 mg, IntraMUSCular, Q1H PRN **OR** LORazepam (ATIVAN) tablet 3 mg, 3 mg, Oral, Q1H PRN **OR** LORazepam (ATIVAN) injection 3 mg, 3 mg, IntraMUSCular, Q1H PRN **OR** LORazepam (ATIVAN) tablet 4 mg, 4 mg, Oral, Q1H PRN **OR** LORazepam (ATIVAN) injection 4 mg, 4 mg, IntraMUSCular, Q1H PRN    ASSESSMENT  Major depressive disorder, recurrent severe without psychotic features ([facility] [address]         HANDOFF  Patient symptoms are: modestly improving  Medications as determined by attending physician  Consider discontinuing CIWA protocol if alcohol withdrawal is stable through the night  Encourage participation in groups and milieu.   Probable discharge is to be determined by MD      Electronically signed by ALLISON Dixon CNP on 3/29/2022 at 3:59 PM    **This report has been created using voice recognition software. It may contain minor errors which are inherent in voice recognition technology. **    I independently saw and evaluated the patient. I reviewed the nurse practitioners documentation above. Any additional comments or changes to the nurse practitioners documentation are stated below otherwise agree with assessment. Plan will be as follows:  Patient expressing ambivalence about his substance use decision. States he wants to think about it some more. Cruciate tomorrow quickly with her decision. He is denying side effects to medication. Still reporting suicidal ideation outside of the hospital  PLAN  Patient s symptoms   show minimal change  Continue with current medication for now  DC CIWA tomorrow if no signs or symptoms of withdrawal  Consider further increase in Zoloft tomorrow  Attempt to develop insight  Psycho-education conducted. Supportive Therapy conducted.   Probable discharge is 2 to 5 days  Follow-up daily while on inpatient unit

## 2022-03-29 NOTE — GROUP NOTE
Group Therapy Note    Date: 3/29/2022    Group Start Time: 1000  Group End Time: 4368  Group Topic: Psychotherapy    [facility]    PILO Stoner LSW        Group Therapy Note    Attendees:10/21       Patient refused to attend psychotherapy group at 10:00 am after encouragement from staff. 1:1 talk time provided as alternative to group session.     Discipline Responsible: /Counselor      Signature:  PILO Stoner LSW

## 2022-03-29 NOTE — GROUP NOTE
Group Therapy Note    Date: 3/29/2022    Group Start Time: 1340  Group End Time: 9586  Group Topic: Psychoeducation    [facility]    Patient refused to attend coping skills group at 1340 after encouragement from staff. 1:1 talk time offered by staff as alternative to group session.

## 2022-03-29 NOTE — PLAN OF CARE
[address] [place] [address]  Day 3 Interdisciplinary Treatment Plan NOTE    Review Date & Time: 3/29/2022 1319    Admission Type:   Admission Type: Involuntary    Reason for admission:  Reason for Admission: suicidal ideations to \"jump off a bridge or something\", reports intent to harm someone by \"beating them up. \"  Estimated Length of Stay:  5-7 days  Estimated Discharge Date Update: to be determined by physician    PATIENT STRENGTHS:  Patient Strengths:Strengths: Motivated,Social Skills  Patient Strengths and Limitations:Limitations: Multiple barriers to leisure interests,Inappropriate/potentially harmful leisure interests,Hopeless about future,Tendency to isolate self  Addictive Behavior:Addictive Behavior  In the past 3 months, have you felt or has someone told you that you have a problem with:  : None  Do you have a history of Chemical Use?: No  Do you have a history of Alcohol Use?: Yes  Do you have a history of Street Drug Abuse?: No  Histroy of Prescripton Drug Abuse?: No  Medical Problems:  Past Medical History:   Diagnosis Date    Depression with suicidal ideation 2/10/2022    Glaucoma     Hypertension     Mixed hyperlipidemia 3/28/2022       Risk:  Fall RiskTotal: 93  Krishna Scale Krishna Scale Score: 22  BVC    Change in scores:  No Changes to plan of Care:  No    Status EXAM:   Status and Exam  Normal: No  Facial Expression: Flat,Avoids Gaze  Affect: Constricted  Level of Consciousness: Alert  Mood:Normal: No  Mood: Depressed,Anxious  Motor Activity:Normal: No  Motor Activity: Decreased  Interview Behavior: Cooperative,Evasive  Preception: [place] to Person,[place] to Time,[place] to Place,[place] to Situation  Attention:Normal: No  Attention: Distractible  Thought Processes: Circumstantial  Thought Content:Normal: No  Thought Content: Poverty of Content  Hallucinations: None  Delusions: No  Memory:Normal: No  Memory: Poor Recent,Poor Remote  Insight and Judgment: No  Insight and Judgment: Poor Judgment,Poor with medication compliance    SHORT-TERM GOALS UPDATE:   Time frame for Short-Term Goals:  5-7 days    LONG-TERM GOALS UPDATE:   Time frame for Long-Term Goals:  6 months    Members Present in Team Meeting:   See signature sheet  Ok Guzmán

## 2022-03-29 NOTE — GROUP NOTE
Group Therapy Note    Date: 3/29/2022    Group Start Time: 1100  Group End Time: 1140  Group Topic: Psychoeducation    N[facility] [place]    Patient refused to attend communication skills group at 1100 after encouragement from staff. 1:1 talk time offered by staff as alternative to group session.

## 2022-03-29 NOTE — PLAN OF CARE
Problem: Suicide risk  Goal: Provide patient with safe environment  Description: Provide patient with safe environment  3/29/2022 1630 by Manuel Tran LPN  Outcome: Ongoing  Patient remains safe on unit. Q15 minute safety checks in place and will continue. Problem: Falls - Risk of:  Goal: Will remain free from falls  Description: Will remain free from falls  3/29/2022 1630 by Manuel Tran LPN  Outcome: Ongoing    Goal: Absence of physical injury  Description: Absence of physical injury  3/29/2022 1630 by Manuel Tran LPN  Outcome: Ongoing  Patient remains free from falls and injury. Problem: Tobacco Use:  Goal: Inpatient tobacco use cessation counseling participation  Description: Inpatient tobacco use cessation counseling participation  3/29/2022 1630 by Manuel Tran LPN  Outcome: Ongoing       Problem: Depressive Behavior With or Without Suicide Precautions:  Goal: Able to verbalize acceptance of life and situations over which he or she has no control  Description: Able to verbalize acceptance of life and situations over which he or she has no control  3/29/2022 1630 by Manuel Tran LPN  Outcome: Ongoing  Patient endorses fleeting suicidal thoughts, but contracts for safety.      Goal: Able to verbalize and/or display a decrease in depressive symptoms  Description: Able to verbalize and/or display a decrease in depressive symptoms  3/29/2022 1630 by Manuel Tran LPN  Outcome: Ongoing

## 2022-03-29 NOTE — H&P
[facility]  Heena Saucedo MD; Stewart Deluna MD; Dheeraj Sow MD; MD Odalis Dixon MD; Bhupendra Cooper MD    [facility]   [address] / HISTORY AND PHYSICAL EXAMINATION            Date:   3/28/2022  Patient name:  Yuni Farrell  Date of admission:  3/27/2022  6:54 PM  MRN:   [id number]  Account:  [de-identified]  YOB: 1960  PCP:    Alli Smiley MD  Room:   [address] [place] [address]  Code Status:    Full Code    Physician Requesting Consult: Haley Reed MD    Reason for Consult:  Med management     Chief Complaint:     Chief Complaint   Patient presents with    Suicidal    Homicidal       History Obtained From:     patient    History of Present Illness:     62 m with hx of HLD, HTN, Vit d def , admitted to in psych with suicidal ideations       Past Medical History:     Past Medical History:   Diagnosis Date    Depression with suicidal ideation 2/10/2022    Glaucoma     Hypertension     Mixed hyperlipidemia 3/28/2022        Past Surgical History:     History reviewed. No pertinent surgical history. Medications Prior to Admission:     Prior to Admission medications    Medication Sig Start Date End Date Taking?  Authorizing Provider   sertraline (ZOLOFT) 50 MG tablet Take 1 tablet by mouth daily 2/17/22   Rosa Waldron MD   traZODone (DESYREL) 50 MG tablet Take 1 tablet by mouth nightly as needed for Sleep 2/16/22   Rosa Waldron MD   naltrexone (DEPADE) 50 MG tablet Take 1 tablet by mouth daily 2/17/22   Rosa Waldron MD   dorzolamide (TRUSOPT) 2 % ophthalmic solution Place 1 drop into both eyes 2 times daily 2/16/22   Rosa Waldron MD   clopidogrel (PLAVIX) 75 MG tablet Take 1 tablet by mouth daily 12/29/19   Uma Cartagena MD   vitamin B-1 100 MG tablet Take 1 tablet by mouth daily 12/29/19   Uma Cartagena MD   folic acid (FOLVITE) 1 MG tablet Take 1 tablet by mouth daily 19   Shahrzad Peguero MD   atorvastatin (LIPITOR) 40 MG tablet Take 1 tablet by mouth nightly 19   Shahrzad Peguero MD   ibuprofen (IBU) 400 MG tablet Take 1 tablet by mouth every 6 hours as needed for Pain 19   Scott Galaviz DO        Allergies:     Patient has no known allergies. Social History:     Tobacco:    reports that he has been smoking cigarettes. His smokeless tobacco use includes chew. Alcohol:      reports current alcohol use. Drug Use:  reports no history of drug use. Family History:     History reviewed. No pertinent family history. Review of Systems:     Positive and Negative as described in HPI. CONSTITUTIONAL:  negative for fevers, chills, sweats, fatigue, weight loss  HEENT:  negative for vision, hearing changes, runny nose, throat pain  RESPIRATORY:  negative for shortness of breath, cough, congestion, wheezing. CARDIOVASCULAR:  negative for chest pain, palpitations. GASTROINTESTINAL:  negative for nausea, vomiting, diarrhea, constipation, change in bowel habits, abdominal pain   GENITOURINARY:  negative for difficulty of urination, burning with urination, frequency   INTEGUMENT:  negative for rash, skin lesions, easy bruising   HEMATOLOGIC/LYMPHATIC:  negative for swelling/edema   ALLERGIC/IMMUNOLOGIC:  negative for urticaria , itching  ENDOCRINE:  negative increase in drinking, increase in urination, hot or cold intolerance  MUSCULOSKELETAL:  negative joint pains, muscle aches, swelling of joints  NEUROLOGICAL:  negative for headaches, dizziness, lightheadedness, numbness, pain, tingling extremities        Physical Exam:     /68   Pulse 86   Temp 97.8 °F (36.6 °C) (Oral)   Resp 14   Ht 5' 4\" (1.626 m)   Wt 165 lb (74.8 kg)   SpO2 97%   BMI 28.32 kg/m²   Temp (24hrs), Av.6 °F (36.4 °C), Min:97.3 °F (36.3 °C), Max:97.8 °F (36.6 °C)    No results for input(s): POCGLU in the last 72 hours.   No intake or output data in the 24 hours ending 03/28/22 2155    General Appearance:  alert, well appearing, and in no acute distress  Mental status: oriented to person, place, and time with normal affect  Head:  normocephalic, atraumatic. Eye: no icterus, redness, pupils equal and reactive, extraocular eye movements intact, conjunctiva clear  Ear: normal external ear, no discharge, hearing intact  Nose:  no drainage noted  Mouth: mucous membranes moist  Neck: supple, no carotid bruits, thyroid not palpable  Lungs: Bilateral equal air entry, clear to ausculation, no wheezing, rales or rhonchi, normal effort  Cardiovascular: normal rate, regular rhythm, no murmur, gallop, rub. Abdomen: Soft, nontender, nondistended, normal bowel sounds, no hepatomegaly or splenomegaly  Neurologic: There are no new focal motor or sensory deficits, normal muscle tone and bulk, no abnormal sensation, normal speech, cranial nerves II through XII grossly intact  Skin: No gross lesions, rashes, bruising or bleeding on exposed skin area  Extremities:  peripheral pulses palpable, no pedal edema or calf pain with palpation      Investigations:      Laboratory Testing:  No results found for this or any previous visit (from the past 24 hour(s)). Imaging/Diagonstics:  No results found. Assessment :      Hospital Problems           Last Modified POA    * (Principal) Major depressive disorder, recurrent severe without psychotic features ([address] 3/28/2022 Yes    Major depressive disorder, single episode, severe without psychotic features ([address] 3/28/2022 Yes    Alcohol abuse with withdrawal, uncomplicated ([address] 6/80/1694 Yes    Overweight (BMI 25.0-29.9) 3/28/2022 Yes    Mixed hyperlipidemia 3/28/2022 Yes          Plan:     1. Major depression with suicidal ideations, admitted to inpt psych ,   2. HLD, home meds   3. Overwt , low herbert diet   4. Alcohol abuse , watch for withdrawal   5. Vit d def . Will start replacement   6.  Full code status       Consultations:

## 2022-03-29 NOTE — PLAN OF CARE
Problem: Suicide risk  Goal: Provide patient with safe environment  Description: Provide patient with safe environment  3/28/2022 2237 by Geni Bowman RN  Outcome: Ongoing     Problem: Falls - Risk of:  Goal: Will remain free from falls  Description: Will remain free from falls  3/28/2022 2237 by Geni Bowman RN  Outcome: Ongoing     Problem: Falls - Risk of:  Goal: Absence of physical injury  Description: Absence of physical injury  3/28/2022 2237 by Geni Bowman RN  Outcome: Ongoing     Problem: Tobacco Use:  Goal: Inpatient tobacco use cessation counseling participation  Description: Inpatient tobacco use cessation counseling participation  3/28/2022 2237 by Geni Bowman RN  Outcome: Ongoing     Problem: Depressive Behavior With or Without Suicide Precautions:  Goal: Able to verbalize acceptance of life and situations over which he or she has no control  Description: Able to verbalize acceptance of life and situations over which he or she has no control  3/28/2022 2237 by Geni Bowman RN  Outcome: Ongoing     Problem: Depressive Behavior With or Without Suicide Precautions:  Goal: Able to verbalize and/or display a decrease in depressive symptoms  Description: Able to verbalize and/or display a decrease in depressive symptoms  3/28/2022 2237 by Geni Bowman RN  Outcome: Ongoing    Patient is evasive and isolated to room this shift, patient denied vital signs this shift. Patient's mood is depressed and anxious, affect is constricted. Patient denies hallucinations and delusions. Patient endorses suicidal ideation and homicidal ideation; patient denies having any specific plan/person involved and contracts for safety on the unit. Patient remains free from falls thus far and all physical injuries. Patient denied wanting to discuss tobacco cessation. Patient's pain is currently WDL per patient and will continue to be assessed. Safety maintained with every 15 minute checks.

## 2022-03-30 PROCEDURE — 6370000000 HC RX 637 (ALT 250 FOR IP): Performed by: PSYCHIATRY & NEUROLOGY

## 2022-03-30 PROCEDURE — 99232 SBSQ HOSP IP/OBS MODERATE 35: CPT | Performed by: PSYCHIATRY & NEUROLOGY

## 2022-03-30 PROCEDURE — 6370000000 HC RX 637 (ALT 250 FOR IP): Performed by: NURSE PRACTITIONER

## 2022-03-30 PROCEDURE — APPSS30 APP SPLIT SHARED TIME 16-30 MINUTES

## 2022-03-30 PROCEDURE — 1240000000 HC EMOTIONAL WELLNESS R&B

## 2022-03-30 RX ADMIN — LATANOPROST 1 DROP: 50 SOLUTION OPHTHALMIC at 21:54

## 2022-03-30 RX ADMIN — DORZOLAMIDE HCL 1 DROP: 20 SOLUTION/ DROPS OPHTHALMIC at 21:55

## 2022-03-30 RX ADMIN — TRAZODONE HYDROCHLORIDE 50 MG: 50 TABLET ORAL at 21:51

## 2022-03-30 RX ADMIN — HYDROXYZINE HYDROCHLORIDE 50 MG: 50 TABLET, FILM COATED ORAL at 21:51

## 2022-03-30 RX ADMIN — THIAMINE HCL TAB 100 MG 100 MG: 100 TAB at 08:38

## 2022-03-30 RX ADMIN — ATORVASTATIN CALCIUM 40 MG: 20 TABLET, FILM COATED ORAL at 21:51

## 2022-03-30 RX ADMIN — TIMOLOL MALEATE 1 DROP: 5 SOLUTION OPHTHALMIC at 21:55

## 2022-03-30 RX ADMIN — NICOTINE POLACRILEX 2 MG: 2 GUM, CHEWING BUCCAL at 21:56

## 2022-03-30 RX ADMIN — DORZOLAMIDE HCL 1 DROP: 20 SOLUTION/ DROPS OPHTHALMIC at 08:39

## 2022-03-30 RX ADMIN — BRIMONIDINE TARTRATE 1 DROP: 2 SOLUTION OPHTHALMIC at 15:03

## 2022-03-30 RX ADMIN — TIMOLOL MALEATE 1 DROP: 5 SOLUTION OPHTHALMIC at 08:40

## 2022-03-30 RX ADMIN — SERTRALINE HYDROCHLORIDE 50 MG: 50 TABLET ORAL at 08:38

## 2022-03-30 RX ADMIN — NICOTINE POLACRILEX 2 MG: 2 GUM, CHEWING BUCCAL at 13:19

## 2022-03-30 RX ADMIN — FOLIC ACID 1 MG: 1 TABLET ORAL at 08:38

## 2022-03-30 RX ADMIN — CLOPIDOGREL BISULFATE 75 MG: 75 TABLET ORAL at 08:38

## 2022-03-30 RX ADMIN — BRIMONIDINE TARTRATE 1 DROP: 2 SOLUTION OPHTHALMIC at 08:39

## 2022-03-30 RX ADMIN — BRIMONIDINE TARTRATE 1 DROP: 2 SOLUTION OPHTHALMIC at 21:55

## 2022-03-30 NOTE — GROUP NOTE
Group Therapy Note    Date: 3/30/2022    Group Start Time: 1000  Group End Time: 4391  Group Topic: Psychotherapy    [address], LSW    patient refused to attend psychotherapy group at [facility] after encouragement from staff.   1:1 talk time provided as alternative to group session

## 2022-03-30 NOTE — PLAN OF CARE
Problem: Suicide risk  Goal: Provide patient with safe environment  Description: Provide patient with safe environment  3/29/2022 2224 by Benson Hermosillo RN  Outcome: Ongoing     Problem: Falls - Risk of:  Goal: Will remain free from falls  Description: Will remain free from falls  3/29/2022 2224 by Benson Hermosillo RN  Outcome: Ongoing     Problem: Falls - Risk of:  Goal: Absence of physical injury  Description: Absence of physical injury  3/29/2022 2224 by Benson Hermosillo RN  Outcome: Ongoing     Problem: Tobacco Use:  Goal: Inpatient tobacco use cessation counseling participation  Description: Inpatient tobacco use cessation counseling participation  3/29/2022 2224 by Benson Hermosillo RN  Outcome: Ongoing     Problem: Depressive Behavior With or Without Suicide Precautions:  Goal: Able to verbalize acceptance of life and situations over which he or she has no control  Description: Able to verbalize acceptance of life and situations over which he or she has no control  3/29/2022 2224 by Benson Hermosillo RN  Outcome: Ongoing     Problem: Depressive Behavior With or Without Suicide Precautions:  Goal: Able to verbalize and/or display a decrease in depressive symptoms  Description: Able to verbalize and/or display a decrease in depressive symptoms  3/29/2022 2224 by Benson Hermosillo RN  Outcome: Ongoing     Patient is isolated to room this shift and only out for needs. Patient's mood is depressed and anxious, affect is constricted. Patient denies hallucinations and delusions. Patient denies suicidal and homicidal ideation. Patient's pain is currently WDL per patient and will continue to be assessed. Patient remains free of falls and physical injuries thus far. Patient denied wanting to discuss tobacco cessation. Safety maintained with every 15 minute checks.

## 2022-03-30 NOTE — PLAN OF CARE
Problem: Suicide risk  Goal: Provide patient with safe environment  Description: Provide patient with safe environment  Outcome: Ongoing  Note: Patient environment kept safe. Problem: Falls - Risk of:  Goal: Will remain free from falls  Description: Will remain free from falls  Outcome: Ongoing     Problem: Falls - Risk of:  Goal: Absence of physical injury  Description: Absence of physical injury  Outcome: Ongoing  Note: 15 minute safety checks, patient free from falls and injury.       Problem: Tobacco Use:  Goal: Inpatient tobacco use cessation counseling participation  Description: Inpatient tobacco use cessation counseling participation  Outcome: Ongoing     Problem: Depressive Behavior With or Without Suicide Precautions:  Goal: Able to verbalize and/or display a decrease in depressive symptoms  Description: Able to verbalize and/or display a decrease in depressive symptoms  Outcome: Ongoing

## 2022-03-30 NOTE — GROUP NOTE
Group Therapy Note    Date: 3/30/2022    Group Start Time: 1100  Group End Time: 1130  Group Topic: Healthy Living/Wellness    [facility]    Patient refused to attend health and wellness group at 1100 after encouragement from staff. 1:1 talk time offered by staff as alternative to group session.

## 2022-03-30 NOTE — PROGRESS NOTES
Daily Progress Note  3/30/2022    Patient Name: Roxana Webb    CHIEF COMPLAINT:  Depression with suicidal ideation and a plan to jump off a bridge. Homicidal ideation with a plan to beat someone up. SUBJECTIVE:      Patient is seen today for a follow up assessment. Patient is compliant with scheduled medications. Patient has not required emergency medications in the past 24 hours. Patient is agreeable to interview in his room today where he is laying in bed. He continues to endorse significant depression and anxiety today. He reports that he has been sleeping poorly and feels unrested today and reports he has been trying to take \"little naps\" throughout the day to deal with this. He reports that his appetite continues to be poor. He denies having any more symptoms of withdrawal so we will discontinue CIWA at this time. Patient continues to endorse active suicidal ideation and homicidal ideation. He reports that both thoughts are still there however he reports they are less intense and frequent today. He continues to feel that he would be very unsafe out of the hospital at this time. He denies symptoms of psychosis including auditory and visual hallucinations. When asked about what patient is planning to do once discharged from the hospital he states, \"I'm looking at going somewhere for help. \"  He is having a hard time remembering the name of the place but states, \"I think it used to be [facility] but now it is something else. \"  We will continue to follow this and help patient with finding placement. He denies any medication side effects or other medical concerns at this time. After discussing risks, benefits, and alternatives it was mutually agreed to titrate patient's Zoloft.     Appetite:  [] Normal/Adequate/Unchanged  [] Increased  [x] Decreased      Sleep:       [] Normal/Adequate/Unchanged  [x] Fair  [] Poor      Group Attendance on Unit:   [] Yes  [] Selectively    [x] No    Medication Side Effects:  Patient denies any medication side effects at the time of assessment. Mental Status Exam  Level of consciousness: Alert and awake. Appearance: Appropriate attire for setting, resting in bed, with poor grooming and hygiene, disheveled. Behavior/Motor: Approachable, somewhat withdrawn, psychomotor slowing  Attitude toward examiner: Cooperative, attentive, fair eye contact. Speech: Normal rate, normal volume, normal tone. Mood:  Patient reports \"pretty good\". Affect: Depressed  Thought processes: Linear and coherent. Thought content: Endorses homicidal ideation. Suicidal Ideation: Active suicidal ideations  Delusions: No evidence of delusions. Denies paranoia. Perceptual Disturbance: Patient does not appear to be responding to internal stimuli. Denies auditory hallucinations. Denies visual hallucinations. Cognition: Oriented to self, location, time, and situation. Memory: Intact. Insight & Judgement: Poor. Data   height is 5' 4\" (1.626 m) and weight is 165 lb (74.8 kg). His oral temperature is 98.3 °F (36.8 °C). His blood pressure is 121/55 (abnormal) and his pulse is 53. His respiration is 18 and oxygen saturation is 97%.    Labs:   Admission on 03/27/2022   Component Date Value Ref Range Status    WBC 03/27/2022 5.2  3.5 - 11.0 k/uL Final    RBC 03/27/2022 4.56  4.5 - 5.9 m/uL Final    Hemoglobin 03/27/2022 15.0  13.5 - 17.5 g/dL Final    Hematocrit 03/27/2022 44.1  41 - 53 % Final    MCV 03/27/2022 96.8  80 - 100 fL Final    MCH 03/27/2022 32.9  26 - 34 pg Final    MCHC 03/27/2022 34.0  31 - 37 g/dL Final    RDW 03/27/2022 13.0  11.5 - 14.9 % Final    Platelets 90/46/1046 172  150 - 450 k/uL Final    MPV 03/27/2022 6.3  6.0 - 12.0 fL Final    Seg Neutrophils 03/27/2022 70* 36 - 66 % Final    Lymphocytes 03/27/2022 21* 24 - 44 % Final    Monocytes 03/27/2022 7  1 - 7 % Final    Eosinophils % 03/27/2022 1  0 - 4 % Final    Basophils 03/27/2022 1  0 - 2 % Final    Segs Absolute 03/27/2022 3.70  1.3 - 9.1 k/uL Final    Absolute Lymph # 03/27/2022 1.10  1.0 - 4.8 k/uL Final    Absolute Mono # 03/27/2022 0.40  0.1 - 1.3 k/uL Final    Absolute Eos # 03/27/2022 0.10  0.0 - 0.4 k/uL Final    Basophils Absolute 03/27/2022 0.00  0.0 - 0.2 k/uL Final    Glucose 03/27/2022 88  70 - 99 mg/dL Final    BUN 03/27/2022 9  8 - 23 mg/dL Final    CREATININE 03/27/2022 0.66* 0.70 - 1.20 mg/dL Final    Calcium 03/27/2022 8.2* 8.6 - 10.4 mg/dL Final    Sodium 03/27/2022 141  135 - 144 mmol/L Final    Potassium 03/27/2022 4.0  3.7 - 5.3 mmol/L Final    Chloride 03/27/2022 106  98 - 107 mmol/L Final    CO2 03/27/2022 23  20 - 31 mmol/L Final    Anion Gap 03/27/2022 12  9 - 17 mmol/L Final    Alkaline Phosphatase 03/27/2022 45  40 - 129 U/L Final    ALT 03/27/2022 25  5 - 41 U/L Final    AST 03/27/2022 28  <40 U/L Final    Total Bilirubin 03/27/2022 0.23* 0.3 - 1.2 mg/dL Final    Total Protein 03/27/2022 7.1  6.4 - 8.3 g/dL Final    Albumin 03/27/2022 4.3  3.5 - 5.2 g/dL Final    GFR Non- 03/27/2022 >60  >60 mL/min Final    GFR  03/27/2022 >60  >60 mL/min Final    GFR Comment 03/27/2022        Final    Comment: Average GFR for 61-76 years old:   80 mL/min/1.73sq m  Chronic Kidney Disease:   <60 mL/min/1.73sq m  Kidney failure:   <15 mL/min/1.73sq m              eGFR calculated using average adult body mass.  Additional eGFR calculator available at:        [employer]            Lipase 03/27/2022 211* 13 - 60 U/L Final    Acetaminophen Level 03/27/2022 <5* 10 - 30 ug/mL Final    Ethanol 03/27/2022 161* <10 mg/dL Final    Ethanol percent 03/27/2022 4.151  % Final    Salicylate Lvl 73/37/4367 <1* 3 - 10 mg/dL Final    Toxic Tricyclic Sc,Blood 02/82/8374 WRONG TEST ORDERED  NEGATIVE Final    Amphetamine Screen, Ur 03/27/2022 NEGATIVE  NEGATIVE Final    Comment:       (Positive cutoff 1000 ng/mL)  Barbiturate Screen, Ur 03/27/2022 NEGATIVE  NEGATIVE Final    Comment:       (Positive cutoff 200 ng/mL)                  Benzodiazepine Screen, Urine 03/27/2022 NEGATIVE  NEGATIVE Final    Comment:       (Positive cutoff 200 ng/mL)                  Cocaine Metabolite, Urine 03/27/2022 NEGATIVE  NEGATIVE Final    Comment:       (Positive cutoff 300 ng/mL)                  Methadone Screen, Urine 03/27/2022 NEGATIVE  NEGATIVE Final    Comment:       (Positive cutoff 300 ng/mL)                  Opiates, Urine 03/27/2022 NEGATIVE  NEGATIVE Final    Comment:       (Positive cutoff 300 ng/mL)                  Phencyclidine, Urine 03/27/2022 NEGATIVE  NEGATIVE Final    Comment:       (Positive cutoff 25 ng/mL)                  Cannabinoid Scrn, Ur 03/27/2022 NEGATIVE  NEGATIVE Final    Comment:       (Positive cutoff 50 ng/mL)                  Oxycodone Screen, Ur 03/27/2022 NEGATIVE  NEGATIVE Final    Comment:       (Positive cutoff 100 ng/mL)                  Test Information 03/27/2022 Assay provides medical screening only. The absence of expected drug(s) and/or metabolite(s) may indicate diluted or adulterated urine, limitations of testing or timing of collection. Final    Comment: Testing for legal purposes should be confirmed by another method. To request confirmation   of test result, please call the lab within 7 days of sample submission.       Color, UA 03/27/2022 Yellow  Yellow Final    Turbidity UA 03/27/2022 Clear  Clear Final    Glucose, Ur 03/27/2022 NEGATIVE  NEGATIVE Final    Bilirubin Urine 03/27/2022 NEGATIVE  NEGATIVE Final    Ketones, Urine 03/27/2022 NEGATIVE  NEGATIVE Final    Specific Gravity, UA 03/27/2022 1.009  1.000 - 1.030 Final    Urine Hgb 03/27/2022 NEGATIVE  NEGATIVE Final    pH, UA 03/27/2022 5.0  5.0 - 8.0 Final    Protein, UA 03/27/2022 NEGATIVE  NEGATIVE Final    Urobilinogen, Urine 03/27/2022 Normal  Normal Final    Nitrite, Urine 03/27/2022 NEGATIVE  NEGATIVE Final    Leukocyte Esterase, Urine 03/27/2022 NEGATIVE  NEGATIVE Final    Urinalysis Comments 03/27/2022 Microscopic exam not performed based on chemical results unless requested in original order. Final    Specimen Description 03/27/2022 . NASOPHARYNGEAL SWAB   Final    SARS-CoV-2, Rapid 03/27/2022 Not Detected  Not Detected Final    Comment:       Rapid NAAT:  The specimen is NEGATIVE for SARS-CoV-2, the novel coronavirus associated with   COVID-19. The ID NOW COVID-19 assay is designed to detect the virus that causes COVID-19 in patients   with signs and symptoms of infection who are suspected of COVID-19. An individual without symptoms of COVID-19 and who is not shedding SARS-CoV-2 virus would   expect to have a negative (not detected) result in this assay. Negative results should be treated as presumptive and, if inconsistent with clinical signs   and symptoms or necessary for patient management,  should be tested with an alternative molecular assay. Negative results do not preclude   SARS-CoV-2 infection and   should not be used as the sole basis for patient management decisions. Fact sheet for Healthcare Providers: Kobi  Fact sheet for Patients: Kobi          Methodology: Isothermal Nucleic Acid Amplification      Tricyclic Antidep,Urine 76/59/2618 NEGATIVE  NEGATIVE Final    Comment:       (Positive cutoff 1000 ng/mL)  Assay provides rapid clinical screening only. Presumptive positive results for legal   purposes should be confirmed by another method. To request confirmation, please call the   lab within 7 days of sample submission. Reviewed patient's current plan of care and vital signs with nursing staff.     Labs reviewed: [x] Yes  Last EKG in EMR reviewed: [x] Yes  QTc: 419  Medications  Current Facility-Administered Medications: brimonidine (ALPHAGAN) 0.2 % ophthalmic solution 1 drop, 1 drop, Right Eye, TID  latanoprost (XALATAN) 0.005 % ophthalmic solution 1 drop, 1 drop, Right Eye, Nightly  timolol (TIMOPTIC) 0.5 % ophthalmic solution 1 drop, 1 drop, Right Eye, BID  acetaminophen (TYLENOL) tablet 650 mg, 650 mg, Oral, Q4H PRN  aluminum & magnesium hydroxide-simethicone (MAALOX) 200-200-20 MG/5ML suspension 30 mL, 30 mL, Oral, Q6H PRN  hydrOXYzine (ATARAX) tablet 50 mg, 50 mg, Oral, TID PRN  traZODone (DESYREL) tablet 50 mg, 50 mg, Oral, Nightly PRN  polyethylene glycol (GLYCOLAX) packet 17 g, 17 g, Oral, Daily PRN  ibuprofen (ADVIL;MOTRIN) tablet 400 mg, 400 mg, Oral, Q6H PRN  nicotine polacrilex (NICORETTE) gum 2 mg, 2 mg, Oral, PRN  sertraline (ZOLOFT) tablet 50 mg, 50 mg, Oral, Daily  folic acid (FOLVITE) tablet 1 mg, 1 mg, Oral, Daily  thiamine mononitrate tablet 100 mg, 100 mg, Oral, Daily  atorvastatin (LIPITOR) tablet 40 mg, 40 mg, Oral, Nightly  clopidogrel (PLAVIX) tablet 75 mg, 75 mg, Oral, Daily  dorzolamide (TRUSOPT) 2 % ophthalmic solution 1 drop, 1 drop, Both Eyes, BID    ASSESSMENT  Major depressive disorder, recurrent severe without psychotic features ([address]         HANDOFF  Patient symptoms are: Remains Unstable. Medications as determined by attending physician  Consider titration of Zoloft to 75 mg  Monitor need and frequency of PRN medications. Encourage participation in groups and milieu. Probable discharge is to be determined by MD.     Electronically signed by ALLISON Thomson CNP on 3/30/2022 at 2:12 PM    **This report has been created using voice recognition software. It may contain minor errors which are inherent in voice recognition technology. **    I independently saw and evaluated the patient. I reviewed the nurse practitioners documentation above. Any additional comments or changes to the nurse practitioners documentation are stated below otherwise agree with assessment. Plan will be as follows:  Patient tolerating medication well.   States he is thinking about substance use rehab programming. Specifically mentions program by name which I believe costs $400/month. Will follow up with social workers tomorrow. Agreeable to increase his medication  PLAN  Patient s symptoms   are improving  Increase Zoloft to 75 mg daily  Attempt to develop insight  Psycho-education conducted. Supportive Therapy conducted.   Probable discharge is to 2 to 3 days  Follow-up daily while on inpatient unit

## 2022-03-31 PROCEDURE — 90833 PSYTX W PT W E/M 30 MIN: CPT | Performed by: PSYCHIATRY & NEUROLOGY

## 2022-03-31 PROCEDURE — 6370000000 HC RX 637 (ALT 250 FOR IP)

## 2022-03-31 PROCEDURE — 6370000000 HC RX 637 (ALT 250 FOR IP): Performed by: PSYCHIATRY & NEUROLOGY

## 2022-03-31 PROCEDURE — 99232 SBSQ HOSP IP/OBS MODERATE 35: CPT | Performed by: PSYCHIATRY & NEUROLOGY

## 2022-03-31 PROCEDURE — APPSS30 APP SPLIT SHARED TIME 16-30 MINUTES

## 2022-03-31 PROCEDURE — 6370000000 HC RX 637 (ALT 250 FOR IP): Performed by: NURSE PRACTITIONER

## 2022-03-31 PROCEDURE — 1240000000 HC EMOTIONAL WELLNESS R&B

## 2022-03-31 RX ADMIN — SERTRALINE HYDROCHLORIDE 75 MG: 50 TABLET ORAL at 08:34

## 2022-03-31 RX ADMIN — BRIMONIDINE TARTRATE 1 DROP: 2 SOLUTION OPHTHALMIC at 08:35

## 2022-03-31 RX ADMIN — TIMOLOL MALEATE 1 DROP: 5 SOLUTION OPHTHALMIC at 20:42

## 2022-03-31 RX ADMIN — TRAZODONE HYDROCHLORIDE 50 MG: 50 TABLET ORAL at 20:40

## 2022-03-31 RX ADMIN — HYDROXYZINE HYDROCHLORIDE 50 MG: 50 TABLET, FILM COATED ORAL at 20:40

## 2022-03-31 RX ADMIN — BRIMONIDINE TARTRATE 1 DROP: 2 SOLUTION OPHTHALMIC at 20:42

## 2022-03-31 RX ADMIN — FOLIC ACID 1 MG: 1 TABLET ORAL at 08:34

## 2022-03-31 RX ADMIN — ATORVASTATIN CALCIUM 40 MG: 20 TABLET, FILM COATED ORAL at 20:40

## 2022-03-31 RX ADMIN — BRIMONIDINE TARTRATE 1 DROP: 2 SOLUTION OPHTHALMIC at 15:33

## 2022-03-31 RX ADMIN — TIMOLOL MALEATE 1 DROP: 5 SOLUTION OPHTHALMIC at 08:36

## 2022-03-31 RX ADMIN — DORZOLAMIDE HCL 1 DROP: 20 SOLUTION/ DROPS OPHTHALMIC at 20:41

## 2022-03-31 RX ADMIN — NICOTINE POLACRILEX 2 MG: 2 GUM, CHEWING BUCCAL at 20:44

## 2022-03-31 RX ADMIN — CLOPIDOGREL BISULFATE 75 MG: 75 TABLET ORAL at 08:34

## 2022-03-31 RX ADMIN — LATANOPROST 1 DROP: 50 SOLUTION OPHTHALMIC at 20:41

## 2022-03-31 RX ADMIN — THIAMINE HCL TAB 100 MG 100 MG: 100 TAB at 08:34

## 2022-03-31 RX ADMIN — DORZOLAMIDE HCL 1 DROP: 20 SOLUTION/ DROPS OPHTHALMIC at 08:35

## 2022-03-31 NOTE — PROGRESS NOTES
Daily Progress Note  3/31/2022    Patient Name: Mario Johns    CHIEF COMPLAINT:  Depression with suicidal ideation and a plan to jump off a bridge. Homicidal ideation with a plan to beat someone up. SUBJECTIVE:      Patient is seen today for a follow up assessment. Patient is compliant with scheduled medications. Patient has not required emergency medications in the past 24 hours. It appears also that patient has gone through most of his alcohol withdrawal and is no longer needing the CIWA protocol in place. Patient continues to endorse significant depression and anxiety and rates both as a 6 (0-10 scale 0 being on 10 being the worst). He reports that he slept well last night however he continues to feel very tired throughout the day but states this is normal for him. He denies any problems with his appetite. Patient continues to endorse fleeting suicidal ideation however reports that the thoughts are less intense and seem to come and go throughout the day. He reports that his homicidal thoughts are also much less intense today. He continues to feel that he would be unsafe out of the hospital at this time due to this and also due to possibly relapsing on alcohol. He continues to be somewhat ambivalent about substance use rehab and states today that he is still \"trying to decide between inpatient and outpatient treatment. \"  This writer tried to inquire patient about the pros and cons of both however the only answer patient could give was \"well I guess if I went inpatient I would not have to sleep on the streets. \"  This writer offered to get patient a green folder with a list of options however patient states, \"That's okay, I can't see anyway. \"  He denies auditory and visual hallucinations. He denies paranoia. He denies any medication side effects or other medical concerns at this time. He continues to remain isolative to his room and does not come out for groups.  He also appears disheveled and does not appear to be engaging in hygiene needs. Appetite:  [x] Normal/Adequate/Unchanged  [] Increased  [] Decreased      Sleep:       [] Normal/Adequate/Unchanged  [x] Fair  [] Poor      Group Attendance on Unit:   [] Yes  [] Selectively    [x] No    Medication Side Effects:  Patient denies any medication side effects at the time of assessment. Mental Status Exam  Level of consciousness: Alert and awake. Appearance: Appropriate attire for setting, resting in bed, with poor grooming and hygiene, disheveled. Behavior/Motor: Approachable, psychomotor slowing  Attitude toward examiner: Cooperative, attentive, fair eye contact. Speech: Normal rate, normal volume, normal tone. Mood:  Patient reports \"alright I guess\". Affect: Depressed  Thought processes: Linear and coherent. Thought content: Reports improvement in homicidal ideation. Suicidal Ideation: Fleeting suicidal ideations  Delusions: No evidence of delusions. Denies paranoia. Perceptual Disturbance: Patient does not appear to be responding to internal stimuli. Denies auditory hallucinations. Denies visual hallucinations. Cognition: Oriented to self, location, time, and situation. Memory: Intact. Insight & Judgement: Poor. Data   height is 5' 4\" (1.626 m) and weight is 165 lb (74.8 kg). His oral temperature is 97.8 °F (36.6 °C). His blood pressure is 106/59 (abnormal) and his pulse is 52. His respiration is 14 and oxygen saturation is 97%.    Labs:   Admission on 03/27/2022   Component Date Value Ref Range Status    WBC 03/27/2022 5.2  3.5 - 11.0 k/uL Final    RBC 03/27/2022 4.56  4.5 - 5.9 m/uL Final    Hemoglobin 03/27/2022 15.0  13.5 - 17.5 g/dL Final    Hematocrit 03/27/2022 44.1  41 - 53 % Final    MCV 03/27/2022 96.8  80 - 100 fL Final    MCH 03/27/2022 32.9  26 - 34 pg Final    MCHC 03/27/2022 34.0  31 - 37 g/dL Final    RDW 03/27/2022 13.0  11.5 - 14.9 % Final    Platelets 76/38/1975 172  150 - 450 k/uL Final    MPV 03/27/2022 6.3  6.0 - 12.0 fL Final    Seg Neutrophils 03/27/2022 70* 36 - 66 % Final    Lymphocytes 03/27/2022 21* 24 - 44 % Final    Monocytes 03/27/2022 7  1 - 7 % Final    Eosinophils % 03/27/2022 1  0 - 4 % Final    Basophils 03/27/2022 1  0 - 2 % Final    Segs Absolute 03/27/2022 3.70  1.3 - 9.1 k/uL Final    Absolute Lymph # 03/27/2022 1.10  1.0 - 4.8 k/uL Final    Absolute Mono # 03/27/2022 0.40  0.1 - 1.3 k/uL Final    Absolute Eos # 03/27/2022 0.10  0.0 - 0.4 k/uL Final    Basophils Absolute 03/27/2022 0.00  0.0 - 0.2 k/uL Final    Glucose 03/27/2022 88  70 - 99 mg/dL Final    BUN 03/27/2022 9  8 - 23 mg/dL Final    CREATININE 03/27/2022 0.66* 0.70 - 1.20 mg/dL Final    Calcium 03/27/2022 8.2* 8.6 - 10.4 mg/dL Final    Sodium 03/27/2022 141  135 - 144 mmol/L Final    Potassium 03/27/2022 4.0  3.7 - 5.3 mmol/L Final    Chloride 03/27/2022 106  98 - 107 mmol/L Final    CO2 03/27/2022 23  20 - 31 mmol/L Final    Anion Gap 03/27/2022 12  9 - 17 mmol/L Final    Alkaline Phosphatase 03/27/2022 45  40 - 129 U/L Final    ALT 03/27/2022 25  5 - 41 U/L Final    AST 03/27/2022 28  <40 U/L Final    Total Bilirubin 03/27/2022 0.23* 0.3 - 1.2 mg/dL Final    Total Protein 03/27/2022 7.1  6.4 - 8.3 g/dL Final    Albumin 03/27/2022 4.3  3.5 - 5.2 g/dL Final    GFR Non- 03/27/2022 >60  >60 mL/min Final    GFR  03/27/2022 >60  >60 mL/min Final    GFR Comment 03/27/2022        Final    Comment: Average GFR for 61-76 years old:   80 mL/min/1.73sq m  Chronic Kidney Disease:   <60 mL/min/1.73sq m  Kidney failure:   <15 mL/min/1.73sq m              eGFR calculated using average adult body mass.  Additional eGFR calculator available at:        [email or web address]            Lipase 03/27/2022 211* 13 - 60 U/L Final    Acetaminophen Level 03/27/2022 <5* 10 - 30 ug/mL Final    Ethanol 03/27/2022 161* <10 mg/dL Final    Ethanol percent 03/27/2022 5.304  % Final    Salicylate Lvl 41/52/9903 <1* 3 - 10 mg/dL Final    Toxic Tricyclic Sc,Blood 89/44/3365 WRONG TEST ORDERED  NEGATIVE Final    Amphetamine Screen, Ur 03/27/2022 NEGATIVE  NEGATIVE Final    Comment:       (Positive cutoff 1000 ng/mL)                  Barbiturate Screen, Ur 03/27/2022 NEGATIVE  NEGATIVE Final    Comment:       (Positive cutoff 200 ng/mL)                  Benzodiazepine Screen, Urine 03/27/2022 NEGATIVE  NEGATIVE Final    Comment:       (Positive cutoff 200 ng/mL)                  Cocaine Metabolite, Urine 03/27/2022 NEGATIVE  NEGATIVE Final    Comment:       (Positive cutoff 300 ng/mL)                  Methadone Screen, Urine 03/27/2022 NEGATIVE  NEGATIVE Final    Comment:       (Positive cutoff 300 ng/mL)                  Opiates, Urine 03/27/2022 NEGATIVE  NEGATIVE Final    Comment:       (Positive cutoff 300 ng/mL)                  Phencyclidine, Urine 03/27/2022 NEGATIVE  NEGATIVE Final    Comment:       (Positive cutoff 25 ng/mL)                  Cannabinoid Scrn, Ur 03/27/2022 NEGATIVE  NEGATIVE Final    Comment:       (Positive cutoff 50 ng/mL)                  Oxycodone Screen, Ur 03/27/2022 NEGATIVE  NEGATIVE Final    Comment:       (Positive cutoff 100 ng/mL)                  Test Information 03/27/2022 Assay provides medical screening only. The absence of expected drug(s) and/or metabolite(s) may indicate diluted or adulterated urine, limitations of testing or timing of collection. Final    Comment: Testing for legal purposes should be confirmed by another method. To request confirmation   of test result, please call the lab within 7 days of sample submission.       Color, UA 03/27/2022 Yellow  Yellow Final    Turbidity UA 03/27/2022 Clear  Clear Final    Glucose, Ur 03/27/2022 NEGATIVE  NEGATIVE Final    Bilirubin Urine 03/27/2022 NEGATIVE  NEGATIVE Final    Ketones, Urine 03/27/2022 NEGATIVE  NEGATIVE Final    Specific Howard, UA 03/27/2022 1.009  1.000 - 1.030 Final    Urine Hgb 03/27/2022 NEGATIVE  NEGATIVE Final    pH, UA 03/27/2022 5.0  5.0 - 8.0 Final    Protein, UA 03/27/2022 NEGATIVE  NEGATIVE Final    Urobilinogen, Urine 03/27/2022 Normal  Normal Final    Nitrite, Urine 03/27/2022 NEGATIVE  NEGATIVE Final    Leukocyte Esterase, Urine 03/27/2022 NEGATIVE  NEGATIVE Final    Urinalysis Comments 03/27/2022 Microscopic exam not performed based on chemical results unless requested in original order. Final    Specimen Description 03/27/2022 . NASOPHARYNGEAL SWAB   Final    SARS-CoV-2, Rapid 03/27/2022 Not Detected  Not Detected Final    Comment:       Rapid NAAT:  The specimen is NEGATIVE for SARS-CoV-2, the novel coronavirus associated with   COVID-19. The ID NOW COVID-19 assay is designed to detect the virus that causes COVID-19 in patients   with signs and symptoms of infection who are suspected of COVID-19. An individual without symptoms of COVID-19 and who is not shedding SARS-CoV-2 virus would   expect to have a negative (not detected) result in this assay. Negative results should be treated as presumptive and, if inconsistent with clinical signs   and symptoms or necessary for patient management,  should be tested with an alternative molecular assay. Negative results do not preclude   SARS-CoV-2 infection and   should not be used as the sole basis for patient management decisions. Fact sheet for Healthcare Providers: [employer]un[employer]kirti  Fact sheet for Patients: Varun[employer]kirti          Methodology: Isothermal Nucleic Acid Amplification      Tricyclic Antidep,Urine 36/06/6735 NEGATIVE  NEGATIVE Final    Comment:       (Positive cutoff 1000 ng/mL)  Assay provides rapid clinical screening only. Presumptive positive results for legal   purposes should be confirmed by another method.   To request confirmation, please call the   lab within 7 days of sample submission. Reviewed patient's current plan of care and vital signs with nursing staff. Labs reviewed: [x] Yes  Last EKG in EMR reviewed: [x] Yes  QTc: 419  Medications  Current Facility-Administered Medications: sertraline (ZOLOFT) tablet 75 mg, 75 mg, Oral, Daily  brimonidine (ALPHAGAN) 0.2 % ophthalmic solution 1 drop, 1 drop, Right Eye, TID  latanoprost (XALATAN) 0.005 % ophthalmic solution 1 drop, 1 drop, Right Eye, Nightly  timolol (TIMOPTIC) 0.5 % ophthalmic solution 1 drop, 1 drop, Right Eye, BID  acetaminophen (TYLENOL) tablet 650 mg, 650 mg, Oral, Q4H PRN  aluminum & magnesium hydroxide-simethicone (MAALOX) 200-200-20 MG/5ML suspension 30 mL, 30 mL, Oral, Q6H PRN  hydrOXYzine (ATARAX) tablet 50 mg, 50 mg, Oral, TID PRN  traZODone (DESYREL) tablet 50 mg, 50 mg, Oral, Nightly PRN  polyethylene glycol (GLYCOLAX) packet 17 g, 17 g, Oral, Daily PRN  ibuprofen (ADVIL;MOTRIN) tablet 400 mg, 400 mg, Oral, Q6H PRN  nicotine polacrilex (NICORETTE) gum 2 mg, 2 mg, Oral, PRN  folic acid (FOLVITE) tablet 1 mg, 1 mg, Oral, Daily  thiamine mononitrate tablet 100 mg, 100 mg, Oral, Daily  atorvastatin (LIPITOR) tablet 40 mg, 40 mg, Oral, Nightly  clopidogrel (PLAVIX) tablet 75 mg, 75 mg, Oral, Daily  dorzolamide (TRUSOPT) 2 % ophthalmic solution 1 drop, 1 drop, Both Eyes, BID    ASSESSMENT  Major depressive disorder, recurrent severe without psychotic features ([facility] [address]         HANDOFF  Patient symptoms are: Remains Unstable. Medications as determined by attending physician  Monitor need and frequency of PRN medications. Encourage participation in groups and milieu. Probable discharge is to be determined by MD.     Electronically signed by ALLISON King CNP on 3/31/2022 at 2:20 PM    **This report has been created using voice recognition software. It may contain minor errors which are inherent in voice recognition technology. **      I independently saw and evaluated the patient.   I reviewed the nurse practitioners documentation above. Any additional comments or changes to the nurse practitioners documentation are stated below otherwise agree with assessment. Plan will be as follows:  Patient denying side effects to medication. Reports improvement in his mood. States his plan is to BBT wife at present time its not clear that he is done anything for further plan. Not see any care coordination notes with regards to discharge efforts. Discussed with the patient possible discharge tomorrow if condition agreement. Spent 30 minutes with the patient, of that greater than 16 minutes was spent in supportive psychotherapy. Patient did not appear to have ambivalence when I spoke with him but per review of other notes it appears the patient may be parts substance use rehab  PLAN  Patient s symptoms   are improving  Continue with current medication for now  Attempt to develop insight  Psycho-education conducted. Supportive Therapy conducted.   Probable discharge is tomorrow if stable or improved  Follow-up daily while on inpatient unit

## 2022-03-31 NOTE — PLAN OF CARE
Problem: Suicide risk  Goal: Provide patient with safe environment  Description: Provide patient with safe environment. Patient resting comfortable in room with eyes closed. Unlabored breathing pattern and no distress noted. Patient denies suicidal thoughts and hallucinations. Patient reports depression and anxiety have improved   3/31/2022 1115 by Jay Kim RN  Outcome: Ongoing  Note: Patient remains on a locked unit where visual checks are preformed every 15 minutes to ensure unit and patient safety. Problem: Depressive Behavior With or Without Suicide Precautions:  Goal: Able to verbalize and/or display a decrease in depressive symptoms  Description: Able to verbalize and/or display a decrease in depressive symptoms  3/31/2022 1115 by Jay Kim RN  Outcome: Ongoing  Note: 1:1 with pt x ten minutes. Pt encouraged to attend unit programming and interact with peers and staff. Pt also encouraged to tend to hygiene and ADLs. Pt encouraged to discuss feelings with staff and feedback and reassurance provided.        Patient states depression is a 4/5 out of 10 with 10 being the worst.

## 2022-03-31 NOTE — FLOWSHEET NOTE
Patient is isolative remains in room, out of bed for patient needs only. Patient is pleasant and courteous.

## 2022-03-31 NOTE — GROUP NOTE
Group Therapy Note    Date: 3/31/2022    Group Start Time: 1000  Group End Time: 2469  Group Topic: Psychotherapy    [address], LSW    patient refused to attend psychotherapy group at [facility] after encouragement from staff.   1:1 talk time provided as alternative to group session

## 2022-03-31 NOTE — PLAN OF CARE
Problem: Depressive Behavior With or Without Suicide Precautions:  Goal: Able to verbalize and/or display a decrease in depressive symptoms  Description: Able to verbalize and/or display a decrease in depressive symptoms  3/30/2022 2257 by Jake Brown LPN  Outcome: Met This Shift     Problem: Suicide risk  Goal: Provide patient with safe environment  Description: Provide patient with safe environment  3/30/2022 2257 by Jake Brown LPN  Outcome: Ongoing  Patient affirms he is not suicidal at the moment however, he is anxious. Problem: Falls - Risk of:  Goal: Will remain free from falls  Description: Will remain free from falls  3/30/2022 2257 by Jake Brown LPN  Outcome: Ongoing  Patient has cleared pathways. Problem: Falls - Risk of:  Goal: Absence of physical injury  Description: Absence of physical injury  3/30/2022 2257 by Jake Brown LPN  Outcome: Ongoing  Patient has non skid socks on and pathways are clear. Problem: Tobacco Use:  Goal: Inpatient tobacco use cessation counseling participation  Description: Inpatient tobacco use cessation counseling participation  3/30/2022 2257 by Jake Brown LPN  Outcome: Ongoing  Note: Patient asked for cessation; Nicorette gum. States he is craving nicotine.

## 2022-03-31 NOTE — GROUP NOTE
Group Therapy Note    Date: 3/31/2022    Group Start Time: 1400  Group End Time: 9636  Group Topic: Psychoeducation    NICHOLAS Bolivar, CTRS    Patient refused to attend creative expression group at 1400 after encouragement from staff. 1:1 talk time offered by staff as alternative to group session.

## 2022-04-01 PROCEDURE — 99232 SBSQ HOSP IP/OBS MODERATE 35: CPT | Performed by: PSYCHIATRY & NEUROLOGY

## 2022-04-01 PROCEDURE — 6370000000 HC RX 637 (ALT 250 FOR IP)

## 2022-04-01 PROCEDURE — 6370000000 HC RX 637 (ALT 250 FOR IP): Performed by: NURSE PRACTITIONER

## 2022-04-01 PROCEDURE — 90833 PSYTX W PT W E/M 30 MIN: CPT | Performed by: PSYCHIATRY & NEUROLOGY

## 2022-04-01 PROCEDURE — 1240000000 HC EMOTIONAL WELLNESS R&B

## 2022-04-01 PROCEDURE — 6370000000 HC RX 637 (ALT 250 FOR IP): Performed by: PSYCHIATRY & NEUROLOGY

## 2022-04-01 RX ORDER — HYDROXYZINE 50 MG/1
50 TABLET, FILM COATED ORAL 3 TIMES DAILY PRN
Qty: 30 TABLET | Refills: 0 | Status: SHIPPED | OUTPATIENT
Start: 2022-04-01 | End: 2022-04-11

## 2022-04-01 RX ORDER — SERTRALINE HYDROCHLORIDE 25 MG/1
75 TABLET, FILM COATED ORAL DAILY
Qty: 90 TABLET | Refills: 0 | Status: SHIPPED | OUTPATIENT
Start: 2022-04-01 | End: 2022-04-04 | Stop reason: HOSPADM

## 2022-04-01 RX ORDER — LATANOPROST 50 UG/ML
1 SOLUTION/ DROPS OPHTHALMIC NIGHTLY
Qty: 2.5 ML | Refills: 0 | Status: ON HOLD | OUTPATIENT
Start: 2022-04-01 | End: 2022-05-31 | Stop reason: SDUPTHER

## 2022-04-01 RX ORDER — MIRTAZAPINE 15 MG/1
7.5 TABLET, FILM COATED ORAL NIGHTLY
Status: DISCONTINUED | OUTPATIENT
Start: 2022-04-01 | End: 2022-04-04 | Stop reason: HOSPADM

## 2022-04-01 RX ORDER — ATORVASTATIN CALCIUM 40 MG/1
40 TABLET, FILM COATED ORAL NIGHTLY
Qty: 30 TABLET | Refills: 0 | Status: ON HOLD | OUTPATIENT
Start: 2022-04-01 | End: 2022-05-31 | Stop reason: SDUPTHER

## 2022-04-01 RX ORDER — CLOPIDOGREL BISULFATE 75 MG/1
75 TABLET ORAL DAILY
Qty: 30 TABLET | Refills: 0 | Status: ON HOLD | OUTPATIENT
Start: 2022-04-01 | End: 2022-05-31 | Stop reason: HOSPADM

## 2022-04-01 RX ORDER — BRIMONIDINE TARTRATE 2 MG/ML
1 SOLUTION/ DROPS OPHTHALMIC 3 TIMES DAILY
Qty: 10 ML | Refills: 0 | Status: ON HOLD | OUTPATIENT
Start: 2022-04-01 | End: 2022-05-31 | Stop reason: SDUPTHER

## 2022-04-01 RX ORDER — FOLIC ACID 1 MG/1
1 TABLET ORAL DAILY
Qty: 30 TABLET | Refills: 0 | Status: ON HOLD | OUTPATIENT
Start: 2022-04-01 | End: 2022-05-31 | Stop reason: SDUPTHER

## 2022-04-01 RX ORDER — SERTRALINE HYDROCHLORIDE 100 MG/1
100 TABLET, FILM COATED ORAL DAILY
Status: DISCONTINUED | OUTPATIENT
Start: 2022-04-02 | End: 2022-04-04 | Stop reason: HOSPADM

## 2022-04-01 RX ORDER — TIMOLOL MALEATE 5 MG/ML
1 SOLUTION/ DROPS OPHTHALMIC 2 TIMES DAILY
Qty: 10 ML | Refills: 0 | Status: ON HOLD | OUTPATIENT
Start: 2022-04-01 | End: 2022-05-31 | Stop reason: SDUPTHER

## 2022-04-01 RX ORDER — THIAMINE MONONITRATE (VIT B1) 100 MG
100 TABLET ORAL DAILY
Qty: 30 TABLET | Refills: 0 | Status: ON HOLD | OUTPATIENT
Start: 2022-04-01 | End: 2022-04-06 | Stop reason: SDUPTHER

## 2022-04-01 RX ORDER — DORZOLAMIDE HCL 20 MG/ML
1 SOLUTION/ DROPS OPHTHALMIC 2 TIMES DAILY
Qty: 10 ML | Refills: 0 | Status: ON HOLD | OUTPATIENT
Start: 2022-04-01 | End: 2022-05-31 | Stop reason: SDUPTHER

## 2022-04-01 RX ORDER — TRAZODONE HYDROCHLORIDE 50 MG/1
50 TABLET ORAL NIGHTLY PRN
Qty: 30 TABLET | Refills: 0 | Status: ON HOLD | OUTPATIENT
Start: 2022-04-01 | End: 2022-05-31 | Stop reason: SDUPTHER

## 2022-04-01 RX ADMIN — BRIMONIDINE TARTRATE 1 DROP: 2 SOLUTION OPHTHALMIC at 15:39

## 2022-04-01 RX ADMIN — NICOTINE POLACRILEX 2 MG: 2 GUM, CHEWING BUCCAL at 15:41

## 2022-04-01 RX ADMIN — ATORVASTATIN CALCIUM 40 MG: 20 TABLET, FILM COATED ORAL at 20:52

## 2022-04-01 RX ADMIN — TRAZODONE HYDROCHLORIDE 50 MG: 50 TABLET ORAL at 20:52

## 2022-04-01 RX ADMIN — NICOTINE POLACRILEX 2 MG: 2 GUM, CHEWING BUCCAL at 10:24

## 2022-04-01 RX ADMIN — SERTRALINE HYDROCHLORIDE 75 MG: 50 TABLET ORAL at 10:19

## 2022-04-01 RX ADMIN — FOLIC ACID 1 MG: 1 TABLET ORAL at 10:19

## 2022-04-01 RX ADMIN — CLOPIDOGREL BISULFATE 75 MG: 75 TABLET ORAL at 10:19

## 2022-04-01 RX ADMIN — MIRTAZAPINE 7.5 MG: 15 TABLET, FILM COATED ORAL at 20:52

## 2022-04-01 RX ADMIN — DORZOLAMIDE HCL 1 DROP: 20 SOLUTION/ DROPS OPHTHALMIC at 20:54

## 2022-04-01 RX ADMIN — DORZOLAMIDE HCL 1 DROP: 20 SOLUTION/ DROPS OPHTHALMIC at 10:19

## 2022-04-01 RX ADMIN — BRIMONIDINE TARTRATE 1 DROP: 2 SOLUTION OPHTHALMIC at 10:18

## 2022-04-01 RX ADMIN — TIMOLOL MALEATE 1 DROP: 5 SOLUTION OPHTHALMIC at 10:18

## 2022-04-01 RX ADMIN — THIAMINE HCL TAB 100 MG 100 MG: 100 TAB at 10:19

## 2022-04-01 RX ADMIN — TIMOLOL MALEATE 1 DROP: 5 SOLUTION OPHTHALMIC at 20:54

## 2022-04-01 RX ADMIN — HYDROXYZINE HYDROCHLORIDE 50 MG: 50 TABLET, FILM COATED ORAL at 20:52

## 2022-04-01 RX ADMIN — LATANOPROST 1 DROP: 50 SOLUTION OPHTHALMIC at 20:54

## 2022-04-01 RX ADMIN — NICOTINE POLACRILEX 2 MG: 2 GUM, CHEWING BUCCAL at 21:16

## 2022-04-01 RX ADMIN — BRIMONIDINE TARTRATE 1 DROP: 2 SOLUTION OPHTHALMIC at 20:54

## 2022-04-01 NOTE — DISCHARGE INSTR - DIET

## 2022-04-01 NOTE — GROUP NOTE
Group Therapy Note    Date: 4/1/2022    Group Start Time: 1100  Group End Time: 1200  Group Topic: Psychoeducation    NICHOLAS BHNICK Dial    Pt did not attend  coping skills group at 1100 d/t resting in room despite staff invitation to attend. 1:1 talk time offered as alternative to group session.        Signature:  Candice Lion, [address]

## 2022-04-01 NOTE — GROUP NOTE
Group Therapy Note    Date: 4/1/2022    Group Start Time: 1330  Group End Time: 1430  Group Topic: Cognitive Skills    [facility] NILESH Olivares, CTRS    Pt did not attend cognitive skills group at 1330 d/t resting in room despite staff invitation to attend. 1:1 talk time offered as alternative to group session.          Signature:  Tian Olivares, [address]

## 2022-04-01 NOTE — DISCHARGE SUMMARY
Daily Progress Note  Gerber Grimes MD  4/1/2022  CHIEF COMPLAINT: Depression with suicidal ideation    Reviewed patient's current plan of care and vital signs with nursing staff. Sleep:  several hours last night  Attending groups: Intermittently    SUBJECTIVE:    Patient was very despondent this morning. He was lying in bed. Seems to have had a reversal in his mood. He is very frustrated as he has not been able to get any programming. He states \"I may as well just fucking kill myself\". He is not able to contract for safety. He has had several difficult denial secondary to visual impairment. He is feeling hopeless and helpless. He is unable to contract for safety outside of the hospital at present time    Mental Status Exam  Level of consciousness:  Within normal limits  Appearance: Hospital attire, seated in chair, with fair grooming and hygiene   Behavior/Motor: No abnormalities noted  Attitude toward examiner: Indifferent and withdrawn  Speech: Monotone in nature  . Mood: Depressed  Affect: Congruent  Thought processes: Linear to brief close ended questions, Little spontaneity of thought  Thought content:  denies homicidal ideation  Suicidal Ideation: Active suicidal ideation and not able to contract for safety outside of the hospital  Delusions:  no evidence of delusions  Perceptual Disturbance:  denies any perceptual disturbance  Cognition:  Oriented to self, location, time, and situation  Memory: age appropriate  Insight & Judgement: Poor  Medication side effects:  denies       Data   height is 5' 4\" (1.626 m) and weight is 165 lb (74.8 kg). His temporal temperature is 97.5 °F (36.4 °C). His blood pressure is 99/55 (abnormal) and his pulse is 54. His respiration is 14 and oxygen saturation is 97%.    Labs:   Admission on 03/27/2022   Component Date Value Ref Range Status    WBC 03/27/2022 5.2  3.5 - 11.0 k/uL Final    RBC 03/27/2022 4.56  4.5 - 5.9 m/uL Final    Hemoglobin 03/27/2022 15.0  13.5 - 17.5 g/dL Final    Hematocrit 03/27/2022 44.1  41 - 53 % Final    MCV 03/27/2022 96.8  80 - 100 fL Final    MCH 03/27/2022 32.9  26 - 34 pg Final    MCHC 03/27/2022 34.0  31 - 37 g/dL Final    RDW 03/27/2022 13.0  11.5 - 14.9 % Final    Platelets 39/95/1038 172  150 - 450 k/uL Final    MPV 03/27/2022 6.3  6.0 - 12.0 fL Final    Seg Neutrophils 03/27/2022 70* 36 - 66 % Final    Lymphocytes 03/27/2022 21* 24 - 44 % Final    Monocytes 03/27/2022 7  1 - 7 % Final    Eosinophils % 03/27/2022 1  0 - 4 % Final    Basophils 03/27/2022 1  0 - 2 % Final    Segs Absolute 03/27/2022 3.70  1.3 - 9.1 k/uL Final    Absolute Lymph # 03/27/2022 1.10  1.0 - 4.8 k/uL Final    Absolute Mono # 03/27/2022 0.40  0.1 - 1.3 k/uL Final    Absolute Eos # 03/27/2022 0.10  0.0 - 0.4 k/uL Final    Basophils Absolute 03/27/2022 0.00  0.0 - 0.2 k/uL Final    Glucose 03/27/2022 88  70 - 99 mg/dL Final    BUN 03/27/2022 9  8 - 23 mg/dL Final    CREATININE 03/27/2022 0.66* 0.70 - 1.20 mg/dL Final    Calcium 03/27/2022 8.2* 8.6 - 10.4 mg/dL Final    Sodium 03/27/2022 141  135 - 144 mmol/L Final    Potassium 03/27/2022 4.0  3.7 - 5.3 mmol/L Final    Chloride 03/27/2022 106  98 - 107 mmol/L Final    CO2 03/27/2022 23  20 - 31 mmol/L Final    Anion Gap 03/27/2022 12  9 - 17 mmol/L Final    Alkaline Phosphatase 03/27/2022 45  40 - 129 U/L Final    ALT 03/27/2022 25  5 - 41 U/L Final    AST 03/27/2022 28  <40 U/L Final    Total Bilirubin 03/27/2022 0.23* 0.3 - 1.2 mg/dL Final    Total Protein 03/27/2022 7.1  6.4 - 8.3 g/dL Final    Albumin 03/27/2022 4.3  3.5 - 5.2 g/dL Final    GFR Non- 03/27/2022 >60  >60 mL/min Final    GFR  03/27/2022 >60  >60 mL/min Final    GFR Comment 03/27/2022        Final    Comment: Average GFR for 61-76 years old:   80 mL/min/1.73sq m  Chronic Kidney Disease:   <60 mL/min/1.73sq m  Kidney failure:   <15 mL/min/1.73sq m              eGFR calculated using average adult body mass. Additional eGFR calculator available at:        [employer]            Lipase 03/27/2022 211* 13 - 60 U/L Final    Acetaminophen Level 03/27/2022 <5* 10 - 30 ug/mL Final    Ethanol 03/27/2022 161* <10 mg/dL Final    Ethanol percent 03/27/2022 6.759  % Final    Salicylate Lvl 38/77/6771 <1* 3 - 10 mg/dL Final    Toxic Tricyclic Sc,Blood 27/97/5604 WRONG TEST ORDERED  NEGATIVE Final    Amphetamine Screen, Ur 03/27/2022 NEGATIVE  NEGATIVE Final    Comment:       (Positive cutoff 1000 ng/mL)                  Barbiturate Screen, Ur 03/27/2022 NEGATIVE  NEGATIVE Final    Comment:       (Positive cutoff 200 ng/mL)                  Benzodiazepine Screen, Urine 03/27/2022 NEGATIVE  NEGATIVE Final    Comment:       (Positive cutoff 200 ng/mL)                  Cocaine Metabolite, Urine 03/27/2022 NEGATIVE  NEGATIVE Final    Comment:       (Positive cutoff 300 ng/mL)                  Methadone Screen, Urine 03/27/2022 NEGATIVE  NEGATIVE Final    Comment:       (Positive cutoff 300 ng/mL)                  Opiates, Urine 03/27/2022 NEGATIVE  NEGATIVE Final    Comment:       (Positive cutoff 300 ng/mL)                  Phencyclidine, Urine 03/27/2022 NEGATIVE  NEGATIVE Final    Comment:       (Positive cutoff 25 ng/mL)                  Cannabinoid Scrn, Ur 03/27/2022 NEGATIVE  NEGATIVE Final    Comment:       (Positive cutoff 50 ng/mL)                  Oxycodone Screen, Ur 03/27/2022 NEGATIVE  NEGATIVE Final    Comment:       (Positive cutoff 100 ng/mL)                  Test Information 03/27/2022 Assay provides medical screening only. The absence of expected drug(s) and/or metabolite(s) may indicate diluted or adulterated urine, limitations of testing or timing of collection. Final    Comment: Testing for legal purposes should be confirmed by another method. To request confirmation   of test result, please call the lab within 7 days of sample submission.  Color, UA 03/27/2022 Yellow  Yellow Final    Turbidity UA 03/27/2022 Clear  Clear Final    Glucose, Ur 03/27/2022 NEGATIVE  NEGATIVE Final    Bilirubin Urine 03/27/2022 NEGATIVE  NEGATIVE Final    Ketones, Urine 03/27/2022 NEGATIVE  NEGATIVE Final    Specific Gravity, UA 03/27/2022 1.009  1.000 - 1.030 Final    Urine Hgb 03/27/2022 NEGATIVE  NEGATIVE Final    pH, UA 03/27/2022 5.0  5.0 - 8.0 Final    Protein, UA 03/27/2022 NEGATIVE  NEGATIVE Final    Urobilinogen, Urine 03/27/2022 Normal  Normal Final    Nitrite, Urine 03/27/2022 NEGATIVE  NEGATIVE Final    Leukocyte Esterase, Urine 03/27/2022 NEGATIVE  NEGATIVE Final    Urinalysis Comments 03/27/2022 Microscopic exam not performed based on chemical results unless requested in original order. Final    Specimen Description 03/27/2022 . NASOPHARYNGEAL SWAB   Final    SARS-CoV-2, Rapid 03/27/2022 Not Detected  Not Detected Final    Comment:       Rapid NAAT:  The specimen is NEGATIVE for SARS-CoV-2, the novel coronavirus associated with   COVID-19. The ID NOW COVID-19 assay is designed to detect the virus that causes COVID-19 in patients   with signs and symptoms of infection who are suspected of COVID-19. An individual without symptoms of COVID-19 and who is not shedding SARS-CoV-2 virus would   expect to have a negative (not detected) result in this assay. Negative results should be treated as presumptive and, if inconsistent with clinical signs   and symptoms or necessary for patient management,  should be tested with an alternative molecular assay. Negative results do not preclude   SARS-CoV-2 infection and   should not be used as the sole basis for patient management decisions.          Fact sheet for Healthcare Providers: BuildHer.es  Fact sheet for Patients: BuildHer.es          Methodology: Isothermal Nucleic Acid Amplification      Tricyclic Antidep,Urine 57/63/1871 NEGATIVE  NEGATIVE Final    Comment:       (Positive cutoff 1000 ng/mL)  Assay provides rapid clinical screening only. Presumptive positive results for legal   purposes should be confirmed by another method. To request confirmation, please call the   lab within 7 days of sample submission. Medications  Current Facility-Administered Medications: sertraline (ZOLOFT) tablet 75 mg, 75 mg, Oral, Daily  brimonidine (ALPHAGAN) 0.2 % ophthalmic solution 1 drop, 1 drop, Right Eye, TID  latanoprost (XALATAN) 0.005 % ophthalmic solution 1 drop, 1 drop, Right Eye, Nightly  timolol (TIMOPTIC) 0.5 % ophthalmic solution 1 drop, 1 drop, Right Eye, BID  acetaminophen (TYLENOL) tablet 650 mg, 650 mg, Oral, Q4H PRN  aluminum & magnesium hydroxide-simethicone (MAALOX) 200-200-20 MG/5ML suspension 30 mL, 30 mL, Oral, Q6H PRN  hydrOXYzine (ATARAX) tablet 50 mg, 50 mg, Oral, TID PRN  traZODone (DESYREL) tablet 50 mg, 50 mg, Oral, Nightly PRN  polyethylene glycol (GLYCOLAX) packet 17 g, 17 g, Oral, Daily PRN  ibuprofen (ADVIL;MOTRIN) tablet 400 mg, 400 mg, Oral, Q6H PRN  nicotine polacrilex (NICORETTE) gum 2 mg, 2 mg, Oral, PRN  folic acid (FOLVITE) tablet 1 mg, 1 mg, Oral, Daily  thiamine mononitrate tablet 100 mg, 100 mg, Oral, Daily  atorvastatin (LIPITOR) tablet 40 mg, 40 mg, Oral, Nightly  clopidogrel (PLAVIX) tablet 75 mg, 75 mg, Oral, Daily  dorzolamide (TRUSOPT) 2 % ophthalmic solution 1 drop, 1 drop, Both Eyes, BID    ASSESSMENT  Major depressive disorder, recurrent severe without psychotic features ([address]     PLAN  Patient s symptoms   are worsening  Increase Zoloft to 100 mg daily  Add Remeron 7.5 mg at bedtime  Attempt to develop insight  Psycho-education conducted. Supportive Therapy conducted. Probable discharge is next week  Follow-up daily while in the inpatient unit    More than 16 mins of the30 minute session was spent doing Supportive psychotherapy.      Electronically signed by Fredrick Brito MD on 4/1/22 at 2:48 PM EDT    **This report has been created using voice recognition software. It may contain minor errors which are inherent in voice recognition technology. **

## 2022-04-01 NOTE — PLAN OF CARE
Problem: Depressive Behavior With or Without Suicide Precautions:  Goal: Able to verbalize acceptance of life and situations over which he or she has no control  Description: Able to verbalize acceptance of life and situations over which he or she has no control  3/31/2022 2254 by Yoko Lopez  Outcome: Ongoing  3/31/2022 1115 by Walter Osei RN  Outcome: Ongoing   Patient has been resting in his room most of the shift. He reports feeling a little better  Problem: Depressive Behavior With or Without Suicide Precautions:  Goal: Able to verbalize and/or display a decrease in depressive symptoms  Description: Able to verbalize and/or display a decrease in depressive symptoms  3/31/2022 2254 by Yoko Lopez  Outcome: Ongoing  3/31/2022 1115 by Walter Osei RN  Outcome: Ongoing  Note: 1:1 with pt x ten minutes. Pt encouraged to attend unit programming and interact with peers and staff. Pt also encouraged to tend to hygiene and ADLs. Pt encouraged to discuss feelings with staff and feedback and reassurance provided. Patient states depression is a 4/5 out of 10 with 10 being the worst.    Patient rates his depression 6/10 and anxiety 5/10. Fewer suicidal thoughts.

## 2022-04-01 NOTE — PLAN OF CARE
Problem: Suicide risk  Goal: Provide patient with safe environment  Description: Provide patient with safe environment. Patient resting comfortable in room with eyes closed. Unlabored breathing pattern and no distress noted. Patient denies suicidal thoughts and hallucinations. Patient reports depression and anxiety have improved   Outcome: Ongoing     Problem: Falls - Risk of:  Goal: Will remain free from falls  Description: Will remain free from falls  Outcome: Ongoing  Note: Pt remains free of falls and verbalizes understanding of individual fall risks. Pt wearing non skid footwear and encouraged to seek out staff for any assistance needed.       Goal: Absence of physical injury  Description: Absence of physical injury  Outcome: Ongoing     Problem: Tobacco Use:  Goal: Inpatient tobacco use cessation counseling participation  Description: Inpatient tobacco use cessation counseling participation  Outcome: Ongoing     Problem: Depressive Behavior With or Without Suicide Precautions:  Goal: Able to verbalize acceptance of life and situations over which he or she has no control  Description: Able to verbalize acceptance of life and situations over which he or she has no control  Outcome: Ongoing  Note: Patient anxious about discharge and unsure of where he is going, pleasant and cooperative when interacted with  Goal: Able to verbalize and/or display a decrease in depressive symptoms  Description: Able to verbalize and/or display a decrease in depressive symptoms  Outcome: Ongoing

## 2022-04-01 NOTE — CARE COORDINATION
Kaelyn received VM from Taurus Boogie at [facility], states d/t pt visual impairment, \"this isn't the best place for him,\" denied pt admission. Kaelyn spoke with Bartolome at [facility], states the leadership will not accept pt for admission, \"they did not give a reason why. \"     KAELYN updated pt/MD.

## 2022-04-01 NOTE — PROGRESS NOTES
CLINICAL PHARMACY NOTE: MEDS TO BEDS    Total # of Prescriptions Filled: 10   The following medications were delivered to the patient:  · Atorvastatin 40mg  · Clopidogrel 04TN  · Folic Acid 1mg  · Hydroxyzine HCL 50mg  · Sertraline HCL 25mg  · Thiamine 100mg  · Brimonidine   · Dorzolamide  · Xalatan  · Timolol    Additional Documentation:  Delivered medications to nurses station

## 2022-04-01 NOTE — CARE COORDINATION
MARSHALL met with pt to confirm his previously stated plan to d/c to [facility]. Pt discloses he does not have any money to admit to this program, MARSHALL assisted pt with calling Joo Stephens at [facility] who states pt cannot admit to their program without the required monthly rent, $400, pt states \"I figured my insurance would just pay for it. \" SW and pt discussed alternate discharge plans, pt states he has been staying in an abandoned house d/t being permanently banned from [facility] and [facility] \"for being belligerent with the staff. \" SW assist pt with calling Bartolome at [facility], pt completed most of phone interview, tells Rosalind Garrison he believes [facility] be liable if I fall there\" and states he cannot navigate the building. MARSHALL assisted pt by calling Anya Anderson at [facility] who states they do have a bed available, MARSHALL assisted filing out [employer] application, faxed to Anya Anderson for her consideration.

## 2022-04-01 NOTE — GROUP NOTE
Group Therapy Note    Date: 4/1/2022    Group Start Time: 1000  Group End Time: 0750  Group Topic: Psychotherapy    [address] [facility], [place], [place]        Group Therapy Note    Attendees: 6/18         Patient refused to attend psychotherapy group at 10:00 am after encouragement from staff. 1:1 talk time provided as alternative to group session.     Discipline Responsible: /Counselor      Signature:  PILO German, [place]

## 2022-04-02 PROCEDURE — 6370000000 HC RX 637 (ALT 250 FOR IP): Performed by: PSYCHIATRY & NEUROLOGY

## 2022-04-02 PROCEDURE — 6370000000 HC RX 637 (ALT 250 FOR IP): Performed by: NURSE PRACTITIONER

## 2022-04-02 PROCEDURE — 6370000000 HC RX 637 (ALT 250 FOR IP): Performed by: INTERNAL MEDICINE

## 2022-04-02 PROCEDURE — 1240000000 HC EMOTIONAL WELLNESS R&B

## 2022-04-02 PROCEDURE — 99232 SBSQ HOSP IP/OBS MODERATE 35: CPT | Performed by: NURSE PRACTITIONER

## 2022-04-02 PROCEDURE — 99232 SBSQ HOSP IP/OBS MODERATE 35: CPT | Performed by: INTERNAL MEDICINE

## 2022-04-02 RX ORDER — ASPIRIN 81 MG/1
81 TABLET ORAL DAILY
Status: DISCONTINUED | OUTPATIENT
Start: 2022-04-02 | End: 2022-04-04 | Stop reason: HOSPADM

## 2022-04-02 RX ADMIN — BRIMONIDINE TARTRATE 1 DROP: 2 SOLUTION OPHTHALMIC at 21:14

## 2022-04-02 RX ADMIN — NICOTINE POLACRILEX 2 MG: 2 GUM, CHEWING BUCCAL at 12:02

## 2022-04-02 RX ADMIN — TRAZODONE HYDROCHLORIDE 50 MG: 50 TABLET ORAL at 21:09

## 2022-04-02 RX ADMIN — NICOTINE POLACRILEX 2 MG: 2 GUM, CHEWING BUCCAL at 21:16

## 2022-04-02 RX ADMIN — HYDROXYZINE HYDROCHLORIDE 50 MG: 50 TABLET, FILM COATED ORAL at 21:09

## 2022-04-02 RX ADMIN — TIMOLOL MALEATE 1 DROP: 5 SOLUTION OPHTHALMIC at 07:59

## 2022-04-02 RX ADMIN — ASPIRIN 81 MG: 81 TABLET, COATED ORAL at 12:02

## 2022-04-02 RX ADMIN — CLOPIDOGREL BISULFATE 75 MG: 75 TABLET ORAL at 07:59

## 2022-04-02 RX ADMIN — MIRTAZAPINE 7.5 MG: 15 TABLET, FILM COATED ORAL at 21:09

## 2022-04-02 RX ADMIN — THIAMINE HCL TAB 100 MG 100 MG: 100 TAB at 07:59

## 2022-04-02 RX ADMIN — DORZOLAMIDE HCL 1 DROP: 20 SOLUTION/ DROPS OPHTHALMIC at 07:59

## 2022-04-02 RX ADMIN — LATANOPROST 1 DROP: 50 SOLUTION OPHTHALMIC at 21:14

## 2022-04-02 RX ADMIN — BRIMONIDINE TARTRATE 1 DROP: 2 SOLUTION OPHTHALMIC at 13:24

## 2022-04-02 RX ADMIN — DORZOLAMIDE HCL 1 DROP: 20 SOLUTION/ DROPS OPHTHALMIC at 21:12

## 2022-04-02 RX ADMIN — BRIMONIDINE TARTRATE 1 DROP: 2 SOLUTION OPHTHALMIC at 07:59

## 2022-04-02 RX ADMIN — FOLIC ACID 1 MG: 1 TABLET ORAL at 07:59

## 2022-04-02 RX ADMIN — SERTRALINE 100 MG: 100 TABLET, FILM COATED ORAL at 07:59

## 2022-04-02 RX ADMIN — TIMOLOL MALEATE 1 DROP: 5 SOLUTION OPHTHALMIC at 21:14

## 2022-04-02 RX ADMIN — ATORVASTATIN CALCIUM 40 MG: 20 TABLET, FILM COATED ORAL at 21:09

## 2022-04-02 NOTE — PLAN OF CARE
Problem: Depressive Behavior With or Without Suicide Precautions:  Goal: Able to verbalize acceptance of life and situations over which he or she has no control  Description: Able to verbalize acceptance of life and situations over which he or she has no control  4/1/2022 2309 by Xavier Lopez  Outcome: Ongoing  4/1/2022 1405 by Kasia Nicole RN  Outcome: Ongoing  Note: Patient anxious about discharge and unsure of where he is going, pleasant and cooperative when interacted with   Patient is frustrated and upset that he was not accepted into the treatment centers he wants to go to. He thinks he may be able to stay with his brother. He has been seclusive to his room. He rates his depression 6/10 and anxiety 5/10. Problem: Depressive Behavior With or Without Suicide Precautions:  Goal: Able to verbalize and/or display a decrease in depressive symptoms  Description: Able to verbalize and/or display a decrease in depressive symptoms  4/1/2022 2309 by Xavier Lopez  Outcome: Ongoing  4/1/2022 1405 by Kasia Nicole RN  Outcome: Ongoing   He has been seclusive to his room. He rates his depression 6/10 and anxiety 5/10. Problem: Suicide risk  Goal: Provide patient with safe environment  Description: Provide patient with safe environment. Patient resting comfortable in room with eyes closed. Unlabored breathing pattern and no distress noted. Patient denies suicidal thoughts and hallucinations.   Patient reports depression and anxiety have improved   4/1/2022 2309 by Xavier Lopez  Outcome: Ongoing  4/1/2022 1405 by Kasia Nicole RN  Outcome: Ongoing   Patient has remained safe on the unit

## 2022-04-02 NOTE — PROGRESS NOTES
Dr. Kristie Ruiz asked writer to evaluate why the patient is on Plavix. Per neuro notes from 2019, patient was to be on Plavix x 21 days and then aspirin monotherapy following that. Verbal orders given from Dr. Kristie Ruiz to discontinue Plavix and order Aspirin 81 mg daily.      Benjy Dumas, PharmD, BCPS  4/2/2022 11:48 AM

## 2022-04-02 NOTE — GROUP NOTE
Group Therapy Note    Date: 4/2/2022    Group Start Time: 3438  Group End Time: 1500  Group Topic: Recreational    [address] [place] [address], [facility]    Pt did not attend recreational therapy group at [phone] d/t resting in room despite staff invitation to attend. 1:1 talk time offered as alternative to group session.          Signature:  Bang Vallejo, [address]

## 2022-04-02 NOTE — GROUP NOTE
Group Therapy Note    Date: 4/2/2022    Group Start Time: 1000  Group End Time: 2424  Group Topic: Psychoeducation    MILADYS GALLOWAY    PILO Lopez LSW        Group Therapy Note    patient refused to attend Psychoeducational group at 10:00 after encouragement from staff.              Signature:  PILO Lopez LSW

## 2022-04-02 NOTE — PROGRESS NOTES
Daily Progress Note  4/2/2022    Patient Name: Laila Molina    CHIEF COMPLAINT: Depression with suicidal ideation         SUBJECTIVE:      Patient is seen today for a follow up assessment. Staff reports that patient continues to isolate to his room, he is not attending any group. He is interviewed today at the nurses station waiting for his dinner tray. He continues to endorse depression and denies any significant improvement in his mood. He reports that he is frustrated with his life in general and is trying to figure out a plan for discharge. He endorses feelings of hopelessness and states \"I either cope or I do not\". Clarification of this statement indicates that if he does not cope it is because he would plan to commit suicide. His Zoloft was adjusted, we discussed potential side effects that he might expect and at present he denies any adverse reactions. He is unable to contract for safety outside of the hospital at present. Appetite:  [x] Adequate/Unchanged  [] Increased  [] Decreased      Sleep:       [x] Adequate/Unchanged  [] Fair  [] Poor      Group Attendance on Unit:   [] Yes   [] Selectively    [x] No    Compliant with scheduled medications: [x] Yes  [] No    Received emergency medications in past 24 hrs: [] Yes   [x] No    Medication Side Effects: Denies         Mental Status Exam  Level of consciousness: Alert and awake   Appearance: Appropriate attire for setting, standing, with fair  grooming and hygiene   Behavior/Motor: Approachable, psychomotor retardation, isolative  Attitude toward examiner: Cooperative, attentive, good eye contact  Speech: Quiet, low tone  Mood: Depressed  Affect: Congruent  Thought processes: Linear and coherent  Thought content: Denies homicidal ideation  Suicidal Ideation: Active suicidal ideations  Delusions: No evidence of delusions. Perceptual Disturbance: Denies, patient does not appear to be responding to internal stimuli.    Cognition: Oriented to self, location, time, and situation  Memory: intact  Insight: fair   Judgement: poor       Data   height is 5' 4\" (1.626 m) and weight is 165 lb (74.8 kg). His temporal temperature is 97.7 °F (36.5 °C). His blood pressure is 106/53 (abnormal) and his pulse is 54. His respiration is 14 and oxygen saturation is 97%.    Labs:   Admission on 03/27/2022   Component Date Value Ref Range Status    WBC 03/27/2022 5.2  3.5 - 11.0 k/uL Final    RBC 03/27/2022 4.56  4.5 - 5.9 m/uL Final    Hemoglobin 03/27/2022 15.0  13.5 - 17.5 g/dL Final    Hematocrit 03/27/2022 44.1  41 - 53 % Final    MCV 03/27/2022 96.8  80 - 100 fL Final    MCH 03/27/2022 32.9  26 - 34 pg Final    MCHC 03/27/2022 34.0  31 - 37 g/dL Final    RDW 03/27/2022 13.0  11.5 - 14.9 % Final    Platelets 36/95/2009 172  150 - 450 k/uL Final    MPV 03/27/2022 6.3  6.0 - 12.0 fL Final    Seg Neutrophils 03/27/2022 70* 36 - 66 % Final    Lymphocytes 03/27/2022 21* 24 - 44 % Final    Monocytes 03/27/2022 7  1 - 7 % Final    Eosinophils % 03/27/2022 1  0 - 4 % Final    Basophils 03/27/2022 1  0 - 2 % Final    Segs Absolute 03/27/2022 3.70  1.3 - 9.1 k/uL Final    Absolute Lymph # 03/27/2022 1.10  1.0 - 4.8 k/uL Final    Absolute Mono # 03/27/2022 0.40  0.1 - 1.3 k/uL Final    Absolute Eos # 03/27/2022 0.10  0.0 - 0.4 k/uL Final    Basophils Absolute 03/27/2022 0.00  0.0 - 0.2 k/uL Final    Glucose 03/27/2022 88  70 - 99 mg/dL Final    BUN 03/27/2022 9  8 - 23 mg/dL Final    CREATININE 03/27/2022 0.66* 0.70 - 1.20 mg/dL Final    Calcium 03/27/2022 8.2* 8.6 - 10.4 mg/dL Final    Sodium 03/27/2022 141  135 - 144 mmol/L Final    Potassium 03/27/2022 4.0  3.7 - 5.3 mmol/L Final    Chloride 03/27/2022 106  98 - 107 mmol/L Final    CO2 03/27/2022 23  20 - 31 mmol/L Final    Anion Gap 03/27/2022 12  9 - 17 mmol/L Final    Alkaline Phosphatase 03/27/2022 45  40 - 129 U/L Final    ALT 03/27/2022 25  5 - 41 U/L Final    AST 03/27/2022 28  <40 U/L Final    Total Bilirubin 03/27/2022 0.23* 0.3 - 1.2 mg/dL Final    Total Protein 03/27/2022 7.1  6.4 - 8.3 g/dL Final    Albumin 03/27/2022 4.3  3.5 - 5.2 g/dL Final    GFR Non- 03/27/2022 >60  >60 mL/min Final    GFR  03/27/2022 >60  >60 mL/min Final    GFR Comment 03/27/2022        Final    Comment: Average GFR for 61-76 years old:   80 mL/min/1.73sq m  Chronic Kidney Disease:   <60 mL/min/1.73sq m  Kidney failure:   <15 mL/min/1.73sq m              eGFR calculated using average adult body mass.  Additional eGFR calculator available at:        [employer]            Lipase 03/27/2022 211* 13 - 60 U/L Final    Acetaminophen Level 03/27/2022 <5* 10 - 30 ug/mL Final    Ethanol 03/27/2022 161* <10 mg/dL Final    Ethanol percent 03/27/2022 2.842  % Final    Salicylate Lvl 57/30/1022 <1* 3 - 10 mg/dL Final    Toxic Tricyclic Sc,Blood 39/55/6518 WRONG TEST ORDERED  NEGATIVE Final    Amphetamine Screen, Ur 03/27/2022 NEGATIVE  NEGATIVE Final    Comment:       (Positive cutoff 1000 ng/mL)                  Barbiturate Screen, Ur 03/27/2022 NEGATIVE  NEGATIVE Final    Comment:       (Positive cutoff 200 ng/mL)                  Benzodiazepine Screen, Urine 03/27/2022 NEGATIVE  NEGATIVE Final    Comment:       (Positive cutoff 200 ng/mL)                  Cocaine Metabolite, Urine 03/27/2022 NEGATIVE  NEGATIVE Final    Comment:       (Positive cutoff 300 ng/mL)                  Methadone Screen, Urine 03/27/2022 NEGATIVE  NEGATIVE Final    Comment:       (Positive cutoff 300 ng/mL)                  Opiates, Urine 03/27/2022 NEGATIVE  NEGATIVE Final    Comment:       (Positive cutoff 300 ng/mL)                  Phencyclidine, Urine 03/27/2022 NEGATIVE  NEGATIVE Final    Comment:       (Positive cutoff 25 ng/mL)                  Cannabinoid Scrn, Ur 03/27/2022 NEGATIVE  NEGATIVE Final    Comment:       (Positive cutoff 50 ng/mL)  Oxycodone Screen, Ur 03/27/2022 NEGATIVE  NEGATIVE Final    Comment:       (Positive cutoff 100 ng/mL)                  Test Information 03/27/2022 Assay provides medical screening only. The absence of expected drug(s) and/or metabolite(s) may indicate diluted or adulterated urine, limitations of testing or timing of collection. Final    Comment: Testing for legal purposes should be confirmed by another method. To request confirmation   of test result, please call the lab within 7 days of sample submission.  Color, UA 03/27/2022 Yellow  Yellow Final    Turbidity UA 03/27/2022 Clear  Clear Final    Glucose, Ur 03/27/2022 NEGATIVE  NEGATIVE Final    Bilirubin Urine 03/27/2022 NEGATIVE  NEGATIVE Final    Ketones, Urine 03/27/2022 NEGATIVE  NEGATIVE Final    Specific Gravity, UA 03/27/2022 1.009  1.000 - 1.030 Final    Urine Hgb 03/27/2022 NEGATIVE  NEGATIVE Final    pH, UA 03/27/2022 5.0  5.0 - 8.0 Final    Protein, UA 03/27/2022 NEGATIVE  NEGATIVE Final    Urobilinogen, Urine 03/27/2022 Normal  Normal Final    Nitrite, Urine 03/27/2022 NEGATIVE  NEGATIVE Final    Leukocyte Esterase, Urine 03/27/2022 NEGATIVE  NEGATIVE Final    Urinalysis Comments 03/27/2022 Microscopic exam not performed based on chemical results unless requested in original order. Final    Specimen Description 03/27/2022 . NASOPHARYNGEAL SWAB   Final    SARS-CoV-2, Rapid 03/27/2022 Not Detected  Not Detected Final    Comment:       Rapid NAAT:  The specimen is NEGATIVE for SARS-CoV-2, the novel coronavirus associated with   COVID-19. The ID NOW COVID-19 assay is designed to detect the virus that causes COVID-19 in patients   with signs and symptoms of infection who are suspected of COVID-19. An individual without symptoms of COVID-19 and who is not shedding SARS-CoV-2 virus would   expect to have a negative (not detected) result in this assay.   Negative results should be treated as presumptive and, if inconsistent with clinical signs   and symptoms or necessary for patient management,  should be tested with an alternative molecular assay. Negative results do not preclude   SARS-CoV-2 infection and   should not be used as the sole basis for patient management decisions. Fact sheet for Healthcare Providers: Kobi  Fact sheet for Patients: Kobi          Methodology: Isothermal Nucleic Acid Amplification      Tricyclic Antidep,Urine 08/21/0500 NEGATIVE  NEGATIVE Final    Comment:       (Positive cutoff 1000 ng/mL)  Assay provides rapid clinical screening only. Presumptive positive results for legal   purposes should be confirmed by another method. To request confirmation, please call the   lab within 7 days of sample submission. Reviewed patient's current plan of care and vital signs with nursing staff.     Labs reviewed: [x] Yes    Medications  Current Facility-Administered Medications: aspirin EC tablet 81 mg, 81 mg, Oral, Daily  sertraline (ZOLOFT) tablet 100 mg, 100 mg, Oral, Daily  mirtazapine (REMERON) tablet 7.5 mg, 7.5 mg, Oral, Nightly  brimonidine (ALPHAGAN) 0.2 % ophthalmic solution 1 drop, 1 drop, Right Eye, TID  latanoprost (XALATAN) 0.005 % ophthalmic solution 1 drop, 1 drop, Right Eye, Nightly  timolol (TIMOPTIC) 0.5 % ophthalmic solution 1 drop, 1 drop, Right Eye, BID  acetaminophen (TYLENOL) tablet 650 mg, 650 mg, Oral, Q4H PRN  aluminum & magnesium hydroxide-simethicone (MAALOX) 200-200-20 MG/5ML suspension 30 mL, 30 mL, Oral, Q6H PRN  hydrOXYzine (ATARAX) tablet 50 mg, 50 mg, Oral, TID PRN  traZODone (DESYREL) tablet 50 mg, 50 mg, Oral, Nightly PRN  polyethylene glycol (GLYCOLAX) packet 17 g, 17 g, Oral, Daily PRN  ibuprofen (ADVIL;MOTRIN) tablet 400 mg, 400 mg, Oral, Q6H PRN  nicotine polacrilex (NICORETTE) gum 2 mg, 2 mg, Oral, PRN  folic acid (FOLVITE) tablet 1 mg, 1 mg, Oral, Daily  thiamine mononitrate tablet 100 mg, 100 mg, Oral, Daily  atorvastatin (LIPITOR) tablet 40 mg, 40 mg, Oral, Nightly  dorzolamide (TRUSOPT) 2 % ophthalmic solution 1 drop, 1 drop, Both Eyes, BID    ASSESSMENT  Major depressive disorder, recurrent severe without psychotic features ([facility] [address]         PLAN  Patient symptoms are: Minimal improvement  Continue current medication regimen, observe on recently titrated Zoloft  Monitor need and frequency of PRN medications. Encourage participation in groups and milieu. Attempt to develop insight. Psycho-education conducted. Supportive Therapy conducted. Probable discharge is per attending physician, his previous plan for AOD treatment fell through  Follow-up daily while inpatient. Patient continues to be monitored in the inpatient psychiatric facility at [facility] for safety and stabilization. Patient continues to need, on a daily basis, active treatment furnished directly by or requiring the supervision of inpatient psychiatric personnel. Electronically signed by ALLISON Stephenson CNP on 4/2/2022 at 6:26 PM    **This report has been created using voice recognition software. It may contain minor errors which are inherent in voice recognition technology. **

## 2022-04-02 NOTE — PROGRESS NOTES
4/11/22 Yes Nina Ernandez MD   latanoprost (XALATAN) 0.005 % ophthalmic solution Place 1 drop into the right eye nightly 4/1/22  Yes Nina Ernandez MD   nicotine polacrilex (NICORETTE) 2 MG gum Take 1 each by mouth as needed for Smoking cessation 4/1/22  Yes Nina Ernandez MD   sertraline (ZOLOFT) 25 MG tablet Take 3 tablets by mouth daily 4/1/22  Yes Nina Ernandez MD   thiamine mononitrate (THIAMINE) 100 MG tablet Take 1 tablet by mouth daily 4/1/22  Yes Nina Ernandez MD   timolol (TIMOPTIC) 0.5 % ophthalmic solution Place 1 drop into the right eye 2 times daily 4/1/22  Yes Nina Ernandez MD   traZODone (DESYREL) 50 MG tablet Take 1 tablet by mouth nightly as needed for Sleep 4/1/22  Yes Nina Ernandez MD   ibuprofen (IBU) 400 MG tablet Take 1 tablet by mouth every 6 hours as needed for Pain 12/20/19   Lea Parson DO        Allergies:     Patient has no known allergies. Social History:     Tobacco:    reports that he has been smoking cigarettes. His smokeless tobacco use includes chew. Alcohol:      reports current alcohol use. Drug Use:  reports no history of drug use. Family History:     History reviewed. No pertinent family history. Review of Systems:     Positive and Negative as described in HPI. CONSTITUTIONAL:  negative for fevers, chills, sweats, fatigue, weight loss  HEENT:  negative for vision, hearing changes, runny nose, throat pain  RESPIRATORY:  negative for shortness of breath, cough, congestion, wheezing. CARDIOVASCULAR:  negative for chest pain, palpitations.   GASTROINTESTINAL:  negative for nausea, vomiting, diarrhea, constipation, change in bowel habits, abdominal pain   GENITOURINARY:  negative for difficulty of urination, burning with urination, frequency   INTEGUMENT:  negative for rash, skin lesions, easy bruising   HEMATOLOGIC/LYMPHATIC:  negative for swelling/edema   ALLERGIC/IMMUNOLOGIC:  negative for urticaria , itching  ENDOCRINE:  negative increase in drinking, increase in urination, hot or cold intolerance  MUSCULOSKELETAL:  negative joint pains, muscle aches, swelling of joints  NEUROLOGICAL:  negative for headaches, dizziness, lightheadedness, numbness, pain, tingling extremities        Physical Exam:     BP (!) 106/53   Pulse 54   Temp 97.7 °F (36.5 °C) (Temporal)   Resp 14   Ht 5' 4\" (1.626 m)   Wt 165 lb (74.8 kg)   SpO2 97%   BMI 28.32 kg/m²   Temp (24hrs), Av.7 °F (36.5 °C), Min:97.7 °F (36.5 °C), Max:97.7 °F (36.5 °C)    No results for input(s): POCGLU in the last 72 hours. No intake or output data in the 24 hours ending 22 1543    General Appearance:  alert, well appearing, and in no acute distress  Mental status: oriented to person, place, and time with normal affect  Head:  normocephalic, atraumatic. Eye: no icterus, redness, pupils equal and reactive, extraocular eye movements intact, conjunctiva clear  Ear: normal external ear, no discharge, hearing intact  Nose:  no drainage noted  Mouth: mucous membranes moist  Neck: supple, no carotid bruits, thyroid not palpable  Lungs: Bilateral equal air entry, clear to ausculation, no wheezing, rales or rhonchi, normal effort  Cardiovascular: normal rate, regular rhythm, no murmur, gallop, rub. Abdomen: Soft, nontender, nondistended, normal bowel sounds, no hepatomegaly or splenomegaly  Neurologic: There are no new focal motor or sensory deficits, normal muscle tone and bulk, no abnormal sensation, normal speech, cranial nerves II through XII grossly intact  Skin: No gross lesions, rashes, bruising or bleeding on exposed skin area  Extremities:  peripheral pulses palpable, no pedal edema or calf pain with palpation      Investigations:      Laboratory Testing:  No results found for this or any previous visit (from the past 24 hour(s)). Imaging/Diagonstics:  No results found.     Assessment :      Hospital Problems           Last Modified POA    * (Principal) Major depressive disorder, recurrent severe without psychotic features ([facility] [address] 3/28/2022 Yes    Major depressive disorder, single episode, severe without psychotic features ([facility] [address] 3/28/2022 Yes    Alcohol abuse with withdrawal, uncomplicated ([facility] [address] 2/07/2214 Yes    Overweight (BMI 25.0-29.9) 3/28/2022 Yes    Mixed hyperlipidemia 3/28/2022 Yes          Plan:     1. Major depression with suicidal ideations, admitted to inpt psych ,   2. HLD, home meds   3. Overwt , low herbert diet   4. Alcohol abuse , watch for withdrawal   5. Vit d def . Will start replacement   6. Full code status     4/2   Patient clinically doing better  Discussed with pharmacist, patient was started on aspirin Plavix by neurologist, for only 3 weeks  We will discontinue Plavix,  Continue with aspirin  We will sign off, please call with questions  Consultations:   Zofia Maher MD  4/2/2022  3:43 PM    Copy sent to Dr. Janneth Mishra MD    Please note that this chart was generated using voice recognition Dragon dictation software. Although every effort was made to ensure the accuracy of this automated transcription, some errors in transcription may have occurred.

## 2022-04-03 PROCEDURE — 6370000000 HC RX 637 (ALT 250 FOR IP): Performed by: INTERNAL MEDICINE

## 2022-04-03 PROCEDURE — 99231 SBSQ HOSP IP/OBS SF/LOW 25: CPT | Performed by: PSYCHIATRY & NEUROLOGY

## 2022-04-03 PROCEDURE — 6370000000 HC RX 637 (ALT 250 FOR IP): Performed by: NURSE PRACTITIONER

## 2022-04-03 PROCEDURE — 6370000000 HC RX 637 (ALT 250 FOR IP): Performed by: PSYCHIATRY & NEUROLOGY

## 2022-04-03 PROCEDURE — 1240000000 HC EMOTIONAL WELLNESS R&B

## 2022-04-03 RX ADMIN — BRIMONIDINE TARTRATE 1 DROP: 2 SOLUTION OPHTHALMIC at 08:14

## 2022-04-03 RX ADMIN — ATORVASTATIN CALCIUM 40 MG: 20 TABLET, FILM COATED ORAL at 21:08

## 2022-04-03 RX ADMIN — DORZOLAMIDE HCL 1 DROP: 20 SOLUTION/ DROPS OPHTHALMIC at 08:15

## 2022-04-03 RX ADMIN — TRAZODONE HYDROCHLORIDE 50 MG: 50 TABLET ORAL at 21:09

## 2022-04-03 RX ADMIN — NICOTINE POLACRILEX 2 MG: 2 GUM, CHEWING BUCCAL at 17:50

## 2022-04-03 RX ADMIN — THIAMINE HCL TAB 100 MG 100 MG: 100 TAB at 08:14

## 2022-04-03 RX ADMIN — TIMOLOL MALEATE 1 DROP: 5 SOLUTION OPHTHALMIC at 08:15

## 2022-04-03 RX ADMIN — BRIMONIDINE TARTRATE 1 DROP: 2 SOLUTION OPHTHALMIC at 14:35

## 2022-04-03 RX ADMIN — SERTRALINE 100 MG: 100 TABLET, FILM COATED ORAL at 08:14

## 2022-04-03 RX ADMIN — MIRTAZAPINE 7.5 MG: 15 TABLET, FILM COATED ORAL at 21:09

## 2022-04-03 RX ADMIN — NICOTINE POLACRILEX 2 MG: 2 GUM, CHEWING BUCCAL at 13:40

## 2022-04-03 RX ADMIN — LATANOPROST 1 DROP: 50 SOLUTION OPHTHALMIC at 21:11

## 2022-04-03 RX ADMIN — NICOTINE POLACRILEX 2 MG: 2 GUM, CHEWING BUCCAL at 08:22

## 2022-04-03 RX ADMIN — FOLIC ACID 1 MG: 1 TABLET ORAL at 08:14

## 2022-04-03 RX ADMIN — BRIMONIDINE TARTRATE 1 DROP: 2 SOLUTION OPHTHALMIC at 21:11

## 2022-04-03 RX ADMIN — ASPIRIN 81 MG: 81 TABLET, COATED ORAL at 08:14

## 2022-04-03 RX ADMIN — HYDROXYZINE HYDROCHLORIDE 50 MG: 50 TABLET, FILM COATED ORAL at 21:09

## 2022-04-03 RX ADMIN — TIMOLOL MALEATE 1 DROP: 5 SOLUTION OPHTHALMIC at 21:11

## 2022-04-03 RX ADMIN — NICOTINE POLACRILEX 2 MG: 2 GUM, CHEWING BUCCAL at 21:14

## 2022-04-03 RX ADMIN — DORZOLAMIDE HCL 1 DROP: 20 SOLUTION/ DROPS OPHTHALMIC at 21:10

## 2022-04-03 NOTE — GROUP NOTE
Group Therapy Note    Date: 4/3/2022    Group Start Time: 1010  Group End Time: 0084  Group Topic: Psychoeducation    NICHOLAS [facility] C Eldora Litten, MSW, LSW        Group Therapy Note    patient refused to attend Psychoeducation group at 10:10 after encouragement from staff.          Signature:  Eldora Litten, MSW, LSW

## 2022-04-03 NOTE — PROGRESS NOTES
Daily Progress Note  4/3/2022    Patient Name: Misti Ruvalcaba    CHIEF COMPLAINT: Depression with suicidal ideation         SUBJECTIVE:    Nursing staff report the patient has maintained medication adherence and has not required emergency medications in the last 24 hours. He did utilize hydroxyzine and trazodone last evening and 2109 related to anxiety and insomnia and reports with good effect. Navi Schneider has remained isolative overall to his room today and reports his mood as \"tired \". He presents withdrawn and requires much encouragement to participate in dialogue. He denies symptoms of withdrawal and confirms that he continues to plan to move forward with AOD placement. He continues to suicidal ideation stating \"it is just side mass \". Patient is encouraged to explore milieu and attend group programming as this may be beneficial.  He explains that his vision has changed substantially in the recent years and this too contributes to his depressive symptoms. He is unable to contract for safety outside of the hospital at present and agrees to reach out to support team as needed for additional one-to-one time. Appetite:  [x] Adequate/Unchanged  [] Increased  [] Decreased      Sleep:       [x] Adequate/Unchanged  [] Fair  [] Poor      Group Attendance on Unit:   [] Yes   [] Selectively    [x] No    Medication Side Effects: Denies at time of assessment         Mental Status Exam  Level of consciousness: Somnolent  Appearance: Appropriate attire for setting, laying on bed with fair  grooming and hygiene   Behavior/Motor: Approachable, psychomotor retardation, isolative  Attitude toward examiner: Cooperative, attentive, fair eye contact  Speech: Quiet, low tone  Mood: Depressed  Affect: Blunted  Thought processes: Linear and coherent  Thought content: Denies homicidal ideation  Suicidal Ideation: Active suicidal ideations  Delusions: No evidence of delusions.    Perceptual Disturbance: Denies, patient does not appear to be responding to internal stimuli. Cognition: Oriented to self, location, time, and situation  Memory: intact  Insight: fair   Judgement: poor       Data   height is 5' 4\" (1.626 m) and weight is 165 lb (74.8 kg). His oral temperature is 98 °F (36.7 °C). His blood pressure is 90/60 and his pulse is 55. His respiration is 14 and oxygen saturation is 97%.    Labs:   Admission on 03/27/2022   Component Date Value Ref Range Status    WBC 03/27/2022 5.2  3.5 - 11.0 k/uL Final    RBC 03/27/2022 4.56  4.5 - 5.9 m/uL Final    Hemoglobin 03/27/2022 15.0  13.5 - 17.5 g/dL Final    Hematocrit 03/27/2022 44.1  41 - 53 % Final    MCV 03/27/2022 96.8  80 - 100 fL Final    MCH 03/27/2022 32.9  26 - 34 pg Final    MCHC 03/27/2022 34.0  31 - 37 g/dL Final    RDW 03/27/2022 13.0  11.5 - 14.9 % Final    Platelets 34/20/1731 172  150 - 450 k/uL Final    MPV 03/27/2022 6.3  6.0 - 12.0 fL Final    Seg Neutrophils 03/27/2022 70* 36 - 66 % Final    Lymphocytes 03/27/2022 21* 24 - 44 % Final    Monocytes 03/27/2022 7  1 - 7 % Final    Eosinophils % 03/27/2022 1  0 - 4 % Final    Basophils 03/27/2022 1  0 - 2 % Final    Segs Absolute 03/27/2022 3.70  1.3 - 9.1 k/uL Final    Absolute Lymph # 03/27/2022 1.10  1.0 - 4.8 k/uL Final    Absolute Mono # 03/27/2022 0.40  0.1 - 1.3 k/uL Final    Absolute Eos # 03/27/2022 0.10  0.0 - 0.4 k/uL Final    Basophils Absolute 03/27/2022 0.00  0.0 - 0.2 k/uL Final    Glucose 03/27/2022 88  70 - 99 mg/dL Final    BUN 03/27/2022 9  8 - 23 mg/dL Final    CREATININE 03/27/2022 0.66* 0.70 - 1.20 mg/dL Final    Calcium 03/27/2022 8.2* 8.6 - 10.4 mg/dL Final    Sodium 03/27/2022 141  135 - 144 mmol/L Final    Potassium 03/27/2022 4.0  3.7 - 5.3 mmol/L Final    Chloride 03/27/2022 106  98 - 107 mmol/L Final    CO2 03/27/2022 23  20 - 31 mmol/L Final    Anion Gap 03/27/2022 12  9 - 17 mmol/L Final    Alkaline Phosphatase 03/27/2022 45  40 - 129 U/L Final    ALT 03/27/2022 25 5 - 41 U/L Final    AST 03/27/2022 28  <40 U/L Final    Total Bilirubin 03/27/2022 0.23* 0.3 - 1.2 mg/dL Final    Total Protein 03/27/2022 7.1  6.4 - 8.3 g/dL Final    Albumin 03/27/2022 4.3  3.5 - 5.2 g/dL Final    GFR Non- 03/27/2022 >60  >60 mL/min Final    GFR  03/27/2022 >60  >60 mL/min Final    GFR Comment 03/27/2022        Final    Comment: Average GFR for 61-76 years old:   80 mL/min/1.73sq m  Chronic Kidney Disease:   <60 mL/min/1.73sq m  Kidney failure:   <15 mL/min/1.73sq m              eGFR calculated using average adult body mass.  Additional eGFR calculator available at:        [employer]            Lipase 03/27/2022 211* 13 - 60 U/L Final    Acetaminophen Level 03/27/2022 <5* 10 - 30 ug/mL Final    Ethanol 03/27/2022 161* <10 mg/dL Final    Ethanol percent 03/27/2022 4.483  % Final    Salicylate Lvl 41/34/2214 <1* 3 - 10 mg/dL Final    Toxic Tricyclic Sc,Blood 45/18/7286 WRONG TEST ORDERED  NEGATIVE Final    Amphetamine Screen, Ur 03/27/2022 NEGATIVE  NEGATIVE Final    Comment:       (Positive cutoff 1000 ng/mL)                  Barbiturate Screen, Ur 03/27/2022 NEGATIVE  NEGATIVE Final    Comment:       (Positive cutoff 200 ng/mL)                  Benzodiazepine Screen, Urine 03/27/2022 NEGATIVE  NEGATIVE Final    Comment:       (Positive cutoff 200 ng/mL)                  Cocaine Metabolite, Urine 03/27/2022 NEGATIVE  NEGATIVE Final    Comment:       (Positive cutoff 300 ng/mL)                  Methadone Screen, Urine 03/27/2022 NEGATIVE  NEGATIVE Final    Comment:       (Positive cutoff 300 ng/mL)                  Opiates, Urine 03/27/2022 NEGATIVE  NEGATIVE Final    Comment:       (Positive cutoff 300 ng/mL)                  Phencyclidine, Urine 03/27/2022 NEGATIVE  NEGATIVE Final    Comment:       (Positive cutoff 25 ng/mL)                  Cannabinoid Scrn, Ur 03/27/2022 NEGATIVE  NEGATIVE Final    Comment: (Positive cutoff 50 ng/mL)                  Oxycodone Screen, Ur 03/27/2022 NEGATIVE  NEGATIVE Final    Comment:       (Positive cutoff 100 ng/mL)                  Test Information 03/27/2022 Assay provides medical screening only. The absence of expected drug(s) and/or metabolite(s) may indicate diluted or adulterated urine, limitations of testing or timing of collection. Final    Comment: Testing for legal purposes should be confirmed by another method. To request confirmation   of test result, please call the lab within 7 days of sample submission.  Color, UA 03/27/2022 Yellow  Yellow Final    Turbidity UA 03/27/2022 Clear  Clear Final    Glucose, Ur 03/27/2022 NEGATIVE  NEGATIVE Final    Bilirubin Urine 03/27/2022 NEGATIVE  NEGATIVE Final    Ketones, Urine 03/27/2022 NEGATIVE  NEGATIVE Final    Specific Gravity, UA 03/27/2022 1.009  1.000 - 1.030 Final    Urine Hgb 03/27/2022 NEGATIVE  NEGATIVE Final    pH, UA 03/27/2022 5.0  5.0 - 8.0 Final    Protein, UA 03/27/2022 NEGATIVE  NEGATIVE Final    Urobilinogen, Urine 03/27/2022 Normal  Normal Final    Nitrite, Urine 03/27/2022 NEGATIVE  NEGATIVE Final    Leukocyte Esterase, Urine 03/27/2022 NEGATIVE  NEGATIVE Final    Urinalysis Comments 03/27/2022 Microscopic exam not performed based on chemical results unless requested in original order. Final    Specimen Description 03/27/2022 . NASOPHARYNGEAL SWAB   Final    SARS-CoV-2, Rapid 03/27/2022 Not Detected  Not Detected Final    Comment:       Rapid NAAT:  The specimen is NEGATIVE for SARS-CoV-2, the novel coronavirus associated with   COVID-19. The ID NOW COVID-19 assay is designed to detect the virus that causes COVID-19 in patients   with signs and symptoms of infection who are suspected of COVID-19. An individual without symptoms of COVID-19 and who is not shedding SARS-CoV-2 virus would   expect to have a negative (not detected) result in this assay.   Negative results should be treated as presumptive and, if inconsistent with clinical signs   and symptoms or necessary for patient management,  should be tested with an alternative molecular assay. Negative results do not preclude   SARS-CoV-2 infection and   should not be used as the sole basis for patient management decisions. Fact sheet for Healthcare Providers: Kobi  Fact sheet for Patients: Kobi          Methodology: Isothermal Nucleic Acid Amplification      Tricyclic Antidep,Urine 45/10/2946 NEGATIVE  NEGATIVE Final    Comment:       (Positive cutoff 1000 ng/mL)  Assay provides rapid clinical screening only. Presumptive positive results for legal   purposes should be confirmed by another method. To request confirmation, please call the   lab within 7 days of sample submission. Reviewed patient's current plan of care and vital signs with nursing staff.     Labs reviewed: [x] Yes    Medications  Current Facility-Administered Medications: aspirin EC tablet 81 mg, 81 mg, Oral, Daily  sertraline (ZOLOFT) tablet 100 mg, 100 mg, Oral, Daily  mirtazapine (REMERON) tablet 7.5 mg, 7.5 mg, Oral, Nightly  brimonidine (ALPHAGAN) 0.2 % ophthalmic solution 1 drop, 1 drop, Right Eye, TID  latanoprost (XALATAN) 0.005 % ophthalmic solution 1 drop, 1 drop, Right Eye, Nightly  timolol (TIMOPTIC) 0.5 % ophthalmic solution 1 drop, 1 drop, Right Eye, BID  acetaminophen (TYLENOL) tablet 650 mg, 650 mg, Oral, Q4H PRN  aluminum & magnesium hydroxide-simethicone (MAALOX) 200-200-20 MG/5ML suspension 30 mL, 30 mL, Oral, Q6H PRN  hydrOXYzine (ATARAX) tablet 50 mg, 50 mg, Oral, TID PRN  traZODone (DESYREL) tablet 50 mg, 50 mg, Oral, Nightly PRN  polyethylene glycol (GLYCOLAX) packet 17 g, 17 g, Oral, Daily PRN  ibuprofen (ADVIL;MOTRIN) tablet 400 mg, 400 mg, Oral, Q6H PRN  nicotine polacrilex (NICORETTE) gum 2 mg, 2 mg, Oral, PRN  folic acid (FOLVITE) tablet 1 mg, 1 mg, Oral, Daily  thiamine mononitrate tablet 100 mg, 100 mg, Oral, Daily  atorvastatin (LIPITOR) tablet 40 mg, 40 mg, Oral, Nightly  dorzolamide (TRUSOPT) 2 % ophthalmic solution 1 drop, 1 drop, Both Eyes, BID    ASSESSMENT  Major depressive disorder, recurrent severe without psychotic features ([facility] [address]         PLAN  Patient symptoms are: Minimal improvement  Continue current medication regimen  Monitor need and frequency of PRN medications. Encourage participation in groups and milieu. Attempt to develop insight. Psycho-education conducted. Supportive Therapy conducted. Probable discharge is per attending physician, his previous plan for AOD treatment fell through  Follow-up daily while inpatient. Patient continues to be monitored in the inpatient psychiatric facility at [facility] for safety and stabilization. Patient continues to need, on a daily basis, active treatment furnished directly by or requiring the supervision of inpatient psychiatric personnel. Electronically signed by ALLISON Saucedo CNP on 4/3/2022 at 3:22 PM    **This report has been created using voice recognition software. It may contain minor errors which are inherent in voice recognition technology. **

## 2022-04-03 NOTE — PLAN OF CARE
Problem: Suicide risk  Goal: Provide patient with safe environment  Description: Provide patient with safe environment. Patient resting comfortable in room with eyes closed. Unlabored breathing pattern and no distress noted. Patient denies suicidal thoughts and hallucinations. Patient reports depression and anxiety have improved   4/3/2022 0914 by Renee Bales RN  Outcome: Ongoing  Note: Patient remains on a locked unit where visual checks are preformed every 15 minutes to ensure unit and patient safety     Problem: Depressive Behavior With or Without Suicide Precautions:  Goal: Able to verbalize and/or display a decrease in depressive symptoms  Description: Able to verbalize and/or display a decrease in depressive symptoms  4/3/2022 0914 by Renee Bales RN  Outcome: Ongoing  Note: 1:1 with pt x ten minutes. Pt encouraged to attend unit programming and interact with peers and staff. Pt also encouraged to tend to hygiene and ADLs. Pt encouraged to discuss feelings with staff and feedback and reassurance provided. Pt denies thoughts of self harm and is agreeable to seeking out should thoughts of self harm arise. Safe environment maintained. Q15 minute checks for safety cont per unit policy. Will cont to monitor for safety and provides support and reassurance as needed.

## 2022-04-03 NOTE — PLAN OF CARE
Problem: Suicide risk  Goal: Provide patient with safe environment  Description: Provide patient with safe environment. Patient resting comfortable in room with eyes closed. Unlabored breathing pattern and no distress noted. Patient denies suicidal thoughts and hallucinations. Patient reports depression and anxiety have improved   Outcome: Ongoing  Note: Patient provided safe environment within [facility] milieu. Will continue to monitor and provide q15 min safety checks. Problem: Falls - Risk of:  Goal: Will remain free from falls  Description: Will remain free from falls  Outcome: Ongoing  Note: Patient has remained free from falls this shift. Will continue to monitor and provide q15 min safety checks.       Problem: Falls - Risk of:  Goal: Absence of physical injury  Description: Absence of physical injury  Outcome: Ongoing     Problem: Tobacco Use:  Goal: Inpatient tobacco use cessation counseling participation  Description: Inpatient tobacco use cessation counseling participation  Outcome: Ongoing     Problem: Depressive Behavior With or Without Suicide Precautions:  Goal: Able to verbalize acceptance of life and situations over which he or she has no control  Description: Able to verbalize acceptance of life and situations over which he or she has no control  Outcome: Ongoing     Problem: Depressive Behavior With or Without Suicide Precautions:  Goal: Able to verbalize and/or display a decrease in depressive symptoms  Description: Able to verbalize and/or display a decrease in depressive symptoms  Outcome: Ongoing

## 2022-04-03 NOTE — GROUP NOTE
Group Therapy Note    Date: 4/3/2022    Group Start Time: 1500  Group End Time: 5801  Group Topic: Cognitive Skills    NICHOLAS Laboy, CTRS        Group Therapy Note    Attendees: 9/18       Pt did not participate in Cognitive Skills Group at 1500 when encouraged by RT due to resting in room. Pt was offered talk time as an alternative to group but declined.          Discipline Responsible: Psychoeducational Specialist        Signature:  Ángel Crandall

## 2022-04-04 VITALS
WEIGHT: 165 LBS | SYSTOLIC BLOOD PRESSURE: 124 MMHG | HEIGHT: 64 IN | OXYGEN SATURATION: 97 % | RESPIRATION RATE: 14 BRPM | TEMPERATURE: 97.5 F | BODY MASS INDEX: 28.17 KG/M2 | HEART RATE: 76 BPM | DIASTOLIC BLOOD PRESSURE: 68 MMHG

## 2022-04-04 PROCEDURE — 6370000000 HC RX 637 (ALT 250 FOR IP): Performed by: INTERNAL MEDICINE

## 2022-04-04 PROCEDURE — 6370000000 HC RX 637 (ALT 250 FOR IP): Performed by: PSYCHIATRY & NEUROLOGY

## 2022-04-04 PROCEDURE — 99239 HOSP IP/OBS DSCHRG MGMT >30: CPT | Performed by: PSYCHIATRY & NEUROLOGY

## 2022-04-04 PROCEDURE — 6370000000 HC RX 637 (ALT 250 FOR IP): Performed by: NURSE PRACTITIONER

## 2022-04-04 RX ORDER — MIRTAZAPINE 7.5 MG/1
7.5 TABLET, FILM COATED ORAL NIGHTLY
Qty: 30 TABLET | Refills: 3 | Status: ON HOLD | OUTPATIENT
Start: 2022-04-04 | End: 2022-05-31 | Stop reason: SDUPTHER

## 2022-04-04 RX ORDER — SERTRALINE HYDROCHLORIDE 100 MG/1
100 TABLET, FILM COATED ORAL DAILY
Qty: 30 TABLET | Refills: 3 | Status: ON HOLD | OUTPATIENT
Start: 2022-04-05 | End: 2022-05-31 | Stop reason: SDUPTHER

## 2022-04-04 RX ADMIN — TIMOLOL MALEATE 1 DROP: 5 SOLUTION OPHTHALMIC at 08:40

## 2022-04-04 RX ADMIN — NICOTINE POLACRILEX 2 MG: 2 GUM, CHEWING BUCCAL at 09:43

## 2022-04-04 RX ADMIN — BRIMONIDINE TARTRATE 1 DROP: 2 SOLUTION OPHTHALMIC at 08:40

## 2022-04-04 RX ADMIN — DORZOLAMIDE HCL 1 DROP: 20 SOLUTION/ DROPS OPHTHALMIC at 08:40

## 2022-04-04 RX ADMIN — NICOTINE POLACRILEX 2 MG: 2 GUM, CHEWING BUCCAL at 13:48

## 2022-04-04 RX ADMIN — SERTRALINE 100 MG: 100 TABLET, FILM COATED ORAL at 08:39

## 2022-04-04 RX ADMIN — THIAMINE HCL TAB 100 MG 100 MG: 100 TAB at 08:39

## 2022-04-04 RX ADMIN — ASPIRIN 81 MG: 81 TABLET, COATED ORAL at 08:39

## 2022-04-04 RX ADMIN — BRIMONIDINE TARTRATE 1 DROP: 2 SOLUTION OPHTHALMIC at 13:48

## 2022-04-04 RX ADMIN — FOLIC ACID 1 MG: 1 TABLET ORAL at 08:39

## 2022-04-04 NOTE — CARE COORDINATION
Sw spoke with pt regarding discharge planning. Pt reports going to [facility] at discharge and is willing to go to [facility] , where pt was medically cleared to attend the program, from [facility] home.

## 2022-04-04 NOTE — BH NOTE
Patient given tobacco quitline number 13650029745 at this time, refusing to call at this time, states \" I just dont want to quit now\"- patient given information as to the dangers of long term tobacco use. Continue to reinforce the importance of tobacco cessation.
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
## 2022-04-04 NOTE — CARE COORDINATION
MARSHALL faxed clinicals to [facility] [phone]. MARSHALL placed call to [employer] asking them to review the paperwork and respond.

## 2022-04-04 NOTE — GROUP NOTE
Group Therapy Note    Date: 4/4/2022    Group Start Time: 1100  Group End Time: 1012  Group Topic: Cognitive Skills    NICHOLAS Graham, CTRS        Group Therapy Note    Attendees: 7/17         Pt did not participate in Cognitive Skills Group at 1100am when encouraged by RT due to resting in room. Pt was offered talk time as an alternative to group but declined.        Discipline Responsible: Psychoeducational Specialist      Signature:  Narendra Landers

## 2022-04-04 NOTE — BH NOTE
[facility]  Discharge Note    Pt discharged with followings belongings:   Dental Appliances: None  Vision - Corrective Lenses: None  Hearing Aid: None  Jewelry: None  Clothing: Elissa Pacer / coat,Pants,Shirt,Socks  Were All Patient Medications Collected?: Not Applicable  Other Valuables: Cell phone,Money (Comment) ($11)   Valuables sent home with or returned to patient. Patient education on aftercare instructions. Information faxed to [facility] Elli Graham by RN  at 3:20 PM .Patient verbalize understanding of AVS.    Status EXAM upon discharge:  Status and Exam  Normal: No  Facial Expression: Flat,Expressionless  Affect: Congruent  Level of Consciousness: Alert  Mood:Normal: No  Mood: Depressed  Motor Activity:Normal: No  Motor Activity: Decreased  Interview Behavior: Cooperative  Preception: Girard to Person,Girard to Time,Girard to Place,[place] to Situation  Attention:Normal: No  Attention: Distractible  Thought Processes: Circumstantial  Thought Content:Normal: No  Thought Content: Poverty of Content  Hallucinations: None  Delusions: No  Memory:Normal: Yes  Memory: Poor Recent  Insight and Judgment: No  Insight and Judgment: Poor Insight,Unmotivated  Present Suicidal Ideation: No  Present Homicidal Ideation: No      Metabolic Screening:    Lab Results   Component Value Date    LABA1C 4.9 12/28/2019       Lab Results   Component Value Date    CHOL 146 12/28/2019    CHOL 169 10/27/2016    CHOL 152 02/03/2016     Lab Results   Component Value Date    TRIG 59 12/28/2019    TRIG 236 (H) 10/27/2016    TRIG 72 02/03/2016     Lab Results   Component Value Date    HDL 70 12/28/2019    HDL 53 10/27/2016    HDL 56 02/03/2016     No components found for: LDLCAL  No results found for: Morris Mcginnis RN    [place] [facility] [place] [facility] [place].

## 2022-04-04 NOTE — CARE COORDINATION
SW approached pt with AoD resource folder to assist pt with finding a place for inpatient treatment. Pt shared \"I am waiting for [facility] to call me back to see if I can stay in the empty part of the building there. \" SW made another attempt to get pt up and make phone calls, pt gave SW verbal permission to fax clinicals to [facility].

## 2022-04-04 NOTE — PLAN OF CARE
Problem: Suicide risk  Goal: Provide patient with safe environment  Description: Provide patient with safe environment. Patient resting comfortable in room with eyes closed. Unlabored breathing pattern and no distress noted. Patient denies suicidal thoughts and hallucinations. Patient reports depression and anxiety have improved   Outcome: Ongoing  Note: Patient provided safe environment within [facility] milieu. Will continue to monitor and provide q15 min safety checks. Problem: Falls - Risk of:  Goal: Will remain free from falls  Description: Will remain free from falls  Outcome: Ongoing  Note: Patient has remained free from falls this shift. Will continue to monitor and provide q15 min safety checks.       Problem: Falls - Risk of:  Goal: Absence of physical injury  Description: Absence of physical injury  Outcome: Ongoing     Problem: Tobacco Use:  Goal: Inpatient tobacco use cessation counseling participation  Description: Inpatient tobacco use cessation counseling participation  Outcome: Ongoing

## 2022-04-04 NOTE — GROUP NOTE
Group Therapy Note    Date: 4/4/2022    Group Start Time: 1000  Group End Time: 3572  Group Topic: Psychotherapy    [address], LSW    patient refused to attend psychotherapy group at [address] after encouragement from staff.   1:1 talk time provided as alternative to group session

## 2022-04-05 ENCOUNTER — HOSPITAL ENCOUNTER (INPATIENT)
Age: 62
LOS: 9 days | Discharge: HOME | DRG: 282 | End: 2022-04-15
Attending: EMERGENCY MEDICINE | Admitting: EMERGENCY MEDICINE
Payer: MEDICARE

## 2022-04-05 ENCOUNTER — APPOINTMENT (OUTPATIENT)
Dept: GENERAL RADIOLOGY | Age: 62
DRG: 282 | End: 2022-04-05
Payer: MEDICARE

## 2022-04-05 DIAGNOSIS — K85.20 ALCOHOL-INDUCED ACUTE PANCREATITIS, UNSPECIFIED COMPLICATION STATUS: Primary | ICD-10-CM

## 2022-04-05 PROBLEM — K85.90 PANCREATITIS, RECURRENT: Status: ACTIVE | Noted: 2022-04-05

## 2022-04-05 LAB
ABSOLUTE EOS #: 0 K/UL (ref 0–0.44)
ABSOLUTE IMMATURE GRANULOCYTE: 0.07 K/UL (ref 0–0.3)
ABSOLUTE LYMPH #: 0.63 K/UL (ref 1.1–3.7)
ABSOLUTE MONO #: 0.42 K/UL (ref 0.1–1.2)
ALBUMIN SERPL-MCNC: 4.2 G/DL (ref 3.5–5.2)
ALBUMIN/GLOBULIN RATIO: 1.6 (ref 1–2.5)
ALP BLD-CCNC: 43 U/L (ref 40–129)
ALT SERPL-CCNC: 38 U/L (ref 5–41)
ANION GAP SERPL CALCULATED.3IONS-SCNC: 10 MMOL/L (ref 9–17)
AST SERPL-CCNC: 33 U/L
BASOPHILS # BLD: 0 % (ref 0–2)
BASOPHILS ABSOLUTE: 0 K/UL (ref 0–0.2)
BILIRUB SERPL-MCNC: 0.47 MG/DL (ref 0.3–1.2)
BUN BLDV-MCNC: 14 MG/DL (ref 8–23)
CALCIUM SERPL-MCNC: 8.6 MG/DL (ref 8.6–10.4)
CHLORIDE BLD-SCNC: 103 MMOL/L (ref 98–107)
CO2: 26 MMOL/L (ref 20–31)
CREAT SERPL-MCNC: 0.71 MG/DL (ref 0.7–1.2)
EOSINOPHILS RELATIVE PERCENT: 0 % (ref 1–4)
ETHANOL PERCENT: <0.01 %
ETHANOL: <10 MG/DL
GFR AFRICAN AMERICAN: >60 ML/MIN
GFR NON-AFRICAN AMERICAN: >60 ML/MIN
GFR SERPL CREATININE-BSD FRML MDRD: ABNORMAL ML/MIN/{1.73_M2}
GLUCOSE BLD-MCNC: 104 MG/DL (ref 70–99)
HCT VFR BLD CALC: 43.8 % (ref 40.7–50.3)
HEMOGLOBIN: 15 G/DL (ref 13–17)
IMMATURE GRANULOCYTES: 1 %
LIPASE: 223 U/L (ref 13–60)
LYMPHOCYTES # BLD: 9 % (ref 24–43)
MCH RBC QN AUTO: 33.6 PG (ref 25.2–33.5)
MCHC RBC AUTO-ENTMCNC: 34.2 G/DL (ref 28.4–34.8)
MCV RBC AUTO: 98 FL (ref 82.6–102.9)
MONOCYTES # BLD: 6 % (ref 3–12)
MORPHOLOGY: NORMAL
NRBC AUTOMATED: 0 PER 100 WBC
PDW BLD-RTO: 12.4 % (ref 11.8–14.4)
PLATELET # BLD: 201 K/UL (ref 138–453)
PMV BLD AUTO: 9.2 FL (ref 8.1–13.5)
POTASSIUM SERPL-SCNC: 4.2 MMOL/L (ref 3.7–5.3)
RBC # BLD: 4.47 M/UL (ref 4.21–5.77)
SARS-COV-2, RAPID: NOT DETECTED
SEG NEUTROPHILS: 84 % (ref 36–65)
SEGMENTED NEUTROPHILS ABSOLUTE COUNT: 5.88 K/UL (ref 1.5–8.1)
SODIUM BLD-SCNC: 139 MMOL/L (ref 135–144)
SPECIMEN DESCRIPTION: NORMAL
TOTAL PROTEIN: 6.9 G/DL (ref 6.4–8.3)
TROPONIN, HIGH SENSITIVITY: <6 NG/L (ref 0–22)
TROPONIN, HIGH SENSITIVITY: <6 NG/L (ref 0–22)
WBC # BLD: 7 K/UL (ref 3.5–11.3)

## 2022-04-05 PROCEDURE — G0480 DRUG TEST DEF 1-7 CLASSES: HCPCS

## 2022-04-05 PROCEDURE — 99285 EMERGENCY DEPT VISIT HI MDM: CPT

## 2022-04-05 PROCEDURE — 87635 SARS-COV-2 COVID-19 AMP PRB: CPT

## 2022-04-05 PROCEDURE — 93005 ELECTROCARDIOGRAM TRACING: CPT | Performed by: EMERGENCY MEDICINE

## 2022-04-05 PROCEDURE — 71045 X-RAY EXAM CHEST 1 VIEW: CPT

## 2022-04-05 PROCEDURE — G0378 HOSPITAL OBSERVATION PER HR: HCPCS

## 2022-04-05 PROCEDURE — 83690 ASSAY OF LIPASE: CPT

## 2022-04-05 PROCEDURE — 6360000002 HC RX W HCPCS: Performed by: EMERGENCY MEDICINE

## 2022-04-05 PROCEDURE — 94761 N-INVAS EAR/PLS OXIMETRY MLT: CPT

## 2022-04-05 PROCEDURE — 2580000003 HC RX 258: Performed by: EMERGENCY MEDICINE

## 2022-04-05 PROCEDURE — 84484 ASSAY OF TROPONIN QUANT: CPT

## 2022-04-05 PROCEDURE — 2700000000 HC OXYGEN THERAPY PER DAY

## 2022-04-05 PROCEDURE — 6370000000 HC RX 637 (ALT 250 FOR IP): Performed by: HEALTH CARE PROVIDER

## 2022-04-05 PROCEDURE — 80053 COMPREHEN METABOLIC PANEL: CPT

## 2022-04-05 PROCEDURE — 6370000000 HC RX 637 (ALT 250 FOR IP): Performed by: EMERGENCY MEDICINE

## 2022-04-05 PROCEDURE — 85025 COMPLETE CBC W/AUTO DIFF WBC: CPT

## 2022-04-05 PROCEDURE — 96372 THER/PROPH/DIAG INJ SC/IM: CPT

## 2022-04-05 RX ORDER — SODIUM CHLORIDE 0.9 % (FLUSH) 0.9 %
5-40 SYRINGE (ML) INJECTION EVERY 12 HOURS SCHEDULED
Status: DISCONTINUED | OUTPATIENT
Start: 2022-04-05 | End: 2022-04-15 | Stop reason: HOSPADM

## 2022-04-05 RX ORDER — TRAZODONE HYDROCHLORIDE 50 MG/1
50 TABLET ORAL NIGHTLY PRN
Status: DISCONTINUED | OUTPATIENT
Start: 2022-04-05 | End: 2022-04-15 | Stop reason: HOSPADM

## 2022-04-05 RX ORDER — SODIUM CHLORIDE 9 MG/ML
INJECTION, SOLUTION INTRAVENOUS CONTINUOUS
Status: DISCONTINUED | OUTPATIENT
Start: 2022-04-05 | End: 2022-04-07

## 2022-04-05 RX ORDER — FOLIC ACID 1 MG/1
1 TABLET ORAL DAILY
Status: DISCONTINUED | OUTPATIENT
Start: 2022-04-05 | End: 2022-04-15 | Stop reason: HOSPADM

## 2022-04-05 RX ORDER — ONDANSETRON 2 MG/ML
4 INJECTION INTRAMUSCULAR; INTRAVENOUS EVERY 4 HOURS PRN
Status: DISCONTINUED | OUTPATIENT
Start: 2022-04-05 | End: 2022-04-15 | Stop reason: HOSPADM

## 2022-04-05 RX ORDER — HYDROXYZINE HYDROCHLORIDE 25 MG/1
50 TABLET, FILM COATED ORAL 3 TIMES DAILY PRN
Status: DISCONTINUED | OUTPATIENT
Start: 2022-04-05 | End: 2022-04-15 | Stop reason: HOSPADM

## 2022-04-05 RX ORDER — SODIUM CHLORIDE 9 MG/ML
25 INJECTION, SOLUTION INTRAVENOUS PRN
Status: DISCONTINUED | OUTPATIENT
Start: 2022-04-05 | End: 2022-04-15 | Stop reason: HOSPADM

## 2022-04-05 RX ORDER — SODIUM CHLORIDE 0.9 % (FLUSH) 0.9 %
5-40 SYRINGE (ML) INJECTION PRN
Status: DISCONTINUED | OUTPATIENT
Start: 2022-04-05 | End: 2022-04-15 | Stop reason: HOSPADM

## 2022-04-05 RX ORDER — POTASSIUM CHLORIDE 20 MEQ/1
40 TABLET, EXTENDED RELEASE ORAL PRN
Status: DISCONTINUED | OUTPATIENT
Start: 2022-04-05 | End: 2022-04-05

## 2022-04-05 RX ORDER — ACETAMINOPHEN 500 MG
1000 TABLET ORAL EVERY 8 HOURS SCHEDULED
Status: DISCONTINUED | OUTPATIENT
Start: 2022-04-05 | End: 2022-04-15 | Stop reason: HOSPADM

## 2022-04-05 RX ORDER — LANOLIN ALCOHOL/MO/W.PET/CERES
100 CREAM (GRAM) TOPICAL DAILY
Status: DISCONTINUED | OUTPATIENT
Start: 2022-04-05 | End: 2022-04-15 | Stop reason: HOSPADM

## 2022-04-05 RX ORDER — ACETAMINOPHEN 650 MG/1
650 SUPPOSITORY RECTAL EVERY 6 HOURS PRN
Status: DISCONTINUED | OUTPATIENT
Start: 2022-04-05 | End: 2022-04-05

## 2022-04-05 RX ORDER — SODIUM CHLORIDE 9 MG/ML
INJECTION, SOLUTION INTRAVENOUS PRN
Status: DISCONTINUED | OUTPATIENT
Start: 2022-04-05 | End: 2022-04-15 | Stop reason: HOSPADM

## 2022-04-05 RX ORDER — POTASSIUM CHLORIDE 7.45 MG/ML
10 INJECTION INTRAVENOUS PRN
Status: DISCONTINUED | OUTPATIENT
Start: 2022-04-05 | End: 2022-04-05

## 2022-04-05 RX ORDER — MAGNESIUM HYDROXIDE/ALUMINUM HYDROXICE/SIMETHICONE 120; 1200; 1200 MG/30ML; MG/30ML; MG/30ML
30 SUSPENSION ORAL EVERY 6 HOURS PRN
Status: DISCONTINUED | OUTPATIENT
Start: 2022-04-05 | End: 2022-04-15 | Stop reason: HOSPADM

## 2022-04-05 RX ORDER — MIRTAZAPINE 15 MG/1
7.5 TABLET, FILM COATED ORAL NIGHTLY
Status: DISCONTINUED | OUTPATIENT
Start: 2022-04-05 | End: 2022-04-15 | Stop reason: HOSPADM

## 2022-04-05 RX ORDER — POLYETHYLENE GLYCOL 3350 17 G/17G
17 POWDER, FOR SOLUTION ORAL DAILY PRN
Status: DISCONTINUED | OUTPATIENT
Start: 2022-04-05 | End: 2022-04-15 | Stop reason: HOSPADM

## 2022-04-05 RX ORDER — CLOPIDOGREL BISULFATE 75 MG/1
75 TABLET ORAL DAILY
Status: DISCONTINUED | OUTPATIENT
Start: 2022-04-05 | End: 2022-04-15 | Stop reason: HOSPADM

## 2022-04-05 RX ORDER — NITROGLYCERIN 0.4 MG/1
0.4 TABLET SUBLINGUAL ONCE
Status: COMPLETED | OUTPATIENT
Start: 2022-04-05 | End: 2022-04-05

## 2022-04-05 RX ORDER — ATORVASTATIN CALCIUM 80 MG/1
40 TABLET, FILM COATED ORAL NIGHTLY
Status: DISCONTINUED | OUTPATIENT
Start: 2022-04-05 | End: 2022-04-15 | Stop reason: HOSPADM

## 2022-04-05 RX ORDER — ACETAMINOPHEN 325 MG/1
650 TABLET ORAL EVERY 6 HOURS PRN
Status: DISCONTINUED | OUTPATIENT
Start: 2022-04-05 | End: 2022-04-05

## 2022-04-05 RX ORDER — THIAMINE MONONITRATE (VIT B1) 100 MG
100 TABLET ORAL DAILY
Status: DISCONTINUED | OUTPATIENT
Start: 2022-04-05 | End: 2022-04-05

## 2022-04-05 RX ADMIN — SODIUM CHLORIDE: 9 INJECTION, SOLUTION INTRAVENOUS at 16:21

## 2022-04-05 RX ADMIN — CLOPIDOGREL 75 MG: 75 TABLET, FILM COATED ORAL at 16:22

## 2022-04-05 RX ADMIN — FOLIC ACID 1 MG: 1 TABLET ORAL at 16:22

## 2022-04-05 RX ADMIN — NICOTINE POLACRILEX 2 MG: 2 GUM, CHEWING BUCCAL at 16:22

## 2022-04-05 RX ADMIN — MIRTAZAPINE 7.5 MG: 15 TABLET, FILM COATED ORAL at 21:53

## 2022-04-05 RX ADMIN — ATORVASTATIN CALCIUM 40 MG: 80 TABLET, FILM COATED ORAL at 21:54

## 2022-04-05 RX ADMIN — Medication 100 MG: at 16:23

## 2022-04-05 RX ADMIN — ACETAMINOPHEN 1000 MG: 500 TABLET ORAL at 21:54

## 2022-04-05 RX ADMIN — ACETAMINOPHEN 1000 MG: 500 TABLET ORAL at 16:22

## 2022-04-05 RX ADMIN — NITROGLYCERIN 0.4 MG: 0.4 TABLET SUBLINGUAL at 12:22

## 2022-04-05 RX ADMIN — SERTRALINE 100 MG: 50 TABLET, FILM COATED ORAL at 16:22

## 2022-04-05 RX ADMIN — ENOXAPARIN SODIUM 40 MG: 40 INJECTION SUBCUTANEOUS at 16:23

## 2022-04-05 ASSESSMENT — PAIN DESCRIPTION - ONSET
ONSET: ON-GOING
ONSET: ON-GOING

## 2022-04-05 ASSESSMENT — PAIN DESCRIPTION - FREQUENCY
FREQUENCY: CONTINUOUS
FREQUENCY: CONTINUOUS

## 2022-04-05 ASSESSMENT — PAIN DESCRIPTION - DESCRIPTORS: DESCRIPTORS: ACHING

## 2022-04-05 ASSESSMENT — PAIN SCALES - GENERAL
PAINLEVEL_OUTOF10: 4
PAINLEVEL_OUTOF10: 4
PAINLEVEL_OUTOF10: 5
PAINLEVEL_OUTOF10: 4

## 2022-04-05 ASSESSMENT — ENCOUNTER SYMPTOMS
VOMITING: 0
DIARRHEA: 0
NAUSEA: 1
CONSTIPATION: 0
ABDOMINAL PAIN: 1
SHORTNESS OF BREATH: 1
SORE THROAT: 0

## 2022-04-05 ASSESSMENT — PAIN DESCRIPTION - ORIENTATION
ORIENTATION: MID
ORIENTATION: MID

## 2022-04-05 ASSESSMENT — PAIN - FUNCTIONAL ASSESSMENT
PAIN_FUNCTIONAL_ASSESSMENT: 0-10
PAIN_FUNCTIONAL_ASSESSMENT: ACTIVITIES ARE NOT PREVENTED

## 2022-04-05 ASSESSMENT — PAIN DESCRIPTION - PAIN TYPE
TYPE: ACUTE PAIN
TYPE: ACUTE PAIN

## 2022-04-05 ASSESSMENT — PAIN DESCRIPTION - PROGRESSION
CLINICAL_PROGRESSION: NOT CHANGED
CLINICAL_PROGRESSION: NOT CHANGED

## 2022-04-05 ASSESSMENT — PAIN DESCRIPTION - LOCATION
LOCATION: CHEST
LOCATION: CHEST

## 2022-04-05 NOTE — ED NOTES
Pt. States that he is nauseous after drinking water. Pt states that he does not want to try to eat.      Nicolasa Palmer RN  04/05/22 4055

## 2022-04-05 NOTE — CARE COORDINATION
6 hours as needed for Pain  Notes to patient: Pain control     latanoprost 0.005 % ophthalmic solution  Commonly known as: XALATAN  Place 1 drop into the right eye nightly  Notes to patient: Ophthalmic agent     timolol 0.5 % ophthalmic solution  Commonly known as: TIMOPTIC  Place 1 drop into the right eye 2 times daily  Notes to patient: Ophthalmic agent     traZODone 50 MG tablet  Commonly known as: DESYREL  Take 1 tablet by mouth nightly as needed for Sleep  Notes to patient: Helps with sleep        STOP taking these medications    naltrexone 50 MG tablet  Commonly known as: DEPADE     thiamine 100 MG tablet           Where to Get Your Medications      These medications were sent to [facility]  [address]  [address], [address] [facility] [address]    Phone: [phone]   · atorvastatin 40 MG tablet  · brimonidine 0.2 % ophthalmic solution  · clopidogrel 75 MG tablet  · dorzolamide 2 % ophthalmic solution  · folic acid 1 MG tablet  · hydrOXYzine 50 MG tablet  · latanoprost 0.005 % ophthalmic solution  · mirtazapine 7.5 MG tablet  · nicotine polacrilex 2 MG gum  · sertraline 100 MG tablet  · timolol 0.5 % ophthalmic solution  · traZODone 50 MG tablet  · vitamin B-1 100 MG tablet         Follow Up Appointment: [place] [address] [address]  [phone]    On 4/11/2022  Has appointment at 9:45

## 2022-04-05 NOTE — ED NOTES
Report given to 3c.  Nurse has no further questions at this time     Armando Bahena RN  04/05/22 1800

## 2022-04-05 NOTE — LETTER
Joisane Rangel was seen and treated in our emergency department on 4/5/2022. He may return to work on 04/07/2022. If you have any questions or concerns, please don't hesitate to call.       Jose Lopez MD

## 2022-04-05 NOTE — ED NOTES
The following labs labeled with pt sticker and tubed to lab:     [] Blue     [] Lavender   [] on ice  [x] Green/yellow  [] Green/black [] on ice  [] Yellow  [] Red  [] Pink      [] COVID-19 swab    [] Rapid  [] PCR  [] Flu swab  [] Peds Viral Panel     [] Urine Sample  [] Pelvic Cultures  [] Blood Cultures            Alex Peralta RN  04/05/22 8113

## 2022-04-05 NOTE — ED NOTES
Pt. Resting in bed, eyes open, NAD. Will continue with plan of care.      Breann Ernst RN  04/05/22 1455

## 2022-04-05 NOTE — ED NOTES
Pt oxygen saturation at 86%, pt placed on [facility] with response to 93% oxygenation      Ebony Seo RN  04/05/22 8783

## 2022-04-05 NOTE — CARE COORDINATION
Case Management Initial Discharge Plan  [facility],             Met with:patient to discuss discharge plans. Information verified: address, contacts, phone number, , insurance Yes  Insurance Provider: [employer]    Emergency Contact/Next of Kin name & number: Celeste Barger (brother) [phone]  Who are involved in patient's support system? brother    PCP: Mariluz Sheppard MD  Date of last visit: \"years\"      Discharge Planning    Living Arrangements:        Homeless    Patient able to perform ADL's:Independent    Current Services (outpatient & in home) none  DME equipment: none  DME provider: none    Is patient receiving oral anticoagulation therapy? No    If indicated:   Physician managing anticoagulation treatment:   Where does patient obtain lab work for ATC treatment? Does patient have any issues/concerns obtaining medications? No  If yes, what are patient's concerns? Is there a preferred Pharmacy after hours or on weekends? Yes    If yes, which pharmacy? [facility]    Potential Assistance Needed:       Patient agreeable to home care: No  [place] of choice provided:  no    Prior SNF/Rehab Placement and Facility: no  Agreeable to SNF/Rehab: No  [place] of choice provided: no     Evaluation: yes    Expected Discharge date:       Patient expects to be discharged to: If home: is the family and/or caregiver wiling & able to provide support at home? Who will be providing this support? Follow Up Appointment: Best Day/ Time:      Transportation provider: needs  Transportation arrangements needed for discharge: No    Readmission Risk              Risk of Unplanned Readmission:  0             Does patient have a readmission risk score greater than 14?:   If yes, follow-up appointment must be made within 7 days of discharge.      Goals of Care:       Educated patient on transitional options, provided freedom of choice and are agreeable with plan      Discharge Plan: from [facility], may stay with brother at discharge          Electronically signed by Lewis Clark RN on 4/5/22 at 2:56 PM EDT

## 2022-04-05 NOTE — ED NOTES
Pt. Resting, eyes open, NAD. Will continue with plan of care. Pt has no requests at this time.       Tamia Grubbs RN  04/05/22 0478

## 2022-04-05 NOTE — ED NOTES
Writer at bedside for CIWA assessment. Pt tolerated well and has no further requests at this time. Will continue with plan of care.       Anali Lozada RN  04/05/22 0231

## 2022-04-05 NOTE — ED PROVIDER NOTES
[facility]     Emergency Department     Faculty Note/ Attestation      Pt Name: Gabe Mott                                       MRN: [id number]  [place] 1960  Date of evaluation: 4/5/2022  Patients PCP:    Tamela Bashir MD    Attestation  I performed a history and physical examination of the patient/ or directly observed  and discussed management with the resident. I reviewed the residents note and agree with the documented findings and plan of care. Any areas of disagreement are noted on the chart. I was personally present for the key portions of any procedures. I have documented in the chart those procedures where I was not present during the key portions. I have reviewed the emergency nurses triage note. I agree with the chief complaint, past medical history, past surgical history, allergies, medications, social and family history as documented unless otherwise noted below. For Physician Assistant/ Nurse Practitioner cases/documentation I have personally evaluated this patient and have completed at least one if not all key elements of the E/M (history, physical exam, and MDM). Additional findings are as noted. This patient was evaluated in the Emergency Department for symptoms described in the history of present illness. The patient was evaluated in the context of the global COVID-19 pandemic, which necessitated consideration that the patient might be at risk for infection with the SARS-CoV-2 virus that causes COVID-19. Institutional protocols and algorithms that pertain to the evaluation of patients at risk for COVID-19 are in a state of rapid change based on information released by regulatory bodies including the CDC and federal and state organizations. These policies and algorithms were followed during the patient's care in the ED.      Initial Screens:        Edgar Coma Scale  Eye Opening: Spontaneous  Best Verbal Response: Oriented  Best Motor Response: Obeys commands  [place] Coma Scale Score: 15    Vitals:    Vitals:    04/05/22 1126   BP: 131/87   Pulse: 73   Resp: 18   Temp: 98.3 °F (36.8 °C)   TempSrc: Oral   SpO2: 95%       Chief Complaint      Chief Complaint   Patient presents with    Chest Pain          oral temperature is 98.3 °F (36.8 °C). His blood pressure is 131/87 and his pulse is 73. His respiration is 18 and oxygen saturation is 95%. DIAGNOSTIC RESULTS       RADIOLOGY:   XR CHEST PORTABLE    (Results Pending)         LABS:  Labs Reviewed   CBC WITH AUTO DIFFERENTIAL   COMPREHENSIVE METABOLIC PANEL W/ REFLEX TO MG FOR LOW K   LIPASE   TROPONIN   TROPONIN   ETHANOL         EMERGENCY DEPARTMENT COURSE:     -------------------------      BP: 131/87, Temp: 98.3 °F (36.8 °C), Pulse: 73, Resp: 18    System Problem List     Patient Active Problem List   Diagnosis    Numbness and tingling    Depression with suicidal ideation    Major depressive disorder, single episode, severe without psychotic features ([address]    Alcohol abuse with withdrawal, uncomplicated ([address]    Major depressive disorder, recurrent severe without psychotic features ([address]    Overweight (BMI 25.0-29. 9)    Mixed hyperlipidemia         Comments  Chronic Prob List noted          Harini Lucio MD,, MD, F.A.C.E.P.   Attending Emergency Physician         Harini Lucio MD  04/05/22 4317

## 2022-04-05 NOTE — ED PROVIDER NOTES
[facility] ED  Emergency Department Encounter  EmergencyMedicine Resident     Pt Name:Niko Stallings  MRN: [id number]  Jignagfho 1960  Date of evaluation: 4/5/22  PCP:  Carlton Fragoso MD    [address] [place] [address]       Chief Complaint   Patient presents with    Chest Pain       HISTORY OF PRESENT ILLNESS  (Location/Symptom, Timing/Onset, Context/Setting, Quality, Duration, Modifying Factors, Severity.)      Rah Centeno is a 62 y.o. male who presents to the emergency department with frontal headache, \"the shakes\", midsternal chest pain and epigastric abdominal pain that started at about 5 AM this morning while the patient was resting. He states that he also has developed a headache between the eyes and significant nausea but no vomiting, states that the headache is not the worst headache of his life. 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 He denies any fever, chills, vision changes, HEENT symptoms, abdominal pain anywhere else, vomiting, problems with urination or bowel movements, or new onset numbness or tingling anywhere. No swelling or pain in the legs that he is aware of. Patient does have a history of alcohol abuse with recent psychiatry admission ending yesterday, and states his last drink was yesterday. Received  and nitro per EMS. Echo in 2019 showed LVEF 65%. PAST MEDICAL / SURGICAL / SOCIAL / FAMILY HISTORY      has a past medical history of Depression with suicidal ideation, Glaucoma, Hypertension, and Mixed hyperlipidemia. has no past surgical history on file.     Social History     Socioeconomic History    Marital status: Single     Spouse name: Not on file    Number of children: Not on file    Years of education: Not on file    Highest education level: Not on file   Occupational History    Not on file   Tobacco Use    Smoking status: Light Tobacco Smoker     Types: Cigarettes    Smokeless tobacco: Current User     Types: Chew    Tobacco comment: smokes daily   Vaping Use    Vaping Use: Never used   Substance and Sexual Activity    Alcohol use: Yes     Comment: Daily    Drug use: No    Sexual activity: Not Currently   Other Topics Concern    Not on file   Social History Narrative    Not on file     Social Determinants of Health     Financial Resource Strain:     Difficulty of Paying Living Expenses: Not on file   Food Insecurity:     Worried About Running Out of Food in the Last Year: Not on file    Valentin of Food in the Last Year: Not on file   Transportation Needs:     Lack of Transportation (Medical): Not on file    Lack of Transportation (Non-Medical): Not on file   Physical Activity:     Days of Exercise per Week: Not on file    Minutes of Exercise per Session: Not on file   Stress:     Feeling of Stress : Not on file   Social Connections:     Frequency of Communication with Friends and Family: Not on file    Frequency of Social Gatherings with Friends and Family: Not on file    Attends Denominational Services: Not on file    Active Member of [address] [place] [address] or Organizations: Not on file    Attends Club or Organization Meetings: Not on file    Marital Status: Not on file   Intimate Partner Violence:     Fear of Current or Ex-Partner: Not on file    Emotionally Abused: Not on file    Physically Abused: Not on file    Sexually Abused: Not on file   Housing Stability:     Unable to Pay for Housing in the Last Year: Not on file    Number of [place] in the Last Year: Not on file    Unstable Housing in the Last Year: Not on file       History reviewed. No pertinent family history. Allergies:  Patient has no known allergies. Home Medications:  Prior to Admission medications    Medication Sig Start Date End Date Taking?  Authorizing Provider   mirtazapine (REMERON) 7.5 MG tablet Take 1 tablet by mouth nightly 4/4/22   Cherise Soriano MD sertraline (ZOLOFT) 100 MG tablet Take 1 tablet by mouth daily 4/5/22   Genesis Hooper MD   atorvastatin (LIPITOR) 40 MG tablet Take 1 tablet by mouth nightly 4/1/22   Genesis Hooper MD   brimonidine (ALPHAGAN) 0.2 % ophthalmic solution Place 1 drop into the right eye 3 times daily 4/1/22   Genesis Hooper MD   clopidogrel (PLAVIX) 75 MG tablet Take 1 tablet by mouth daily 4/1/22   Genesis Hooper MD   dorzolamide (TRUSOPT) 2 % ophthalmic solution Place 1 drop into the right eye 2 times daily 4/1/22   Genesis Hooper MD   folic acid (FOLVITE) 1 MG tablet Take 1 tablet by mouth daily 4/1/22   Genesis Hooper MD   hydrOXYzine (ATARAX) 50 MG tablet Take 1 tablet by mouth 3 times daily as needed for Anxiety 4/1/22 4/11/22  Genesis Hooper MD   latanoprost (XALATAN) 0.005 % ophthalmic solution Place 1 drop into the right eye nightly 4/1/22   Genesis Hooper MD   nicotine polacrilex (NICORETTE) 2 MG gum Take 1 each by mouth as needed for Smoking cessation 4/1/22   Genesis Hooper MD   thiamine mononitrate (THIAMINE) 100 MG tablet Take 1 tablet by mouth daily 4/1/22   Genesis Hooper MD   timolol (TIMOPTIC) 0.5 % ophthalmic solution Place 1 drop into the right eye 2 times daily 4/1/22   Genesis Hooper MD   traZODone (DESYREL) 50 MG tablet Take 1 tablet by mouth nightly as needed for Sleep 4/1/22   Genesis Hooper MD   ibuprofen (IBU) 400 MG tablet Take 1 tablet by mouth every 6 hours as needed for Pain 12/20/19   Suad Trotter, DO       REVIEW OF SYSTEMS    (2-9 systems for level 4, 10 or more for level 5)      Review of Systems   Constitutional: Negative for chills and fever. HENT: Negative for ear pain, hearing loss and sore throat. Eyes: Negative for visual disturbance. Respiratory: Positive for shortness of breath. Cardiovascular: Positive for chest pain. Gastrointestinal: Positive for abdominal pain and nausea. Negative for constipation, diarrhea and vomiting.    Genitourinary: Negative for difficulty urinating and dysuria. Musculoskeletal: Negative for arthralgias and myalgias. Neurological: Positive for tremors and headaches. Negative for numbness. Psychiatric/Behavioral: Negative for agitation and confusion. PHYSICAL EXAM   (up to 7 for level 4, 8 or more for level 5)      INITIAL VITALS:   /82   Pulse 64   Temp 98.3 °F (36.8 °C) (Oral)   Resp 15   SpO2 99%     Physical Exam  Vitals and nursing note reviewed. Constitutional:       General: He is not in acute distress. Appearance: Normal appearance. He is well-developed. He is not ill-appearing or diaphoretic. HENT:      Head: Normocephalic and atraumatic. Right Ear: External ear normal.      Left Ear: External ear normal.      Nose: Nose normal.      Mouth/Throat:      Mouth: Mucous membranes are moist.   Eyes:      Extraocular Movements: Extraocular movements intact. Conjunctiva/sclera: Conjunctivae normal.   Neck:      Trachea: No tracheal deviation. Cardiovascular:      Rate and Rhythm: Normal rate and regular rhythm. Heart sounds: Normal heart sounds. No murmur heard. No friction rub. No gallop. Pulmonary:      Effort: Pulmonary effort is normal. No respiratory distress. Breath sounds: Normal breath sounds. No wheezing, rhonchi or rales. Abdominal:      General: Abdomen is flat. There is no distension. Palpations: Abdomen is soft. There is no mass. Tenderness: There is abdominal tenderness (epigastric). There is no guarding or rebound. Musculoskeletal:         General: No swelling, tenderness (no ttp ble), deformity or signs of injury. Normal range of motion. Cervical back: Normal range of motion and neck supple. Right lower leg: No edema. Left lower leg: No edema. Comments: No pitting edema noted at the level of the ankle     Skin:     General: Skin is warm and dry. Coloration: Skin is not jaundiced. Findings: No bruising or lesion. Neurological:      General: No focal deficit present. Mental Status: He is alert and oriented to person, place, and time. Mental status is at baseline. Motor: No abnormal muscle tone. DIFFERENTIAL  DIAGNOSIS     PLAN (LABS / IMAGING / EKG):  Orders Placed This Encounter   Procedures    COVID-19, Rapid    XR CHEST PORTABLE    CBC with Auto Differential    Comprehensive Metabolic Panel w/ Reflex to MG    Lipase    Troponin    Ethanol    Lipase    ADULT DIET;  Clear Liquid    Notify physician    Vital signs per unit routine    Telemetry monitoring - 72 hour duration    Notify physician    Up as tolerated    Place intermittent pneumatic compression device    Full Code    Initiate Oxygen Therapy Protocol    Initiate Oxygen Therapy Protocol    Nasal Cannula Oxygen    EKG 12 Lead    Insert peripheral IV    Place in Observation Service    Alcohol and or drug assessment    Seizure precautions    Fall precautions       MEDICATIONS ORDERED:  Orders Placed This Encounter   Medications    sodium chloride flush 0.9 % injection 5-40 mL    sodium chloride flush 0.9 % injection 5-40 mL    0.9 % sodium chloride infusion    nitroGLYCERIN (NITROSTAT) SL tablet 0.4 mg    atorvastatin (LIPITOR) tablet 40 mg    clopidogrel (PLAVIX) tablet 75 mg    folic acid (FOLVITE) tablet 1 mg    hydrOXYzine (ATARAX) tablet 50 mg    sertraline (ZOLOFT) tablet 100 mg    DISCONTD: vitamin B-1 (THIAMINE) tablet 100 mg    traZODone (DESYREL) tablet 50 mg    0.9 % sodium chloride infusion    sodium chloride flush 0.9 % injection 5-40 mL    sodium chloride flush 0.9 % injection 5-40 mL    0.9 % sodium chloride infusion    OR Linked Order Group     potassium chloride (KLOR-CON M) extended release tablet 40 mEq     potassium bicarb-citric acid (EFFER-K) effervescent tablet 40 mEq     potassium chloride 10 mEq/100 mL IVPB (Peripheral Line)    enoxaparin (LOVENOX) injection 40 mg    ondansetron [facility]) injection 4 mg    polyethylene glycol (GLYCOLAX) packet 17 g    OR Linked Order Group     acetaminophen (TYLENOL) tablet 650 mg     acetaminophen (TYLENOL) suppository 650 mg    aluminum & magnesium hydroxide-simethicone (MAALOX) 200-200-20 MG/5ML suspension 30 mL    thiamine tablet 100 mg       DDX: Michaelle Ramirez is a 62 y.o. male who presents to the emergency department with tremors, chest pain, shortness of breath for 1 day.  Differential diagnosis includes alcohol withdrawal, ACS, pneumonia, pneumothorax, musculoskeletal chest wall pain, anxiety state, tension headache, alcoholic pancreatitis    DIAGNOSTIC RESULTS / EMERGENCY DEPARTMENT COURSE / MDM   LAB RESULTS:  Results for orders placed or performed during the hospital encounter of 04/05/22   CBC with Auto Differential   Result Value Ref Range    WBC 7.0 3.5 - 11.3 k/uL    RBC 4.47 4.21 - 5.77 m/uL    Hemoglobin 15.0 13.0 - 17.0 g/dL    Hematocrit 43.8 40.7 - 50.3 %    MCV 98.0 82.6 - 102.9 fL    MCH 33.6 (H) 25.2 - 33.5 pg    MCHC 34.2 28.4 - 34.8 g/dL    RDW 12.4 11.8 - 14.4 %    Platelets 382 078 - 204 k/uL    MPV 9.2 8.1 - 13.5 fL    NRBC Automated 0.0 0.0 per 100 WBC    Immature Granulocytes 1 (H) 0 %    Seg Neutrophils 84 (H) 36 - 65 %    Lymphocytes 9 (L) 24 - 43 %    Monocytes 6 3 - 12 %    Eosinophils % 0 (L) 1 - 4 %    Basophils 0 0 - 2 %    Absolute Immature Granulocyte 0.07 0.00 - 0.30 k/uL    Segs Absolute 5.88 1.50 - 8.10 k/uL    Absolute Lymph # 0.63 (L) 1.10 - 3.70 k/uL    Absolute Mono # 0.42 0.10 - 1.20 k/uL    Absolute Eos # 0.00 0.00 - 0.44 k/uL    Basophils Absolute 0.00 0.00 - 0.20 k/uL    Morphology Normal    Comprehensive Metabolic Panel w/ Reflex to MG   Result Value Ref Range    Glucose 104 (H) 70 - 99 mg/dL    BUN 14 8 - 23 mg/dL    CREATININE 0.71 0.70 - 1.20 mg/dL    Calcium 8.6 8.6 - 10.4 mg/dL    Sodium 139 135 - 144 mmol/L    Potassium 4.2 3.7 - 5.3 mmol/L    Chloride 103 98 - 107 mmol/L    CO2 26 20 - 31 mmol/L Anion Gap 10 9 - 17 mmol/L    Alkaline Phosphatase 43 40 - 129 U/L    ALT 38 5 - 41 U/L    AST 33 <40 U/L    Total Bilirubin 0.47 0.3 - 1.2 mg/dL    Total Protein 6.9 6.4 - 8.3 g/dL    Albumin 4.2 3.5 - 5.2 g/dL    Albumin/Globulin Ratio 1.6 1.0 - 2.5    GFR Non-African American >60 >60 mL/min    GFR African American >60 >60 mL/min    GFR Comment         Lipase   Result Value Ref Range    Lipase 223 (H) 13 - 60 U/L   Troponin   Result Value Ref Range    Troponin, High Sensitivity <6 0 - 22 ng/L   Troponin   Result Value Ref Range    Troponin, High Sensitivity <6 0 - 22 ng/L   Ethanol   Result Value Ref Range    Ethanol <10 <10 mg/dL    Ethanol percent <0.010 <0.010 %       IMPRESSION: Brianna Naidu is a 62 y.o. male who presents to the emergency department with tremors, chest pain, shortness of breath for 1 day. On examination he is afebrile, vital signs unremarkable examination demonstrates a pale appearing male of stated age with normal heart and lung sounds and epigastric mild tenderness to palpation. We will check ethanol level given the patient was just discharged yesterday for alcohol abuse, and we will also check cardiac and abdominal work-up for ACS and pancreatitis, respectively. Plan for p.o. challenge, close observation, CIWA scale. Patient verbalizes understanding agreement with plan. RADIOLOGY:  No results found. EKG  EKG Interpretation    Interpreted by emergency department physician    Rhythm: normal sinus   Rate: normal  Axis: Left  Ectopy: none  Conduction: Left anterior fascicular block  ST Segments: no acute change  T Waves: no acute change  Q Waves: none    Clinical Impression: no acute changes and normal EKG    Antonio Jones MD    All EKG's are interpreted by the Emergency Department Physician who either signs or co-signs this chart in the absence of a cardiologist.    EMERGENCY DEPARTMENT COURSE:  ED Course as of 04/05/22 1438   Tue Apr 05, 2022   1255 Lipase(!): 223  Consistent with pancreatitis [TS]   7553 Patient condition unchanged, resting comfortably. Offered observation versus discharge with symptomatic care for acute pancreatitis and patient elected to be discharged if he can tolerate p.o. in the emergency department. [TS]   [id number] For the observation resident this is a 52-year-old male with history of alcohol abuse and alcoholic pancreatitis who presents for exacerbation of alcoholic pancreatitis after being discharged from [facility] yesterday. Work-up is significant for pancreatitis, negative for cardiac causes of his chest pain. Discharge was offered but patient was not comfortable secondary to inability to tolerate p.o. He is placed in observation for antiemetics, pain control if required, and clear liquid diet. [TS]      ED Course User Index  [TS] Lavanda Lesches, MD       PROCEDURES:  None    CONSULTS:  None    CRITICAL CARE:  None    FINAL IMPRESSION      1. Alcohol-induced acute pancreatitis, unspecified complication status          DISPOSITION / PLAN     DISPOSITION Admitted 04/05/2022 02:09:52 PM      PATIENT REFERRED TO:  Jose Tobar MD  [address] [address]  [phone]    Schedule an appointment as soon as possible for a visit in 1 week  For followup [facility] ED  [address] [address]  [phone]  Go to   As needed, If symptoms worsen      DISCHARGE MEDICATIONS:  New Prescriptions    No medications on file       Lavanda Lesches, MD  Emergency Medicine Resident    This patient was evaluated in the Emergency Department for symptoms described in the history of present illness. He/she was evaluated in the context of the global COVID-19 pandemic, which necessitated consideration that the patient might be at risk for infection with the SARS-CoV-2 virus that causes COVID-19.  Institutional protocols and algorithms that pertain to the evaluation of patients at risk for COVID-19 are in a state of rapid change based on information released by regulatory bodies including the CDC and federal and state organizations. These policies and algorithms were followed during the patient's care in the ED.     (Please note that portions of thisnote were completed with a voice recognition program.  Efforts were made to edit the dictations but occasionally words are mis-transcribed.)       Jolly Whiteside MD  Resident  04/05/22 [address] [place] [address] Elizabeth Doyle MD  Resident  04/05/22 [address] [place] [address] Elizabeth Doyle MD  Resident  04/05/22 2423

## 2022-04-05 NOTE — ED NOTES
Pt. Presents to the ED for midsternal chest pain since 0500 this am. Pt states that the pain has not gotten better or worse. Pt given 324 asa and 1 nitro en route with no change in pain. Pt placed on full cardiac monitor. Pt ekg, line, and labs completed. Pt resp even and non labored. Pt a&ox4. Will continue with plan of care.       Nayla Robledo RN  04/05/22 [id number]

## 2022-04-06 LAB
ABSOLUTE EOS #: 0.08 K/UL (ref 0–0.44)
ABSOLUTE IMMATURE GRANULOCYTE: <0.03 K/UL (ref 0–0.3)
ABSOLUTE LYMPH #: 1.01 K/UL (ref 1.1–3.7)
ABSOLUTE MONO #: 0.45 K/UL (ref 0.1–1.2)
ALBUMIN SERPL-MCNC: 3.4 G/DL (ref 3.5–5.2)
ALBUMIN/GLOBULIN RATIO: 1.4 (ref 1–2.5)
ALP BLD-CCNC: 37 U/L (ref 40–129)
ALT SERPL-CCNC: 27 U/L (ref 5–41)
ANION GAP SERPL CALCULATED.3IONS-SCNC: 9 MMOL/L (ref 9–17)
AST SERPL-CCNC: 22 U/L
BASOPHILS # BLD: 1 % (ref 0–2)
BASOPHILS ABSOLUTE: <0.03 K/UL (ref 0–0.2)
BILIRUB SERPL-MCNC: 0.68 MG/DL (ref 0.3–1.2)
BUN BLDV-MCNC: 11 MG/DL (ref 8–23)
CALCIUM SERPL-MCNC: 8.1 MG/DL (ref 8.6–10.4)
CHLORIDE BLD-SCNC: 110 MMOL/L (ref 98–107)
CO2: 23 MMOL/L (ref 20–31)
CREAT SERPL-MCNC: 0.66 MG/DL (ref 0.7–1.2)
EOSINOPHILS RELATIVE PERCENT: 2 % (ref 1–4)
GFR AFRICAN AMERICAN: >60 ML/MIN
GFR NON-AFRICAN AMERICAN: >60 ML/MIN
GFR SERPL CREATININE-BSD FRML MDRD: ABNORMAL ML/MIN/{1.73_M2}
GLUCOSE BLD-MCNC: 89 MG/DL (ref 70–99)
HCT VFR BLD CALC: 40.7 % (ref 40.7–50.3)
HEMOGLOBIN: 13.6 G/DL (ref 13–17)
IMMATURE GRANULOCYTES: 1 %
LIPASE: 185 U/L (ref 13–60)
LYMPHOCYTES # BLD: 27 % (ref 24–43)
MAGNESIUM: 1.9 MG/DL (ref 1.6–2.6)
MCH RBC QN AUTO: 32.9 PG (ref 25.2–33.5)
MCHC RBC AUTO-ENTMCNC: 33.4 G/DL (ref 28.4–34.8)
MCV RBC AUTO: 98.5 FL (ref 82.6–102.9)
MONOCYTES # BLD: 12 % (ref 3–12)
NRBC AUTOMATED: 0 PER 100 WBC
PDW BLD-RTO: 12.4 % (ref 11.8–14.4)
PLATELET # BLD: 159 K/UL (ref 138–453)
PMV BLD AUTO: 8.6 FL (ref 8.1–13.5)
POTASSIUM SERPL-SCNC: 3.9 MMOL/L (ref 3.7–5.3)
RBC # BLD: 4.13 M/UL (ref 4.21–5.77)
SEG NEUTROPHILS: 59 % (ref 36–65)
SEGMENTED NEUTROPHILS ABSOLUTE COUNT: 2.22 K/UL (ref 1.5–8.1)
SODIUM BLD-SCNC: 142 MMOL/L (ref 135–144)
TOTAL PROTEIN: 5.8 G/DL (ref 6.4–8.3)
WBC # BLD: 3.8 K/UL (ref 3.5–11.3)

## 2022-04-06 PROCEDURE — 96361 HYDRATE IV INFUSION ADD-ON: CPT

## 2022-04-06 PROCEDURE — 6370000000 HC RX 637 (ALT 250 FOR IP): Performed by: HEALTH CARE PROVIDER

## 2022-04-06 PROCEDURE — 6370000000 HC RX 637 (ALT 250 FOR IP)

## 2022-04-06 PROCEDURE — 2580000003 HC RX 258: Performed by: EMERGENCY MEDICINE

## 2022-04-06 PROCEDURE — 80053 COMPREHEN METABOLIC PANEL: CPT

## 2022-04-06 PROCEDURE — 85025 COMPLETE CBC W/AUTO DIFF WBC: CPT

## 2022-04-06 PROCEDURE — 83735 ASSAY OF MAGNESIUM: CPT

## 2022-04-06 PROCEDURE — 83690 ASSAY OF LIPASE: CPT

## 2022-04-06 PROCEDURE — 6360000002 HC RX W HCPCS: Performed by: EMERGENCY MEDICINE

## 2022-04-06 PROCEDURE — 36415 COLL VENOUS BLD VENIPUNCTURE: CPT

## 2022-04-06 PROCEDURE — 6370000000 HC RX 637 (ALT 250 FOR IP): Performed by: EMERGENCY MEDICINE

## 2022-04-06 PROCEDURE — 1200000000 HC SEMI PRIVATE

## 2022-04-06 PROCEDURE — 96374 THER/PROPH/DIAG INJ IV PUSH: CPT

## 2022-04-06 RX ORDER — ONDANSETRON 4 MG/1
4 TABLET, ORALLY DISINTEGRATING ORAL EVERY 8 HOURS PRN
Qty: 20 TABLET | Refills: 0 | Status: SHIPPED | OUTPATIENT
Start: 2022-04-06 | End: 2022-05-25

## 2022-04-06 RX ORDER — BRIMONIDINE TARTRATE 2 MG/ML
1 SOLUTION/ DROPS OPHTHALMIC 3 TIMES DAILY
Status: DISCONTINUED | OUTPATIENT
Start: 2022-04-06 | End: 2022-04-15 | Stop reason: HOSPADM

## 2022-04-06 RX ORDER — THIAMINE MONONITRATE (VIT B1) 100 MG
100 TABLET ORAL DAILY
Qty: 30 TABLET | Refills: 0 | Status: ON HOLD | OUTPATIENT
Start: 2022-04-06 | End: 2022-05-31 | Stop reason: SDUPTHER

## 2022-04-06 RX ORDER — TIMOLOL MALEATE 5 MG/ML
1 SOLUTION/ DROPS OPHTHALMIC 2 TIMES DAILY
Status: DISCONTINUED | OUTPATIENT
Start: 2022-04-06 | End: 2022-04-15 | Stop reason: HOSPADM

## 2022-04-06 RX ORDER — LATANOPROST 50 UG/ML
1 SOLUTION/ DROPS OPHTHALMIC NIGHTLY
Status: DISCONTINUED | OUTPATIENT
Start: 2022-04-06 | End: 2022-04-15 | Stop reason: HOSPADM

## 2022-04-06 RX ORDER — DORZOLAMIDE HCL 20 MG/ML
1 SOLUTION/ DROPS OPHTHALMIC 2 TIMES DAILY
Status: DISCONTINUED | OUTPATIENT
Start: 2022-04-06 | End: 2022-04-15 | Stop reason: HOSPADM

## 2022-04-06 RX ORDER — MAGNESIUM HYDROXIDE/ALUMINUM HYDROXICE/SIMETHICONE 120; 1200; 1200 MG/30ML; MG/30ML; MG/30ML
30 SUSPENSION ORAL EVERY 6 HOURS PRN
Qty: 355 ML | Refills: 0 | Status: SHIPPED | OUTPATIENT
Start: 2022-04-06 | End: 2022-05-25

## 2022-04-06 RX ADMIN — ACETAMINOPHEN 1000 MG: 500 TABLET ORAL at 08:19

## 2022-04-06 RX ADMIN — ATORVASTATIN CALCIUM 40 MG: 80 TABLET, FILM COATED ORAL at 19:34

## 2022-04-06 RX ADMIN — ACETAMINOPHEN 1000 MG: 500 TABLET ORAL at 14:56

## 2022-04-06 RX ADMIN — SERTRALINE 100 MG: 50 TABLET, FILM COATED ORAL at 08:24

## 2022-04-06 RX ADMIN — NICOTINE POLACRILEX 2 MG: 2 GUM, CHEWING BUCCAL at 14:56

## 2022-04-06 RX ADMIN — FOLIC ACID 1 MG: 1 TABLET ORAL at 08:25

## 2022-04-06 RX ADMIN — BRIMONIDINE TARTRATE 1 DROP: 2 SOLUTION OPHTHALMIC at 23:08

## 2022-04-06 RX ADMIN — ONDANSETRON 4 MG: 2 INJECTION INTRAMUSCULAR; INTRAVENOUS at 14:55

## 2022-04-06 RX ADMIN — NICOTINE POLACRILEX 2 MG: 2 GUM, CHEWING BUCCAL at 16:18

## 2022-04-06 RX ADMIN — NICOTINE POLACRILEX 2 MG: 2 GUM, CHEWING BUCCAL at 19:38

## 2022-04-06 RX ADMIN — SODIUM CHLORIDE: 9 INJECTION, SOLUTION INTRAVENOUS at 10:53

## 2022-04-06 RX ADMIN — MIRTAZAPINE 7.5 MG: 15 TABLET, FILM COATED ORAL at 19:34

## 2022-04-06 RX ADMIN — CLOPIDOGREL 75 MG: 75 TABLET, FILM COATED ORAL at 08:24

## 2022-04-06 RX ADMIN — ACETAMINOPHEN 1000 MG: 500 TABLET ORAL at 23:08

## 2022-04-06 RX ADMIN — Medication 100 MG: at 08:24

## 2022-04-06 RX ADMIN — LATANOPROST 1 DROP: 50 SOLUTION OPHTHALMIC at 23:08

## 2022-04-06 RX ADMIN — DORZOLAMIDE HYDROCHLORIDE 1 DROP: 20 SOLUTION/ DROPS OPHTHALMIC at 23:08

## 2022-04-06 RX ADMIN — TIMOLOL MALEATE 1 DROP: 5 SOLUTION OPHTHALMIC at 23:08

## 2022-04-06 RX ADMIN — NICOTINE POLACRILEX 2 MG: 2 GUM, CHEWING BUCCAL at 08:24

## 2022-04-06 ASSESSMENT — PAIN SCALES - GENERAL
PAINLEVEL_OUTOF10: 4
PAINLEVEL_OUTOF10: 3
PAINLEVEL_OUTOF10: 4
PAINLEVEL_OUTOF10: 3

## 2022-04-06 ASSESSMENT — PAIN DESCRIPTION - LOCATION
LOCATION: CHEST
LOCATION: CHEST

## 2022-04-06 ASSESSMENT — PAIN DESCRIPTION - FREQUENCY: FREQUENCY: CONTINUOUS

## 2022-04-06 ASSESSMENT — PAIN DESCRIPTION - PROGRESSION
CLINICAL_PROGRESSION: NOT CHANGED

## 2022-04-06 ASSESSMENT — PAIN DESCRIPTION - DESCRIPTORS: DESCRIPTORS: ACHING

## 2022-04-06 ASSESSMENT — PAIN DESCRIPTION - ONSET: ONSET: ON-GOING

## 2022-04-06 ASSESSMENT — PAIN - FUNCTIONAL ASSESSMENT: PAIN_FUNCTIONAL_ASSESSMENT: ACTIVITIES ARE NOT PREVENTED

## 2022-04-06 ASSESSMENT — PAIN DESCRIPTION - ORIENTATION
ORIENTATION: MID
ORIENTATION: MID

## 2022-04-06 ASSESSMENT — PAIN DESCRIPTION - PAIN TYPE: TYPE: ACUTE PAIN

## 2022-04-06 NOTE — H&P
[facility]  CDU / OBSERVATION eNCOUnter  Resident Note     Pt Name: Elissa Fry  MRN: [id number]  [place] 1960  Date of evaluation: 4/6/22  Patient's PCP is :  Tanna Whitten MD    CHIEF COMPLAINT       Chief Complaint   Patient presents with    Chest Pain     HISTORY OF PRESENT ILLNESS    Elissa Fry is a 62 y.o. male who presents headache, midsternal nonradiating chest pain, epigastric abdominal pain, nausea and tremulousness. Patient states his symptoms began simultaneously around 5 am this morning. He was just discharged from psychiatric admission yesterday, and has a history of chronic alcohol use. He states his last drink was yesterday. He states he is primarily concerned about the epigastric pain, nausea and vomiting. He describes the chest pain as upper epigastric abdominal pain, nonradiating, worsening with meals. He denies shortness of breath, changes in vision, syncope, lightheadedness, palpitations, diarrhea, fevers, chills, numbness, tingling, weakness, seizure like activity. In the ED, his lipase was 223, was found to have pancreatitis and was brought to observation unit for further management of pancreatitis. Location/Symptom: Epigastric pain, nausea  Timing/Onset: Yesterday  Provocation: Worse with food  Quality: Burning  Radiation: None  Severity: 7/10  Timing/Duration: Intermittent  Modifying Factors:  Worsened with food intake    REVIEW OF SYSTEMS       General ROS - No fevers, No malaise   Ophthalmic ROS - No discharge, No changes in vision  ENT ROS -  No sore throat, No rhinorrhea,   Respiratory ROS - no shortness of breath, no cough, no  wheezing  Cardiovascular ROS - No chest pain, no dyspnea on exertion  Gastrointestinal ROS - + abdominal pain, + nausea + vomiting, no change in bowel habits, no black or bloody stools  Genito-Urinary ROS - No dysuria, trouble voiding, or hematuria  Musculoskeletal ROS - No myalgias, No arthalgias  Neurological ROS - + headache, no dizziness/lightheadedness, No focal weakness, no loss of sensation  Dermatological ROS - No lesions, No rash     (PQRS) Advance directives on face sheet per hospital policy. No change unless specifically mentioned in chart    PAST MEDICAL HISTORY    has a past medical history of Depression with suicidal ideation, Glaucoma, Hypertension, and Mixed hyperlipidemia. I have reviewed the past medical history with the patient and it is pertinent to this complaint. SURGICAL HISTORY      has no past surgical history on file. I have reviewed and agree with Surgical History entered and it is not pertinent to this complaint. CURRENT MEDICATIONS     sodium chloride flush 0.9 % injection 5-40 mL, 2 times per day  sodium chloride flush 0.9 % injection 5-40 mL, PRN  0.9 % sodium chloride infusion, PRN  atorvastatin (LIPITOR) tablet 40 mg, Nightly  clopidogrel (PLAVIX) tablet 75 mg, Daily  folic acid (FOLVITE) tablet 1 mg, Daily  hydrOXYzine (ATARAX) tablet 50 mg, TID PRN  sertraline (ZOLOFT) tablet 100 mg, Daily  traZODone (DESYREL) tablet 50 mg, Nightly PRN  0.9 % sodium chloride infusion, Continuous  sodium chloride flush 0.9 % injection 5-40 mL, 2 times per day  sodium chloride flush 0.9 % injection 5-40 mL, PRN  0.9 % sodium chloride infusion, PRN  enoxaparin (LOVENOX) injection 40 mg, Daily  ondansetron (ZOFRAN) injection 4 mg, Q4H PRN  polyethylene glycol (GLYCOLAX) packet 17 g, Daily PRN  aluminum & magnesium hydroxide-simethicone (MAALOX) 200-200-20 MG/5ML suspension 30 mL, Q6H PRN  thiamine tablet 100 mg, Daily  acetaminophen (TYLENOL) tablet 1,000 mg, 3 times per day  mirtazapine (REMERON) tablet 7.5 mg, Nightly  nicotine polacrilex (NICORETTE) gum 2 mg, Q1H PRN        All medication charted and reviewed. ALLERGIES     has No Known Allergies. FAMILY HISTORY     has no family status information on file. family history is not on file.   The patient denies any pertinent family history. I have reviewed and agree with the family history entered. I have reviewed the Family History and it is not significant to the case    SOCIAL HISTORY      reports that he has been smoking cigarettes. His smokeless tobacco use includes chew. He reports current alcohol use. He reports that he does not use drugs. I have reviewed and agree with all Social.  There are concerns for substance abuse/use. PHYSICAL EXAM     INITIAL VITALS:  temperature is 98.7 °F (37.1 °C). His blood pressure is 121/78 and his pulse is 59. His respiration is 16 and oxygen saturation is 97%. CONSTITUTIONAL: AOx4, no apparent distress, appears chronically ill   HEAD: normocephalic, atraumatic   EYES: PERRLA, EOMI    ENT: moist mucous membranes, uvula midline   NECK: supple, symmetric   BACK: symmetric   LUNGS: clear to auscultation bilaterally   CARDIOVASCULAR: regular rate and rhythm, no murmurs, rubs or gallops   ABDOMEN: soft, non-distended with normal active bowel sounds. Mildly tender to palpation in epigastric region.  No rebound, rigidity or guarding   NEUROLOGIC:  MAEx4, no focal sensory or motor deficits   MUSCULOSKELETAL: no clubbing, cyanosis or edema   SKIN: no rash or wounds       DIFFERENTIAL DIAGNOSIS/MDM:   Headache:  DDX: SAH, subdural hematoma, epidural, intracerebral, meningitis, encephalitis, migraine, tension, cluster, sinusitis, dental, stroke, encephalitis, abscess, CNS mass, increased ICP, venous thrombosis, carbon monoxide poisoning, acute angle closure glaucoma, temporal arteritis, idiopathic intracranial hypertension    Abdominal Pain:  DDX: GERD, PUD, pancreatitis, cholecystitis, GB colic, cholangitis, Obxj-Oian-Ltfssn, ACS/ MI, pneumonia, SBO, DKA, AAA, mesenteric ischemia, perforated viscous, acute gastroenteritis, NSAP, pyelonephritis, kidney stone, appendicitis, hernia, D-TICS, testicular torsion, ectopic, ovarian torsion, ovarian cyst, PID, Mittelschmerz, period/ fibroid, UTI, constipation, epididymitis/ orchitis  Ransons criteria: WBC>16, age >49, glucose>200, AST>80, LDH>350  Evaluate for: EtOH abuse, ACS risk factors, point tenderness, rebound, guardingm Cummins sign, GB US (stone, sono Cummins, wall thick>3mm, CBD>6mm)    Garcia Score: (appendicitis)  1. Abdominal pain (RLQ)     2  2. Anorexia (loss of appetite) or ketones in the urine  1  3. Nausea or vomiting      1  4. Migration to R iliac fossa     1  5. Rebound tenderness     1  6. Fever of 37.3 °C/ 99.1 F +     1  7. Leukocytosis > 19848 WBC    2  8. Neutrophilia, or an increase in % of neutrophils in WBC 1  Total: 1 /10    (<3 no CT, 4-6 CT, 7-8 Surgery consult, 5-6 is consistent with diagnosis of acute appendicitis, 7-8 indicates a probable appendicitis, 9-10 indicates a very probable acute appendicitis)    BISAP Score: (pancreatitis)  BUN >25           1  Impaired mental status        1  SIRS criteria (HR >90, T 100.4, 36, RR >20/ CO2 <32, WBC >12, <4)  1  Age >60          1   Pleural Effusion          1  Total: 0    (Patients with a BISAP Score >0 had an increasing risk of mortality, with mortality increasing significantly with a score of 3 or greater.  A score of 5 had a mortality rate of 22%.)    DIAGNOSTIC RESULTS     EKG: All EKG's are interpreted by the Observation Physician who either signs or Co-signs this chart in the absence of a cardiologist.    EKG Interpretation    Interpreted by observation physician    Rhythm: normal sinus   Rate: normal  Axis: left  Ectopy: none  Conduction: no acute change  ST Segments: no acute change  T Waves: no acute change  Q Waves: none    Clinical Impression: non-specific EKG    Marion Elizalde MD      RADIOLOGY:   I directly visualized the following  images and reviewed the radiologist interpretations:    XR CHEST PORTABLE    Result Date: 4/5/2022  EXAMINATION: ONE XRAY VIEW OF THE CHEST 4/5/2022 12:00 pm COMPARISON: 03/06/2019 HISTORY: ORDERING SYSTEM PROVIDED HISTORY: Epigastric and midsternal pain with SOB TECHNOLOGIST PROVIDED HISTORY: Epigastric and midsternal pain with SOB Reason for Exam: upright port FINDINGS: The lungs are without acute focal process. There is no effusion or pneumothorax. The cardiomediastinal silhouette is stable. The osseous structures are stable. No acute process. LABS:  I have reviewed and interpreted all available lab results.   Labs Reviewed   CBC WITH AUTO DIFFERENTIAL - Abnormal; Notable for the following components:       Result Value    MCH 33.6 (*)     Immature Granulocytes 1 (*)     Seg Neutrophils 84 (*)     Lymphocytes 9 (*)     Eosinophils % 0 (*)     Absolute Lymph # 0.63 (*)     All other components within normal limits   COMPREHENSIVE METABOLIC PANEL W/ REFLEX TO MG FOR LOW K - Abnormal; Notable for the following components:    Glucose 104 (*)     All other components within normal limits   LIPASE - Abnormal; Notable for the following components:    Lipase 223 (*)     All other components within normal limits   CBC WITH AUTO DIFFERENTIAL - Abnormal; Notable for the following components:    RBC 4.13 (*)     Immature Granulocytes 1 (*)     Absolute Lymph # 1.01 (*)     All other components within normal limits   COMPREHENSIVE METABOLIC PANEL - Abnormal; Notable for the following components:    CREATININE 0.66 (*)     Calcium 8.1 (*)     Chloride 110 (*)     Alkaline Phosphatase 37 (*)     Total Protein 5.8 (*)     Albumin 3.4 (*)     All other components within normal limits   LIPASE - Abnormal; Notable for the following components:    Lipase 185 (*)     All other components within normal limits   COVID-19, RAPID   COVID-19, RAPID   TROPONIN   TROPONIN   ETHANOL   MAGNESIUM       SCREENING TOOLS:    HEART Risk Score for Chest Pain Patients   History and Physical Exam Suspicion Level  (Nausea, Vomiting, Diaphoresis, Radiation, Exertion)   Slightly Suspicious (0 pts)   Moderately Suspicious (1 pt)   Highly Suspicious (2 pts)   EKG Interpretation   Normal (0 pts)   Non-Specific Repolarization Disturbance (1 pt)   Significant ST-Depression (2 pts)   Age of Patient (in years)   = 39 (0 pts)   46-64 (1 pt)   = 65 (2 pts)   Risk Factors   No Risk Factors (0 pts)   1-2 Risk Factors (1 pt)   = 3 Risk Factors (2 pts)   Risk Factors Include:   Hypercholesterolemia   Hypertension   Diabetes Mellitus   Cigarette smoking   Positive family history   Obesity   CAD   (SLE, CKDz, HIV, Cocaine abuse)   Troponin Levels   = Normal Limit (0 pts)   1-3 Times Normal Limit (1 pt)   > 3 Times Normal Limit (2 pts)  TOTAL:    Percent Risk for Major Adverse Cardiac Event (MACE)  0-3 pts indicates low risk for MACE   2.5% (DISCHARGE)   4-7 pts indicates moderate risk for MACE  20.3% (OBS)  8-10 pts indicates high risk for MACE  72.7% (EARLY INVASIVE TX)    CDU IMPRESSION / PLAN      Laila Molina is a 62 y.o. male who presents with epigastric pain, nausea, and mild diffuse headache. Patient was just discharged from psychiatric admission yesterday, has a historic of chronic alcohol use and states his last drink was yesterday. Was found to have pancreatitis, initial lipase 223. Repeat lipase after initiation of IVF and pain control was 185. Patient brought to observation unit for fluid rehydration and further monitoring.     · IV  ml/hr  · Advance diet as tolerated  · Continue home medications and pain control  · Monitor vitals, labs, and imaging  · DISPO: pending consults and clinical improvement    CONSULTS:    None    PROCEDURES:  Not indicated      PATIENT REFERRED TO:    Dipika Armando MD  [address] [address]  [phone]    Schedule an appointment as soon as possible for a visit in 1 week  For followup    [facility]  [address] [address]  [phone]  Go to   As needed, If symptoms worsen      --  Nuha Bhakta MD   Emergency Medicine Resident     This dictation was generated by voice recognition computer software. Although all attempts are made to edit the dictation for accuracy, there may be errors in the transcription that are not intended.

## 2022-04-06 NOTE — CARE COORDINATION
Received social work consult from , pt being discharged and not able to return to [address]. Met with pt who states that he was staying in an abandoned [facility] prior to admission because he was kicked out of [facility] for drinking and threatening staff. Pt has also been banned from [address]. Pt does not have an ID so he is unable to go to [facility]. No other available shelters in the area and pt is not interested in going to a shelter out of town. Pt plans to return to the abandoned [facility] he was living in on [address] [facility] states his belongings are there and would like a cab to take him to [facility]. Pt does have a brother who lives in [place] however, it is not an option to stay with his brother. Pt just recently discharged from [facility] on 4/3. Pt has follow up appointment at [facility] which was arranged by [facility]. Pt plans to follow up with [facility] and has been working on getting an ID. Pt denies suicidal ideations at this time. Pt states that he also follows up at the [facility] and gets medications filled through the clinic.  to arrange cab at discharge.

## 2022-04-06 NOTE — CARE COORDINATION
[place] [facility] Flow/Interdisciplinary Rounds Progress Note    Quality Flow Rounds held on April 6, 2022 at [address] Attending:  Bedside Nurse,  and Nursing Unit Leadership      Readmission Risk              Risk of Unplanned Readmission:  0           Discussed patient goal for the day, patient clinical progression, and barriers to discharge.   The following Goal(s) of the Day/Commitment(s) have been identified:    Visually impaired   Discharged  Per Bebe Vargas RN patient is banned from [facility] and [facility] consult    16:15 Discharge on hold   Patient changed to inpatient    Tu Sena RN  April 6, 2022

## 2022-04-06 NOTE — PROGRESS NOTES
[facility]  CDU / OBSERVATION ENCOUNTER  ATTENDING NOTE       I performed a history and physical examination of the patient and discussed management with the resident or midlevel provider. I reviewed the resident or midlevel provider's note and agree with the documented findings and plan of care. Any areas of disagreement are noted on the chart. I was personally present for the key portions of any procedures. I have documented in the chart those procedures where I was not present during the key portions. I have reviewed the nurses notes. I agree with the chief complaint, past medical history, past surgical history, allergies, medications, social and family history as documented unless otherwise noted below. The Family history, social history, and ROS are effectively unchanged since admission unless noted elsewhere in the chart. Patient with alcoholism. Patient with pancreatitis. Patient is homeless. Patient presents with elevated lipase after drinking. Patient drinks nearly a gallon a day. Patient has never had with feel shaky or with altered mental status now. Patient is with ongoing discomfort and difficulty handling p.o. Patient unable to handle oral fluids throughout the day. Will need IV fluids overnight. Will need ongoing analgesics and antiemetics.   Patient for reevaluation tomorrow    Андрей Wilder MD  Attending Emergency  Physician

## 2022-04-06 NOTE — PROGRESS NOTES
CLINICAL PHARMACY NOTE: MEDS TO BEDS    Total # of Prescriptions Filled: 3   The following medications were delivered to the patient:  · REY LANTA   · ZOFRAN 4MG ODT   · VIT B 100     Additional Documentation:    MEDS DELIVERED TO PT IN ROOM 33-2, NO COPAY

## 2022-04-07 LAB
ABSOLUTE EOS #: 0.09 K/UL (ref 0–0.44)
ABSOLUTE IMMATURE GRANULOCYTE: <0.03 K/UL (ref 0–0.3)
ABSOLUTE LYMPH #: 1.02 K/UL (ref 1.1–3.7)
ABSOLUTE MONO #: 0.41 K/UL (ref 0.1–1.2)
ALBUMIN SERPL-MCNC: 3.2 G/DL (ref 3.5–5.2)
ALBUMIN/GLOBULIN RATIO: 1.5 (ref 1–2.5)
ALP BLD-CCNC: 36 U/L (ref 40–129)
ALT SERPL-CCNC: 22 U/L (ref 5–41)
ANION GAP SERPL CALCULATED.3IONS-SCNC: 8 MMOL/L (ref 9–17)
AST SERPL-CCNC: 17 U/L
BASOPHILS # BLD: 0 % (ref 0–2)
BASOPHILS ABSOLUTE: <0.03 K/UL (ref 0–0.2)
BILIRUB SERPL-MCNC: 0.53 MG/DL (ref 0.3–1.2)
BUN BLDV-MCNC: 7 MG/DL (ref 8–23)
CALCIUM SERPL-MCNC: 7.8 MG/DL (ref 8.6–10.4)
CHLORIDE BLD-SCNC: 113 MMOL/L (ref 98–107)
CO2: 22 MMOL/L (ref 20–31)
CREAT SERPL-MCNC: 0.65 MG/DL (ref 0.7–1.2)
EKG ATRIAL RATE: 69 BPM
EKG P AXIS: 49 DEGREES
EKG P-R INTERVAL: 160 MS
EKG Q-T INTERVAL: 416 MS
EKG QRS DURATION: 90 MS
EKG QTC CALCULATION (BAZETT): 445 MS
EKG R AXIS: -55 DEGREES
EKG T AXIS: 54 DEGREES
EKG VENTRICULAR RATE: 69 BPM
EOSINOPHILS RELATIVE PERCENT: 2 % (ref 1–4)
GFR AFRICAN AMERICAN: >60 ML/MIN
GFR NON-AFRICAN AMERICAN: >60 ML/MIN
GFR SERPL CREATININE-BSD FRML MDRD: ABNORMAL ML/MIN/{1.73_M2}
GLUCOSE BLD-MCNC: 89 MG/DL (ref 70–99)
HCT VFR BLD CALC: 41.9 % (ref 40.7–50.3)
HEMOGLOBIN: 13.9 G/DL (ref 13–17)
IMMATURE GRANULOCYTES: 1 %
LIPASE: 240 U/L (ref 13–60)
LYMPHOCYTES # BLD: 28 % (ref 24–43)
MAGNESIUM: 1.9 MG/DL (ref 1.6–2.6)
MCH RBC QN AUTO: 32.9 PG (ref 25.2–33.5)
MCHC RBC AUTO-ENTMCNC: 33.2 G/DL (ref 28.4–34.8)
MCV RBC AUTO: 99.1 FL (ref 82.6–102.9)
MONOCYTES # BLD: 11 % (ref 3–12)
NRBC AUTOMATED: 0 PER 100 WBC
PDW BLD-RTO: 12.2 % (ref 11.8–14.4)
PLATELET # BLD: 160 K/UL (ref 138–453)
PMV BLD AUTO: 8.4 FL (ref 8.1–13.5)
POTASSIUM SERPL-SCNC: 3.8 MMOL/L (ref 3.7–5.3)
RBC # BLD: 4.23 M/UL (ref 4.21–5.77)
SEG NEUTROPHILS: 58 % (ref 36–65)
SEGMENTED NEUTROPHILS ABSOLUTE COUNT: 2.15 K/UL (ref 1.5–8.1)
SODIUM BLD-SCNC: 143 MMOL/L (ref 135–144)
TOTAL PROTEIN: 5.4 G/DL (ref 6.4–8.3)
WBC # BLD: 3.7 K/UL (ref 3.5–11.3)

## 2022-04-07 PROCEDURE — 80053 COMPREHEN METABOLIC PANEL: CPT

## 2022-04-07 PROCEDURE — 93010 ELECTROCARDIOGRAM REPORT: CPT | Performed by: INTERNAL MEDICINE

## 2022-04-07 PROCEDURE — 94761 N-INVAS EAR/PLS OXIMETRY MLT: CPT

## 2022-04-07 PROCEDURE — 2580000003 HC RX 258: Performed by: HEALTH CARE PROVIDER

## 2022-04-07 PROCEDURE — 6370000000 HC RX 637 (ALT 250 FOR IP): Performed by: EMERGENCY MEDICINE

## 2022-04-07 PROCEDURE — 85025 COMPLETE CBC W/AUTO DIFF WBC: CPT

## 2022-04-07 PROCEDURE — 83735 ASSAY OF MAGNESIUM: CPT

## 2022-04-07 PROCEDURE — 83690 ASSAY OF LIPASE: CPT

## 2022-04-07 PROCEDURE — 36415 COLL VENOUS BLD VENIPUNCTURE: CPT

## 2022-04-07 PROCEDURE — 6370000000 HC RX 637 (ALT 250 FOR IP): Performed by: HEALTH CARE PROVIDER

## 2022-04-07 PROCEDURE — 1200000000 HC SEMI PRIVATE

## 2022-04-07 RX ORDER — SODIUM CHLORIDE, SODIUM LACTATE, POTASSIUM CHLORIDE, CALCIUM CHLORIDE 600; 310; 30; 20 MG/100ML; MG/100ML; MG/100ML; MG/100ML
INJECTION, SOLUTION INTRAVENOUS CONTINUOUS
Status: DISCONTINUED | OUTPATIENT
Start: 2022-04-07 | End: 2022-04-15 | Stop reason: HOSPADM

## 2022-04-07 RX ADMIN — SODIUM CHLORIDE, POTASSIUM CHLORIDE, SODIUM LACTATE AND CALCIUM CHLORIDE: 600; 310; 30; 20 INJECTION, SOLUTION INTRAVENOUS at 10:11

## 2022-04-07 RX ADMIN — BRIMONIDINE TARTRATE 1 DROP: 2 SOLUTION OPHTHALMIC at 15:13

## 2022-04-07 RX ADMIN — SERTRALINE 100 MG: 50 TABLET, FILM COATED ORAL at 08:47

## 2022-04-07 RX ADMIN — NICOTINE POLACRILEX 2 MG: 2 GUM, CHEWING BUCCAL at 20:33

## 2022-04-07 RX ADMIN — SODIUM CHLORIDE, POTASSIUM CHLORIDE, SODIUM LACTATE AND CALCIUM CHLORIDE: 600; 310; 30; 20 INJECTION, SOLUTION INTRAVENOUS at 22:28

## 2022-04-07 RX ADMIN — MIRTAZAPINE 7.5 MG: 15 TABLET, FILM COATED ORAL at 20:34

## 2022-04-07 RX ADMIN — Medication 100 MG: at 08:47

## 2022-04-07 RX ADMIN — TIMOLOL MALEATE 1 DROP: 5 SOLUTION OPHTHALMIC at 08:47

## 2022-04-07 RX ADMIN — CLOPIDOGREL 75 MG: 75 TABLET, FILM COATED ORAL at 08:47

## 2022-04-07 RX ADMIN — TIMOLOL MALEATE 1 DROP: 5 SOLUTION OPHTHALMIC at 20:40

## 2022-04-07 RX ADMIN — SODIUM CHLORIDE, POTASSIUM CHLORIDE, SODIUM LACTATE AND CALCIUM CHLORIDE: 600; 310; 30; 20 INJECTION, SOLUTION INTRAVENOUS at 18:27

## 2022-04-07 RX ADMIN — BRIMONIDINE TARTRATE 1 DROP: 2 SOLUTION OPHTHALMIC at 08:47

## 2022-04-07 RX ADMIN — ACETAMINOPHEN 1000 MG: 500 TABLET ORAL at 15:13

## 2022-04-07 RX ADMIN — DORZOLAMIDE HYDROCHLORIDE 1 DROP: 20 SOLUTION/ DROPS OPHTHALMIC at 08:47

## 2022-04-07 RX ADMIN — ATORVASTATIN CALCIUM 40 MG: 80 TABLET, FILM COATED ORAL at 20:34

## 2022-04-07 RX ADMIN — BRIMONIDINE TARTRATE 1 DROP: 2 SOLUTION OPHTHALMIC at 20:38

## 2022-04-07 RX ADMIN — ACETAMINOPHEN 1000 MG: 500 TABLET ORAL at 21:21

## 2022-04-07 RX ADMIN — DORZOLAMIDE HYDROCHLORIDE 1 DROP: 20 SOLUTION/ DROPS OPHTHALMIC at 20:40

## 2022-04-07 RX ADMIN — SODIUM CHLORIDE, POTASSIUM CHLORIDE, SODIUM LACTATE AND CALCIUM CHLORIDE: 600; 310; 30; 20 INJECTION, SOLUTION INTRAVENOUS at 14:11

## 2022-04-07 RX ADMIN — FOLIC ACID 1 MG: 1 TABLET ORAL at 08:47

## 2022-04-07 RX ADMIN — LATANOPROST 1 DROP: 50 SOLUTION OPHTHALMIC at 20:39

## 2022-04-07 ASSESSMENT — PAIN SCALES - GENERAL
PAINLEVEL_OUTOF10: 3
PAINLEVEL_OUTOF10: 0
PAINLEVEL_OUTOF10: 3

## 2022-04-07 NOTE — PLAN OF CARE
Problem: Pain:  Description: Pain management should include both nonpharmacologic and pharmacologic interventions.   Goal: Pain level will decrease  Description: Pain level will decrease  Outcome: Ongoing  Goal: Control of acute pain  Description: Control of acute pain  Outcome: Ongoing  Goal: Control of chronic pain  Description: Control of chronic pain  Outcome: Ongoing     Problem: Skin Integrity:  Goal: Will show no infection signs and symptoms  Description: Will show no infection signs and symptoms  Outcome: Ongoing  Goal: Absence of new skin breakdown  Description: Absence of new skin breakdown  Outcome: Ongoing

## 2022-04-07 NOTE — PROGRESS NOTES
[facility]  CDU / OBSERVATION eNCOUnter  Attending NOte       I performed a history and physical examination of the patient and discussed management with the resident. I reviewed the residents note and agree with the documented findings and plan of care. Any areas of disagreement are noted on the chart. I was personally present for the key portions of any procedures. I have documented in the chart those procedures where I was not present during the key portions. I have reviewed the nurses notes. I agree with the chief complaint, past medical history, past surgical history, allergies, medications, social and family history as documented unless otherwise noted below. The Family history, social history, and ROS are effectively unchanged since admission unless noted elsewhere in the chart. 52-year-old male here with pancreatitis. He reports continued abdominal pain and vomiting. Abdomen soft with epigastric tenderness to palpation. No rebound or guarding. Continue IV fluids. Do not advance diet today. Reassess this afternoon. Lipase is trending up as well.      Bella Patel MD  Attending Emergency  Physician

## 2022-04-07 NOTE — PROGRESS NOTES
OBS/CDU   RESIDENT NOTE      Patients PCP is:  Janneth Mishra MD        SUBJECTIVE      No acute events overnight. Has been able to tolerate a liquid diet without nausea or vomiting. Last time patient vomited was around 2pm yesterday. The patient is urinating on his own and is passing flatus. Denies fever, chills, nausea, vomiting, chest pain, shortness of breath, abdominal pain, focal weakness, numbness, tingling, urinary/bowel symptoms, vision changes, visual hallucinations, or headache. PHYSICAL EXAM      General: NAD, AO X 3  Heent: EMOI, PERRL  Neck: SUPPLE, NO JVD  Cardiovascular: RRR, S1S2  Pulmonary: CTAB, NO SOB  Abdomen: SOFT, NTTP, ND, +BS  Extremities: +2/4 PULSES DISTAL, NO SWELLING  Neuro / Psych: NO NUMBNESS OR TINGLING, MENTATION AT BASELINE    PERTINENT TEST /EXAMS      I have reviewed all available laboratory results.     MEDICATIONS CURRENT   brimonidine (ALPHAGAN) 0.2 % ophthalmic solution 1 drop, TID  dorzolamide (TRUSOPT) 2 % ophthalmic solution 1 drop, BID  latanoprost (XALATAN) 0.005 % ophthalmic solution 1 drop, Nightly  timolol (TIMOPTIC) 0.5 % ophthalmic solution 1 drop, BID  sodium chloride flush 0.9 % injection 5-40 mL, 2 times per day  sodium chloride flush 0.9 % injection 5-40 mL, PRN  0.9 % sodium chloride infusion, PRN  atorvastatin (LIPITOR) tablet 40 mg, Nightly  clopidogrel (PLAVIX) tablet 75 mg, Daily  folic acid (FOLVITE) tablet 1 mg, Daily  hydrOXYzine (ATARAX) tablet 50 mg, TID PRN  sertraline (ZOLOFT) tablet 100 mg, Daily  traZODone (DESYREL) tablet 50 mg, Nightly PRN  0.9 % sodium chloride infusion, Continuous  sodium chloride flush 0.9 % injection 5-40 mL, 2 times per day  sodium chloride flush 0.9 % injection 5-40 mL, PRN  0.9 % sodium chloride infusion, PRN  enoxaparin (LOVENOX) injection 40 mg, Daily  ondansetron (ZOFRAN) injection 4 mg, Q4H PRN  polyethylene glycol (GLYCOLAX) packet 17 g, Daily PRN  aluminum & magnesium hydroxide-simethicone (MAALOX) 769-815-60 MG/5ML suspension 30 mL, Q6H PRN  thiamine tablet 100 mg, Daily  acetaminophen (TYLENOL) tablet 1,000 mg, 3 times per day  mirtazapine (REMERON) tablet 7.5 mg, Nightly  nicotine polacrilex (NICORETTE) gum 2 mg, Q1H PRN        All medication charted and reviewed. CONSULTS      IP CONSULT TO SOCIAL WORK    ASSESSMENT/PLAN       Michaela Odell is a 62 y.o. male who presents with epigastric pain, nausea, and mild diffuse headache. Patient was just discharged from psychiatric admission yesterday, has a historic of chronic alcohol use and states his last drink was yesterday. Was found to have pancreatitis, initial lipase 223. Repeat lipase after initiation of IVF and pain control was 185. Patient brought to observation unit for fluid rehydration and further monitoring. Pancreatitis  - Lipase remains elevated 240 (185, 223). - LFTs remain stable, BUN 7 from 11, Hct 41.9 from 40.7  - Pain improved, tolerating liquid diet, consider advancing to soft diet  - Cont IVF, will change NS to LR and increase to 250mL/hr    · Continue home medications and pain control  · Monitor vitals, labs, and imaging  · DISPO: pending consults and clinical improvement    --  Yobany Murphy DO  Emergency Medicine Resident Physician     This dictation was generated by voice recognition computer software. Although all attempts are made to edit the dictation for accuracy, there may be errors in the transcription that are not intended.

## 2022-04-08 ENCOUNTER — APPOINTMENT (OUTPATIENT)
Dept: CT IMAGING | Age: 62
DRG: 282 | End: 2022-04-08
Payer: MEDICARE

## 2022-04-08 LAB
ABSOLUTE EOS #: 0.08 K/UL (ref 0–0.44)
ABSOLUTE IMMATURE GRANULOCYTE: <0.03 K/UL (ref 0–0.3)
ABSOLUTE LYMPH #: 1.12 K/UL (ref 1.1–3.7)
ABSOLUTE MONO #: 0.46 K/UL (ref 0.1–1.2)
ALBUMIN SERPL-MCNC: 3.2 G/DL (ref 3.5–5.2)
ALBUMIN/GLOBULIN RATIO: 1.6 (ref 1–2.5)
ALP BLD-CCNC: 36 U/L (ref 40–129)
ALT SERPL-CCNC: 21 U/L (ref 5–41)
ANION GAP SERPL CALCULATED.3IONS-SCNC: 8 MMOL/L (ref 9–17)
AST SERPL-CCNC: 19 U/L
BASOPHILS # BLD: 0 % (ref 0–2)
BASOPHILS ABSOLUTE: <0.03 K/UL (ref 0–0.2)
BILIRUB SERPL-MCNC: 0.5 MG/DL (ref 0.3–1.2)
BUN BLDV-MCNC: 4 MG/DL (ref 8–23)
CALCIUM SERPL-MCNC: 8.1 MG/DL (ref 8.6–10.4)
CHLORIDE BLD-SCNC: 109 MMOL/L (ref 98–107)
CO2: 25 MMOL/L (ref 20–31)
CREAT SERPL-MCNC: 0.59 MG/DL (ref 0.7–1.2)
EOSINOPHILS RELATIVE PERCENT: 2 % (ref 1–4)
GFR AFRICAN AMERICAN: >60 ML/MIN
GFR NON-AFRICAN AMERICAN: >60 ML/MIN
GFR SERPL CREATININE-BSD FRML MDRD: ABNORMAL ML/MIN/{1.73_M2}
GLUCOSE BLD-MCNC: 82 MG/DL (ref 70–99)
HCT VFR BLD CALC: 38.5 % (ref 40.7–50.3)
HEMOGLOBIN: 13.2 G/DL (ref 13–17)
IMMATURE GRANULOCYTES: 0 %
LIPASE: 163 U/L (ref 13–60)
LIPASE: 228 U/L (ref 13–60)
LYMPHOCYTES # BLD: 26 % (ref 24–43)
MAGNESIUM: 1.6 MG/DL (ref 1.6–2.6)
MCH RBC QN AUTO: 33.1 PG (ref 25.2–33.5)
MCHC RBC AUTO-ENTMCNC: 34.3 G/DL (ref 28.4–34.8)
MCV RBC AUTO: 96.5 FL (ref 82.6–102.9)
MONOCYTES # BLD: 11 % (ref 3–12)
NRBC AUTOMATED: 0 PER 100 WBC
PDW BLD-RTO: 12.1 % (ref 11.8–14.4)
PLATELET # BLD: 166 K/UL (ref 138–453)
PMV BLD AUTO: 8.7 FL (ref 8.1–13.5)
POTASSIUM SERPL-SCNC: 3.6 MMOL/L (ref 3.7–5.3)
RBC # BLD: 3.99 M/UL (ref 4.21–5.77)
SEG NEUTROPHILS: 61 % (ref 36–65)
SEGMENTED NEUTROPHILS ABSOLUTE COUNT: 2.58 K/UL (ref 1.5–8.1)
SODIUM BLD-SCNC: 142 MMOL/L (ref 135–144)
TOTAL PROTEIN: 5.2 G/DL (ref 6.4–8.3)
WBC # BLD: 4.3 K/UL (ref 3.5–11.3)

## 2022-04-08 PROCEDURE — 36415 COLL VENOUS BLD VENIPUNCTURE: CPT

## 2022-04-08 PROCEDURE — 94761 N-INVAS EAR/PLS OXIMETRY MLT: CPT

## 2022-04-08 PROCEDURE — 6370000000 HC RX 637 (ALT 250 FOR IP): Performed by: EMERGENCY MEDICINE

## 2022-04-08 PROCEDURE — 83735 ASSAY OF MAGNESIUM: CPT

## 2022-04-08 PROCEDURE — 85025 COMPLETE CBC W/AUTO DIFF WBC: CPT

## 2022-04-08 PROCEDURE — 6370000000 HC RX 637 (ALT 250 FOR IP): Performed by: HEALTH CARE PROVIDER

## 2022-04-08 PROCEDURE — 74177 CT ABD & PELVIS W/CONTRAST: CPT

## 2022-04-08 PROCEDURE — 6360000004 HC RX CONTRAST MEDICATION: Performed by: EMERGENCY MEDICINE

## 2022-04-08 PROCEDURE — 1200000000 HC SEMI PRIVATE

## 2022-04-08 PROCEDURE — 2580000003 HC RX 258: Performed by: HEALTH CARE PROVIDER

## 2022-04-08 PROCEDURE — 80053 COMPREHEN METABOLIC PANEL: CPT

## 2022-04-08 PROCEDURE — 83690 ASSAY OF LIPASE: CPT

## 2022-04-08 RX ADMIN — MIRTAZAPINE 7.5 MG: 15 TABLET, FILM COATED ORAL at 19:37

## 2022-04-08 RX ADMIN — NICOTINE POLACRILEX 2 MG: 2 GUM, CHEWING BUCCAL at 19:49

## 2022-04-08 RX ADMIN — ATORVASTATIN CALCIUM 40 MG: 80 TABLET, FILM COATED ORAL at 19:37

## 2022-04-08 RX ADMIN — ACETAMINOPHEN 1000 MG: 500 TABLET ORAL at 21:04

## 2022-04-08 RX ADMIN — SODIUM CHLORIDE, POTASSIUM CHLORIDE, SODIUM LACTATE AND CALCIUM CHLORIDE: 600; 310; 30; 20 INJECTION, SOLUTION INTRAVENOUS at 02:33

## 2022-04-08 RX ADMIN — DORZOLAMIDE HYDROCHLORIDE 1 DROP: 20 SOLUTION/ DROPS OPHTHALMIC at 20:52

## 2022-04-08 RX ADMIN — SODIUM CHLORIDE, POTASSIUM CHLORIDE, SODIUM LACTATE AND CALCIUM CHLORIDE: 600; 310; 30; 20 INJECTION, SOLUTION INTRAVENOUS at 23:44

## 2022-04-08 RX ADMIN — SODIUM CHLORIDE, POTASSIUM CHLORIDE, SODIUM LACTATE AND CALCIUM CHLORIDE: 600; 310; 30; 20 INJECTION, SOLUTION INTRAVENOUS at 15:17

## 2022-04-08 RX ADMIN — BRIMONIDINE TARTRATE 1 DROP: 2 SOLUTION OPHTHALMIC at 08:51

## 2022-04-08 RX ADMIN — BRIMONIDINE TARTRATE 1 DROP: 2 SOLUTION OPHTHALMIC at 14:01

## 2022-04-08 RX ADMIN — SODIUM CHLORIDE, POTASSIUM CHLORIDE, SODIUM LACTATE AND CALCIUM CHLORIDE: 600; 310; 30; 20 INJECTION, SOLUTION INTRAVENOUS at 11:38

## 2022-04-08 RX ADMIN — SODIUM CHLORIDE, POTASSIUM CHLORIDE, SODIUM LACTATE AND CALCIUM CHLORIDE: 600; 310; 30; 20 INJECTION, SOLUTION INTRAVENOUS at 06:39

## 2022-04-08 RX ADMIN — ACETAMINOPHEN 1000 MG: 500 TABLET ORAL at 14:01

## 2022-04-08 RX ADMIN — TIMOLOL MALEATE 1 DROP: 5 SOLUTION OPHTHALMIC at 20:51

## 2022-04-08 RX ADMIN — Medication 100 MG: at 08:51

## 2022-04-08 RX ADMIN — DORZOLAMIDE HYDROCHLORIDE 1 DROP: 20 SOLUTION/ DROPS OPHTHALMIC at 08:51

## 2022-04-08 RX ADMIN — TIMOLOL MALEATE 1 DROP: 5 SOLUTION OPHTHALMIC at 08:51

## 2022-04-08 RX ADMIN — IOPAMIDOL 75 ML: 755 INJECTION, SOLUTION INTRAVENOUS at 09:51

## 2022-04-08 RX ADMIN — FOLIC ACID 1 MG: 1 TABLET ORAL at 08:51

## 2022-04-08 RX ADMIN — CLOPIDOGREL 75 MG: 75 TABLET, FILM COATED ORAL at 08:55

## 2022-04-08 RX ADMIN — SERTRALINE 100 MG: 50 TABLET, FILM COATED ORAL at 08:51

## 2022-04-08 RX ADMIN — LATANOPROST 1 DROP: 50 SOLUTION OPHTHALMIC at 19:35

## 2022-04-08 RX ADMIN — SODIUM CHLORIDE, POTASSIUM CHLORIDE, SODIUM LACTATE AND CALCIUM CHLORIDE: 600; 310; 30; 20 INJECTION, SOLUTION INTRAVENOUS at 19:36

## 2022-04-08 RX ADMIN — BRIMONIDINE TARTRATE 1 DROP: 2 SOLUTION OPHTHALMIC at 20:51

## 2022-04-08 RX ADMIN — ACETAMINOPHEN 1000 MG: 500 TABLET ORAL at 06:09

## 2022-04-08 ASSESSMENT — PAIN SCALES - GENERAL
PAINLEVEL_OUTOF10: 3
PAINLEVEL_OUTOF10: 0
PAINLEVEL_OUTOF10: 3
PAINLEVEL_OUTOF10: 0
PAINLEVEL_OUTOF10: 3
PAINLEVEL_OUTOF10: 0

## 2022-04-08 ASSESSMENT — PAIN DESCRIPTION - ORIENTATION: ORIENTATION: RIGHT

## 2022-04-08 ASSESSMENT — PAIN DESCRIPTION - LOCATION: LOCATION: ABDOMEN

## 2022-04-08 ASSESSMENT — PAIN DESCRIPTION - PROGRESSION: CLINICAL_PROGRESSION: GRADUALLY IMPROVING

## 2022-04-08 ASSESSMENT — PAIN DESCRIPTION - FREQUENCY: FREQUENCY: INTERMITTENT

## 2022-04-08 ASSESSMENT — PAIN DESCRIPTION - ONSET: ONSET: GRADUAL

## 2022-04-08 ASSESSMENT — PAIN DESCRIPTION - PAIN TYPE: TYPE: ACUTE PAIN

## 2022-04-08 ASSESSMENT — PAIN DESCRIPTION - DESCRIPTORS: DESCRIPTORS: ACHING

## 2022-04-08 NOTE — PLAN OF CARE
At 2249 hour patient was referred to on call resident as patient having sinus bradycardia as per telemetry monitor. Upon assessment patient asymptomatic, vital signs monitored during shift. Primary care team to note.     Problem: Pain:  Goal: Pain level will decrease  Description: Pain level will decrease  4/8/2022 0538 by Kenji Yi RN  Outcome: Ongoing  4/7/2022 1720 by Alexa Kaur RN  Outcome: Ongoing  Goal: Control of acute pain  Description: Control of acute pain  4/8/2022 0538 by Kenji Yi RN  Outcome: Ongoing  4/7/2022 1720 by Alexa Kaur RN  Outcome: Ongoing  Goal: Control of chronic pain  Description: Control of chronic pain  4/8/2022 0538 by Kenji Yi RN  Outcome: Ongoing  4/7/2022 1720 by Alexa Kaur RN  Outcome: Ongoing     Problem: Skin Integrity:  Goal: Will show no infection signs and symptoms  Description: Will show no infection signs and symptoms  4/8/2022 0538 by Kenji Yi RN  Outcome: Ongoing  4/7/2022 1720 by Alexa Kaur RN  Outcome: Ongoing  Goal: Absence of new skin breakdown  Description: Absence of new skin breakdown  4/8/2022 0538 by Kenji Yi RN  Outcome: Ongoing  4/7/2022 1720 by Alexa Kaur RN  Outcome: Ongoing     Problem: Falls - Risk of:  Goal: Will remain free from falls  Description: Will remain free from falls  Outcome: Ongoing  Goal: Absence of physical injury  Description: Absence of physical injury  Outcome: Ongoing

## 2022-04-08 NOTE — PROGRESS NOTES
[address]  CDU / OBSERVATION ENCOUNTER  ATTENDING NOTE       I performed a history and physical examination of the patient and discussed management with the resident or midlevel provider. I reviewed the resident or midlevel provider's note and agree with the documented findings and plan of care. Any areas of disagreement are noted on the chart. I was personally present for the key portions of any procedures. I have documented in the chart those procedures where I was not present during the key portions. I have reviewed the nurses notes. I agree with the chief complaint, past medical history, past surgical history, allergies, medications, social and family history as documented unless otherwise noted below. The Family history, social history, and ROS are effectively unchanged since admission unless noted elsewhere in the chart. Ongoing pain with pancreatitis. Patient with lipase actually trending up during hospital admission though beginning to turn around a little bit this morning. Patient with valiantly less than yesterday but not following appropriately. Therefore we will get CT abdomen. Patient has ongoing need for IV fluids and supportive therapy. We will look for additional pathology on CT scan. CT negative. Will recheck lipase. Advance VERY slowly -clears for now.     Tomasz Bhardwaj MD  Attending Emergency  Physician

## 2022-04-08 NOTE — ADT AUTH CERT
Neuro / Psych: NO NUMBNESS OR TINGLING, MENTATION AT BASELINE          MD Assessments & Plans:   ASSESSMENT/PLAN         Ariel Phelps is a 62 y.o. male who presents with epigastric pain, nausea, and mild diffuse headache. Patient was just discharged from psychiatric admission yesterday, has a historic of chronic alcohol use and states his last drink was yesterday. Was found to have pancreatitis, initial lipase 223. Repeat lipase after initiation of IVF and pain control was 185. Patient brought to observation unit for fluid rehydration and further monitoring.       Pancreatitis   - Lipase remains elevated 240 (185, 223). - LFTs remain stable, BUN 7 from 11, Hct 41.9 from 40.7   - Pain improved, tolerating liquid diet, consider advancing to soft diet   - Cont IVF, will change NS to LR and increase to 250mL/hr       Continue home medications and pain control   Monitor vitals, labs, and imaging   DISPO: pending consults and clinical improvement                      Letter of recommendation by Richard Aguila RN       Review Status Review Entered   In Primary 2022 07:54      Criteria Review   We recommend that the following pt's current hospitalization under INPATIENT status is APPROPRIATE . Name: Ariel Phelps   : 1960   CSN: [id number]   INSURANCE: [employer]        Clinical summary 62 y.o. male who presents with epigastric pain, nausea, and mild diffuse headache. Patient was just discharged from psychiatric admission yesterday, has a historic of chronic alcohol use and states his last drink was yesterday. Was found to have pancreatitis, initial lipase 223. Repeat lipase after initiation of IVF and pain control was 185. Patient brought to observation unit for fluid rehydration and further monitoring.     Pancreatitis  - Lipase remains elevated 240 (185, 223).   - LFTs remain stable, BUN 7 from 11, Hct 41.9 from 40.7  - Pain improved, tolerating liquid diet, consider advancing to soft diet  - Cont IVF, will change NS to LR and increase to 250mL/hr     · Continue home medications and pain control  · Monitor vitals, labs, and imaging    Vitals Stable   Labs and Imaging As above   MCG criteria applies yes,   Comments       This chart was reviewed at 5:21 PM 4/7/2022    [id number]   CELL : [id number]

## 2022-04-08 NOTE — PLAN OF CARE
Problem: Pain:  Description: Pain management should include both nonpharmacologic and pharmacologic interventions.   Goal: Pain level will decrease  Description: Pain level will decrease  4/8/2022 1706 by Guillermo Ortega RN  Outcome: Ongoing  4/8/2022 0538 by Ashli Fournier RN  Outcome: Ongoing  Goal: Control of acute pain  Description: Control of acute pain  4/8/2022 1706 by Guillermo Ortega RN  Outcome: Ongoing  4/8/2022 0538 by Ashli Fournier RN  Outcome: Ongoing  Goal: Control of chronic pain  Description: Control of chronic pain  4/8/2022 1706 by Guillermo Ortega RN  Outcome: Ongoing  4/8/2022 0538 by Ashli Fournier RN  Outcome: Ongoing     Problem: Skin Integrity:  Goal: Will show no infection signs and symptoms  Description: Will show no infection signs and symptoms  4/8/2022 1706 by Guillermo Ortega RN  Outcome: Ongoing  4/8/2022 0538 by Ashli Fournier RN  Outcome: Ongoing  Goal: Absence of new skin breakdown  Description: Absence of new skin breakdown  4/8/2022 1706 by Guillermo Ortega RN  Outcome: Ongoing  4/8/2022 0538 by Ashli Fournier RN  Outcome: Ongoing     Problem: Falls - Risk of:  Goal: Will remain free from falls  Description: Will remain free from falls  4/8/2022 1706 by Guillermo Ortega RN  Outcome: Ongoing  4/8/2022 0538 by Ashli Fournier RN  Outcome: Ongoing  Goal: Absence of physical injury  Description: Absence of physical injury  4/8/2022 1706 by Guillermo Ortega RN  Outcome: Ongoing  4/8/2022 0538 by Ashli Fournier RN  Outcome: Ongoing

## 2022-04-08 NOTE — PROGRESS NOTES
OBS/CDU   RESIDENT NOTE      Patients PCP is:  Danni Morales MD        SUBJECTIVE      Brief hx: presented on 4/6 for midsternal nonradiating chest pain, epigastric abdominal pain, nausea, headache and tremulousness beginning at 5am.     Overnight events: Continues to be unable to tolerate p.o. intake, vomited Jell-O    PHYSICAL EXAM      General: NAD, AO X 3  Heent: EMOI, PERRL  Neck: SUPPLE, NO JVD  Cardiovascular: RRR, S1S2  Pulmonary: CTAB, NO SOB  Abdomen: SOFT, tenderness to palpation in epigastric region  Extremities: +2/4 PULSES DISTAL, NO SWELLING  Neuro / Psych: NO NUMBNESS OR TINGLING, MENTATION AT BASELINE    PERTINENT TEST /EXAMS      I have reviewed all available laboratory results.     MEDICATIONS CURRENT   lactated ringers infusion, Continuous  brimonidine (ALPHAGAN) 0.2 % ophthalmic solution 1 drop, TID  dorzolamide (TRUSOPT) 2 % ophthalmic solution 1 drop, BID  latanoprost (XALATAN) 0.005 % ophthalmic solution 1 drop, Nightly  timolol (TIMOPTIC) 0.5 % ophthalmic solution 1 drop, BID  sodium chloride flush 0.9 % injection 5-40 mL, 2 times per day  sodium chloride flush 0.9 % injection 5-40 mL, PRN  0.9 % sodium chloride infusion, PRN  atorvastatin (LIPITOR) tablet 40 mg, Nightly  clopidogrel (PLAVIX) tablet 75 mg, Daily  folic acid (FOLVITE) tablet 1 mg, Daily  hydrOXYzine (ATARAX) tablet 50 mg, TID PRN  sertraline (ZOLOFT) tablet 100 mg, Daily  traZODone (DESYREL) tablet 50 mg, Nightly PRN  sodium chloride flush 0.9 % injection 5-40 mL, 2 times per day  sodium chloride flush 0.9 % injection 5-40 mL, PRN  0.9 % sodium chloride infusion, PRN  enoxaparin (LOVENOX) injection 40 mg, Daily  ondansetron (ZOFRAN) injection 4 mg, Q4H PRN  polyethylene glycol (GLYCOLAX) packet 17 g, Daily PRN  aluminum & magnesium hydroxide-simethicone (MAALOX) 200-200-20 MG/5ML suspension 30 mL, Q6H PRN  thiamine tablet 100 mg, Daily  acetaminophen (TYLENOL) tablet 1,000 mg, 3 times per day  mirtazapine (REMERON) tablet

## 2022-04-09 LAB
ABSOLUTE EOS #: 0.09 K/UL (ref 0–0.44)
ABSOLUTE IMMATURE GRANULOCYTE: <0.03 K/UL (ref 0–0.3)
ABSOLUTE LYMPH #: 1.01 K/UL (ref 1.1–3.7)
ABSOLUTE MONO #: 0.34 K/UL (ref 0.1–1.2)
ALBUMIN SERPL-MCNC: 3.3 G/DL (ref 3.5–5.2)
ALBUMIN/GLOBULIN RATIO: 1.9 (ref 1–2.5)
ALP BLD-CCNC: 35 U/L (ref 40–129)
ALT SERPL-CCNC: 25 U/L (ref 5–41)
ANION GAP SERPL CALCULATED.3IONS-SCNC: 9 MMOL/L (ref 9–17)
AST SERPL-CCNC: 23 U/L
BASOPHILS # BLD: 1 % (ref 0–2)
BASOPHILS ABSOLUTE: <0.03 K/UL (ref 0–0.2)
BILIRUB SERPL-MCNC: 0.47 MG/DL (ref 0.3–1.2)
BUN BLDV-MCNC: 4 MG/DL (ref 8–23)
CALCIUM SERPL-MCNC: 8.2 MG/DL (ref 8.6–10.4)
CHLORIDE BLD-SCNC: 108 MMOL/L (ref 98–107)
CO2: 24 MMOL/L (ref 20–31)
CREAT SERPL-MCNC: 0.65 MG/DL (ref 0.7–1.2)
EOSINOPHILS RELATIVE PERCENT: 3 % (ref 1–4)
GFR AFRICAN AMERICAN: >60 ML/MIN
GFR NON-AFRICAN AMERICAN: >60 ML/MIN
GFR SERPL CREATININE-BSD FRML MDRD: ABNORMAL ML/MIN/{1.73_M2}
GLUCOSE BLD-MCNC: 81 MG/DL (ref 70–99)
HCT VFR BLD CALC: 36.9 % (ref 40.7–50.3)
HEMOGLOBIN: 13 G/DL (ref 13–17)
IMMATURE GRANULOCYTES: 0 %
LYMPHOCYTES # BLD: 28 % (ref 24–43)
MAGNESIUM: 1.5 MG/DL (ref 1.6–2.6)
MCH RBC QN AUTO: 33.7 PG (ref 25.2–33.5)
MCHC RBC AUTO-ENTMCNC: 35.2 G/DL (ref 28.4–34.8)
MCV RBC AUTO: 95.6 FL (ref 82.6–102.9)
MONOCYTES # BLD: 10 % (ref 3–12)
NRBC AUTOMATED: 0 PER 100 WBC
PDW BLD-RTO: 11.9 % (ref 11.8–14.4)
PLATELET # BLD: 168 K/UL (ref 138–453)
PMV BLD AUTO: 8.8 FL (ref 8.1–13.5)
POTASSIUM SERPL-SCNC: 3.8 MMOL/L (ref 3.7–5.3)
RBC # BLD: 3.86 M/UL (ref 4.21–5.77)
SEG NEUTROPHILS: 58 % (ref 36–65)
SEGMENTED NEUTROPHILS ABSOLUTE COUNT: 2.1 K/UL (ref 1.5–8.1)
SODIUM BLD-SCNC: 141 MMOL/L (ref 135–144)
TOTAL PROTEIN: 5 G/DL (ref 6.4–8.3)
WBC # BLD: 3.6 K/UL (ref 3.5–11.3)

## 2022-04-09 PROCEDURE — 1200000000 HC SEMI PRIVATE

## 2022-04-09 PROCEDURE — 6370000000 HC RX 637 (ALT 250 FOR IP): Performed by: HEALTH CARE PROVIDER

## 2022-04-09 PROCEDURE — 96372 THER/PROPH/DIAG INJ SC/IM: CPT

## 2022-04-09 PROCEDURE — 96376 TX/PRO/DX INJ SAME DRUG ADON: CPT

## 2022-04-09 PROCEDURE — 6360000002 HC RX W HCPCS: Performed by: EMERGENCY MEDICINE

## 2022-04-09 PROCEDURE — 83735 ASSAY OF MAGNESIUM: CPT

## 2022-04-09 PROCEDURE — 36415 COLL VENOUS BLD VENIPUNCTURE: CPT

## 2022-04-09 PROCEDURE — 6370000000 HC RX 637 (ALT 250 FOR IP): Performed by: EMERGENCY MEDICINE

## 2022-04-09 PROCEDURE — 2580000003 HC RX 258: Performed by: EMERGENCY MEDICINE

## 2022-04-09 PROCEDURE — 2580000003 HC RX 258: Performed by: HEALTH CARE PROVIDER

## 2022-04-09 PROCEDURE — 85025 COMPLETE CBC W/AUTO DIFF WBC: CPT

## 2022-04-09 PROCEDURE — 80053 COMPREHEN METABOLIC PANEL: CPT

## 2022-04-09 RX ADMIN — DORZOLAMIDE HYDROCHLORIDE 1 DROP: 20 SOLUTION/ DROPS OPHTHALMIC at 08:43

## 2022-04-09 RX ADMIN — SODIUM CHLORIDE, PRESERVATIVE FREE 10 ML: 5 INJECTION INTRAVENOUS at 08:43

## 2022-04-09 RX ADMIN — SODIUM CHLORIDE, POTASSIUM CHLORIDE, SODIUM LACTATE AND CALCIUM CHLORIDE: 600; 310; 30; 20 INJECTION, SOLUTION INTRAVENOUS at 22:48

## 2022-04-09 RX ADMIN — NICOTINE POLACRILEX 2 MG: 2 GUM, CHEWING BUCCAL at 13:39

## 2022-04-09 RX ADMIN — LATANOPROST 1 DROP: 50 SOLUTION OPHTHALMIC at 20:33

## 2022-04-09 RX ADMIN — SODIUM CHLORIDE, POTASSIUM CHLORIDE, SODIUM LACTATE AND CALCIUM CHLORIDE: 600; 310; 30; 20 INJECTION, SOLUTION INTRAVENOUS at 03:48

## 2022-04-09 RX ADMIN — TIMOLOL MALEATE 1 DROP: 5 SOLUTION OPHTHALMIC at 08:42

## 2022-04-09 RX ADMIN — TIMOLOL MALEATE 1 DROP: 5 SOLUTION OPHTHALMIC at 20:33

## 2022-04-09 RX ADMIN — DORZOLAMIDE HYDROCHLORIDE 1 DROP: 20 SOLUTION/ DROPS OPHTHALMIC at 20:33

## 2022-04-09 RX ADMIN — CLOPIDOGREL 75 MG: 75 TABLET, FILM COATED ORAL at 08:41

## 2022-04-09 RX ADMIN — MIRTAZAPINE 7.5 MG: 15 TABLET, FILM COATED ORAL at 20:33

## 2022-04-09 RX ADMIN — ONDANSETRON 4 MG: 2 INJECTION INTRAMUSCULAR; INTRAVENOUS at 12:34

## 2022-04-09 RX ADMIN — SODIUM CHLORIDE, POTASSIUM CHLORIDE, SODIUM LACTATE AND CALCIUM CHLORIDE: 600; 310; 30; 20 INJECTION, SOLUTION INTRAVENOUS at 07:55

## 2022-04-09 RX ADMIN — BRIMONIDINE TARTRATE 1 DROP: 2 SOLUTION OPHTHALMIC at 13:31

## 2022-04-09 RX ADMIN — ACETAMINOPHEN 1000 MG: 500 TABLET ORAL at 21:35

## 2022-04-09 RX ADMIN — ATORVASTATIN CALCIUM 40 MG: 80 TABLET, FILM COATED ORAL at 20:33

## 2022-04-09 RX ADMIN — SERTRALINE 100 MG: 50 TABLET, FILM COATED ORAL at 08:41

## 2022-04-09 RX ADMIN — BRIMONIDINE TARTRATE 1 DROP: 2 SOLUTION OPHTHALMIC at 20:33

## 2022-04-09 RX ADMIN — NICOTINE POLACRILEX 2 MG: 2 GUM, CHEWING BUCCAL at 21:35

## 2022-04-09 RX ADMIN — SODIUM CHLORIDE, PRESERVATIVE FREE 10 ML: 5 INJECTION INTRAVENOUS at 08:44

## 2022-04-09 RX ADMIN — ACETAMINOPHEN 1000 MG: 500 TABLET ORAL at 05:12

## 2022-04-09 RX ADMIN — ENOXAPARIN SODIUM 40 MG: 40 INJECTION SUBCUTANEOUS at 08:42

## 2022-04-09 RX ADMIN — BRIMONIDINE TARTRATE 1 DROP: 2 SOLUTION OPHTHALMIC at 08:41

## 2022-04-09 RX ADMIN — Medication 100 MG: at 08:41

## 2022-04-09 RX ADMIN — FOLIC ACID 1 MG: 1 TABLET ORAL at 08:42

## 2022-04-09 RX ADMIN — ACETAMINOPHEN 1000 MG: 500 TABLET ORAL at 13:33

## 2022-04-09 ASSESSMENT — PAIN DESCRIPTION - PROGRESSION
CLINICAL_PROGRESSION: GRADUALLY IMPROVING

## 2022-04-09 ASSESSMENT — PAIN DESCRIPTION - ORIENTATION: ORIENTATION: MID

## 2022-04-09 ASSESSMENT — PAIN DESCRIPTION - ONSET: ONSET: GRADUAL

## 2022-04-09 ASSESSMENT — PAIN SCALES - GENERAL
PAINLEVEL_OUTOF10: 2
PAINLEVEL_OUTOF10: 3
PAINLEVEL_OUTOF10: 3

## 2022-04-09 ASSESSMENT — PAIN DESCRIPTION - PAIN TYPE: TYPE: ACUTE PAIN

## 2022-04-09 ASSESSMENT — PAIN DESCRIPTION - LOCATION: LOCATION: ABDOMEN

## 2022-04-09 ASSESSMENT — PAIN DESCRIPTION - FREQUENCY: FREQUENCY: INTERMITTENT

## 2022-04-09 ASSESSMENT — PAIN DESCRIPTION - DESCRIPTORS: DESCRIPTORS: ACHING

## 2022-04-09 NOTE — PROGRESS NOTES
OBS/CDU   RESIDENT NOTE      Patients PCP is:  Carlton Fragoso MD        SUBJECTIVE    Brief history: Hospital day 3 for midsternal nonradiating chest pain, epigastric pain, nausea and headache. Here for pancreatitis and inability to tolerate p.o. No acute events overnight. Febrile hemodynamically stable. Patient still complaining of midepigastric pain that is unchanged from admission and mild nausea. He is able to tolerate a liquid diet without emesis. He denies any chest pain or shortness of breath or systemic symptoms. PHYSICAL EXAM      General: NAD, AO X 3  Heent: EMOI, PERRL  Neck: SUPPLE, NO JVD  Cardiovascular: RRR, S1S2  Pulmonary: CTAB, NO SOB  Abdomen: SOFT, mildly tender to palpation midepigastric region, no distention or guarding nonperitoneal, ND, +BS  Extremities: +2/4 PULSES DISTAL, NO SWELLING  Neuro / Psych: NO NUMBNESS OR TINGLING, MENTATION AT BASELINE    PERTINENT TEST /EXAMS      I have reviewed all available laboratory results.     MEDICATIONS CURRENT   lactated ringers infusion, Continuous  brimonidine (ALPHAGAN) 0.2 % ophthalmic solution 1 drop, TID  dorzolamide (TRUSOPT) 2 % ophthalmic solution 1 drop, BID  latanoprost (XALATAN) 0.005 % ophthalmic solution 1 drop, Nightly  timolol (TIMOPTIC) 0.5 % ophthalmic solution 1 drop, BID  sodium chloride flush 0.9 % injection 5-40 mL, 2 times per day  sodium chloride flush 0.9 % injection 5-40 mL, PRN  0.9 % sodium chloride infusion, PRN  atorvastatin (LIPITOR) tablet 40 mg, Nightly  clopidogrel (PLAVIX) tablet 75 mg, Daily  folic acid (FOLVITE) tablet 1 mg, Daily  hydrOXYzine (ATARAX) tablet 50 mg, TID PRN  sertraline (ZOLOFT) tablet 100 mg, Daily  traZODone (DESYREL) tablet 50 mg, Nightly PRN  sodium chloride flush 0.9 % injection 5-40 mL, 2 times per day  sodium chloride flush 0.9 % injection 5-40 mL, PRN  0.9 % sodium chloride infusion, PRN  enoxaparin (LOVENOX) injection 40 mg, Daily  ondansetron (ZOFRAN) injection 4 mg, Q4H PRN  polyethylene glycol (GLYCOLAX) packet 17 g, Daily PRN  aluminum & magnesium hydroxide-simethicone (MAALOX) 200-200-20 MG/5ML suspension 30 mL, Q6H PRN  thiamine tablet 100 mg, Daily  acetaminophen (TYLENOL) tablet 1,000 mg, 3 times per day  mirtazapine (REMERON) tablet 7.5 mg, Nightly  nicotine polacrilex (NICORETTE) gum 2 mg, Q1H PRN        All medication charted and reviewed. CONSULTS      IP CONSULT TO SOCIAL WORK    ASSESSMENT/PLAN       Cielo Guzmán is a 62 y.o. male who presents with pancreatitis, nausea and vomiting    · Lipase on admission 223, 25, 240, 228, now 163  · CBC CMP grossly unremarkable  · CT abdomen pelvis shows no evidence of acute pancreatitis, however there is calcification of pancreatic head suggestive of chronic pancreatitis. · Incidental finding: Questionable irregular wall thickening at the rectum possibly due to incomplete distention versus underlying neoplasm  · Continue home medications and pain control  · Monitor vitals, labs, and imaging  · DISPO: pending consults and clinical improvement  · Social work consulted, patient resides at a shelter    --  Syed Chakraborty MD  Emergency Medicine Resident Physician     This dictation was generated by voice recognition computer software. Although all attempts are made to edit the dictation for accuracy, there may be errors in the transcription that are not intended.

## 2022-04-09 NOTE — PLAN OF CARE
Problem: Pain:  Goal: Pain level will decrease  Description: Pain level will decrease  4/9/2022 1757 by Bi Saba RN  Outcome: Ongoing  4/9/2022 1730 by Bi Saba RN  Outcome: Ongoing  4/9/2022 0415 by Iron Back RN  Outcome: Ongoing  Goal: Control of acute pain  Description: Control of acute pain  4/9/2022 1757 by Bi Saba RN  Outcome: Ongoing  4/9/2022 1730 by Bi Saba RN  Outcome: Ongoing  4/9/2022 0415 by Iron Back RN  Outcome: Ongoing  Goal: Control of chronic pain  Description: Control of chronic pain  4/9/2022 1757 by Bi Saba RN  Outcome: Ongoing  4/9/2022 1730 by Bi Saba RN  Outcome: Ongoing  4/9/2022 0415 by Iron Back RN  Outcome: Ongoing     Problem: Pain:  Goal: Pain level will decrease  Description: Pain level will decrease  4/9/2022 1757 by Bi Saba RN  Outcome: Ongoing  4/9/2022 1730 by Bi aSba RN  Outcome: Ongoing  4/9/2022 0415 by Iron Back RN  Outcome: Ongoing     Problem: Pain:  Goal: Control of acute pain  Description: Control of acute pain  4/9/2022 1757 by Bi Saba RN  Outcome: Ongoing  4/9/2022 1730 by Bi Saba RN  Outcome: Ongoing  4/9/2022 0415 by Iron Back RN  Outcome: Ongoing     Problem: Pain:  Goal: Control of chronic pain  Description: Control of chronic pain  4/9/2022 1757 by Bi Saba RN  Outcome: Ongoing  4/9/2022 1730 by Bi Saba RN  Outcome: Ongoing  4/9/2022 0415 by Iron Back RN  Outcome: Ongoing     Problem: Skin Integrity:  Goal: Will show no infection signs and symptoms  Description: Will show no infection signs and symptoms  4/9/2022 1757 by Bi Saba RN  Outcome: Ongoing  4/9/2022 1730 by Bi Saba RN  Outcome: Ongoing  4/9/2022 0415 by Iron Back RN  Outcome: Ongoing  Goal: Absence of new skin breakdown  Description: Absence of new skin breakdown  4/9/2022 1757 by Bi Saba RN  Outcome: Ongoing  4/9/2022 1730 by Bi Saba RN  Outcome: Ongoing  4/9/2022 0415 by Elsy Banks Justin Bryant RN  Outcome: Ongoing     Problem: Skin Integrity:  Goal: Will show no infection signs and symptoms  Description: Will show no infection signs and symptoms  4/9/2022 1757 by Maria De Jesus Gregory RN  Outcome: Ongoing  4/9/2022 1730 by Maria De Jesus Gregory RN  Outcome: Ongoing  4/9/2022 0415 by Jabari Ortiz RN  Outcome: Ongoing     Problem: Skin Integrity:  Goal: Absence of new skin breakdown  Description: Absence of new skin breakdown  4/9/2022 1757 by Maria De Jesus Gregory RN  Outcome: Ongoing  4/9/2022 1730 by Maria De Jesus Gregory RN  Outcome: Ongoing  4/9/2022 0415 by Jabari Ortiz RN  Outcome: Ongoing     Problem: Falls - Risk of:  Goal: Will remain free from falls  Description: Will remain free from falls  4/9/2022 1757 by Maria De Jesus Gregory RN  Outcome: Ongoing  4/9/2022 1730 by Maria De Jesus Gregory RN  Outcome: Ongoing  4/9/2022 0415 by Jabari Ortiz RN  Outcome: Ongoing  Goal: Absence of physical injury  Description: Absence of physical injury  4/9/2022 1757 by Maria De Jesus Gregory RN  Outcome: Ongoing  4/9/2022 1730 by Maria De Jesus Gregory RN  Outcome: Ongoing  4/9/2022 0415 by Jabari Ortiz RN  Outcome: Ongoing     Problem: Falls - Risk of:  Goal: Will remain free from falls  Description: Will remain free from falls  4/9/2022 1757 by Maria De Jesus Gregory RN  Outcome: Ongoing  4/9/2022 1730 by Maria De Jesus Gregory RN  Outcome: Ongoing  4/9/2022 0415 by Jabari Ortiz RN  Outcome: Ongoing     Problem: Falls - Risk of:  Goal: Absence of physical injury  Description: Absence of physical injury  4/9/2022 1757 by Maria De Jesus Gregory RN  Outcome: Ongoing  4/9/2022 1730 by Maria De Jesus Gregory RN  Outcome: Ongoing  4/9/2022 0415 by Jabari Ortiz RN  Outcome: Ongoing     Problem: Falls - Risk of:  Goal: Absence of physical injury  Description: Absence of physical injury  4/9/2022 1730 by Maria De Jesus Gregory RN  Outcome: Ongoing

## 2022-04-09 NOTE — PLAN OF CARE
Problem: Pain:  Description: Pain management should include both nonpharmacologic and pharmacologic interventions.   Goal: Pain level will decrease  Description: Pain level will decrease  4/9/2022 0415 by Miracle Hassan RN  Outcome: Ongoing  4/8/2022 1706 by Jayde Agudelo RN  Outcome: Ongoing  Goal: Control of acute pain  Description: Control of acute pain  4/9/2022 0415 by Miracle Hassan RN  Outcome: Ongoing  4/8/2022 1706 by Jayde Agudelo RN  Outcome: Ongoing  Goal: Control of chronic pain  Description: Control of chronic pain  4/9/2022 0415 by Miracle Hassan RN  Outcome: Ongoing  4/8/2022 1706 by Jayde Agudelo RN  Outcome: Ongoing     Problem: Skin Integrity:  Goal: Will show no infection signs and symptoms  Description: Will show no infection signs and symptoms  4/9/2022 0415 by Miracle Hassan RN  Outcome: Ongoing  4/8/2022 1706 by Jayde Agudelo RN  Outcome: Ongoing  Goal: Absence of new skin breakdown  Description: Absence of new skin breakdown  4/9/2022 0415 by Miracle Hassan RN  Outcome: Ongoing  4/8/2022 1706 by Jayde Agudelo RN  Outcome: Ongoing     Problem: Falls - Risk of:  Goal: Will remain free from falls  Description: Will remain free from falls  4/9/2022 0415 by Miracle Hassan RN  Outcome: Ongoing  4/8/2022 1706 by Jayde Agudelo RN  Outcome: Ongoing  Goal: Absence of physical injury  Description: Absence of physical injury  4/9/2022 0415 by Miracle Hassan RN  Outcome: Ongoing  4/8/2022 1706 by Jayde Agudelo RN  Outcome: Ongoing

## 2022-04-09 NOTE — PROGRESS NOTES
[address] [facility] [employer]  CDU / OBSERVATION ENCOUNTER  ATTENDING NOTE       I performed a history and physical examination of the patient and discussed management with the resident or midlevel provider. I reviewed the resident or midlevel provider's note and agree with the documented findings and plan of care. Any areas of disagreement are noted on the chart. I was personally present for the key portions of any procedures. I have documented in the chart those procedures where I was not present during the key portions. I have reviewed the nurses notes. I agree with the chief complaint, past medical history, past surgical history, allergies, medications, social and family history as documented unless otherwise noted below. The Family history, social history, and ROS are effectively unchanged since admission unless noted elsewhere in the chart. Patient with ongoing pancreatitis. Patient evaluated further with imaging yesterday which showed evidence of chronic pancreatitis but also irregular wall thickening in the rectum and diffuse prominence of gastric folds suggesting gastritis. Patient on PPI and IV fluids. Patient on clears right now. Patient for slow advancement of diet and reassessment. Per nursing patient is not really taking his antiemetics. He is feels all right and but has had a couple episodes of emesis today. Encourage patient to take sips and to watch fluid intake. Talked with nurse about scheduling the medications for any vomiting whatsoever and document the vomiting that is seen.     Charisse Walters MD  Attending Emergency  Physician

## 2022-04-10 LAB
ABSOLUTE EOS #: 0.08 K/UL (ref 0–0.44)
ABSOLUTE IMMATURE GRANULOCYTE: 0.03 K/UL (ref 0–0.3)
ABSOLUTE LYMPH #: 0.87 K/UL (ref 1.1–3.7)
ABSOLUTE MONO #: 0.37 K/UL (ref 0.1–1.2)
ALBUMIN SERPL-MCNC: 3.6 G/DL (ref 3.5–5.2)
ALBUMIN/GLOBULIN RATIO: 1.6 (ref 1–2.5)
ALP BLD-CCNC: 42 U/L (ref 40–129)
ALT SERPL-CCNC: 27 U/L (ref 5–41)
ANION GAP SERPL CALCULATED.3IONS-SCNC: 11 MMOL/L (ref 9–17)
AST SERPL-CCNC: 25 U/L
BASOPHILS # BLD: 0 % (ref 0–2)
BASOPHILS ABSOLUTE: <0.03 K/UL (ref 0–0.2)
BILIRUB SERPL-MCNC: 0.62 MG/DL (ref 0.3–1.2)
BUN BLDV-MCNC: 8 MG/DL (ref 8–23)
CALCIUM SERPL-MCNC: 8.3 MG/DL (ref 8.6–10.4)
CHLORIDE BLD-SCNC: 108 MMOL/L (ref 98–107)
CO2: 19 MMOL/L (ref 20–31)
CREAT SERPL-MCNC: 0.64 MG/DL (ref 0.7–1.2)
EOSINOPHILS RELATIVE PERCENT: 2 % (ref 1–4)
GFR AFRICAN AMERICAN: >60 ML/MIN
GFR NON-AFRICAN AMERICAN: >60 ML/MIN
GFR SERPL CREATININE-BSD FRML MDRD: ABNORMAL ML/MIN/{1.73_M2}
GLUCOSE BLD-MCNC: 63 MG/DL (ref 70–99)
HCT VFR BLD CALC: 41.6 % (ref 40.7–50.3)
HEMOGLOBIN: 14.5 G/DL (ref 13–17)
IMMATURE GRANULOCYTES: 1 %
LIPASE: 192 U/L (ref 13–60)
LYMPHOCYTES # BLD: 17 % (ref 24–43)
MAGNESIUM: 1.8 MG/DL (ref 1.6–2.6)
MCH RBC QN AUTO: 33.1 PG (ref 25.2–33.5)
MCHC RBC AUTO-ENTMCNC: 34.9 G/DL (ref 28.4–34.8)
MCV RBC AUTO: 95 FL (ref 82.6–102.9)
MONOCYTES # BLD: 7 % (ref 3–12)
NRBC AUTOMATED: 0 PER 100 WBC
PDW BLD-RTO: 11.9 % (ref 11.8–14.4)
PLATELET # BLD: 173 K/UL (ref 138–453)
PMV BLD AUTO: 8.5 FL (ref 8.1–13.5)
POTASSIUM SERPL-SCNC: 4 MMOL/L (ref 3.7–5.3)
RBC # BLD: 4.38 M/UL (ref 4.21–5.77)
SEG NEUTROPHILS: 73 % (ref 36–65)
SEGMENTED NEUTROPHILS ABSOLUTE COUNT: 3.64 K/UL (ref 1.5–8.1)
SODIUM BLD-SCNC: 138 MMOL/L (ref 135–144)
TOTAL PROTEIN: 5.9 G/DL (ref 6.4–8.3)
WBC # BLD: 5 K/UL (ref 3.5–11.3)

## 2022-04-10 PROCEDURE — 2580000003 HC RX 258: Performed by: EMERGENCY MEDICINE

## 2022-04-10 PROCEDURE — 96372 THER/PROPH/DIAG INJ SC/IM: CPT

## 2022-04-10 PROCEDURE — 83690 ASSAY OF LIPASE: CPT

## 2022-04-10 PROCEDURE — 6370000000 HC RX 637 (ALT 250 FOR IP): Performed by: EMERGENCY MEDICINE

## 2022-04-10 PROCEDURE — 6370000000 HC RX 637 (ALT 250 FOR IP): Performed by: HEALTH CARE PROVIDER

## 2022-04-10 PROCEDURE — 6360000002 HC RX W HCPCS: Performed by: EMERGENCY MEDICINE

## 2022-04-10 PROCEDURE — 80053 COMPREHEN METABOLIC PANEL: CPT

## 2022-04-10 PROCEDURE — 83735 ASSAY OF MAGNESIUM: CPT

## 2022-04-10 PROCEDURE — 85025 COMPLETE CBC W/AUTO DIFF WBC: CPT

## 2022-04-10 PROCEDURE — 1200000000 HC SEMI PRIVATE

## 2022-04-10 PROCEDURE — 36415 COLL VENOUS BLD VENIPUNCTURE: CPT

## 2022-04-10 RX ADMIN — SERTRALINE 100 MG: 50 TABLET, FILM COATED ORAL at 10:26

## 2022-04-10 RX ADMIN — LATANOPROST 1 DROP: 50 SOLUTION OPHTHALMIC at 21:11

## 2022-04-10 RX ADMIN — CLOPIDOGREL 75 MG: 75 TABLET, FILM COATED ORAL at 10:25

## 2022-04-10 RX ADMIN — NICOTINE POLACRILEX 2 MG: 2 GUM, CHEWING BUCCAL at 21:16

## 2022-04-10 RX ADMIN — BRIMONIDINE TARTRATE 1 DROP: 2 SOLUTION OPHTHALMIC at 21:10

## 2022-04-10 RX ADMIN — SODIUM CHLORIDE, POTASSIUM CHLORIDE, SODIUM LACTATE AND CALCIUM CHLORIDE: 600; 310; 30; 20 INJECTION, SOLUTION INTRAVENOUS at 08:45

## 2022-04-10 RX ADMIN — FOLIC ACID 1 MG: 1 TABLET ORAL at 10:26

## 2022-04-10 RX ADMIN — ATORVASTATIN CALCIUM 40 MG: 80 TABLET, FILM COATED ORAL at 21:11

## 2022-04-10 RX ADMIN — MIRTAZAPINE 7.5 MG: 15 TABLET, FILM COATED ORAL at 21:11

## 2022-04-10 RX ADMIN — BRIMONIDINE TARTRATE 1 DROP: 2 SOLUTION OPHTHALMIC at 14:06

## 2022-04-10 RX ADMIN — DORZOLAMIDE HYDROCHLORIDE 1 DROP: 20 SOLUTION/ DROPS OPHTHALMIC at 10:28

## 2022-04-10 RX ADMIN — TIMOLOL MALEATE 1 DROP: 5 SOLUTION OPHTHALMIC at 21:10

## 2022-04-10 RX ADMIN — ACETAMINOPHEN 1000 MG: 500 TABLET ORAL at 14:07

## 2022-04-10 RX ADMIN — BRIMONIDINE TARTRATE 1 DROP: 2 SOLUTION OPHTHALMIC at 10:27

## 2022-04-10 RX ADMIN — Medication 100 MG: at 10:25

## 2022-04-10 RX ADMIN — ACETAMINOPHEN 1000 MG: 500 TABLET ORAL at 21:11

## 2022-04-10 RX ADMIN — DORZOLAMIDE HYDROCHLORIDE 1 DROP: 20 SOLUTION/ DROPS OPHTHALMIC at 21:11

## 2022-04-10 RX ADMIN — TIMOLOL MALEATE 1 DROP: 5 SOLUTION OPHTHALMIC at 10:28

## 2022-04-10 RX ADMIN — ACETAMINOPHEN 1000 MG: 500 TABLET ORAL at 06:02

## 2022-04-10 RX ADMIN — SODIUM CHLORIDE, POTASSIUM CHLORIDE, SODIUM LACTATE AND CALCIUM CHLORIDE: 600; 310; 30; 20 INJECTION, SOLUTION INTRAVENOUS at 18:31

## 2022-04-10 RX ADMIN — ENOXAPARIN SODIUM 40 MG: 40 INJECTION SUBCUTANEOUS at 10:26

## 2022-04-10 ASSESSMENT — PAIN SCALES - GENERAL
PAINLEVEL_OUTOF10: 2
PAINLEVEL_OUTOF10: 0
PAINLEVEL_OUTOF10: 3
PAINLEVEL_OUTOF10: 2

## 2022-04-10 ASSESSMENT — PAIN DESCRIPTION - DESCRIPTORS: DESCRIPTORS: HEAVINESS;PRESSURE

## 2022-04-10 ASSESSMENT — PAIN DESCRIPTION - LOCATION: LOCATION: ABDOMEN

## 2022-04-10 ASSESSMENT — PAIN DESCRIPTION - PAIN TYPE: TYPE: ACUTE PAIN

## 2022-04-10 ASSESSMENT — PAIN DESCRIPTION - ORIENTATION: ORIENTATION: MID

## 2022-04-10 NOTE — PROGRESS NOTES
OBS/CDU   Attending NOTE      Patients PCP is:  Caron Sandoval MD        SUBJECTIVE      Less vomiting today per patient. Patient states he is still throwing up though nurses not observed it. Patient has some mild hypoglycemia and also has lower bicarb. Patient for repeat evaluation by laboratory work. Patient has been instructed to save his vomiting. Patient states he is slightly improved symptomatically. Patient still not handling p.o. well. Clinically patient is doing acceptably    PHYSICAL EXAM      General: NAD, AO X 3  Heent: EMOI, PERRL  Neck: SUPPLE, NO JVD  Cardiovascular: RRR, S1S2  Pulmonary: CTAB, NO SOB  Abdomen: SOFT, NTTP, ND, +BS  Extremities: +2/4 PULSES DISTAL, NO SWELLING  Neuro / Psych: NO NUMBNESS OR TINGLING, MENTATION AT BASELINE    PERTINENT TEST /EXAMS      I have reviewed all available laboratory results.     MEDICATIONS CURRENT   lactated ringers infusion, Continuous  brimonidine (ALPHAGAN) 0.2 % ophthalmic solution 1 drop, TID  dorzolamide (TRUSOPT) 2 % ophthalmic solution 1 drop, BID  latanoprost (XALATAN) 0.005 % ophthalmic solution 1 drop, Nightly  timolol (TIMOPTIC) 0.5 % ophthalmic solution 1 drop, BID  sodium chloride flush 0.9 % injection 5-40 mL, 2 times per day  sodium chloride flush 0.9 % injection 5-40 mL, PRN  0.9 % sodium chloride infusion, PRN  atorvastatin (LIPITOR) tablet 40 mg, Nightly  clopidogrel (PLAVIX) tablet 75 mg, Daily  folic acid (FOLVITE) tablet 1 mg, Daily  hydrOXYzine (ATARAX) tablet 50 mg, TID PRN  sertraline (ZOLOFT) tablet 100 mg, Daily  traZODone (DESYREL) tablet 50 mg, Nightly PRN  sodium chloride flush 0.9 % injection 5-40 mL, 2 times per day  sodium chloride flush 0.9 % injection 5-40 mL, PRN  0.9 % sodium chloride infusion, PRN  enoxaparin (LOVENOX) injection 40 mg, Daily  ondansetron (ZOFRAN) injection 4 mg, Q4H PRN  polyethylene glycol (GLYCOLAX) packet 17 g, Daily PRN  aluminum & magnesium hydroxide-simethicone (MAALOX) 200-200-20 MG/5ML suspension 30 mL, Q6H PRN  thiamine tablet 100 mg, Daily  acetaminophen (TYLENOL) tablet 1,000 mg, 3 times per day  mirtazapine (REMERON) tablet 7.5 mg, Nightly  nicotine polacrilex (NICORETTE) gum 2 mg, Q1H PRN        All medication charted and reviewed. CONSULTS      IP CONSULT TO SOCIAL WORK    ASSESSMENT/PLAN       Alanna España is a 62 y.o. male who presents with pancreatitis. Patient is also homeless having difficulty ambulating self as outpatient. Patient requiring IV fluids and further aggressive care. · Pancreatitis requiring IV fluid  · Advancing diet slowly  · Parenteral antiemetic  · Ongoing IV pain medication  · Ongoing IV fluid  · Hypoglycemia noted. Lowered bicarb noted. Patient on lactated Ringer's. Discussed with nurse feeding patient and monitoring glucose. · Discussed with nursing and patient need to observe vomiting in order to quantify emesis  · Patient is homeless. Patient with difficulty handling orals. Patient will require appropriate disposition destination in order to handle medical needs after IV fluids  · Continue home medications and pain control  · Monitor vitals, labs, and imaging  · DISPO: pending consults and clinical improvement    --  Anne Rojas MD  Emergency Medicine Attending Physician     This dictation was generated by voice recognition computer software. Although all attempts are made to edit the dictation for accuracy, there may be errors in the transcription that are not intended.

## 2022-04-10 NOTE — PLAN OF CARE
Problem: Pain:  Goal: Pain level will decrease  Description: Pain level will decrease  4/10/2022 0521 by Rj Yi RN  Outcome: Ongoing  4/9/2022 1757 by Elli Matthesw RN  Outcome: Ongoing  4/9/2022 1730 by Elli Matthews RN  Outcome: Ongoing  Goal: Control of acute pain  Description: Control of acute pain  4/10/2022 0521 by Rj Yi RN  Outcome: Ongoing  4/9/2022 1757 by Elli Matthews RN  Outcome: Ongoing  4/9/2022 1730 by Elli Matthews RN  Outcome: Ongoing  Goal: Control of chronic pain  Description: Control of chronic pain  4/10/2022 0521 by Rj Yi RN  Outcome: Ongoing  4/9/2022 1757 by Elli Matthews RN  Outcome: Ongoing  4/9/2022 1730 by Elli Matthews RN  Outcome: Ongoing     Problem: Skin Integrity:  Goal: Will show no infection signs and symptoms  Description: Will show no infection signs and symptoms  4/10/2022 0521 by Rj Yi RN  Outcome: Ongoing  4/9/2022 1757 by Elli Matthews RN  Outcome: Ongoing  4/9/2022 1730 by Elli Matthews RN  Outcome: Ongoing  Goal: Absence of new skin breakdown  Description: Absence of new skin breakdown  4/10/2022 0521 by Rj Yi RN  Outcome: Ongoing  4/9/2022 1757 by Elli Matthews RN  Outcome: Ongoing  4/9/2022 1730 by Elli Matthews RN  Outcome: Ongoing     Problem: Falls - Risk of:  Goal: Will remain free from falls  Description: Will remain free from falls  4/10/2022 0521 by Rj Yi RN  Outcome: Ongoing  4/9/2022 1757 by Elli Matthews RN  Outcome: Ongoing  4/9/2022 1730 by Elli Matthews RN  Outcome: Ongoing  Goal: Absence of physical injury  Description: Absence of physical injury  4/10/2022 0521 by Rj Yi RN  Outcome: Ongoing  4/9/2022 1757 by Elli Matthews RN  Outcome: Ongoing  4/9/2022 1730 by Elli Matthews RN  Outcome: Ongoing

## 2022-04-11 LAB
ABSOLUTE EOS #: 0.09 K/UL (ref 0–0.44)
ABSOLUTE IMMATURE GRANULOCYTE: <0.03 K/UL (ref 0–0.3)
ABSOLUTE LYMPH #: 1.12 K/UL (ref 1.1–3.7)
ABSOLUTE MONO #: 0.44 K/UL (ref 0.1–1.2)
ALBUMIN SERPL-MCNC: 3.4 G/DL (ref 3.5–5.2)
ALBUMIN/GLOBULIN RATIO: 1.4 (ref 1–2.5)
ALP BLD-CCNC: 39 U/L (ref 40–129)
ALT SERPL-CCNC: 22 U/L (ref 5–41)
ANION GAP SERPL CALCULATED.3IONS-SCNC: 10 MMOL/L (ref 9–17)
AST SERPL-CCNC: 21 U/L
BASOPHILS # BLD: 0 % (ref 0–2)
BASOPHILS ABSOLUTE: <0.03 K/UL (ref 0–0.2)
BILIRUB SERPL-MCNC: 0.5 MG/DL (ref 0.3–1.2)
BUN BLDV-MCNC: 6 MG/DL (ref 8–23)
CALCIUM SERPL-MCNC: 8.3 MG/DL (ref 8.6–10.4)
CHLORIDE BLD-SCNC: 108 MMOL/L (ref 98–107)
CO2: 22 MMOL/L (ref 20–31)
CREAT SERPL-MCNC: 0.66 MG/DL (ref 0.7–1.2)
EOSINOPHILS RELATIVE PERCENT: 2 % (ref 1–4)
GFR AFRICAN AMERICAN: >60 ML/MIN
GFR NON-AFRICAN AMERICAN: >60 ML/MIN
GFR SERPL CREATININE-BSD FRML MDRD: ABNORMAL ML/MIN/{1.73_M2}
GLUCOSE BLD-MCNC: 91 MG/DL (ref 70–99)
HCT VFR BLD CALC: 40.2 % (ref 40.7–50.3)
HEMOGLOBIN: 14.2 G/DL (ref 13–17)
IMMATURE GRANULOCYTES: 0 %
LYMPHOCYTES # BLD: 29 % (ref 24–43)
MAGNESIUM: 1.7 MG/DL (ref 1.6–2.6)
MCH RBC QN AUTO: 33.3 PG (ref 25.2–33.5)
MCHC RBC AUTO-ENTMCNC: 35.3 G/DL (ref 28.4–34.8)
MCV RBC AUTO: 94.4 FL (ref 82.6–102.9)
MONOCYTES # BLD: 12 % (ref 3–12)
NRBC AUTOMATED: 0 PER 100 WBC
PDW BLD-RTO: 12.1 % (ref 11.8–14.4)
PLATELET # BLD: 168 K/UL (ref 138–453)
PMV BLD AUTO: 8.7 FL (ref 8.1–13.5)
POTASSIUM SERPL-SCNC: 3.8 MMOL/L (ref 3.7–5.3)
RBC # BLD: 4.26 M/UL (ref 4.21–5.77)
SEG NEUTROPHILS: 57 % (ref 36–65)
SEGMENTED NEUTROPHILS ABSOLUTE COUNT: 2.17 K/UL (ref 1.5–8.1)
SODIUM BLD-SCNC: 140 MMOL/L (ref 135–144)
TOTAL PROTEIN: 5.8 G/DL (ref 6.4–8.3)
WBC # BLD: 3.8 K/UL (ref 3.5–11.3)

## 2022-04-11 PROCEDURE — 1200000000 HC SEMI PRIVATE

## 2022-04-11 PROCEDURE — 94761 N-INVAS EAR/PLS OXIMETRY MLT: CPT

## 2022-04-11 PROCEDURE — 36415 COLL VENOUS BLD VENIPUNCTURE: CPT

## 2022-04-11 PROCEDURE — 96376 TX/PRO/DX INJ SAME DRUG ADON: CPT

## 2022-04-11 PROCEDURE — 2580000003 HC RX 258: Performed by: EMERGENCY MEDICINE

## 2022-04-11 PROCEDURE — 85025 COMPLETE CBC W/AUTO DIFF WBC: CPT

## 2022-04-11 PROCEDURE — 6370000000 HC RX 637 (ALT 250 FOR IP): Performed by: EMERGENCY MEDICINE

## 2022-04-11 PROCEDURE — 6360000002 HC RX W HCPCS: Performed by: EMERGENCY MEDICINE

## 2022-04-11 PROCEDURE — 80053 COMPREHEN METABOLIC PANEL: CPT

## 2022-04-11 PROCEDURE — 6370000000 HC RX 637 (ALT 250 FOR IP): Performed by: HEALTH CARE PROVIDER

## 2022-04-11 PROCEDURE — 83735 ASSAY OF MAGNESIUM: CPT

## 2022-04-11 RX ADMIN — ACETAMINOPHEN 1000 MG: 500 TABLET ORAL at 06:32

## 2022-04-11 RX ADMIN — FOLIC ACID 1 MG: 1 TABLET ORAL at 08:49

## 2022-04-11 RX ADMIN — NICOTINE POLACRILEX 2 MG: 2 GUM, CHEWING BUCCAL at 21:54

## 2022-04-11 RX ADMIN — TIMOLOL MALEATE 1 DROP: 5 SOLUTION OPHTHALMIC at 21:47

## 2022-04-11 RX ADMIN — BRIMONIDINE TARTRATE 1 DROP: 2 SOLUTION OPHTHALMIC at 08:50

## 2022-04-11 RX ADMIN — SERTRALINE 100 MG: 50 TABLET, FILM COATED ORAL at 08:50

## 2022-04-11 RX ADMIN — LATANOPROST 1 DROP: 50 SOLUTION OPHTHALMIC at 21:47

## 2022-04-11 RX ADMIN — DORZOLAMIDE HYDROCHLORIDE 1 DROP: 20 SOLUTION/ DROPS OPHTHALMIC at 08:50

## 2022-04-11 RX ADMIN — ONDANSETRON 4 MG: 2 INJECTION INTRAMUSCULAR; INTRAVENOUS at 08:50

## 2022-04-11 RX ADMIN — SODIUM CHLORIDE, POTASSIUM CHLORIDE, SODIUM LACTATE AND CALCIUM CHLORIDE: 600; 310; 30; 20 INJECTION, SOLUTION INTRAVENOUS at 14:33

## 2022-04-11 RX ADMIN — MIRTAZAPINE 7.5 MG: 15 TABLET, FILM COATED ORAL at 21:43

## 2022-04-11 RX ADMIN — TIMOLOL MALEATE 1 DROP: 5 SOLUTION OPHTHALMIC at 08:50

## 2022-04-11 RX ADMIN — CLOPIDOGREL 75 MG: 75 TABLET, FILM COATED ORAL at 08:49

## 2022-04-11 RX ADMIN — Medication 100 MG: at 08:49

## 2022-04-11 RX ADMIN — SODIUM CHLORIDE, POTASSIUM CHLORIDE, SODIUM LACTATE AND CALCIUM CHLORIDE: 600; 310; 30; 20 INJECTION, SOLUTION INTRAVENOUS at 04:08

## 2022-04-11 RX ADMIN — BRIMONIDINE TARTRATE 1 DROP: 2 SOLUTION OPHTHALMIC at 14:34

## 2022-04-11 RX ADMIN — TRAZODONE HYDROCHLORIDE 50 MG: 50 TABLET ORAL at 21:43

## 2022-04-11 RX ADMIN — ATORVASTATIN CALCIUM 40 MG: 80 TABLET, FILM COATED ORAL at 21:42

## 2022-04-11 RX ADMIN — ACETAMINOPHEN 1000 MG: 500 TABLET ORAL at 14:34

## 2022-04-11 RX ADMIN — DORZOLAMIDE HYDROCHLORIDE 1 DROP: 20 SOLUTION/ DROPS OPHTHALMIC at 21:47

## 2022-04-11 RX ADMIN — BRIMONIDINE TARTRATE 1 DROP: 2 SOLUTION OPHTHALMIC at 21:47

## 2022-04-11 ASSESSMENT — PAIN SCALES - GENERAL
PAINLEVEL_OUTOF10: 2

## 2022-04-11 NOTE — PLAN OF CARE
Problem: Pain:  Description: Pain management should include both nonpharmacologic and pharmacologic interventions.   Goal: Pain level will decrease  Description: Pain level will decrease  4/11/2022 1739 by Jeff Ramos RN  Outcome: Ongoing  4/11/2022 0657 by Estefania Pena RN  Outcome: Ongoing  Goal: Control of acute pain  Description: Control of acute pain  4/11/2022 1739 by Jeff Ramos RN  Outcome: Ongoing  4/11/2022 0657 by Estefania Pena RN  Outcome: Ongoing  Goal: Control of chronic pain  Description: Control of chronic pain  4/11/2022 1739 by Jeff Ramos RN  Outcome: Ongoing  4/11/2022 0657 by Estefania Pena RN  Outcome: Ongoing     Problem: Skin Integrity:  Goal: Will show no infection signs and symptoms  Description: Will show no infection signs and symptoms  4/11/2022 1739 by Jeff Ramos RN  Outcome: Ongoing  4/11/2022 0657 by Estefania Pena RN  Outcome: Ongoing  Goal: Absence of new skin breakdown  Description: Absence of new skin breakdown  4/11/2022 1739 by Jeff Ramos RN  Outcome: Ongoing  4/11/2022 0657 by Estefania Pena RN  Outcome: Ongoing     Problem: Falls - Risk of:  Goal: Will remain free from falls  Description: Will remain free from falls  4/11/2022 1739 by Jeff Ramos RN  Outcome: Ongoing  4/11/2022 0657 by Estefania Pena RN  Outcome: Ongoing  Goal: Absence of physical injury  Description: Absence of physical injury  4/11/2022 1739 by Jeff Ramos RN  Outcome: Ongoing  4/11/2022 0657 by Estefania Pena RN  Outcome: Ongoing

## 2022-04-11 NOTE — PROGRESS NOTES
[address] [facility]  CDU / OBSERVATION ENCOUNTER  ATTENDING NOTE       I performed a history and physical examination of the patient and discussed management with the resident or midlevel provider. I reviewed the resident or midlevel provider's note and agree with the documented findings and plan of care. Any areas of disagreement are noted on the chart. I was personally present for the key portions of any procedures. I have documented in the chart those procedures where I was not present during the key portions. I have reviewed the nurses notes. I agree with the chief complaint, past medical history, past surgical history, allergies, medications, social and family history as documented unless otherwise noted below. The Family history, social history, and ROS are effectively unchanged since admission unless noted elsewhere in the chart. Laboratory work good this morning. Patient has less vomiting as a after IV fluids. Patient slowly responding to therapy. Patient with slow improvement from fairly significant nausea vomiting. Patient with plan for reassessment today. Patient will need changes made for PCP. Patient currently improved from laboratory standpoint from yesterday. Will reassess for clinical improvement as well. Patient with less vomiting today.   Still some episodes of emesis but improved from previously    Андрей Wilder MD  Attending Emergency  Physician

## 2022-04-11 NOTE — PROGRESS NOTES
OBS/CDU   Attending NOTE      Patients PCP is:  Maria De Jesus Jewell MD        SUBJECTIVE      Patient was seen and evaluated bedside this morning. Patient states overall his pain has improved. He does have concerns that he did have some nausea vomiting after breakfast this morning. Otherwise patient has been afebrile. No other acute concerns morning. Denies any chest pain, shortness of breath, change in bowel or bladder habits, upper or lower extremity weakness. PHYSICAL EXAM      General: NAD, AO X 3  Heent: EMOI, PERRL  Neck: SUPPLE, NO JVD  Cardiovascular: RRR, S1S2  Pulmonary: CTAB, NO SOB  Abdomen: SOFT, NTTP, ND, +BS  Extremities: +2/4 PULSES DISTAL, NO SWELLING  Neuro / Psych: NO NUMBNESS OR TINGLING, MENTATION AT BASELINE    PERTINENT TEST /EXAMS      I have reviewed all available laboratory results.     MEDICATIONS CURRENT   lactated ringers infusion, Continuous  brimonidine (ALPHAGAN) 0.2 % ophthalmic solution 1 drop, TID  dorzolamide (TRUSOPT) 2 % ophthalmic solution 1 drop, BID  latanoprost (XALATAN) 0.005 % ophthalmic solution 1 drop, Nightly  timolol (TIMOPTIC) 0.5 % ophthalmic solution 1 drop, BID  sodium chloride flush 0.9 % injection 5-40 mL, 2 times per day  sodium chloride flush 0.9 % injection 5-40 mL, PRN  0.9 % sodium chloride infusion, PRN  atorvastatin (LIPITOR) tablet 40 mg, Nightly  clopidogrel (PLAVIX) tablet 75 mg, Daily  folic acid (FOLVITE) tablet 1 mg, Daily  hydrOXYzine (ATARAX) tablet 50 mg, TID PRN  sertraline (ZOLOFT) tablet 100 mg, Daily  traZODone (DESYREL) tablet 50 mg, Nightly PRN  sodium chloride flush 0.9 % injection 5-40 mL, 2 times per day  sodium chloride flush 0.9 % injection 5-40 mL, PRN  0.9 % sodium chloride infusion, PRN  enoxaparin (LOVENOX) injection 40 mg, Daily  ondansetron (ZOFRAN) injection 4 mg, Q4H PRN  polyethylene glycol (GLYCOLAX) packet 17 g, Daily PRN  aluminum & magnesium hydroxide-simethicone (MAALOX) 200-200-20 MG/5ML suspension 30 mL, Q6H PRN  thiamine tablet 100 mg, Daily  acetaminophen (TYLENOL) tablet 1,000 mg, 3 times per day  mirtazapine (REMERON) tablet 7.5 mg, Nightly  nicotine polacrilex (NICORETTE) gum 2 mg, Q1H PRN        All medication charted and reviewed. CONSULTS      IP CONSULT TO SOCIAL WORK    ASSESSMENT/PLAN       Charlee Piña is a 62 y.o. male who presents with pancreatitis. Patient is also homeless having difficulty ambulating self as outpatient. Patient requiring IV fluids and further aggressive care. · Pancreatitis requiring IV fluid  · Advancing diet slowly  · Parenteral antiemetic  · Ongoing IV pain medication  · Cont LR  · Hypoglycemia improved today. Bicarb also improved today. · Discussed with nursing and patient need to observe vomiting in order to quantify emesis  · Patient is homeless. Patient with difficulty handling orals. Patient will require appropriate disposition destination in order to handle medical needs after IV fluids  · Continue home medications and pain control  · Monitor vitals, labs, and imaging  · DISPO: pending consults and clinical improvement, potential discharge this afternoon if improved and tolerating solid oral intake versus tomorrow    --  Sandy Crawford DO  Emergency Medicine Attending Physician     This dictation was generated by voice recognition computer software. Although all attempts are made to edit the dictation for accuracy, there may be errors in the transcription that are not intended.

## 2022-04-12 ENCOUNTER — APPOINTMENT (OUTPATIENT)
Dept: MRI IMAGING | Age: 62
DRG: 282 | End: 2022-04-12
Payer: MEDICARE

## 2022-04-12 LAB
ABSOLUTE EOS #: 0.09 K/UL (ref 0–0.44)
ABSOLUTE IMMATURE GRANULOCYTE: 0.03 K/UL (ref 0–0.3)
ABSOLUTE LYMPH #: 1.11 K/UL (ref 1.1–3.7)
ABSOLUTE MONO #: 0.47 K/UL (ref 0.1–1.2)
ALBUMIN SERPL-MCNC: 3.4 G/DL (ref 3.5–5.2)
ALBUMIN/GLOBULIN RATIO: 1.5 (ref 1–2.5)
ALP BLD-CCNC: 38 U/L (ref 40–129)
ALT SERPL-CCNC: 30 U/L (ref 5–41)
ANION GAP SERPL CALCULATED.3IONS-SCNC: 9 MMOL/L (ref 9–17)
AST SERPL-CCNC: 30 U/L
BASOPHILS # BLD: 0 % (ref 0–2)
BASOPHILS ABSOLUTE: <0.03 K/UL (ref 0–0.2)
BILIRUB SERPL-MCNC: 0.35 MG/DL (ref 0.3–1.2)
BUN BLDV-MCNC: 4 MG/DL (ref 8–23)
CALCIUM SERPL-MCNC: 8.4 MG/DL (ref 8.6–10.4)
CHLORIDE BLD-SCNC: 109 MMOL/L (ref 98–107)
CO2: 26 MMOL/L (ref 20–31)
CREAT SERPL-MCNC: 0.77 MG/DL (ref 0.7–1.2)
EOSINOPHILS RELATIVE PERCENT: 2 % (ref 1–4)
GFR AFRICAN AMERICAN: >60 ML/MIN
GFR NON-AFRICAN AMERICAN: >60 ML/MIN
GFR SERPL CREATININE-BSD FRML MDRD: ABNORMAL ML/MIN/{1.73_M2}
GLUCOSE BLD-MCNC: 97 MG/DL (ref 70–99)
HCT VFR BLD CALC: 40.3 % (ref 40.7–50.3)
HEMOGLOBIN: 14 G/DL (ref 13–17)
IMMATURE GRANULOCYTES: 1 %
LIPASE: 185 U/L (ref 13–60)
LYMPHOCYTES # BLD: 24 % (ref 24–43)
MAGNESIUM: 1.7 MG/DL (ref 1.6–2.6)
MCH RBC QN AUTO: 33.3 PG (ref 25.2–33.5)
MCHC RBC AUTO-ENTMCNC: 34.7 G/DL (ref 28.4–34.8)
MCV RBC AUTO: 96 FL (ref 82.6–102.9)
MONOCYTES # BLD: 10 % (ref 3–12)
NRBC AUTOMATED: 0 PER 100 WBC
PDW BLD-RTO: 12.3 % (ref 11.8–14.4)
PLATELET # BLD: 173 K/UL (ref 138–453)
PMV BLD AUTO: 8.7 FL (ref 8.1–13.5)
POTASSIUM SERPL-SCNC: 3.8 MMOL/L (ref 3.7–5.3)
RBC # BLD: 4.2 M/UL (ref 4.21–5.77)
SEG NEUTROPHILS: 63 % (ref 36–65)
SEGMENTED NEUTROPHILS ABSOLUTE COUNT: 2.91 K/UL (ref 1.5–8.1)
SODIUM BLD-SCNC: 144 MMOL/L (ref 135–144)
TOTAL PROTEIN: 5.6 G/DL (ref 6.4–8.3)
WBC # BLD: 4.6 K/UL (ref 3.5–11.3)

## 2022-04-12 PROCEDURE — 83735 ASSAY OF MAGNESIUM: CPT

## 2022-04-12 PROCEDURE — 74183 MRI ABD W/O CNTR FLWD CNTR: CPT

## 2022-04-12 PROCEDURE — 83690 ASSAY OF LIPASE: CPT

## 2022-04-12 PROCEDURE — 2580000003 HC RX 258: Performed by: EMERGENCY MEDICINE

## 2022-04-12 PROCEDURE — 6360000002 HC RX W HCPCS: Performed by: EMERGENCY MEDICINE

## 2022-04-12 PROCEDURE — 96372 THER/PROPH/DIAG INJ SC/IM: CPT

## 2022-04-12 PROCEDURE — 6360000004 HC RX CONTRAST MEDICATION: Performed by: HEALTH CARE PROVIDER

## 2022-04-12 PROCEDURE — 36415 COLL VENOUS BLD VENIPUNCTURE: CPT

## 2022-04-12 PROCEDURE — 6370000000 HC RX 637 (ALT 250 FOR IP): Performed by: EMERGENCY MEDICINE

## 2022-04-12 PROCEDURE — 85025 COMPLETE CBC W/AUTO DIFF WBC: CPT

## 2022-04-12 PROCEDURE — 6370000000 HC RX 637 (ALT 250 FOR IP): Performed by: HEALTH CARE PROVIDER

## 2022-04-12 PROCEDURE — 96375 TX/PRO/DX INJ NEW DRUG ADDON: CPT

## 2022-04-12 PROCEDURE — 1200000000 HC SEMI PRIVATE

## 2022-04-12 PROCEDURE — A9576 INJ PROHANCE MULTIPACK: HCPCS | Performed by: HEALTH CARE PROVIDER

## 2022-04-12 PROCEDURE — 99222 1ST HOSP IP/OBS MODERATE 55: CPT | Performed by: INTERNAL MEDICINE

## 2022-04-12 PROCEDURE — 2580000003 HC RX 258: Performed by: STUDENT IN AN ORGANIZED HEALTH CARE EDUCATION/TRAINING PROGRAM

## 2022-04-12 PROCEDURE — 80053 COMPREHEN METABOLIC PANEL: CPT

## 2022-04-12 RX ORDER — LORAZEPAM 2 MG/ML
1 INJECTION INTRAMUSCULAR ONCE
Status: COMPLETED | OUTPATIENT
Start: 2022-04-12 | End: 2022-04-12

## 2022-04-12 RX ADMIN — ENOXAPARIN SODIUM 40 MG: 40 INJECTION SUBCUTANEOUS at 08:44

## 2022-04-12 RX ADMIN — MIRTAZAPINE 7.5 MG: 15 TABLET, FILM COATED ORAL at 21:36

## 2022-04-12 RX ADMIN — ACETAMINOPHEN 1000 MG: 500 TABLET ORAL at 21:37

## 2022-04-12 RX ADMIN — ATORVASTATIN CALCIUM 40 MG: 80 TABLET, FILM COATED ORAL at 21:36

## 2022-04-12 RX ADMIN — FOLIC ACID 1 MG: 1 TABLET ORAL at 08:44

## 2022-04-12 RX ADMIN — SODIUM CHLORIDE, POTASSIUM CHLORIDE, SODIUM LACTATE AND CALCIUM CHLORIDE: 600; 310; 30; 20 INJECTION, SOLUTION INTRAVENOUS at 10:18

## 2022-04-12 RX ADMIN — SERTRALINE 100 MG: 50 TABLET, FILM COATED ORAL at 08:44

## 2022-04-12 RX ADMIN — Medication 100 MG: at 08:44

## 2022-04-12 RX ADMIN — BRIMONIDINE TARTRATE 1 DROP: 2 SOLUTION OPHTHALMIC at 21:37

## 2022-04-12 RX ADMIN — DORZOLAMIDE HYDROCHLORIDE 1 DROP: 20 SOLUTION/ DROPS OPHTHALMIC at 21:37

## 2022-04-12 RX ADMIN — LORAZEPAM 1 MG: 2 INJECTION INTRAMUSCULAR; INTRAVENOUS at 13:11

## 2022-04-12 RX ADMIN — SODIUM CHLORIDE, POTASSIUM CHLORIDE, SODIUM LACTATE AND CALCIUM CHLORIDE: 600; 310; 30; 20 INJECTION, SOLUTION INTRAVENOUS at 21:35

## 2022-04-12 RX ADMIN — TRAZODONE HYDROCHLORIDE 50 MG: 50 TABLET ORAL at 21:36

## 2022-04-12 RX ADMIN — CLOPIDOGREL 75 MG: 75 TABLET, FILM COATED ORAL at 08:44

## 2022-04-12 RX ADMIN — BRIMONIDINE TARTRATE 1 DROP: 2 SOLUTION OPHTHALMIC at 14:46

## 2022-04-12 RX ADMIN — ACETAMINOPHEN 1000 MG: 500 TABLET ORAL at 14:46

## 2022-04-12 RX ADMIN — DORZOLAMIDE HYDROCHLORIDE 1 DROP: 20 SOLUTION/ DROPS OPHTHALMIC at 08:44

## 2022-04-12 RX ADMIN — BRIMONIDINE TARTRATE 1 DROP: 2 SOLUTION OPHTHALMIC at 08:44

## 2022-04-12 RX ADMIN — GADOTERIDOL 15 ML: 279.3 INJECTION, SOLUTION INTRAVENOUS at 14:18

## 2022-04-12 RX ADMIN — TIMOLOL MALEATE 1 DROP: 5 SOLUTION OPHTHALMIC at 21:37

## 2022-04-12 RX ADMIN — SODIUM CHLORIDE, POTASSIUM CHLORIDE, SODIUM LACTATE AND CALCIUM CHLORIDE: 600; 310; 30; 20 INJECTION, SOLUTION INTRAVENOUS at 00:16

## 2022-04-12 RX ADMIN — LATANOPROST 1 DROP: 50 SOLUTION OPHTHALMIC at 21:37

## 2022-04-12 RX ADMIN — TIMOLOL MALEATE 1 DROP: 5 SOLUTION OPHTHALMIC at 08:44

## 2022-04-12 RX ADMIN — NICOTINE POLACRILEX 2 MG: 2 GUM, CHEWING BUCCAL at 21:37

## 2022-04-12 ASSESSMENT — PAIN SCALES - GENERAL
PAINLEVEL_OUTOF10: 2
PAINLEVEL_OUTOF10: 0
PAINLEVEL_OUTOF10: 0
PAINLEVEL_OUTOF10: 2

## 2022-04-12 NOTE — CARE COORDINATION
04/12/22 1811   Readmission Assessment   Number of Days since last admission? 1-7 days   Previous Disposition Other (comment)  (Homeless, lives in his [facility])   Who is being Interviewed Patient   What was the patient's/caregiver's perception as to why they think they needed to return back to the hospital?   (Chest pain)   Did you visit your Primary Care Physician after you left the hospital, before you returned this time? No   Why weren't you able to visit your PCP?   (did not have time between hospitalizations)   Did you see a specialist, such as Cardiac, Pulmonary, Orthopedic Physician, etc. after you left the hospital? No   Who advised the patient to return to the hospital? Self-referral   Does the patient report anything that got in the way of taking their medications? No   In our efforts to provide the best possible care to you and others like you, can you think of anything that we could have done to help you after you left the hospital the first time, so that you might not have needed to return so soon?  Other (Comment)  (Pt stated dc instructions were understood)

## 2022-04-12 NOTE — PLAN OF CARE
Problem: Pain:  Goal: Pain level will decrease  Description: Pain level will decrease  4/12/2022 1714 by Casa Arguello  Outcome: Ongoing  4/12/2022 1713 by Violet Garcia, RN  Reactivated  4/12/2022 1713 by Violet Garcia, RN  Reactivated  4/12/2022 0903 by Casa Arguello  Outcome: Completed  Goal: Control of acute pain  Description: Control of acute pain  4/12/2022 1714 by Casa Arguello  Outcome: Ongoing  4/12/2022 1713 by Violet Garcia, RN  Reactivated  4/12/2022 1713 by Violet Garcia, RN  Reactivated  4/12/2022 0903 by Casa Arguello  Outcome: Completed  Goal: Control of chronic pain  Description: Control of chronic pain  4/12/2022 1714 by Casa Arguello  Outcome: Ongoing  4/12/2022 1713 by Violet Garcia, RN  Reactivated  4/12/2022 1713 by Violet Garcia, RN  Reactivated  4/12/2022 0903 by Casa Arguello  Outcome: Completed     Problem: Skin Integrity:  Goal: Will show no infection signs and symptoms  Description: Will show no infection signs and symptoms  4/12/2022 1714 by Casa Arguello  Outcome: Ongoing  4/12/2022 1713 by Violet Garcia, RN  Reactivated  4/12/2022 1713 by Violet Garcia, RN  Reactivated  4/12/2022 0903 by Casa Arguello  Outcome: Completed  Goal: Absence of new skin breakdown  Description: Absence of new skin breakdown  4/12/2022 1714 by Casa Arguello  Outcome: Ongoing  4/12/2022 1713 by Violet Garcia, RN  Reactivated  4/12/2022 1713 by Violet Garcia, RN  Reactivated  4/12/2022 0903 by Casa Arguello  Outcome: Completed     Problem: Falls - Risk of:  Goal: Will remain free from falls  Description: Will remain free from falls  4/12/2022 1714 by Casa Arguello  Outcome: Ongoing  4/12/2022 1713 by Violet Garcia, RN  Reactivated  4/12/2022 1713 by Violet Garcia, RN  Reactivated  4/12/2022 0903 by Casa Arguello  Outcome: Completed  Goal: Absence of physical injury  Description: Absence of physical injury  4/12/2022 1714 by Olga Mortensen Kenzie  Outcome: Ongoing  4/12/2022 1713 by Ryan Tarango RN  Reactivated  4/12/2022 1713 by Ryan Tarango RN  Reactivated  4/12/2022 [id number] by Jose Alfredo Vizcarra  Outcome: Completed

## 2022-04-12 NOTE — PLAN OF CARE
Problem: Pain:  Goal: Pain level will decrease  Description: Pain level will decrease  4/12/2022 1714 by Lucila Hunt  Outcome: Ongoing  4/12/2022 1714 by Lucila Hunt  Outcome: Ongoing  4/12/2022 1713 by Darrian Castorena RN  Reactivated  4/12/2022 0903 by Lucila Hunt  Outcome: Completed  Goal: Control of acute pain  Description: Control of acute pain  4/12/2022 1714 by Lucila Hunt  Outcome: Ongoing  4/12/2022 1714 by Lucila Hunt  Outcome: Ongoing  4/12/2022 1713 by Darrian Castorena RN  Reactivated  4/12/2022 0903 by Lucila Hunt  Outcome: Completed  Goal: Control of chronic pain  Description: Control of chronic pain  4/12/2022 1714 by Lucila Hunt  Outcome: Ongoing  4/12/2022 1714 by Lucila Hunt  Outcome: Ongoing  4/12/2022 1713 by Darrian Castorena RN  Reactivated  4/12/2022 0903 by Lucila Hunt  Outcome: Completed     Problem: Skin Integrity:  Goal: Will show no infection signs and symptoms  Description: Will show no infection signs and symptoms  4/12/2022 1714 by Lucila Hunt  Outcome: Ongoing  4/12/2022 1714 by Lucila Hunt  Outcome: Ongoing  4/12/2022 1713 by Darrian Castorena RN  Reactivated  4/12/2022 0903 by Lucila Hunt  Outcome: Completed  Goal: Absence of new skin breakdown  Description: Absence of new skin breakdown  4/12/2022 1714 by Lucila Hunt  Outcome: Ongoing  4/12/2022 1714 by Lucila Hunt  Outcome: Ongoing  4/12/2022 1713 by Darrian Castoerna RN  Reactivated  4/12/2022 0903 by Lucila Hunt  Outcome: Completed     Problem: Falls - Risk of:  Goal: Will remain free from falls  Description: Will remain free from falls  4/12/2022 1714 by Lucila Hunt  Outcome: Ongoing  4/12/2022 1714 by Lucila Hunt  Outcome: Ongoing  4/12/2022 1713 by Darrian Castorena RN  Reactivated  4/12/2022 0903 by Lucila Hunt  Outcome: Completed  Goal: Absence of physical injury  Description: Absence of physical injury  4/12/2022 1714 by Velta Record  Outcome: Ongoing  4/12/2022 1714 by Velta Record  Outcome: Ongoing  4/12/2022 1713 by Debbie Camacho RN  Reactivated  4/12/2022 0903 by Velta Record  Outcome: Completed

## 2022-04-12 NOTE — CARE COORDINATION
Transitional planning.  Homeless, need cab ride to [place] 32 lives in his [facility]. has established PCP

## 2022-04-12 NOTE — PROGRESS NOTES
OBS/CDU   Attending NOTE      Patients PCP is:  Carlton Fragoso MD        SUBJECTIVE      Patient was seen and evaluated bedside this morning. Patient states that pain remains better at a 2 or 3 out of 10. He appears comfortable in bed, no acute distress noted. Does state that he has some persistent, unchanged epigastric pain. Has been able to tolerate full liquid diet. Otherwise patient has been afebrile. No other acute concerns morning. Denies any chest pain, shortness of breath, change in bowel or bladder habits, upper or lower extremity weakness. PHYSICAL EXAM      General: NAD, AO X 3  Heent: EMOI, PERRL  Neck: SUPPLE, NO JVD  Cardiovascular: RRR, S1S2  Pulmonary: CTAB, NO SOB  Abdomen: SOFT, NTTP, no tenderness noted to right upper quadrant or epigastric region  Extremities: +2/4 PULSES DISTAL, NO SWELLING  Neuro / Psych: NO NUMBNESS OR TINGLING, MENTATION AT BASELINE    PERTINENT TEST /EXAMS      I have reviewed all available laboratory results.     MEDICATIONS CURRENT   lactated ringers infusion, Continuous  brimonidine (ALPHAGAN) 0.2 % ophthalmic solution 1 drop, TID  dorzolamide (TRUSOPT) 2 % ophthalmic solution 1 drop, BID  latanoprost (XALATAN) 0.005 % ophthalmic solution 1 drop, Nightly  timolol (TIMOPTIC) 0.5 % ophthalmic solution 1 drop, BID  sodium chloride flush 0.9 % injection 5-40 mL, 2 times per day  sodium chloride flush 0.9 % injection 5-40 mL, PRN  0.9 % sodium chloride infusion, PRN  atorvastatin (LIPITOR) tablet 40 mg, Nightly  clopidogrel (PLAVIX) tablet 75 mg, Daily  folic acid (FOLVITE) tablet 1 mg, Daily  hydrOXYzine (ATARAX) tablet 50 mg, TID PRN  sertraline (ZOLOFT) tablet 100 mg, Daily  traZODone (DESYREL) tablet 50 mg, Nightly PRN  sodium chloride flush 0.9 % injection 5-40 mL, 2 times per day  sodium chloride flush 0.9 % injection 5-40 mL, PRN  0.9 % sodium chloride infusion, PRN  enoxaparin (LOVENOX) injection 40 mg, Daily  ondansetron (ZOFRAN) injection 4 mg, Q4H PRN  polyethylene glycol (GLYCOLAX) packet 17 g, Daily PRN  aluminum & magnesium hydroxide-simethicone (MAALOX) 200-200-20 MG/5ML suspension 30 mL, Q6H PRN  thiamine tablet 100 mg, Daily  acetaminophen (TYLENOL) tablet 1,000 mg, 3 times per day  mirtazapine (REMERON) tablet 7.5 mg, Nightly  nicotine polacrilex (NICORETTE) gum 2 mg, Q1H PRN        All medication charted and reviewed. CONSULTS      IP CONSULT TO SOCIAL WORK    ASSESSMENT/PLAN       Martin Molina is a 62 y.o. male who presents with pancreatitis. Patient is also homeless having difficulty ambulating self as outpatient. Patient requiring IV fluids and further aggressive care. · Pancreatitis requiring IV fluid  · Advancing diet slowly, will advance to low-fat diet today  · Parenteral antiemetic  · Ongoing IV pain medication  · Cont LR  · Will obtain MRCP given persistently elevated lipase along with the pancreatic ductal dilation seen on the CT scan  · Patient is homeless. Patient with difficulty handling orals. Patient will require appropriate disposition destination in order to handle medical needs after IV fluids  · Continue home medications and pain control  · Monitor vitals, labs, and imaging  · DISPO: pending consults and clinical improvement, potential discharge this afternoon if improved and tolerating solid oral intake versus tomorrow    --  Adan Mazariegos DO  Emergency Medicine Attending Physician     This dictation was generated by voice recognition computer software. Although all attempts are made to edit the dictation for accuracy, there may be errors in the transcription that are not intended.

## 2022-04-12 NOTE — CONSULTS
[facility]  Consultation Note     . REASON FOR CONSULTATION:        HISTORY OF PRESENT ILLNESS:     This is a 62 y.o. male with PMH including chronic alcohol use, equivalent of 1 gallon of beer daily, last drink day before admission, chews tobacco, psych issues with suicidal ideation, recent discharge from  psych unit    Presented with epigastric abdominal pain, nausea. In the ED, his lipase was 223, was found to have pancreatitis and was brought to observation unit for further management of pancreatitis. Had IVF and pain management. Currently on PO diet, improved pain persistently elevated lipase. GI consulted   Has 2/10 epigastric pain. And diarrhea. Denies current steatorhea, vomiting, nausea, hematemesis, melena, hematochezia. Denies marijuana, cocaine and other illicit drugs. Summary of imaging completed at this time:    CT ABDOMEN PELVIS W IV CONTRAST    Result Date: 4/8/2022  1. No evidence of acute pancreatitis. There is diffuse pancreatic ductal dilatation and coarse calcification at the pancreatic head, most suggestive of chronic pancreatitis. 2.  Diffuse prominence of the gastric rugal folds, suggesting gastritis. 3.  Questionable irregular wall thickening at the rectum, which could be due to incomplete distension, but possibility of underlying neoplasm is difficult to exclude. Consider direct visualization. 4.  Trace bilateral pleural fluid. 5.  Suspected previous right orchiectomy.        Summary of abnormal labs completed at this time:    Metabolic: Sodium 050, potassium 3.8, BUN 4, creatinine 0.7,  Hematology: Hb 14, WBC 4.6, platelet 840  Coags:  Liver profile:  ALT stable around 30, AST 30, bilirubin 0.68 down to 0.35, ALP 38 albumin 3.4 lipase persistently elevated around 185        On physician exam:      Previous GI history:       Past Medical/Social/Family History:  Past Medical History:   Diagnosis Date    Depression with suicidal ideation 2/10/2022    Glaucoma     Hypertension     Mixed hyperlipidemia 3/28/2022     History reviewed. No pertinent surgical history. History reviewed. No pertinent family history. Previous records/history/ and notes were reviewed    Allergies:  No Known Allergies    Home medications:  Prior to Admission medications    Medication Sig Start Date End Date Taking?  Authorizing Provider   aluminum & magnesium hydroxide-simethicone (MAALOX) 200-200-20 MG/5ML SUSP suspension Take 30 mLs by mouth every 6 hours as needed for Indigestion 4/6/22  Yes Fiona Paige MD   ondansetron (ZOFRAN ODT) 4 MG disintegrating tablet Take 1 tablet by mouth every 8 hours as needed for Nausea 4/6/22  Yes Fiona Paige MD   vitamin B-1 (THIAMINE) 100 MG tablet Take 1 tablet by mouth daily 4/6/22  Yes Fiona Paige MD   mirtazapine (REMERON) 7.5 MG tablet Take 1 tablet by mouth nightly 4/4/22   Damaris Ramirez MD   sertraline (ZOLOFT) 100 MG tablet Take 1 tablet by mouth daily 4/5/22   Damaris Ramirez MD   atorvastatin (LIPITOR) 40 MG tablet Take 1 tablet by mouth nightly 4/1/22   Damaris Ramirez MD   brimonidine (ALPHAGAN) 0.2 % ophthalmic solution Place 1 drop into the right eye 3 times daily 4/1/22   Damaris Ramirez MD   clopidogrel (PLAVIX) 75 MG tablet Take 1 tablet by mouth daily 4/1/22   Damaris Ramirez MD   dorzolamide (TRUSOPT) 2 % ophthalmic solution Place 1 drop into the right eye 2 times daily 4/1/22   Damaris Ramirez MD   folic acid (FOLVITE) 1 MG tablet Take 1 tablet by mouth daily 4/1/22   Damaris Ramirez MD   latanoprost (XALATAN) 0.005 % ophthalmic solution Place 1 drop into the right eye nightly 4/1/22   Damaris Ramirez MD   nicotine polacrilex (NICORETTE) 2 MG gum Take 1 each by mouth as needed for Smoking cessation 4/1/22   Damaris Ramirez MD   timolol (TIMOPTIC) 0.5 % ophthalmic solution Place 1 drop into the right eye 2 times daily 4/1/22   Damaris Ramirez MD   traZODone (DESYREL) 50 MG tablet Take 1 tablet by mouth nightly as needed for Sleep 4/1/22   Luis Harkins MD   ibuprofen (IBU) 400 MG tablet Take 1 tablet by mouth every 6 hours as needed for Pain 12/20/19   Sarah Celis DO     .  Current Medications:  Scheduled Meds:   brimonidine  1 drop Right Eye TID    dorzolamide  1 drop Right Eye BID    latanoprost  1 drop Right Eye Nightly    timolol  1 drop Right Eye BID    sodium chloride flush  5-40 mL IntraVENous 2 times per day    atorvastatin  40 mg Oral Nightly    clopidogrel  75 mg Oral Daily    folic acid  1 mg Oral Daily    sertraline  100 mg Oral Daily    sodium chloride flush  5-40 mL IntraVENous 2 times per day    enoxaparin  40 mg SubCUTAneous Daily    thiamine  100 mg Oral Daily    acetaminophen  1,000 mg Oral 3 times per day    mirtazapine  7.5 mg Oral Nightly     . Continuous Infusions:   lactated ringers 100 mL/hr at 04/12/22 1018    sodium chloride      sodium chloride       . PRN Meds:sodium chloride flush, sodium chloride, hydrOXYzine, traZODone, sodium chloride flush, sodium chloride, ondansetron, polyethylene glycol, aluminum & magnesium hydroxide-simethicone, nicotine polacrilex    REVIEW OF SYSTEMS:     Constitutional: No fever, no chills, no lethargy, no weakness, no weight loss  HEENT:  No headache, otalgia, itchy eyes, nasal discharge or sore throat. Cardiac:  No chest pain, dyspnea, orthopnea or PND. Chest:   No cough, phlegm or wheezing. Abdomen:  No abdominal pain, nausea, vomiting, constipation, diarrhea, hematochezia/melena  Neuro:  No focal weakness, abnormal movements or seizure like activity. Skin:   No rashes, no itching. :   No hematuria, no pyuria, no dysuria, no flank pain. Extremities:  No swelling or joint pains. ROS was otherwise negative except as mentioned in the [address]      PHYSICAL EXAM:    /76   Pulse 50   Temp 96.5 °F (35.8 °C) (Oral)   Resp 14   Ht 5' 4\" (1.626 m)   Wt 165 lb (74.8 kg)   SpO2 98%   BMI 28.32 kg/m² Jaspal Confer LEONIDAS[24]    General: Well developed, Well nourished, No apparent distress  Head:  Normocephalic, Atraumatic  EENT: EOMI, Sclera not icteric, Oropharynx moist  Neck:  Supple, Trachea midline  Lungs:CTA Bilaterally  Heart: RRR, No murmur, No rub, No gallop, PMI nondisplaced. Abdomen:Soft, Non tender, Not distended, BS WNL,  No masses. No hepatomegalia   Ext:No clubbing. No cyanosis. No edema. Skin: No rashes. No jaundice. No stigmata of liver disease. Neuro:  A&O x Three, No focal neurological deficits    Imaging:       Hemotological labs: Anemia studies:  No results for input(s): LABIRON, TIBC, FERRITIN, ABQHIIDA24, FOLATE, OCCULTBLD in the last 72 hours. CBC:  Recent Labs     04/10/22  0610 04/11/22  0433 04/12/22  0628   WBC 5.0 3.8 4.6   HGB 14.5 14.2 14.0   MCV 95.0 94.4 96.0   RDW 11.9 12.1 12.3    168 173       PT/INR:  No results for input(s): PROTIME, INR in the last 72 hours. BMP:  Recent Labs     04/10/22  0610 04/11/22 0433 04/12/22 0628    140 144   K 4.0 3.8 3.8   * 108* 109*   CO2 19* 22 26   BUN 8 6* 4*   CREATININE 0.64* 0.66* 0.77   GLUCOSE 63* 91 97   CALCIUM 8.3* 8.3* 8.4*       Liver work up:  Hepatitis Functional Panel:  Recent Labs     04/12/22 0628   ALKPHOS 38*   ALT 30   AST 30   PROT 5.6*   BILITOT 0.35   LABALBU 3.4*       Amylase/Lipase/Ammonia:  Recent Labs     04/10/22  0610 04/12/22 0628   LIPASE 192* 185*       Acute Hepatitis Panel:  Lab Results   Component Value Date    HEPBSAG NONREACTIVE 10/27/2016    HEPCAB NONREACTIVE 10/27/2016    HEPBIGM NONREACTIVE 10/27/2016    HEPAIGM NONREACTIVE 10/27/2016            Principal Problem:    Pancreatitis, recurrent  Resolved Problems:    * No resolved hospital problems. *       GI Impression and recommendations and plan:    Patient with history of alcohol abuse, acute pancreatitis, dilated pancreatic duct on CT, with persistently elevated lipase.   Abdominal pain has been improving since admission patient will need MRCP    Plan    - MRCP to evaluate dilated pancreatic duct  - OK to continue diet as tolerated  - IVF and pain management per primary    This plan was formulated in collaboration with Dr. Carly Rich MD      Electronically signed by:  Chance Fowler, [address] [place] [address] Gastroenterology  [phone]  4/12/2022    2:30 PM     This note was created with the assistance of a speech-recognition program.  Although the intention is to generate a document that actually reflects the content of the visit, no guarantees can be provided that every mistake has been identified and corrected by editing.

## 2022-04-12 NOTE — PROGRESS NOTES
[address]  CDU / OBSERVATION ENCOUNTER  ATTENDING NOTE       I performed a history and physical examination of the patient and discussed management with the resident or midlevel provider. I reviewed the resident or midlevel provider's note and agree with the documented findings and plan of care. Any areas of disagreement are noted on the chart. I was personally present for the key portions of any procedures. I have documented in the chart those procedures where I was not present during the key portions. I have reviewed the nurses notes. I agree with the chief complaint, past medical history, past surgical history, allergies, medications, social and family history as documented unless otherwise noted below. The Family history, social history, and ROS are effectively unchanged since admission unless noted elsewhere in the chart. Patient with good laboratory work to this morning. Patient with vomiting yesterday.   Persistent lipase elevation - Will do MRCP due to ductal dilitation seen on CT scan    Reasses after MRCp    Alissa Webster MD  Attending Emergency  Physician

## 2022-04-12 NOTE — PLAN OF CARE
Problem: Pain:  Goal: Pain level will decrease  Description: Pain level will decrease  4/12/2022 0145 by Ping Zelaya RN  Outcome: Ongoing  4/11/2022 1739 by Tiffanie Davidson RN  Outcome: Ongoing  Goal: Control of acute pain  Description: Control of acute pain  4/12/2022 0145 by Ping Zelaya RN  Outcome: Ongoing  4/11/2022 1739 by Tiffanie Davidson RN  Outcome: Ongoing  Goal: Control of chronic pain  Description: Control of chronic pain  4/12/2022 0145 by Ping Zelaya RN  Outcome: Ongoing  4/11/2022 1739 by Tiffanie Davidson RN  Outcome: Ongoing     Problem: Pain:  Goal: Pain level will decrease  Description: Pain level will decrease  4/12/2022 0145 by Ping Zelaya RN  Outcome: Ongoing  4/11/2022 1739 by Tiffanie Davidson RN  Outcome: Ongoing     Problem: Pain:  Goal: Control of acute pain  Description: Control of acute pain  4/12/2022 0145 by Ping Zelaya RN  Outcome: Ongoing  4/11/2022 1739 by Tiffanie Davidson RN  Outcome: Ongoing     Problem: Pain:  Goal: Control of chronic pain  Description: Control of chronic pain  4/12/2022 0145 by Ping Zelaya RN  Outcome: Ongoing  4/11/2022 1739 by Tiffanie Davidson RN  Outcome: Ongoing     Problem: Skin Integrity:  Goal: Will show no infection signs and symptoms  Description: Will show no infection signs and symptoms  4/12/2022 0145 by Ping Zelaya RN  Outcome: Ongoing  4/11/2022 1739 by Tiffanie Davidson RN  Outcome: Ongoing  Goal: Absence of new skin breakdown  Description: Absence of new skin breakdown  4/12/2022 0145 by Ping Zelaya RN  Outcome: Ongoing  4/11/2022 1739 by Tiffanie Davidson RN  Outcome: Ongoing

## 2022-04-13 LAB
ABSOLUTE EOS #: 0.1 K/UL (ref 0–0.44)
ABSOLUTE IMMATURE GRANULOCYTE: <0.03 K/UL (ref 0–0.3)
ABSOLUTE LYMPH #: 1.18 K/UL (ref 1.1–3.7)
ABSOLUTE MONO #: 0.38 K/UL (ref 0.1–1.2)
ALBUMIN SERPL-MCNC: 3.3 G/DL (ref 3.5–5.2)
ALBUMIN/GLOBULIN RATIO: 1.7 (ref 1–2.5)
ALP BLD-CCNC: 37 U/L (ref 40–129)
ALT SERPL-CCNC: 46 U/L (ref 5–41)
ANION GAP SERPL CALCULATED.3IONS-SCNC: 9 MMOL/L (ref 9–17)
AST SERPL-CCNC: 45 U/L
BASOPHILS # BLD: 1 % (ref 0–2)
BASOPHILS ABSOLUTE: <0.03 K/UL (ref 0–0.2)
BILIRUB SERPL-MCNC: 0.31 MG/DL (ref 0.3–1.2)
BUN BLDV-MCNC: 6 MG/DL (ref 8–23)
CALCIUM SERPL-MCNC: 8.3 MG/DL (ref 8.6–10.4)
CHLORIDE BLD-SCNC: 108 MMOL/L (ref 98–107)
CO2: 24 MMOL/L (ref 20–31)
CREAT SERPL-MCNC: 0.71 MG/DL (ref 0.7–1.2)
EOSINOPHILS RELATIVE PERCENT: 3 % (ref 1–4)
GFR AFRICAN AMERICAN: >60 ML/MIN
GFR NON-AFRICAN AMERICAN: >60 ML/MIN
GFR SERPL CREATININE-BSD FRML MDRD: ABNORMAL ML/MIN/{1.73_M2}
GLUCOSE BLD-MCNC: 96 MG/DL (ref 70–99)
HCT VFR BLD CALC: 38.1 % (ref 40.7–50.3)
HEMOGLOBIN: 13.4 G/DL (ref 13–17)
IMMATURE GRANULOCYTES: 1 %
LYMPHOCYTES # BLD: 33 % (ref 24–43)
MAGNESIUM: 1.6 MG/DL (ref 1.6–2.6)
MCH RBC QN AUTO: 33 PG (ref 25.2–33.5)
MCHC RBC AUTO-ENTMCNC: 35.2 G/DL (ref 28.4–34.8)
MCV RBC AUTO: 93.8 FL (ref 82.6–102.9)
MONOCYTES # BLD: 11 % (ref 3–12)
NRBC AUTOMATED: 0 PER 100 WBC
PDW BLD-RTO: 12.1 % (ref 11.8–14.4)
PLATELET # BLD: 159 K/UL (ref 138–453)
PMV BLD AUTO: 8.8 FL (ref 8.1–13.5)
POTASSIUM SERPL-SCNC: 3.8 MMOL/L (ref 3.7–5.3)
RBC # BLD: 4.06 M/UL (ref 4.21–5.77)
SEG NEUTROPHILS: 51 % (ref 36–65)
SEGMENTED NEUTROPHILS ABSOLUTE COUNT: 1.87 K/UL (ref 1.5–8.1)
SODIUM BLD-SCNC: 141 MMOL/L (ref 135–144)
TOTAL PROTEIN: 5.3 G/DL (ref 6.4–8.3)
WBC # BLD: 3.6 K/UL (ref 3.5–11.3)

## 2022-04-13 PROCEDURE — 83735 ASSAY OF MAGNESIUM: CPT

## 2022-04-13 PROCEDURE — 80053 COMPREHEN METABOLIC PANEL: CPT

## 2022-04-13 PROCEDURE — 6370000000 HC RX 637 (ALT 250 FOR IP): Performed by: HEALTH CARE PROVIDER

## 2022-04-13 PROCEDURE — 85025 COMPLETE CBC W/AUTO DIFF WBC: CPT

## 2022-04-13 PROCEDURE — 2580000003 HC RX 258: Performed by: STUDENT IN AN ORGANIZED HEALTH CARE EDUCATION/TRAINING PROGRAM

## 2022-04-13 PROCEDURE — 93005 ELECTROCARDIOGRAM TRACING: CPT | Performed by: HEALTH CARE PROVIDER

## 2022-04-13 PROCEDURE — 99232 SBSQ HOSP IP/OBS MODERATE 35: CPT | Performed by: INTERNAL MEDICINE

## 2022-04-13 PROCEDURE — 6370000000 HC RX 637 (ALT 250 FOR IP): Performed by: EMERGENCY MEDICINE

## 2022-04-13 PROCEDURE — 1200000000 HC SEMI PRIVATE

## 2022-04-13 PROCEDURE — 36415 COLL VENOUS BLD VENIPUNCTURE: CPT

## 2022-04-13 RX ADMIN — BRIMONIDINE TARTRATE 1 DROP: 2 SOLUTION OPHTHALMIC at 13:36

## 2022-04-13 RX ADMIN — NICOTINE POLACRILEX 2 MG: 2 GUM, CHEWING BUCCAL at 12:13

## 2022-04-13 RX ADMIN — Medication 100 MG: at 09:59

## 2022-04-13 RX ADMIN — DORZOLAMIDE HYDROCHLORIDE 1 DROP: 20 SOLUTION/ DROPS OPHTHALMIC at 09:04

## 2022-04-13 RX ADMIN — BRIMONIDINE TARTRATE 1 DROP: 2 SOLUTION OPHTHALMIC at 09:14

## 2022-04-13 RX ADMIN — NICOTINE POLACRILEX 2 MG: 2 GUM, CHEWING BUCCAL at 21:55

## 2022-04-13 RX ADMIN — TRAZODONE HYDROCHLORIDE 50 MG: 50 TABLET ORAL at 21:54

## 2022-04-13 RX ADMIN — SERTRALINE 100 MG: 50 TABLET, FILM COATED ORAL at 09:59

## 2022-04-13 RX ADMIN — LATANOPROST 1 DROP: 50 SOLUTION OPHTHALMIC at 20:46

## 2022-04-13 RX ADMIN — ACETAMINOPHEN 1000 MG: 500 TABLET ORAL at 21:53

## 2022-04-13 RX ADMIN — NICOTINE POLACRILEX 2 MG: 2 GUM, CHEWING BUCCAL at 20:47

## 2022-04-13 RX ADMIN — MIRTAZAPINE 7.5 MG: 15 TABLET, FILM COATED ORAL at 20:46

## 2022-04-13 RX ADMIN — TIMOLOL MALEATE 1 DROP: 5 SOLUTION OPHTHALMIC at 20:46

## 2022-04-13 RX ADMIN — SODIUM CHLORIDE, POTASSIUM CHLORIDE, SODIUM LACTATE AND CALCIUM CHLORIDE: 600; 310; 30; 20 INJECTION, SOLUTION INTRAVENOUS at 21:15

## 2022-04-13 RX ADMIN — ATORVASTATIN CALCIUM 40 MG: 80 TABLET, FILM COATED ORAL at 20:46

## 2022-04-13 RX ADMIN — FOLIC ACID 1 MG: 1 TABLET ORAL at 09:59

## 2022-04-13 RX ADMIN — SODIUM CHLORIDE, POTASSIUM CHLORIDE, SODIUM LACTATE AND CALCIUM CHLORIDE: 600; 310; 30; 20 INJECTION, SOLUTION INTRAVENOUS at 13:32

## 2022-04-13 RX ADMIN — DORZOLAMIDE HYDROCHLORIDE 1 DROP: 20 SOLUTION/ DROPS OPHTHALMIC at 20:46

## 2022-04-13 RX ADMIN — BRIMONIDINE TARTRATE 1 DROP: 2 SOLUTION OPHTHALMIC at 20:47

## 2022-04-13 RX ADMIN — TIMOLOL MALEATE 1 DROP: 5 SOLUTION OPHTHALMIC at 09:04

## 2022-04-13 RX ADMIN — ACETAMINOPHEN 1000 MG: 500 TABLET ORAL at 13:35

## 2022-04-13 ASSESSMENT — PAIN DESCRIPTION - PROGRESSION
CLINICAL_PROGRESSION: GRADUALLY IMPROVING

## 2022-04-13 ASSESSMENT — PAIN SCALES - GENERAL
PAINLEVEL_OUTOF10: 2

## 2022-04-13 ASSESSMENT — PAIN DESCRIPTION - ONSET: ONSET: ON-GOING

## 2022-04-13 ASSESSMENT — PAIN DESCRIPTION - LOCATION: LOCATION: ABDOMEN

## 2022-04-13 ASSESSMENT — PAIN DESCRIPTION - DESCRIPTORS: DESCRIPTORS: ACHING;PRESSURE

## 2022-04-13 ASSESSMENT — PAIN DESCRIPTION - ORIENTATION: ORIENTATION: MID

## 2022-04-13 ASSESSMENT — PAIN DESCRIPTION - FREQUENCY: FREQUENCY: INTERMITTENT

## 2022-04-13 ASSESSMENT — PAIN - FUNCTIONAL ASSESSMENT: PAIN_FUNCTIONAL_ASSESSMENT: ACTIVITIES ARE NOT PREVENTED

## 2022-04-13 ASSESSMENT — PAIN DESCRIPTION - PAIN TYPE: TYPE: ACUTE PAIN

## 2022-04-13 NOTE — PROGRESS NOTES
[address]  CDU / OBSERVATION ENCOUNTER  ATTENDING NOTE     Interval note    Patient still relatively comfortable but choledocholithiasis noted on MRCP. Will involve GI regarding ERCP. Appreciate GI involvement. Patient nontoxic at the moment but will need further intervention tomorrow.   N.p.o. at midnight for ERCP    Ashley Chavez MD  Attending Emergency  Physician

## 2022-04-13 NOTE — PROGRESS NOTES
Comprehensive Nutrition Assessment    Type and Reason for Visit:  Initial (LOS)    Nutrition Recommendations/Plan: Continue current diet. Will provide Clear Liquid Ensure oral supplements with meals. Encourage/monitor PO intakes as tolerated. Monitor labs, weights, and plan of care. Nutrition Assessment:  Pt seen/chart reviewed based on length of stay. Admitted with c/o chest pain, headache, nausea/vomiting, and abdominal pain - pt with pancreatitis. PMH includes: HTN, h/o alcohol abuse. Pt reports currently tolerating an oral diet. Pt did have issues with nausea and vomiting during admission. Pt reports he has been eating variable amounts of his meals. Yesterday pt was eating % of meals. Will provide oral supplements and monitor PO intakes. Noted weight fluctuations per chart review - suggest obtaining a bed scale weight as able. Labs reviewed: Lipase 185 U/L. Meds reviewed. Malnutrition Assessment:  Malnutrition Status: At risk for malnutrition    Context:  Acute Illness     Findings of the 6 clinical characteristics of malnutrition:  Energy Intake:  1 - 75% or less of estimated energy requirements for 7 or more days  Weight Loss:  No significant weight loss - Weight fluctuations noted. Body Fat Loss:  1 - Mild body fat loss Orbital   Muscle Mass Loss:  No significant muscle mass loss  Fluid Accumulation:  No significant fluid accumulation   Strength:  Not Performed    Estimated Daily Nutrient Needs:  Energy (kcal):  25 kcal/kg = 1276-2771 kcals/day; Weight Used for Energy Requirements:  Current     Protein (g):  1.2-1.5 gm/kg = 70-90 gm pro/day; Weight Used for Protein Requirements:  Ideal        Fluid (ml/day):  30 mL/kg = 2200 mL/day or per MD; Method Used for Fluid Requirements:  ml/Kg      Nutrition Related Findings:  Labs/Meds reviewed. Last BM 4/5. Wounds:  None       Current Nutrition Therapies:    ADULT DIET;  Regular    Anthropometric Measures:  · Height: 5' 4\" (162.6 cm)  · Current Body Weight: 165 lb (74.8 kg)   · Admission Body Weight: 165 lb (74.8 kg) (stated)    · Ideal Body Weight: 130 lbs; % Ideal Body Weight 126.9 %   · BMI: 28.3  · BMI Categories: Overweight (BMI 25.0-29. 9)       Nutrition Diagnosis:   · Inadequate oral intake related to  (current condition; pancreatitis) as evidenced by nausea,vomiting,poor intake prior to admission (improving PO intakes; need for ONS)    Nutrition Interventions:   Food and/or Nutrient Delivery:  Continue Current Diet,Start Oral Nutrition Supplement  Nutrition Education/Counseling:  No recommendation at this time   Coordination of Nutrition Care:  Continue to monitor while inpatient    Goals:  Meet at least 75% of estimated nutrition needs. Nutrition Monitoring and Evaluation:   Behavioral-Environmental Outcomes:  None Identified   Food/Nutrient Intake Outcomes:  Food and Nutrient Intake,Supplement Intake  Physical Signs/Symptoms Outcomes:  Biochemical Data,GI Status,Constipation,Nausea or Vomiting,Fluid Status or Edema,Hemodynamic Status,Nutrition Focused Physical Findings,Skin,Weight     Discharge Planning:     Too soon to determine     Electronically signed by Bebeto Reynolds RD, LD on 4/13/22 at 3:00 PM EDT    Contact: [phone]

## 2022-04-13 NOTE — PLAN OF CARE
Nutrition Problem #1: Inadequate oral intake  Intervention: Food and/or Nutrient Delivery: Continue Current Diet,Start Oral Nutrition Supplement  Nutritional Goals: Meet at least 75% of estimated nutrition needs.

## 2022-04-13 NOTE — PROGRESS NOTES
[facility]   Progress Note    Laila Molina is a 62 y.o. male patient. Hospitalization Day:7      Chief consult reason:     Persistent elevated lipase  Patient had MRCP yesterday which revealed     Result Date: 2022  1. Dilated pancreatic duct as seen on CT, presumably the result of the large pancreatic duct calcification/stone, also seen on CT. 2. Dilated common duct with distal choledocholithiasis. 3. Technically suboptimal exam because of motion on many of the image series. 4. Other portions of the MRI abdomen appear unremarkable. Subjective:    Patient alert and oriented. Improved abdominal pain. Labs reviewed. His MRCP was reviewed with Dr Sonia Hernandez; and an ERCP to remove pancreatic duct stones / calcifications will likely be difficult. Will plan for OR ERCP with Dr Christian Payne next week      VITALS:  /69   Pulse 54   Temp 97.7 °F (36.5 °C) (Oral)   Resp 18   Ht 5' 4\" (1.626 m)   Wt 165 lb (74.8 kg)   SpO2 97%   BMI 28.32 kg/m²   TEMPERATURE:  Current - Temp: 97.7 °F (36.5 °C); Max - Temp  Av °F (36.7 °C)  Min: 97.7 °F (36.5 °C)  Max: 98.2 °F (36.8 °C)    Physical Assessment:  General appearance:  alert, cooperative and no distress  Mental Status:  oriented to person, place and time and normal affect  Lungs:  clear to auscultation bilaterally, normal effort  Heart:  regular rate and rhythm, no murmur  Abdomen:  soft, nontender, nondistended, normal bowel sounds, no masses, hepatomegaly, splenomegaly  Extremities:  no edema, redness, tenderness in the calves  Skin:  no gross lesions, rashes, induration    Data Review:    Labs and Imaging:     CBC:  Recent Labs     22  0622  0628   WBC 3.8 4.6 3.6   HGB 14.2 14.0 13.4   MCV 94.4 96.0 93.8   RDW 12.1 12.3 12.1    173 159       ANEMIA STUDIES:  No results for input(s): LABIRON, TIBC, FERRITIN, [id number], FOLATE, OCCULTBLD in the last 72 hours.     BMP:  Recent Labs     22 04/12/22  0628 04/13/22  0628    144 141   K 3.8 3.8 3.8   * 109* 108*   CO2 22 26 24   BUN 6* 4* 6*   CREATININE 0.66* 0.77 0.71   GLUCOSE 91 97 96   CALCIUM 8.3* 8.4* 8.3*       LFTS:  Recent Labs     04/11/22  0433 04/12/22  0628 04/13/22  0628   ALKPHOS 39* 38* 37*   ALT 22 30 46*   AST 21 30 45*   BILITOT 0.50 0.35 0.31   LABALBU 3.4* 3.4* 3.3*       Amylase/Lipase and Ammonia:  Recent Labs     04/12/22  0628   LIPASE 185*       Acute Hepatitis Panel:  Lab Results   Component Value Date    HEPBSAG NONREACTIVE 10/27/2016    HEPCAB NONREACTIVE 10/27/2016    HEPBIGM NONREACTIVE 10/27/2016    HEPAIGM NONREACTIVE 10/27/2016       HCV Genotype:  No results found for: HEPATITISCGENOTYPE    HCV Quantitative:  No results found for: HCVQNT    LIVER WORK UP:    AFP  No results found for: AFP    Alpha 1 antitrypsin   No results found for: A1A    REJI  Lab Results   Component Value Date    REJI NEGATIVE 10/18/2017       AMA  No results found for: MITOAB    ASMA  No results found for: SMOOTHMUSCAB    PT/INR  No results for input(s): PROTIME, INR in the last 72 hours. Cancer Markers:  CEA:  No results for input(s): CEA in the last 72 hours. Ca 125:  No results for input(s):  in the last 72 hours. Ca 19-9:   Invalid input(s):   AFP: No results for input(s): AFP in the last 72 hours. Lactic acid:Invalid input(s): LACTIC ACID    Radiology Review:    No results found. Principal Problem:    Pancreatitis, recurrent  Active Problems:    Alcohol-induced acute pancreatitis    Chronic calcific pancreatitis ([address]  Resolved Problems:    * No resolved hospital problems. *       GI Impression:    Patient with history of alcohol abuse, presents with acute on chronic pancreatitis, dilated pancreatic duct on CT, with persistently elevated lipase. Patient at risk for recurrent pancreatitis  MRCP revealing dilated pancreatic duct with stones, and dilated common bile duct with cholelithiasis.  Location of stones difficult to extract. OP ERCP with Dr Roxie Delgado next week. Plan and Recommendations:    - Plan for OR ERCP with Dr Hortencia Lopez next week  - Patient OK for discharge from GI standpoint  - Outpatient follow-up with GI clinic in 1 week  - Protonix po 40mg daily for 2 weeks      This plan pending confirmation by Dr. Miguelito Estrada MD    Thank you for allowing me to participate in the care of your patient. Please feel free to contact me with any questions or concerns. Cony Mascorro, [address] [facility]    This note was created with the assistance of a speech-recognition program.  Although the intention is to generate a document that actually reflects the content of the visit, no guarantees can be provided that every mistake has been identified and corrected by editing.

## 2022-04-13 NOTE — PLAN OF CARE
Problem: Pain:  Goal: Pain level will decrease  Description: Pain level will decrease  Outcome: Ongoing  Goal: Control of acute pain  Description: Control of acute pain  Outcome: Ongoing  Goal: Control of chronic pain  Description: Control of chronic pain  Outcome: Ongoing     Problem: Skin Integrity:  Goal: Will show no infection signs and symptoms  Description: Will show no infection signs and symptoms  Outcome: Ongoing  Goal: Absence of new skin breakdown  Description: Absence of new skin breakdown  Outcome: Ongoing     Problem: Falls - Risk of:  Goal: Will remain free from falls  Description: Will remain free from falls  Outcome: Ongoing  Goal: Absence of physical injury  Description: Absence of physical injury  Outcome: Ongoing     Problem: Nutrition  Goal: Optimal nutrition therapy  4/13/2022 1850 by Jarret Guidry RN  Outcome: Ongoing  4/13/2022 1501 by Merrick Olmos RD, LD  Outcome: Ongoing  Note: Nutrition Problem #1: Inadequate oral intake  Intervention: Food and/or Nutrient Delivery: Continue Current Diet,Start Oral Nutrition Supplement  Nutritional Goals: Meet at least 75% of estimated nutrition needs.

## 2022-04-13 NOTE — PROGRESS NOTES
OBS/CDU   Attending NOTE      Patients PCP is:  Ajit Blanchard MD        SUBJECTIVE      Patient was seen and evaluated at bedside this afternoon. Has been noted by nursing to intermittently have HR drop into the 30s to low 40s. Patient has been denying any complaints. Has been ambulating without any difficulty. No concerns for any headaches, dizziness, lightheadedness, chest pain, shortness of breath. PHYSICAL EXAM      General: NAD, AO X 3  Heent: EMOI, PERRL  Neck: SUPPLE, NO JVD  Cardiovascular: RRR, S1S2  Pulmonary: CTAB, NO SOB  Abdomen: SOFT, NTTP, no tenderness noted to right upper quadrant or epigastric region  Extremities: +2/4 PULSES DISTAL, NO SWELLING  Neuro / Psych: NO NUMBNESS OR TINGLING, MENTATION AT BASELINE    PERTINENT TEST /EXAMS      I have reviewed all available laboratory results.     MEDICATIONS CURRENT   lactated ringers infusion, Continuous  brimonidine (ALPHAGAN) 0.2 % ophthalmic solution 1 drop, TID  dorzolamide (TRUSOPT) 2 % ophthalmic solution 1 drop, BID  latanoprost (XALATAN) 0.005 % ophthalmic solution 1 drop, Nightly  timolol (TIMOPTIC) 0.5 % ophthalmic solution 1 drop, BID  sodium chloride flush 0.9 % injection 5-40 mL, 2 times per day  sodium chloride flush 0.9 % injection 5-40 mL, PRN  0.9 % sodium chloride infusion, PRN  atorvastatin (LIPITOR) tablet 40 mg, Nightly  clopidogrel (PLAVIX) tablet 75 mg, Daily  folic acid (FOLVITE) tablet 1 mg, Daily  hydrOXYzine (ATARAX) tablet 50 mg, TID PRN  sertraline (ZOLOFT) tablet 100 mg, Daily  traZODone (DESYREL) tablet 50 mg, Nightly PRN  sodium chloride flush 0.9 % injection 5-40 mL, 2 times per day  sodium chloride flush 0.9 % injection 5-40 mL, PRN  0.9 % sodium chloride infusion, PRN  enoxaparin (LOVENOX) injection 40 mg, Daily  ondansetron (ZOFRAN) injection 4 mg, Q4H PRN  polyethylene glycol (GLYCOLAX) packet 17 g, Daily PRN  aluminum & magnesium hydroxide-simethicone (MAALOX) 200-200-20 MG/5ML suspension 30 mL, Q6H PRN  thiamine tablet 100 mg, Daily  acetaminophen (TYLENOL) tablet 1,000 mg, 3 times per day  mirtazapine (REMERON) tablet 7.5 mg, Nightly  nicotine polacrilex (NICORETTE) gum 2 mg, Q1H PRN        All medication charted and reviewed. CONSULTS      IP CONSULT TO SOCIAL WORK    ASSESSMENT/PLAN       Phoebe Packer is a 62 y.o. male who presents with pancreatitis. Patient is also homeless having difficulty ambulating self as outpatient. Patient requiring IV fluids and further aggressive care. · Pancreatitis requiring IV fluid  · Tolerating diet  · Parenteral antiemetic  · Ongoing IV pain medication  · Cont LR  · GI has seen s/p MRCP, plan for outpatient ERCP next week. · Asymptomatic Bradycardia  · EKG pending, if any concerning changes from baseline will likely consult cardiology and transfer to medicine service for further workup by cards and potential inpatient ERCP  · Patient is homeless. Patient will require appropriate disposition destination in order to handle medical needs after IV fluids  · Continue home medications and pain control  · Monitor vitals, labs, and imaging  DISPO: pending consults and clinical improvement, potential discharge vs transfer to medicine this afternoon pending reassessment. --  Marce Means,   Emergency Medicine Attending Physician     This dictation was generated by voice recognition computer software. Although all attempts are made to edit the dictation for accuracy, there may be errors in the transcription that are not intended.

## 2022-04-14 ENCOUNTER — TELEPHONE (OUTPATIENT)
Dept: GASTROENTEROLOGY | Age: 62
End: 2022-04-14

## 2022-04-14 LAB
ABSOLUTE EOS #: 0.11 K/UL (ref 0–0.44)
ABSOLUTE IMMATURE GRANULOCYTE: <0.03 K/UL (ref 0–0.3)
ABSOLUTE LYMPH #: 1.33 K/UL (ref 1.1–3.7)
ABSOLUTE MONO #: 0.34 K/UL (ref 0.1–1.2)
ALBUMIN SERPL-MCNC: 3.4 G/DL (ref 3.5–5.2)
ALBUMIN/GLOBULIN RATIO: 1.6 (ref 1–2.5)
ALP BLD-CCNC: 37 U/L (ref 40–129)
ALT SERPL-CCNC: 52 U/L (ref 5–41)
ANION GAP SERPL CALCULATED.3IONS-SCNC: 7 MMOL/L (ref 9–17)
AST SERPL-CCNC: 47 U/L
BASOPHILS # BLD: 1 % (ref 0–2)
BASOPHILS ABSOLUTE: <0.03 K/UL (ref 0–0.2)
BILIRUB SERPL-MCNC: 0.3 MG/DL (ref 0.3–1.2)
BUN BLDV-MCNC: 6 MG/DL (ref 8–23)
CALCIUM SERPL-MCNC: 8.4 MG/DL (ref 8.6–10.4)
CHLORIDE BLD-SCNC: 110 MMOL/L (ref 98–107)
CO2: 26 MMOL/L (ref 20–31)
CREAT SERPL-MCNC: 0.74 MG/DL (ref 0.7–1.2)
EKG ATRIAL RATE: 50 BPM
EKG P AXIS: 6 DEGREES
EKG P-R INTERVAL: 134 MS
EKG Q-T INTERVAL: 450 MS
EKG QRS DURATION: 88 MS
EKG QTC CALCULATION (BAZETT): 410 MS
EKG R AXIS: -44 DEGREES
EKG T AXIS: 25 DEGREES
EKG VENTRICULAR RATE: 50 BPM
EOSINOPHILS RELATIVE PERCENT: 3 % (ref 1–4)
GFR AFRICAN AMERICAN: >60 ML/MIN
GFR NON-AFRICAN AMERICAN: >60 ML/MIN
GFR SERPL CREATININE-BSD FRML MDRD: ABNORMAL ML/MIN/{1.73_M2}
GLUCOSE BLD-MCNC: 94 MG/DL (ref 70–99)
HCT VFR BLD CALC: 39 % (ref 40.7–50.3)
HEMOGLOBIN: 13.4 G/DL (ref 13–17)
IMMATURE GRANULOCYTES: 1 %
LV EF: 55 %
LVEF MODALITY: NORMAL
LYMPHOCYTES # BLD: 35 % (ref 24–43)
MAGNESIUM: 1.6 MG/DL (ref 1.6–2.6)
MCH RBC QN AUTO: 33.5 PG (ref 25.2–33.5)
MCHC RBC AUTO-ENTMCNC: 34.4 G/DL (ref 28.4–34.8)
MCV RBC AUTO: 97.5 FL (ref 82.6–102.9)
MONOCYTES # BLD: 9 % (ref 3–12)
NRBC AUTOMATED: 0 PER 100 WBC
PDW BLD-RTO: 12.1 % (ref 11.8–14.4)
PLATELET # BLD: 155 K/UL (ref 138–453)
PMV BLD AUTO: 9.1 FL (ref 8.1–13.5)
POTASSIUM SERPL-SCNC: 4.1 MMOL/L (ref 3.7–5.3)
RBC # BLD: 4 M/UL (ref 4.21–5.77)
SEG NEUTROPHILS: 51 % (ref 36–65)
SEGMENTED NEUTROPHILS ABSOLUTE COUNT: 1.97 K/UL (ref 1.5–8.1)
SODIUM BLD-SCNC: 143 MMOL/L (ref 135–144)
TOTAL PROTEIN: 5.5 G/DL (ref 6.4–8.3)
TSH SERPL DL<=0.05 MIU/L-ACNC: 2.77 UIU/ML (ref 0.3–5)
WBC # BLD: 3.8 K/UL (ref 3.5–11.3)

## 2022-04-14 PROCEDURE — 2580000003 HC RX 258: Performed by: STUDENT IN AN ORGANIZED HEALTH CARE EDUCATION/TRAINING PROGRAM

## 2022-04-14 PROCEDURE — 80053 COMPREHEN METABOLIC PANEL: CPT

## 2022-04-14 PROCEDURE — 83735 ASSAY OF MAGNESIUM: CPT

## 2022-04-14 PROCEDURE — 6370000000 HC RX 637 (ALT 250 FOR IP): Performed by: EMERGENCY MEDICINE

## 2022-04-14 PROCEDURE — 6360000002 HC RX W HCPCS: Performed by: EMERGENCY MEDICINE

## 2022-04-14 PROCEDURE — 93306 TTE W/DOPPLER COMPLETE: CPT

## 2022-04-14 PROCEDURE — 36415 COLL VENOUS BLD VENIPUNCTURE: CPT

## 2022-04-14 PROCEDURE — 6370000000 HC RX 637 (ALT 250 FOR IP): Performed by: HEALTH CARE PROVIDER

## 2022-04-14 PROCEDURE — 1200000000 HC SEMI PRIVATE

## 2022-04-14 PROCEDURE — 84443 ASSAY THYROID STIM HORMONE: CPT

## 2022-04-14 PROCEDURE — 93010 ELECTROCARDIOGRAM REPORT: CPT | Performed by: INTERNAL MEDICINE

## 2022-04-14 PROCEDURE — 96372 THER/PROPH/DIAG INJ SC/IM: CPT

## 2022-04-14 PROCEDURE — 85025 COMPLETE CBC W/AUTO DIFF WBC: CPT

## 2022-04-14 RX ADMIN — ATORVASTATIN CALCIUM 40 MG: 80 TABLET, FILM COATED ORAL at 20:56

## 2022-04-14 RX ADMIN — BRIMONIDINE TARTRATE 1 DROP: 2 SOLUTION OPHTHALMIC at 21:00

## 2022-04-14 RX ADMIN — ACETAMINOPHEN 1000 MG: 500 TABLET ORAL at 14:32

## 2022-04-14 RX ADMIN — LATANOPROST 1 DROP: 50 SOLUTION OPHTHALMIC at 21:00

## 2022-04-14 RX ADMIN — SODIUM CHLORIDE, POTASSIUM CHLORIDE, SODIUM LACTATE AND CALCIUM CHLORIDE: 600; 310; 30; 20 INJECTION, SOLUTION INTRAVENOUS at 04:40

## 2022-04-14 RX ADMIN — DORZOLAMIDE HYDROCHLORIDE 1 DROP: 20 SOLUTION/ DROPS OPHTHALMIC at 20:57

## 2022-04-14 RX ADMIN — NICOTINE POLACRILEX 2 MG: 2 GUM, CHEWING BUCCAL at 21:03

## 2022-04-14 RX ADMIN — TIMOLOL MALEATE 1 DROP: 5 SOLUTION OPHTHALMIC at 08:35

## 2022-04-14 RX ADMIN — ACETAMINOPHEN 1000 MG: 500 TABLET ORAL at 21:01

## 2022-04-14 RX ADMIN — MIRTAZAPINE 7.5 MG: 15 TABLET, FILM COATED ORAL at 20:56

## 2022-04-14 RX ADMIN — TRAZODONE HYDROCHLORIDE 50 MG: 50 TABLET ORAL at 20:56

## 2022-04-14 RX ADMIN — CLOPIDOGREL 75 MG: 75 TABLET, FILM COATED ORAL at 08:32

## 2022-04-14 RX ADMIN — SERTRALINE 100 MG: 50 TABLET, FILM COATED ORAL at 08:32

## 2022-04-14 RX ADMIN — SODIUM CHLORIDE, POTASSIUM CHLORIDE, SODIUM LACTATE AND CALCIUM CHLORIDE: 600; 310; 30; 20 INJECTION, SOLUTION INTRAVENOUS at 20:26

## 2022-04-14 RX ADMIN — ENOXAPARIN SODIUM 40 MG: 40 INJECTION SUBCUTANEOUS at 08:34

## 2022-04-14 RX ADMIN — BRIMONIDINE TARTRATE 1 DROP: 2 SOLUTION OPHTHALMIC at 08:35

## 2022-04-14 RX ADMIN — Medication 100 MG: at 08:32

## 2022-04-14 RX ADMIN — FOLIC ACID 1 MG: 1 TABLET ORAL at 08:32

## 2022-04-14 RX ADMIN — TIMOLOL MALEATE 1 DROP: 5 SOLUTION OPHTHALMIC at 20:59

## 2022-04-14 RX ADMIN — BRIMONIDINE TARTRATE 1 DROP: 2 SOLUTION OPHTHALMIC at 14:34

## 2022-04-14 RX ADMIN — DORZOLAMIDE HYDROCHLORIDE 1 DROP: 20 SOLUTION/ DROPS OPHTHALMIC at 08:35

## 2022-04-14 ASSESSMENT — PAIN DESCRIPTION - ORIENTATION
ORIENTATION: MID

## 2022-04-14 ASSESSMENT — PAIN DESCRIPTION - PROGRESSION

## 2022-04-14 ASSESSMENT — PAIN DESCRIPTION - DESCRIPTORS: DESCRIPTORS: PRESSURE

## 2022-04-14 ASSESSMENT — PAIN SCALES - GENERAL
PAINLEVEL_OUTOF10: 2
PAINLEVEL_OUTOF10: 1

## 2022-04-14 ASSESSMENT — PAIN DESCRIPTION - PAIN TYPE
TYPE: ACUTE PAIN

## 2022-04-14 ASSESSMENT — PAIN DESCRIPTION - LOCATION
LOCATION: ABDOMEN

## 2022-04-14 ASSESSMENT — PAIN DESCRIPTION - ONSET: ONSET: ON-GOING

## 2022-04-14 NOTE — CARE COORDINATION
Transitional planning. LM with [facility] to call CM back to schedule ERCP for pt. Pt confirmed his phone number is [phone] information placed on AVS   Spoke to [facility] GI NP, she stated pt will have to call and and schedule appt for ERCP, informed Dr. Иван Mckeon.

## 2022-04-14 NOTE — TELEPHONE ENCOUNTER
----- Message from ALLISON Higuera CNP sent at 4/13/2022  3:11 PM EDT -----  Regarding: OP EUS/ERCP  Patient will need to be scheduled for outpatient EUS/ERCP with Dr. Darion De La Fuente at his next availability.   Anticipate patient to be discharged in 2 to 3 days    Jewel

## 2022-04-14 NOTE — PROGRESS NOTES
[address]  CDU / OBSERVATION ENCOUNTER  ATTENDING NOTE       I performed a history and physical examination of the patient and discussed management with the resident or midlevel provider. I reviewed the resident or midlevel provider's note and agree with the documented findings and plan of care. Any areas of disagreement are noted on the chart. I was personally present for the key portions of any procedures. I have documented in the chart those procedures where I was not present during the key portions. I have reviewed the nurses notes. I agree with the chief complaint, past medical history, past surgical history, allergies, medications, social and family history as documented unless otherwise noted below. The Family history, social history, and ROS are effectively unchanged since admission unless noted elsewhere in the chart. Patient is improved from a GI standpoint. Patient was noted yesterday to be bradycardic. Bradycardia was asymptomatic but patient was kept for evaluation by cardiology. Cardiology has seen patient and echo was ordered. Echo currently pending. Patient better from GI standpoint. We will need to await echo results. Patient for GI follow-up. We will make a GI follow-up arrangements. Patient will have symptoms continue to be managed. Will await echo for cardiac clearance.     Leora Henson MD  Attending Emergency  Physician

## 2022-04-14 NOTE — PROGRESS NOTES
OBS/CDU   RESIDENT NOTE      Patients PCP is:  Tamela Bashir MD        SUBJECTIVE      No acute events overnight. Heart rate has remained around the 50s. Remains asymptomatic regarding the bradycardia. Cardiology following. Plan for echo today. Had MRCP completed which was positive, needs ERCP. GI plan doing this next week in the outpatient setting. Has been able to tolerate a regular diet without vomiting. Complaining of abdominal pain. The patient is urinating on his own and is passing flatus. Denies fever, chills, chest pain, shortness of breath, focal weakness, numbness, tingling, urinary/bowel symptoms, vision changes, visual hallucinations, or headache. PHYSICAL EXAM      General: NAD, AO X 3  Heent: EMOI, PERRL  Neck: SUPPLE, NO JVD  Cardiovascular: RRR, S1S2  Pulmonary: CTAB, NO SOB  Abdomen: SOFT, tender to palpation epigastric area, ND, +BS  Extremities: +2/4 PULSES DISTAL, NO SWELLING  Neuro / Psych: NO NUMBNESS OR TINGLING, MENTATION AT BASELINE    PERTINENT TEST /EXAMS      I have reviewed all available laboratory results.     MEDICATIONS CURRENT   lactated ringers infusion, Continuous  brimonidine (ALPHAGAN) 0.2 % ophthalmic solution 1 drop, TID  dorzolamide (TRUSOPT) 2 % ophthalmic solution 1 drop, BID  latanoprost (XALATAN) 0.005 % ophthalmic solution 1 drop, Nightly  timolol (TIMOPTIC) 0.5 % ophthalmic solution 1 drop, BID  sodium chloride flush 0.9 % injection 5-40 mL, 2 times per day  sodium chloride flush 0.9 % injection 5-40 mL, PRN  0.9 % sodium chloride infusion, PRN  atorvastatin (LIPITOR) tablet 40 mg, Nightly  clopidogrel (PLAVIX) tablet 75 mg, Daily  folic acid (FOLVITE) tablet 1 mg, Daily  hydrOXYzine (ATARAX) tablet 50 mg, TID PRN  sertraline (ZOLOFT) tablet 100 mg, Daily  traZODone (DESYREL) tablet 50 mg, Nightly PRN  sodium chloride flush 0.9 % injection 5-40 mL, 2 times per day  sodium chloride flush 0.9 % injection 5-40 mL, PRN  0.9 % sodium chloride infusion, PRN  enoxaparin (LOVENOX) injection 40 mg, Daily  ondansetron (ZOFRAN) injection 4 mg, Q4H PRN  polyethylene glycol (GLYCOLAX) packet 17 g, Daily PRN  aluminum & magnesium hydroxide-simethicone (MAALOX) 200-200-20 MG/5ML suspension 30 mL, Q6H PRN  thiamine tablet 100 mg, Daily  acetaminophen (TYLENOL) tablet 1,000 mg, 3 times per day  mirtazapine (REMERON) tablet 7.5 mg, Nightly  nicotine polacrilex (NICORETTE) gum 2 mg, Q1H PRN        All medication charted and reviewed. CONSULTS      IP CONSULT TO SOCIAL WORK  IP CONSULT TO CARDIOLOGY    ASSESSMENT/PLAN       Roxana Webb is a 62 y.o. male who presents with chronic pancreatitis. Additionally patient is homeless and is having difficulty ambulating in the outpatient setting    1.)  Abdominal pain    · MRCP positive  · GI following  · Plan for ERCP in the outpatient setting, probably next week  · We will have social work help arrange this  · Tolerating regular diet    2.)  Asymptomatic bradycardia    · Cardiology consulted, appreciate recommendations  · Follow-up echocardiogram  · Continue to monitor heart rate    · Continue home medications  · Monitor vitals, labs, and imaging  · DISPO: pending consults and clinical improvement    --  Berline Krabbe, DO  Emergency Medicine Resident Physician     This dictation was generated by voice recognition computer software. Although all attempts are made to edit the dictation for accuracy, there may be errors in the transcription that are not intended.

## 2022-04-14 NOTE — PROGRESS NOTES
[facility]  Documentation Note                Admission Dx: Pancreatitis, recurrent [K85.90]  Alcohol-induced acute pancreatitis, unspecified complication status [Q16.51]    Past Medical History:   has a past medical history of Depression with suicidal ideation, Glaucoma, Hypertension, and Mixed hyperlipidemia. Previous Testing:     ECHO 12/28/19: EF 65%, RA dilatation. Previous office/hospital visit:   None? ?     Nat Arrieta [facility]

## 2022-04-14 NOTE — CONSULTS
Attending Physician Statement:    I have discussed the care of  ST. SAVANAH DALY , including pertinent history and exam findings, with the Cardiology fellow/resident. I have seen and examined the patient and the key elements of all parts of the encounter have been performed by me. I agree with the assessment, plan and orders as documented by the fellow/resident, after I modified exam findings and plan of treatments, and the final version is my approved version of the assessment. Additional Comments:   Asymptomatic bradycardia  Plan to do echocardiogram to rule out any structural heart disease  Check thyroid function test  Watch for any sleep apnea in the hospital  Holter monitor on discharge   [facility] Cardiology Cardiology    Consult / H&P               Today's Date: 4/14/2022  Patient Name: ST. SAVANAH DALY  Date of admission: 4/5/2022 11:23 AM  Patient's age: 62 y.o., 1960  Admission Dx: Pancreatitis, recurrent [K85.90]  Alcohol-induced acute pancreatitis, unspecified complication status [C04.90]    Reason for Consult:  Cardiac evaluation    Requesting Physician: Charisse Walters MD    CHIEF COMPLAINT:  bradycardia    History Obtained From:  patient, electronic medical record    HISTORY OF PRESENT ILLNESS:      ST. SAVANAH DALY is a 62 y.o. male who presents headache, epigastric abdominal pain, nausea and tremulousness. Patient states his symptoms began simultaneously around 5 am this morning. He describes the chest pain as upper epigastric abdominal pain, nonradiating, worsening with meals. He denies shortness of breath, changes in vision, syncope, lightheadedness, palpitations, diarrhea, fevers, chills, numbness, tingling, weakness, seizure like activity. In the ED, his lipase was 223, was found to have pancreatitis and was brought to observation unit for further management of pancreatitis. Cardiology is consulted because of asymptomatic bradycardia.   Denies any chest pain, shortness of breath, palpitation, fluttering, syncopal episodes. Denies any beta-blockers or negative chronotropic drugs. Past Medical History:   has a past medical history of Depression with suicidal ideation, Glaucoma, Hypertension, and Mixed hyperlipidemia. Past Surgical History:   has no past surgical history on file. Home Medications:    Prior to Admission medications    Medication Sig Start Date End Date Taking?  Authorizing Provider   aluminum & magnesium hydroxide-simethicone (MAALOX) 200-200-20 MG/5ML SUSP suspension Take 30 mLs by mouth every 6 hours as needed for Indigestion 4/6/22  Yes Gretchen Woods MD   ondansetron (ZOFRAN ODT) 4 MG disintegrating tablet Take 1 tablet by mouth every 8 hours as needed for Nausea 4/6/22  Yes Gretchen Woods MD   vitamin B-1 (THIAMINE) 100 MG tablet Take 1 tablet by mouth daily 4/6/22  Yes Gretchen Woods MD   mirtazapine (REMERON) 7.5 MG tablet Take 1 tablet by mouth nightly 4/4/22   Kem Grande MD   sertraline (ZOLOFT) 100 MG tablet Take 1 tablet by mouth daily 4/5/22   Kem Grande MD   atorvastatin (LIPITOR) 40 MG tablet Take 1 tablet by mouth nightly 4/1/22   Kem Grande MD   brimonidine (ALPHAGAN) 0.2 % ophthalmic solution Place 1 drop into the right eye 3 times daily 4/1/22   Kem Grande MD   clopidogrel (PLAVIX) 75 MG tablet Take 1 tablet by mouth daily 4/1/22   Kem Grande MD   dorzolamide (TRUSOPT) 2 % ophthalmic solution Place 1 drop into the right eye 2 times daily 4/1/22   Kem Grande MD   folic acid (FOLVITE) 1 MG tablet Take 1 tablet by mouth daily 4/1/22   Kem Grande MD   latanoprost (XALATAN) 0.005 % ophthalmic solution Place 1 drop into the right eye nightly 4/1/22   Kem Grande MD   nicotine polacrilex (NICORETTE) 2 MG gum Take 1 each by mouth as needed for Smoking cessation 4/1/22   Kem Grande MD   timolol (TIMOPTIC) 0.5 % ophthalmic solution Place 1 drop into the right eye 2 times daily 4/1/22   Melody Donaldson MD   traZODone (DESYREL) 50 MG tablet Take 1 tablet by mouth nightly as needed for Sleep 4/1/22   Melody Donaldson MD   ibuprofen (IBU) 400 MG tablet Take 1 tablet by mouth every 6 hours as needed for Pain 12/20/19   Yamile Lee DO         Allergies:  Patient has no known allergies. Social History:   reports that he has been smoking cigarettes. His smokeless tobacco use includes chew. He reports current alcohol use. He reports that he does not use drugs. Family History: family history is not on file. No h/o sudden cardiac death. No for premature CAD    REVIEW OF SYSTEMS:    · Constitutional: there has been no unanticipated weight loss. There's been No change in energy level, No change in activity level. · Eyes: No visual changes or diplopia. No scleral icterus. · ENT: No Headaches  · Cardiovascular: No cardiac history  · Respiratory: No previous pulmonary problems, No cough  · Gastrointestinal: No abdominal pain. No change in bowel or bladder habits. · Genitourinary: No dysuria, trouble voiding, or hematuria. · Musculoskeletal:  No gait disturban ce, No weakness or joint complaints. · Integumentary: No rash or pruritis. · Neurological: No headache, diplopia, change in muscle strength, numbness or tingling. No change in gait, balance, coordination, mood, affect, memory, mentation, behavior. · Psychiatric: No anxiety, or depression. · Endocrine: No temperature intolerance. No excessive thirst, fluid intake, or urination. No tremor. · Hematologic/Lymphatic: No abnormal bruising or bleeding, blood clots or swollen lymph nodes. · Allergic/Immunologic: No nasal congestion or hives.       PHYSICAL EXAM:      BP (!) 115/58   Pulse 57   Temp 98 °F (36.7 °C) (Oral)   Resp 16   Ht 5' 4\" (1.626 m)   Wt 165 lb (74.8 kg)   SpO2 97%   BMI 28.32 kg/m²    Constitutional and General Appearance: alert, cooperative, no distress and appears stated age  [de-identified]: PERRL, no cervical lymphadenopathy. No masses palpable. Normal oral mucosa  Respiratory:  · Normal excursion and expansion without use of accessory muscles  · Resp Auscultation: Good respiratory effort. No for increased work of breathing. On auscultation: clear to auscultation bilaterally  Cardiovascular:  · The apical impulse is not displaced  · Heart tones are crisp and normal. regular S1 and S2.  · Jugular venous pulsation Normal  · The carotid upstroke is normal in amplitude and contour without delay or bruit  · Peripheral pulses are symmetrical and full   Abdomen:   · No masses or tenderness  · Bowel sounds present  Extremities:  ·  No Cyanosis or Clubbing  ·  Lower extremity edema: No  ·  Skin: Warm and dry  Neurological:  · Alert and oriented. · Moves all extremities well  · No abnormalities of mood, affect, memory, mentation, or behavior are noted    DATA:    Diagnostics:      EKG:   Sinus bradycardia, no ST-T segment changes      ECHO: 12/19  EF 65%, RA dilation    Stress Test:   not obtained. Cardiac Angiography:   not obtained. Labs:     CBC:   Recent Labs     04/13/22 0628 04/14/22 0615   WBC 3.6 3.8   HGB 13.4 13.4   HCT 38.1* 39.0*    155     BMP:   Recent Labs     04/12/22 0628 04/13/22 0628    141   K 3.8 3.8   CO2 26 24   BUN 4* 6*   CREATININE 0.77 0.71   LABGLOM >60 >60   GLUCOSE 97 96     BNP: No results for input(s): BNP in the last 72 hours. PT/INR: No results for input(s): PROTIME, INR in the last 72 hours. APTT:No results for input(s): APTT in the last 72 hours. CARDIAC ENZYMES:No results for input(s): CKTOTAL, CKMB, CKMBINDEX, TROPONINI in the last 72 hours. FASTING LIPID PANEL:  Lab Results   Component Value Date    HDL 70 12/28/2019    TRIG 59 12/28/2019     LIVER PROFILE:  Recent Labs     04/12/22 0628 04/13/22 0628   AST 30 45*   ALT 30 46*   LABALBU 3.4* 3.3*       IMPRESSION:    1.      Patient Active Problem List   Diagnosis    Numbness and tingling    Depression with suicidal ideation    Major depressive disorder, single episode, severe without psychotic features ([address]    Alcohol abuse with withdrawal, uncomplicated ([address]    Major depressive disorder, recurrent severe without psychotic features ([address]    Overweight (BMI 25.0-29. 9)    Mixed hyperlipidemia    Pancreatitis, recurrent    Alcohol-induced acute pancreatitis    Chronic calcific pancreatitis (HCC)    Choledocholithiasis    Pancreatic duct calculus       RECOMMENDATIONS:  1. Asymptomatic bradycardia. Get TSH. We will get echo and follow. Patient will need Holter monitoring before discharge  2. Rest of management per primary    Will discuss with rounding attending  for final recommendations.     Belen Donovan MD  Cardiology Service  Internal Medicine Residency Program, PGY-1  [facility]

## 2022-04-14 NOTE — PLAN OF CARE
Problem: Pain:  Goal: Pain level will decrease  Description: Pain level will decrease  4/13/2022 2252 by Dawn Cleveland RN  Outcome: Ongoing  4/13/2022 1850 by Constanza Holcomb RN  Outcome: Ongoing  Goal: Control of acute pain  Description: Control of acute pain  4/13/2022 2252 by Dawn Cleveland RN  Outcome: Ongoing  4/13/2022 1850 by Constanza Holcomb RN  Outcome: Ongoing  Goal: Control of chronic pain  Description: Control of chronic pain  4/13/2022 2252 by Dawn Cleveland RN  Outcome: Ongoing  4/13/2022 1850 by Constanza Holcomb RN  Outcome: Ongoing     Problem: Skin Integrity:  Goal: Will show no infection signs and symptoms  Description: Will show no infection signs and symptoms  4/13/2022 2252 by Dawn Cleveland RN  Outcome: Ongoing  4/13/2022 1850 by Constanza Holcomb RN  Outcome: Ongoing  Goal: Absence of new skin breakdown  Description: Absence of new skin breakdown  4/13/2022 2252 by Dawn Cleveland RN  Outcome: Ongoing  4/13/2022 1850 by Constanza Holcomb RN  Outcome: Ongoing     Problem: Falls - Risk of:  Goal: Will remain free from falls  Description: Will remain free from falls  4/13/2022 2252 by Dawn Cleveland RN  Outcome: Ongoing  4/13/2022 1850 by Constanza Holcomb RN  Outcome: Ongoing  Goal: Absence of physical injury  Description: Absence of physical injury  4/13/2022 2252 by Dawn Cleveland RN  Outcome: Ongoing  4/13/2022 1850 by Constanza Holcomb RN  Outcome: Ongoing     Problem: Nutrition  Goal: Optimal nutrition therapy  4/13/2022 2252 by Dawn Cleveland RN  Outcome: Ongoing  4/13/2022 1850 by Constanza Holcomb RN  Outcome: Ongoing  4/13/2022 1501 by Mohsen Poole RD, LD  Outcome: Ongoing  Note: Nutrition Problem #1: Inadequate oral intake  Intervention: Food and/or Nutrient Delivery: Continue Current Diet,Start Oral Nutrition Supplement  Nutritional Goals: Meet at least 75% of estimated nutrition needs.

## 2022-04-14 NOTE — PROGRESS NOTES
[address]  CDU / OBSERVATION ENCOUNTER  ATTENDING NOTE       I performed a history and physical examination of the patient and discussed management with the resident or midlevel provider. I reviewed the resident or midlevel provider's note and agree with the documented findings and plan of care. Any areas of disagreement are noted on the chart. I was personally present for the key portions of any procedures. I have documented in the chart those procedures where I was not present during the key portions. I have reviewed the nurses notes. I agree with the chief complaint, past medical history, past surgical history, allergies, medications, social and family history as documented unless otherwise noted below. The Family history, social history, and ROS are effectively unchanged since admission unless noted elsewhere in the chart. Patient improving. MRCP positive. Patient will need ERCP. Unable to arrange procedure today with GI. Patient also noted to be bradycardic. Patient asymptomatic. Patient was discussed with cardiology. Cardiology will reevaluate patient as well. Patient with 2/10 pain now. Patient with improving nausea. From a GI standpoint is getting better symptomatically. Patient with ongoing need for ERCP.   Discussing with GI the best timing    Brandon Marina MD  Attending Emergency  Physician

## 2022-04-15 VITALS
SYSTOLIC BLOOD PRESSURE: 100 MMHG | RESPIRATION RATE: 20 BRPM | HEART RATE: 57 BPM | OXYGEN SATURATION: 96 % | BODY MASS INDEX: 28.17 KG/M2 | TEMPERATURE: 99 F | HEIGHT: 64 IN | DIASTOLIC BLOOD PRESSURE: 69 MMHG | WEIGHT: 165 LBS

## 2022-04-15 LAB
ABSOLUTE EOS #: 0.11 K/UL (ref 0–0.44)
ABSOLUTE IMMATURE GRANULOCYTE: <0.03 K/UL (ref 0–0.3)
ABSOLUTE LYMPH #: 1.23 K/UL (ref 1.1–3.7)
ABSOLUTE MONO #: 0.39 K/UL (ref 0.1–1.2)
ALBUMIN SERPL-MCNC: 3.4 G/DL (ref 3.5–5.2)
ALBUMIN/GLOBULIN RATIO: 1.5 (ref 1–2.5)
ALP BLD-CCNC: 37 U/L (ref 40–129)
ALT SERPL-CCNC: 54 U/L (ref 5–41)
ANION GAP SERPL CALCULATED.3IONS-SCNC: 10 MMOL/L (ref 9–17)
AST SERPL-CCNC: 43 U/L
BASOPHILS # BLD: 1 % (ref 0–2)
BASOPHILS ABSOLUTE: 0.03 K/UL (ref 0–0.2)
BILIRUB SERPL-MCNC: 0.28 MG/DL (ref 0.3–1.2)
BUN BLDV-MCNC: 8 MG/DL (ref 8–23)
CALCIUM SERPL-MCNC: 8.7 MG/DL (ref 8.6–10.4)
CHLORIDE BLD-SCNC: 108 MMOL/L (ref 98–107)
CO2: 24 MMOL/L (ref 20–31)
CREAT SERPL-MCNC: 0.67 MG/DL (ref 0.7–1.2)
EOSINOPHILS RELATIVE PERCENT: 3 % (ref 1–4)
GFR AFRICAN AMERICAN: >60 ML/MIN
GFR NON-AFRICAN AMERICAN: >60 ML/MIN
GFR SERPL CREATININE-BSD FRML MDRD: ABNORMAL ML/MIN/{1.73_M2}
GLUCOSE BLD-MCNC: 133 MG/DL (ref 75–110)
GLUCOSE BLD-MCNC: 92 MG/DL (ref 75–110)
GLUCOSE BLD-MCNC: 97 MG/DL (ref 75–110)
GLUCOSE BLD-MCNC: 99 MG/DL (ref 70–99)
HCT VFR BLD CALC: 39.8 % (ref 40.7–50.3)
HEMOGLOBIN: 13.7 G/DL (ref 13–17)
IMMATURE GRANULOCYTES: 1 %
LYMPHOCYTES # BLD: 32 % (ref 24–43)
MAGNESIUM: 1.7 MG/DL (ref 1.6–2.6)
MCH RBC QN AUTO: 33.8 PG (ref 25.2–33.5)
MCHC RBC AUTO-ENTMCNC: 34.4 G/DL (ref 28.4–34.8)
MCV RBC AUTO: 98.3 FL (ref 82.6–102.9)
MONOCYTES # BLD: 10 % (ref 3–12)
NRBC AUTOMATED: 0 PER 100 WBC
PDW BLD-RTO: 12.2 % (ref 11.8–14.4)
PLATELET # BLD: 168 K/UL (ref 138–453)
PMV BLD AUTO: 9.3 FL (ref 8.1–13.5)
POTASSIUM SERPL-SCNC: 4 MMOL/L (ref 3.7–5.3)
RBC # BLD: 4.05 M/UL (ref 4.21–5.77)
SEG NEUTROPHILS: 53 % (ref 36–65)
SEGMENTED NEUTROPHILS ABSOLUTE COUNT: 2.03 K/UL (ref 1.5–8.1)
SODIUM BLD-SCNC: 142 MMOL/L (ref 135–144)
TOTAL PROTEIN: 5.7 G/DL (ref 6.4–8.3)
WBC # BLD: 3.8 K/UL (ref 3.5–11.3)

## 2022-04-15 PROCEDURE — 83735 ASSAY OF MAGNESIUM: CPT

## 2022-04-15 PROCEDURE — 36415 COLL VENOUS BLD VENIPUNCTURE: CPT

## 2022-04-15 PROCEDURE — 82947 ASSAY GLUCOSE BLOOD QUANT: CPT

## 2022-04-15 PROCEDURE — 80053 COMPREHEN METABOLIC PANEL: CPT

## 2022-04-15 PROCEDURE — 2580000003 HC RX 258: Performed by: STUDENT IN AN ORGANIZED HEALTH CARE EDUCATION/TRAINING PROGRAM

## 2022-04-15 PROCEDURE — 6370000000 HC RX 637 (ALT 250 FOR IP): Performed by: EMERGENCY MEDICINE

## 2022-04-15 PROCEDURE — 85025 COMPLETE CBC W/AUTO DIFF WBC: CPT

## 2022-04-15 PROCEDURE — 6360000002 HC RX W HCPCS: Performed by: EMERGENCY MEDICINE

## 2022-04-15 PROCEDURE — 96372 THER/PROPH/DIAG INJ SC/IM: CPT

## 2022-04-15 RX ADMIN — SODIUM CHLORIDE, POTASSIUM CHLORIDE, SODIUM LACTATE AND CALCIUM CHLORIDE: 600; 310; 30; 20 INJECTION, SOLUTION INTRAVENOUS at 04:34

## 2022-04-15 RX ADMIN — TIMOLOL MALEATE 1 DROP: 5 SOLUTION OPHTHALMIC at 08:48

## 2022-04-15 RX ADMIN — ENOXAPARIN SODIUM 40 MG: 40 INJECTION SUBCUTANEOUS at 08:48

## 2022-04-15 RX ADMIN — DORZOLAMIDE HYDROCHLORIDE 1 DROP: 20 SOLUTION/ DROPS OPHTHALMIC at 08:48

## 2022-04-15 RX ADMIN — SERTRALINE 100 MG: 50 TABLET, FILM COATED ORAL at 08:48

## 2022-04-15 RX ADMIN — FOLIC ACID 1 MG: 1 TABLET ORAL at 08:48

## 2022-04-15 RX ADMIN — Medication 100 MG: at 08:48

## 2022-04-15 RX ADMIN — BRIMONIDINE TARTRATE 1 DROP: 2 SOLUTION OPHTHALMIC at 08:48

## 2022-04-15 RX ADMIN — CLOPIDOGREL 75 MG: 75 TABLET, FILM COATED ORAL at 08:48

## 2022-04-15 NOTE — PROGRESS NOTES
[facility]  Progress Note                   Date:   4/15/2022  Patient name: Brianna Naidu  Date of admission:  4/5/2022 11:23 AM  MRN:   [id number]  YOB: 1960  PCP: Darline Purcell MD    Reason for Admission: Pancreatitis, recurrent [K85.90]  Alcohol-induced acute pancreatitis, unspecified complication status [T59.19]    Subjective:       Clinical Changes /Abnormalities:Patient seen and examined. Denies chest pain or shortness of breath. Tele/vitals/labs reviewed . Discussed case with RN. Heart rates in 60s    Review of Systems    Medications:   Scheduled Meds:   brimonidine  1 drop Right Eye TID    dorzolamide  1 drop Right Eye BID    latanoprost  1 drop Right Eye Nightly    timolol  1 drop Right Eye BID    sodium chloride flush  5-40 mL IntraVENous 2 times per day    atorvastatin  40 mg Oral Nightly    clopidogrel  75 mg Oral Daily    folic acid  1 mg Oral Daily    sertraline  100 mg Oral Daily    sodium chloride flush  5-40 mL IntraVENous 2 times per day    enoxaparin  40 mg SubCUTAneous Daily    thiamine  100 mg Oral Daily    acetaminophen  1,000 mg Oral 3 times per day    mirtazapine  7.5 mg Oral Nightly     Continuous Infusions:   lactated ringers 125 mL/hr at 04/15/22 0434    sodium chloride      sodium chloride       CBC:   Recent Labs     04/13/22  0628 04/14/22  0615 04/15/22  0529   WBC 3.6 3.8 3.8   HGB 13.4 13.4 13.7    155 168     BMP:    Recent Labs     04/13/22  0628 04/14/22  0615 04/15/22  0529    143 142   K 3.8 4.1 4.0   * 110* 108*   CO2 24 26 24   BUN 6* 6* 8   CREATININE 0.71 0.74 0.67*   GLUCOSE 96 94 99     Hepatic:  Recent Labs     04/13/22  0628 04/14/22  0615 04/15/22  0529   AST 45* 47* 43*   ALT 46* 52* 54*   BILITOT 0.31 0.30 0.28*   ALKPHOS 37* 37* 37*     Troponin: No results for input(s): TROPHS in the last 72 hours. BNP: No results for input(s): BNP in the last 72 hours.   Lipids: No results for input(s): CHOL, HDL in the last 72 hours. Invalid input(s): LDLCALCU  INR: No results for input(s): INR in the last 72 hours.       DATA:    Diagnostics:       EKG:   Sinus bradycardia, no ST-T segment changes        ECHO: 12/19  EF 65%, RA dilation     Stress Test:   not obtained.     Cardiac Angiography:   not obtained. ECHO 4/15/2022  CONCLUSIONS     Summary  Left ventricle is normal in size Global left ventricular systolic function  is hyperdynamic Estimated ejection fraction is 55 % . Right atrial dilatation. Prominent Eustachian valve. Trivial mitral regurgitation.     Signature  ----------------------------------------------------------------------------   Electronically signed by Katie Murray(Sonographer) on 04/14/2022 06:09 PM  ----------------------------------------------------------------------------     ----------------------------------------------------------------------------   Electronically signed by Ayaz Duvall(Interpreting physician) on 04/14/2022   06:13 PM  ----------------------------------------------------------------------------  FINDINGS    Objective:   Vitals: /60   Pulse 52   Temp 98.2 °F (36.8 °C) (Oral)   Resp 16   Ht 5' 4\" (1.626 m)   Wt 165 lb (74.8 kg)   SpO2 95%   BMI 28.32 kg/m²   General appearance: alert and cooperative with exam  HEENT: Head: Normocephalic, no lesions, without obvious abnormality. Neck:no JVD, trachea midline, no adenopathy  Lungs: Clear to auscultation  Heart: Regular rate and rhythm, s1/s2 auscultated, no murmurs  Abdomen: soft, non-tender, bowel sounds active  Extremities: no edema  Neurologic: not done        Assessment / Acute Cardiac Problems:   1.  Asymptomatic bradycardia      Patient Active Problem List:     Numbness and tingling     Depression with suicidal ideation     Major depressive disorder, single episode, severe without psychotic features ([address]     Alcohol abuse with withdrawal, uncomplicated ([address]     Major depressive disorder, recurrent severe without psychotic features ([facility]     Overweight (BMI 25.0-29. 9)     Mixed hyperlipidemia     Pancreatitis, recurrent     Alcohol-induced acute pancreatitis     Chronic calcific pancreatitis (HCC)     Choledocholithiasis     Pancreatic duct calculus      Plan of Treatment:   1. Cardiac stable-denies chest pain or dizziness-   2. Echo reviewed as above-preserved LV function with ejection fraction 55%  3. Does not meet the criteria for  Holter monitor due to his social issues  4.  Further cardiac work-up needed cardiology signing off    Electronically signed by ALLISON Randall NP on 4/15/2022 at 3:10 PM  [address]  [phone]

## 2022-04-15 NOTE — PROGRESS NOTES
OBS/CDU   RESIDENT NOTE      Patients PCP is:  Daniel Beverly MD        SUBJECTIVE      No acute events overnight. Heart rate has remained around the 50s. Patient remains asymptomatic. Has been able to tolerate a regular diet without vomiting. Complaining of abdominal pain. The patient is urinating on his own and is passing flatus. Denies fever, chills, chest pain, shortness of breath, focal weakness, numbness, tingling, urinary/bowel symptoms, vision changes, visual hallucinations, or headache. PHYSICAL EXAM      General: NAD, AO X 3  Heent: EMOI, PERRL  Neck: SUPPLE, NO JVD  Cardiovascular: RRR, S1S2  Pulmonary: CTAB, NO SOB  Abdomen: SOFT, tender to palpation epigastric area, ND, +BS  Extremities: +2/4 PULSES DISTAL, NO SWELLING  Neuro / Psych: NO NUMBNESS OR TINGLING, MENTATION AT BASELINE    PERTINENT TEST /EXAMS      I have reviewed all available laboratory results.     MEDICATIONS CURRENT   lactated ringers infusion, Continuous  brimonidine (ALPHAGAN) 0.2 % ophthalmic solution 1 drop, TID  dorzolamide (TRUSOPT) 2 % ophthalmic solution 1 drop, BID  latanoprost (XALATAN) 0.005 % ophthalmic solution 1 drop, Nightly  timolol (TIMOPTIC) 0.5 % ophthalmic solution 1 drop, BID  sodium chloride flush 0.9 % injection 5-40 mL, 2 times per day  sodium chloride flush 0.9 % injection 5-40 mL, PRN  0.9 % sodium chloride infusion, PRN  atorvastatin (LIPITOR) tablet 40 mg, Nightly  clopidogrel (PLAVIX) tablet 75 mg, Daily  folic acid (FOLVITE) tablet 1 mg, Daily  hydrOXYzine (ATARAX) tablet 50 mg, TID PRN  sertraline (ZOLOFT) tablet 100 mg, Daily  traZODone (DESYREL) tablet 50 mg, Nightly PRN  sodium chloride flush 0.9 % injection 5-40 mL, 2 times per day  sodium chloride flush 0.9 % injection 5-40 mL, PRN  0.9 % sodium chloride infusion, PRN  enoxaparin (LOVENOX) injection 40 mg, Daily  ondansetron (ZOFRAN) injection 4 mg, Q4H PRN  polyethylene glycol (GLYCOLAX) packet 17 g, Daily PRN  aluminum & magnesium hydroxide-simethicone (MAALOX) 835-158-74 MG/5ML suspension 30 mL, Q6H PRN  thiamine tablet 100 mg, Daily  acetaminophen (TYLENOL) tablet 1,000 mg, 3 times per day  mirtazapine (REMERON) tablet 7.5 mg, Nightly  nicotine polacrilex (NICORETTE) gum 2 mg, Q1H PRN        All medication charted and reviewed. CONSULTS      IP CONSULT TO SOCIAL WORK  IP CONSULT TO CARDIOLOGY    ASSESSMENT/PLAN       Prince Red is a 62 y.o. male who presents with chronic pancreatitis. Additionally patient is homeless and is having difficulty ambulating in the outpatient setting    1.)  Abdominal pain    · MRCP positive w/ dilatation of the pancreatic duct 2/2 pancreatic duct calcification vs stone. CBD 9mm. · GI following  · Plan for ERCP in the outpatient next week  · We will have social work help arrange this  · Tolerating regular diet    2.)  Asymptomatic bradycardia    · Cardiology consulted, appreciate recommendations  · ECHO completed last night  · LVEF 55%, some dilatation of the right atrium, otherwise wnl  · Continue to monitor heart rate, plan for Holter monitor 48 hours on discharge  · Cleared by cardiology for discharge    · Continue home medications  · Monitor vitals, labs, and imaging  · DISPO: Discharge today    --  Festus Bryan DO  Emergency Medicine Resident Physician     This dictation was generated by voice recognition computer software. Although all attempts are made to edit the dictation for accuracy, there may be errors in the transcription that are not intended.

## 2022-04-15 NOTE — FLOWSHEET NOTE
Pt refused the 48 hour holter monitor because he is homeless and does not want to be responsible for the monitor.

## 2022-04-15 NOTE — DISCHARGE SUMMARY
CDU Discharge Summary        Patient:  Fiona Stewart  YOB: 1960    MRN: [id number]   Acct: [de-identified]    Primary Care Physician: Jenny Khan MD    Admit date:  4/5/2022 11:23 AM  Discharge date: 4/15/2022  5:13 PM     Discharge Diagnoses:     Acute abdominal pain due to acute on chronic pancreatitis  Improved with pain control, fluids, nausea control    Follow-up:  Call today/tomorrow for a follow up appointment with Jenny Khan MD , or return to the Emergency Room with worsening symptoms    Stressed to patient the importance of following up with primary care doctor for further workup/management of symptoms. Pt verbalizes understanding and agrees with plan.     Discharge Medications:  Changes to medications            Medication List      START taking these medications    aluminum & magnesium hydroxide-simethicone 200-200-20 MG/5ML Susp suspension  Commonly known as: MAALOX  Take 30 mLs by mouth every 6 hours as needed for Indigestion     ondansetron 4 MG disintegrating tablet  Commonly known as: Zofran ODT  Take 1 tablet by mouth every 8 hours as needed for Nausea        CONTINUE taking these medications    atorvastatin 40 MG tablet  Commonly known as: LIPITOR  Take 1 tablet by mouth nightly     brimonidine 0.2 % ophthalmic solution  Commonly known as: ALPHAGAN  Place 1 drop into the right eye 3 times daily     clopidogrel 75 MG tablet  Commonly known as: PLAVIX  Take 1 tablet by mouth daily     dorzolamide 2 % ophthalmic solution  Commonly known as: TRUSOPT  Place 1 drop into the right eye 2 times daily     folic acid 1 MG tablet  Commonly known as: FOLVITE  Take 1 tablet by mouth daily     ibuprofen 400 MG tablet  Commonly known as: IBU  Take 1 tablet by mouth every 6 hours as needed for Pain     latanoprost 0.005 % ophthalmic solution  Commonly known as: XALATAN  Place 1 drop into the right eye nightly     mirtazapine 7.5 MG tablet  Commonly known as: REMERON  Take 1 tablet by mouth nightly     nicotine polacrilex 2 MG gum  Commonly known as: NICORETTE  Take 1 each by mouth as needed for Smoking cessation     sertraline 100 MG tablet  Commonly known as: ZOLOFT  Take 1 tablet by mouth daily     timolol 0.5 % ophthalmic solution  Commonly known as: TIMOPTIC  Place 1 drop into the right eye 2 times daily     traZODone 50 MG tablet  Commonly known as: DESYREL  Take 1 tablet by mouth nightly as needed for Sleep     vitamin B-1 100 MG tablet  Commonly known as: THIAMINE  Take 1 tablet by mouth daily        ASK your doctor about these medications    hydrOXYzine 50 MG tablet  Commonly known as: ATARAX  Take 1 tablet by mouth 3 times daily as needed for Anxiety  Ask about: Should I take this medication? Where to Get Your Medications      These medications were sent to [facility], [address] [place] [address]  [address] [facility], [address] [address]    Phone: [phone]   · aluminum & magnesium hydroxide-simethicone 200-200-20 MG/5ML Susp suspension  · ondansetron 4 MG disintegrating tablet  · vitamin B-1 100 MG tablet         Diet:  No diet orders on file , Advance as tolerated     Activity:  As tolerated    Consultants: IP CONSULT TO SOCIAL WORK  IP CONSULT TO CARDIOLOGY    Procedures:  Not indicated     Diagnostic Test:   Results for orders placed or performed during the hospital encounter of 04/05/22   COVID-19, Rapid    Specimen: Nasopharyngeal Swab   Result Value Ref Range    Specimen Description . NASOPHARYNGEAL SWAB     SARS-CoV-2, Rapid Not Detected Not Detected   CBC with Auto Differential   Result Value Ref Range    WBC 7.0 3.5 - 11.3 k/uL    RBC 4.47 4.21 - 5.77 m/uL    Hemoglobin 15.0 13.0 - 17.0 g/dL    Hematocrit 43.8 40.7 - 50.3 %    MCV 98.0 82.6 - 102.9 fL    MCH 33.6 (H) 25.2 - 33.5 pg    MCHC 34.2 28.4 - 34.8 g/dL    RDW 12.4 11.8 - 14.4 %    Platelets 730 645 - 844 k/uL    MPV 9.2 8.1 - 13.5 fL    NRBC Automated 0.0 0.0 per 100 WBC    Immature Granulocytes 1 (H) 0 %    Seg Neutrophils 84 (H) 36 - 65 %    Lymphocytes 9 (L) 24 - 43 %    Monocytes 6 3 - 12 %    Eosinophils % 0 (L) 1 - 4 %    Basophils 0 0 - 2 %    Absolute Immature Granulocyte 0.07 0.00 - 0.30 k/uL    Segs Absolute 5.88 1.50 - 8.10 k/uL    Absolute Lymph # 0.63 (L) 1.10 - 3.70 k/uL    Absolute Mono # 0.42 0.10 - 1.20 k/uL    Absolute Eos # 0.00 0.00 - 0.44 k/uL    Basophils Absolute 0.00 0.00 - 0.20 k/uL    Morphology Normal    Comprehensive Metabolic Panel w/ Reflex to MG   Result Value Ref Range    Glucose 104 (H) 70 - 99 mg/dL    BUN 14 8 - 23 mg/dL    CREATININE 0.71 0.70 - 1.20 mg/dL    Calcium 8.6 8.6 - 10.4 mg/dL    Sodium 139 135 - 144 mmol/L    Potassium 4.2 3.7 - 5.3 mmol/L    Chloride 103 98 - 107 mmol/L    CO2 26 20 - 31 mmol/L    Anion Gap 10 9 - 17 mmol/L    Alkaline Phosphatase 43 40 - 129 U/L    ALT 38 5 - 41 U/L    AST 33 <40 U/L    Total Bilirubin 0.47 0.3 - 1.2 mg/dL    Total Protein 6.9 6.4 - 8.3 g/dL    Albumin 4.2 3.5 - 5.2 g/dL    Albumin/Globulin Ratio 1.6 1.0 - 2.5    GFR Non-African American >60 >60 mL/min    GFR African American >60 >60 mL/min    GFR Comment         Lipase   Result Value Ref Range    Lipase 223 (H) 13 - 60 U/L   Troponin   Result Value Ref Range    Troponin, High Sensitivity <6 0 - 22 ng/L   Troponin   Result Value Ref Range    Troponin, High Sensitivity <6 0 - 22 ng/L   Ethanol   Result Value Ref Range    Ethanol <10 <10 mg/dL    Ethanol percent <0.010 <0.010 %   CBC with Auto Differential   Result Value Ref Range    WBC 3.8 3.5 - 11.3 k/uL    RBC 4.13 (L) 4.21 - 5.77 m/uL    Hemoglobin 13.6 13.0 - 17.0 g/dL    Hematocrit 40.7 40.7 - 50.3 %    MCV 98.5 82.6 - 102.9 fL    MCH 32.9 25.2 - 33.5 pg    MCHC 33.4 28.4 - 34.8 g/dL    RDW 12.4 11.8 - 14.4 %    Platelets 328 612 - 549 k/uL    MPV 8.6 8.1 - 13.5 fL    NRBC Automated 0.0 0.0 per 100 WBC    Seg Neutrophils 59 36 - 65 %    Lymphocytes 27 24 - 43 %    Monocytes 12 3 - 12 % Eosinophils % 2 1 - 4 %    Basophils 1 0 - 2 %    Immature Granulocytes 1 (H) 0 %    Segs Absolute 2.22 1.50 - 8.10 k/uL    Absolute Lymph # 1.01 (L) 1.10 - 3.70 k/uL    Absolute Mono # 0.45 0.10 - 1.20 k/uL    Absolute Eos # 0.08 0.00 - 0.44 k/uL    Basophils Absolute <0.03 0.00 - 0.20 k/uL    Absolute Immature Granulocyte <0.03 0.00 - 0.30 k/uL   Comprehensive Metabolic Panel   Result Value Ref Range    Glucose 89 70 - 99 mg/dL    BUN 11 8 - 23 mg/dL    CREATININE 0.66 (L) 0.70 - 1.20 mg/dL    Calcium 8.1 (L) 8.6 - 10.4 mg/dL    Sodium 142 135 - 144 mmol/L    Potassium 3.9 3.7 - 5.3 mmol/L    Chloride 110 (H) 98 - 107 mmol/L    CO2 23 20 - 31 mmol/L    Anion Gap 9 9 - 17 mmol/L    Alkaline Phosphatase 37 (L) 40 - 129 U/L    ALT 27 5 - 41 U/L    AST 22 <40 U/L    Total Bilirubin 0.68 0.3 - 1.2 mg/dL    Total Protein 5.8 (L) 6.4 - 8.3 g/dL    Albumin 3.4 (L) 3.5 - 5.2 g/dL    Albumin/Globulin Ratio 1.4 1.0 - 2.5    GFR Non-African American >60 >60 mL/min    GFR African American >60 >60 mL/min    GFR Comment         Lipase   Result Value Ref Range    Lipase 185 (H) 13 - 60 U/L   Magnesium   Result Value Ref Range    Magnesium 1.9 1.6 - 2.6 mg/dL   CBC with Auto Differential   Result Value Ref Range    WBC 3.7 3.5 - 11.3 k/uL    RBC 4.23 4.21 - 5.77 m/uL    Hemoglobin 13.9 13.0 - 17.0 g/dL    Hematocrit 41.9 40.7 - 50.3 %    MCV 99.1 82.6 - 102.9 fL    MCH 32.9 25.2 - 33.5 pg    MCHC 33.2 28.4 - 34.8 g/dL    RDW 12.2 11.8 - 14.4 %    Platelets 534 439 - 763 k/uL    MPV 8.4 8.1 - 13.5 fL    NRBC Automated 0.0 0.0 per 100 WBC    Seg Neutrophils 58 36 - 65 %    Lymphocytes 28 24 - 43 %    Monocytes 11 3 - 12 %    Eosinophils % 2 1 - 4 %    Basophils 0 0 - 2 %    Immature Granulocytes 1 (H) 0 %    Segs Absolute 2.15 1.50 - 8.10 k/uL    Absolute Lymph # 1.02 (L) 1.10 - 3.70 k/uL    Absolute Mono # 0.41 0.10 - 1.20 k/uL    Absolute Eos # 0.09 0.00 - 0.44 k/uL    Basophils Absolute <0.03 0.00 - 0.20 k/uL    Absolute Immature Granulocyte <0.03 0.00 - 0.30 k/uL   Comprehensive Metabolic Panel   Result Value Ref Range    Glucose 89 70 - 99 mg/dL    BUN 7 (L) 8 - 23 mg/dL    CREATININE 0.65 (L) 0.70 - 1.20 mg/dL    Calcium 7.8 (L) 8.6 - 10.4 mg/dL    Sodium 143 135 - 144 mmol/L    Potassium 3.8 3.7 - 5.3 mmol/L    Chloride 113 (H) 98 - 107 mmol/L    CO2 22 20 - 31 mmol/L    Anion Gap 8 (L) 9 - 17 mmol/L    Alkaline Phosphatase 36 (L) 40 - 129 U/L    ALT 22 5 - 41 U/L    AST 17 <40 U/L    Total Bilirubin 0.53 0.3 - 1.2 mg/dL    Total Protein 5.4 (L) 6.4 - 8.3 g/dL    Albumin 3.2 (L) 3.5 - 5.2 g/dL    Albumin/Globulin Ratio 1.5 1.0 - 2.5    GFR Non-African American >60 >60 mL/min    GFR African American >60 >60 mL/min    GFR Comment         Magnesium   Result Value Ref Range    Magnesium 1.9 1.6 - 2.6 mg/dL   Lipase   Result Value Ref Range    Lipase 240 (H) 13 - 60 U/L   CBC with Auto Differential   Result Value Ref Range    WBC 4.3 3.5 - 11.3 k/uL    RBC 3.99 (L) 4.21 - 5.77 m/uL    Hemoglobin 13.2 13.0 - 17.0 g/dL    Hematocrit 38.5 (L) 40.7 - 50.3 %    MCV 96.5 82.6 - 102.9 fL    MCH 33.1 25.2 - 33.5 pg    MCHC 34.3 28.4 - 34.8 g/dL    RDW 12.1 11.8 - 14.4 %    Platelets 837 664 - 115 k/uL    MPV 8.7 8.1 - 13.5 fL    NRBC Automated 0.0 0.0 per 100 WBC    Seg Neutrophils 61 36 - 65 %    Lymphocytes 26 24 - 43 %    Monocytes 11 3 - 12 %    Eosinophils % 2 1 - 4 %    Basophils 0 0 - 2 %    Immature Granulocytes 0 0 %    Segs Absolute 2.58 1.50 - 8.10 k/uL    Absolute Lymph # 1.12 1.10 - 3.70 k/uL    Absolute Mono # 0.46 0.10 - 1.20 k/uL    Absolute Eos # 0.08 0.00 - 0.44 k/uL    Basophils Absolute <0.03 0.00 - 0.20 k/uL    Absolute Immature Granulocyte <0.03 0.00 - 0.30 k/uL   Comprehensive Metabolic Panel   Result Value Ref Range    Glucose 82 70 - 99 mg/dL    BUN 4 (L) 8 - 23 mg/dL    CREATININE 0.59 (L) 0.70 - 1.20 mg/dL    Calcium 8.1 (L) 8.6 - 10.4 mg/dL    Sodium 142 135 - 144 mmol/L    Potassium 3.6 (L) 3.7 - 5.3 mmol/L    Chloride 109 (H) 98 - 107 mmol/L    CO2 25 20 - 31 mmol/L    Anion Gap 8 (L) 9 - 17 mmol/L    Alkaline Phosphatase 36 (L) 40 - 129 U/L    ALT 21 5 - 41 U/L    AST 19 <40 U/L    Total Bilirubin 0.50 0.3 - 1.2 mg/dL    Total Protein 5.2 (L) 6.4 - 8.3 g/dL    Albumin 3.2 (L) 3.5 - 5.2 g/dL    Albumin/Globulin Ratio 1.6 1.0 - 2.5    GFR Non-African American >60 >60 mL/min    GFR African American >60 >60 mL/min    GFR Comment         Magnesium   Result Value Ref Range    Magnesium 1.6 1.6 - 2.6 mg/dL   Lipase   Result Value Ref Range    Lipase 228 (H) 13 - 60 U/L   Lipase   Result Value Ref Range    Lipase 163 (H) 13 - 60 U/L   CBC with Auto Differential   Result Value Ref Range    WBC 3.6 3.5 - 11.3 k/uL    RBC 3.86 (L) 4.21 - 5.77 m/uL    Hemoglobin 13.0 13.0 - 17.0 g/dL    Hematocrit 36.9 (L) 40.7 - 50.3 %    MCV 95.6 82.6 - 102.9 fL    MCH 33.7 (H) 25.2 - 33.5 pg    MCHC 35.2 (H) 28.4 - 34.8 g/dL    RDW 11.9 11.8 - 14.4 %    Platelets 393 912 - 919 k/uL    MPV 8.8 8.1 - 13.5 fL    NRBC Automated 0.0 0.0 per 100 WBC    Seg Neutrophils 58 36 - 65 %    Lymphocytes 28 24 - 43 %    Monocytes 10 3 - 12 %    Eosinophils % 3 1 - 4 %    Basophils 1 0 - 2 %    Immature Granulocytes 0 0 %    Segs Absolute 2.10 1.50 - 8.10 k/uL    Absolute Lymph # 1.01 (L) 1.10 - 3.70 k/uL    Absolute Mono # 0.34 0.10 - 1.20 k/uL    Absolute Eos # 0.09 0.00 - 0.44 k/uL    Basophils Absolute <0.03 0.00 - 0.20 k/uL    Absolute Immature Granulocyte <0.03 0.00 - 0.30 k/uL   Comprehensive Metabolic Panel   Result Value Ref Range    Glucose 81 70 - 99 mg/dL    BUN 4 (L) 8 - 23 mg/dL    CREATININE 0.65 (L) 0.70 - 1.20 mg/dL    Calcium 8.2 (L) 8.6 - 10.4 mg/dL    Sodium 141 135 - 144 mmol/L    Potassium 3.8 3.7 - 5.3 mmol/L    Chloride 108 (H) 98 - 107 mmol/L    CO2 24 20 - 31 mmol/L    Anion Gap 9 9 - 17 mmol/L    Alkaline Phosphatase 35 (L) 40 - 129 U/L    ALT 25 5 - 41 U/L    AST 23 <40 U/L    Total Bilirubin 0.47 0.3 - 1.2 mg/dL    Total Protein 5.0 (L) 6.4 - 8.3 g/dL    Albumin 3.3 (L) 3.5 - 5.2 g/dL    Albumin/Globulin Ratio 1.9 1.0 - 2.5    GFR Non-African American >60 >60 mL/min    GFR African American >60 >60 mL/min    GFR Comment         Magnesium   Result Value Ref Range    Magnesium 1.5 (L) 1.6 - 2.6 mg/dL   CBC with Auto Differential   Result Value Ref Range    WBC 5.0 3.5 - 11.3 k/uL    RBC 4.38 4.21 - 5.77 m/uL    Hemoglobin 14.5 13.0 - 17.0 g/dL    Hematocrit 41.6 40.7 - 50.3 %    MCV 95.0 82.6 - 102.9 fL    MCH 33.1 25.2 - 33.5 pg    MCHC 34.9 (H) 28.4 - 34.8 g/dL    RDW 11.9 11.8 - 14.4 %    Platelets 875 948 - 479 k/uL    MPV 8.5 8.1 - 13.5 fL    NRBC Automated 0.0 0.0 per 100 WBC    Seg Neutrophils 73 (H) 36 - 65 %    Lymphocytes 17 (L) 24 - 43 %    Monocytes 7 3 - 12 %    Eosinophils % 2 1 - 4 %    Basophils 0 0 - 2 %    Immature Granulocytes 1 (H) 0 %    Segs Absolute 3.64 1.50 - 8.10 k/uL    Absolute Lymph # 0.87 (L) 1.10 - 3.70 k/uL    Absolute Mono # 0.37 0.10 - 1.20 k/uL    Absolute Eos # 0.08 0.00 - 0.44 k/uL    Basophils Absolute <0.03 0.00 - 0.20 k/uL    Absolute Immature Granulocyte 0.03 0.00 - 0.30 k/uL   Comprehensive Metabolic Panel   Result Value Ref Range    Glucose 63 (L) 70 - 99 mg/dL    BUN 8 8 - 23 mg/dL    CREATININE 0.64 (L) 0.70 - 1.20 mg/dL    Calcium 8.3 (L) 8.6 - 10.4 mg/dL    Sodium 138 135 - 144 mmol/L    Potassium 4.0 3.7 - 5.3 mmol/L    Chloride 108 (H) 98 - 107 mmol/L    CO2 19 (L) 20 - 31 mmol/L    Anion Gap 11 9 - 17 mmol/L    Alkaline Phosphatase 42 40 - 129 U/L    ALT 27 5 - 41 U/L    AST 25 <40 U/L    Total Bilirubin 0.62 0.3 - 1.2 mg/dL    Total Protein 5.9 (L) 6.4 - 8.3 g/dL    Albumin 3.6 3.5 - 5.2 g/dL    Albumin/Globulin Ratio 1.6 1.0 - 2.5    GFR Non-African American >60 >60 mL/min    GFR African American >60 >60 mL/min    GFR Comment         Magnesium   Result Value Ref Range    Magnesium 1.8 1.6 - 2.6 mg/dL   Lipase   Result Value Ref Range    Lipase 192 (H) 13 - 60 U/L   CBC with Auto Differential   Result Value Ref Range    WBC 3.8 3.5 - 11.3 k/uL    RBC 4.26 4.21 - 5.77 m/uL    Hemoglobin 14.2 13.0 - 17.0 g/dL    Hematocrit 40.2 (L) 40.7 - 50.3 %    MCV 94.4 82.6 - 102.9 fL    MCH 33.3 25.2 - 33.5 pg    MCHC 35.3 (H) 28.4 - 34.8 g/dL    RDW 12.1 11.8 - 14.4 %    Platelets 998 153 - 923 k/uL    MPV 8.7 8.1 - 13.5 fL    NRBC Automated 0.0 0.0 per 100 WBC    Seg Neutrophils 57 36 - 65 %    Lymphocytes 29 24 - 43 %    Monocytes 12 3 - 12 %    Eosinophils % 2 1 - 4 %    Basophils 0 0 - 2 %    Immature Granulocytes 0 0 %    Segs Absolute 2.17 1.50 - 8.10 k/uL    Absolute Lymph # 1.12 1.10 - 3.70 k/uL    Absolute Mono # 0.44 0.10 - 1.20 k/uL    Absolute Eos # 0.09 0.00 - 0.44 k/uL    Basophils Absolute <0.03 0.00 - 0.20 k/uL    Absolute Immature Granulocyte <0.03 0.00 - 0.30 k/uL   Comprehensive Metabolic Panel   Result Value Ref Range    Glucose 91 70 - 99 mg/dL    BUN 6 (L) 8 - 23 mg/dL    CREATININE 0.66 (L) 0.70 - 1.20 mg/dL    Calcium 8.3 (L) 8.6 - 10.4 mg/dL    Sodium 140 135 - 144 mmol/L    Potassium 3.8 3.7 - 5.3 mmol/L    Chloride 108 (H) 98 - 107 mmol/L    CO2 22 20 - 31 mmol/L    Anion Gap 10 9 - 17 mmol/L    Alkaline Phosphatase 39 (L) 40 - 129 U/L    ALT 22 5 - 41 U/L    AST 21 <40 U/L    Total Bilirubin 0.50 0.3 - 1.2 mg/dL    Total Protein 5.8 (L) 6.4 - 8.3 g/dL    Albumin 3.4 (L) 3.5 - 5.2 g/dL    Albumin/Globulin Ratio 1.4 1.0 - 2.5    GFR Non-African American >60 >60 mL/min    GFR African American >60 >60 mL/min    GFR Comment         Magnesium   Result Value Ref Range    Magnesium 1.7 1.6 - 2.6 mg/dL   CBC with Auto Differential   Result Value Ref Range    WBC 4.6 3.5 - 11.3 k/uL    RBC 4.20 (L) 4.21 - 5.77 m/uL    Hemoglobin 14.0 13.0 - 17.0 g/dL    Hematocrit 40.3 (L) 40.7 - 50.3 %    MCV 96.0 82.6 - 102.9 fL    MCH 33.3 25.2 - 33.5 pg    MCHC 34.7 28.4 - 34.8 g/dL    RDW 12.3 11.8 - 14.4 %    Platelets 023 202 - 258 k/uL    MPV 8.7 8.1 - 13.5 fL    NRBC Automated 0.0 0.0 per 100 WBC    Seg Neutrophils 63 36 - 65 %    Lymphocytes 24 24 - 43 %    Monocytes 10 3 - 12 %    Eosinophils % 2 1 - 4 %    Basophils 0 0 - 2 %    Immature Granulocytes 1 (H) 0 %    Segs Absolute 2.91 1.50 - 8.10 k/uL    Absolute Lymph # 1.11 1.10 - 3.70 k/uL    Absolute Mono # 0.47 0.10 - 1.20 k/uL    Absolute Eos # 0.09 0.00 - 0.44 k/uL    Basophils Absolute <0.03 0.00 - 0.20 k/uL    Absolute Immature Granulocyte 0.03 0.00 - 0.30 k/uL   Comprehensive Metabolic Panel   Result Value Ref Range    Glucose 97 70 - 99 mg/dL    BUN 4 (L) 8 - 23 mg/dL    CREATININE 0.77 0.70 - 1.20 mg/dL    Calcium 8.4 (L) 8.6 - 10.4 mg/dL    Sodium 144 135 - 144 mmol/L    Potassium 3.8 3.7 - 5.3 mmol/L    Chloride 109 (H) 98 - 107 mmol/L    CO2 26 20 - 31 mmol/L    Anion Gap 9 9 - 17 mmol/L    Alkaline Phosphatase 38 (L) 40 - 129 U/L    ALT 30 5 - 41 U/L    AST 30 <40 U/L    Total Bilirubin 0.35 0.3 - 1.2 mg/dL    Total Protein 5.6 (L) 6.4 - 8.3 g/dL    Albumin 3.4 (L) 3.5 - 5.2 g/dL    Albumin/Globulin Ratio 1.5 1.0 - 2.5    GFR Non-African American >60 >60 mL/min    GFR African American >60 >60 mL/min    GFR Comment         Magnesium   Result Value Ref Range    Magnesium 1.7 1.6 - 2.6 mg/dL   Lipase   Result Value Ref Range    Lipase 185 (H) 13 - 60 U/L   CBC with Auto Differential   Result Value Ref Range    WBC 3.6 3.5 - 11.3 k/uL    RBC 4.06 (L) 4.21 - 5.77 m/uL    Hemoglobin 13.4 13.0 - 17.0 g/dL    Hematocrit 38.1 (L) 40.7 - 50.3 %    MCV 93.8 82.6 - 102.9 fL    MCH 33.0 25.2 - 33.5 pg    MCHC 35.2 (H) 28.4 - 34.8 g/dL    RDW 12.1 11.8 - 14.4 %    Platelets 654 912 - 461 k/uL    MPV 8.8 8.1 - 13.5 fL    NRBC Automated 0.0 0.0 per 100 WBC    Seg Neutrophils 51 36 - 65 %    Lymphocytes 33 24 - 43 %    Monocytes 11 3 - 12 %    Eosinophils % 3 1 - 4 %    Basophils 1 0 - 2 %    Immature Granulocytes 1 (H) 0 %    Segs Absolute 1.87 1.50 - 8.10 k/uL    Absolute Lymph # 1.18 1.10 - 3.70 k/uL    Absolute Mono # 0.38 0.10 - 1.20 k/uL    Absolute Eos # 0.10 0.00 - 0.44 k/uL    Basophils Absolute <0.03 0.00 - 0.20 k/uL    Absolute Immature Granulocyte <0.03 0.00 - 0.30 k/uL   Comprehensive Metabolic Panel   Result Value Ref Range    Glucose 96 70 - 99 mg/dL    BUN 6 (L) 8 - 23 mg/dL    CREATININE 0.71 0.70 - 1.20 mg/dL    Calcium 8.3 (L) 8.6 - 10.4 mg/dL    Sodium 141 135 - 144 mmol/L    Potassium 3.8 3.7 - 5.3 mmol/L    Chloride 108 (H) 98 - 107 mmol/L    CO2 24 20 - 31 mmol/L    Anion Gap 9 9 - 17 mmol/L    Alkaline Phosphatase 37 (L) 40 - 129 U/L    ALT 46 (H) 5 - 41 U/L    AST 45 (H) <40 U/L    Total Bilirubin 0.31 0.3 - 1.2 mg/dL    Total Protein 5.3 (L) 6.4 - 8.3 g/dL    Albumin 3.3 (L) 3.5 - 5.2 g/dL    Albumin/Globulin Ratio 1.7 1.0 - 2.5    GFR Non-African American >60 >60 mL/min    GFR African American >60 >60 mL/min    GFR Comment         Magnesium   Result Value Ref Range    Magnesium 1.6 1.6 - 2.6 mg/dL   CBC with Auto Differential   Result Value Ref Range    WBC 3.8 3.5 - 11.3 k/uL    RBC 4.00 (L) 4.21 - 5.77 m/uL    Hemoglobin 13.4 13.0 - 17.0 g/dL    Hematocrit 39.0 (L) 40.7 - 50.3 %    MCV 97.5 82.6 - 102.9 fL    MCH 33.5 25.2 - 33.5 pg    MCHC 34.4 28.4 - 34.8 g/dL    RDW 12.1 11.8 - 14.4 %    Platelets 488 800 - 715 k/uL    MPV 9.1 8.1 - 13.5 fL    NRBC Automated 0.0 0.0 per 100 WBC    Seg Neutrophils 51 36 - 65 %    Lymphocytes 35 24 - 43 %    Monocytes 9 3 - 12 %    Eosinophils % 3 1 - 4 %    Basophils 1 0 - 2 %    Immature Granulocytes 1 (H) 0 %    Segs Absolute 1.97 1.50 - 8.10 k/uL    Absolute Lymph # 1.33 1.10 - 3.70 k/uL    Absolute Mono # 0.34 0.10 - 1.20 k/uL    Absolute Eos # 0.11 0.00 - 0.44 k/uL    Basophils Absolute <0.03 0.00 - 0.20 k/uL    Absolute Immature Granulocyte <0.03 0.00 - 0.30 k/uL   Comprehensive Metabolic Panel   Result Value Ref Range    Glucose 94 70 - 99 mg/dL    BUN 6 (L) 8 - 23 mg/dL    CREATININE 0.74 0.70 - 1.20 mg/dL    Calcium 8.4 (L) 8.6 - 10.4 mg/dL    Sodium 143 135 - 144 mmol/L    Potassium 4.1 3.7 - 5.3 mmol/L    Chloride 110 (H) 98 - 107 mmol/L    CO2 26 20 - 31 mmol/L    Anion Gap 7 (L) 9 - 17 mmol/L    Alkaline Phosphatase 37 (L) 40 - 129 U/L    ALT 52 (H) 5 - 41 U/L    AST 47 (H) <40 U/L    Total Bilirubin 0.30 0.3 - 1.2 mg/dL    Total Protein 5.5 (L) 6.4 - 8.3 g/dL    Albumin 3.4 (L) 3.5 - 5.2 g/dL    Albumin/Globulin Ratio 1.6 1.0 - 2.5    GFR Non-African American >60 >60 mL/min    GFR African American >60 >60 mL/min    GFR Comment         Magnesium   Result Value Ref Range    Magnesium 1.6 1.6 - 2.6 mg/dL   TSH with Reflex   Result Value Ref Range    TSH 2.77 0.30 - 5.00 uIU/mL   CBC with Auto Differential   Result Value Ref Range    WBC 3.8 3.5 - 11.3 k/uL    RBC 4.05 (L) 4.21 - 5.77 m/uL    Hemoglobin 13.7 13.0 - 17.0 g/dL    Hematocrit 39.8 (L) 40.7 - 50.3 %    MCV 98.3 82.6 - 102.9 fL    MCH 33.8 (H) 25.2 - 33.5 pg    MCHC 34.4 28.4 - 34.8 g/dL    RDW 12.2 11.8 - 14.4 %    Platelets 882 895 - 191 k/uL    MPV 9.3 8.1 - 13.5 fL    NRBC Automated 0.0 0.0 per 100 WBC    Seg Neutrophils 53 36 - 65 %    Lymphocytes 32 24 - 43 %    Monocytes 10 3 - 12 %    Eosinophils % 3 1 - 4 %    Basophils 1 0 - 2 %    Immature Granulocytes 1 (H) 0 %    Segs Absolute 2.03 1.50 - 8.10 k/uL    Absolute Lymph # 1.23 1.10 - 3.70 k/uL    Absolute Mono # 0.39 0.10 - 1.20 k/uL    Absolute Eos # 0.11 0.00 - 0.44 k/uL    Basophils Absolute 0.03 0.00 - 0.20 k/uL    Absolute Immature Granulocyte <0.03 0.00 - 0.30 k/uL   Comprehensive Metabolic Panel   Result Value Ref Range    Glucose 99 70 - 99 mg/dL    BUN 8 8 - 23 mg/dL    CREATININE 0.67 (L) 0.70 - 1.20 mg/dL    Calcium 8.7 8.6 - 10.4 mg/dL    Sodium 142 135 - 144 mmol/L    Potassium 4.0 3.7 - 5.3 mmol/L    Chloride 108 (H) 98 - 107 mmol/L    CO2 24 20 - 31 mmol/L    Anion Gap 10 9 - 17 mmol/L    Alkaline Phosphatase 37 (L) 40 - 129 U/L    ALT 54 (H) 5 - 41 U/L    AST 43 (H) <40 U/L    Total Bilirubin 0.28 (L) 0.3 - 1.2 mg/dL    Total Protein 5.7 (L) 6.4 - 8.3 g/dL    Albumin 3.4 (L) 3.5 - 5.2 g/dL Albumin/Globulin Ratio 1.5 1.0 - 2.5    GFR Non-African American >60 >60 mL/min    GFR African American >60 >60 mL/min    GFR Comment         Magnesium   Result Value Ref Range    Magnesium 1.7 1.6 - 2.6 mg/dL   POC Glucose Fingerstick   Result Value Ref Range    POC Glucose 92 75 - 110 mg/dL   POC Glucose Fingerstick   Result Value Ref Range    POC Glucose 97 75 - 110 mg/dL   POC Glucose Fingerstick   Result Value Ref Range    POC Glucose 133 (H) 75 - 110 mg/dL   EKG 12 Lead   Result Value Ref Range    Ventricular Rate 69 BPM    Atrial Rate 69 BPM    P-R Interval 160 ms    QRS Duration 90 ms    Q-T Interval 416 ms    QTc Calculation (Bazett) 445 ms    P Axis 49 degrees    R Axis -55 degrees    T Axis 54 degrees   EKG 12 Lead   Result Value Ref Range    Ventricular Rate 50 BPM    Atrial Rate 50 BPM    P-R Interval 134 ms    QRS Duration 88 ms    Q-T Interval 450 ms    QTc Calculation (Bazett) 410 ms    P Axis 6 degrees    R Axis -44 degrees    T Axis 25 degrees     ECHO Complete 2D W Doppler W Color    Result Date: 4/14/2022  Transthoracic Echocardiography Report (TTE)  Patient Name Jovanna Velarde Date of Study               04/14/2022               R   Date of      1960  Gender                      Male  Birth   Age          62 year(s)  Race                           Room Number  0330        Height:                     64 inch, 162.56 cm   Corporate ID [id number]    Weight:                     165 pounds, 74.8 kg  #   Patient Acct [de-identified]   BSA:          1.8 m^2       BMI:     28.32 kg/m^2  #   MR #         [id number]     Sonographer                 Francesca Ojeda   Accession #  [id number]  Interpreting Physician      [address] [facility]   Fellow                   Referring Nurse                           Practitioner   Interpreting             Referring Physician         [address]  Fellow  Type of Study   TTE procedure:2D Echocardiogram, M-Mode, Doppler, Color Doppler.   Procedure Date Date: 04/14/2022 Start: 04:23 PM Study Location: [facility] Technical Quality: Adequate visualization Indications:LV Function. History / Tech. Comments: Procedure explained to patient. Echo completed in the Echo Lab. PMHx: Alcohol abuse Patient Status: Inpatient Height: 64 inches Weight: 165 pounds BSA: 1.8 m^2 BMI: 28.32 kg/m^2 CONCLUSIONS Summary Left ventricle is normal in size Global left ventricular systolic function is hyperdynamic Estimated ejection fraction is 55 % . Right atrial dilatation. Prominent Eustachian valve. Trivial mitral regurgitation. Signature ----------------------------------------------------------------------------  Electronically signed by Katie Mac(Sonographer) on 04/14/2022 06:09 PM ---------------------------------------------------------------------------- ----------------------------------------------------------------------------  Electronically signed by Ayaz Duvall(Interpreting physician) on 04/14/2022  06:13 PM ---------------------------------------------------------------------------- FINDINGS Left Atrium Left atrium is normal in size. Inter-atrial septum is intact with no evidence for an atrial septal defect, by color doppler. Left Ventricle Left ventricle is normal in size Global left ventricular systolic function is hyperdynamic Estimated ejection fraction is 55 % . Right Atrium Right atrial dilatation. Prominent Eustachian valve. Right Ventricle Normal right ventricular size and function. Mitral Valve Normal mitral valve structure. No mitral stenosis. Trivial mitral regurgitation. Aortic Valve Aortic valve is trileaflet. No evidence of aortic insufficiency or stenosis. Tricuspid Valve Tricuspid valve was not well visualized. No tricuspid regurgitation was seen. Insignificant tricuspid regurgitation, unable to estimate RVSP. Pulmonic Valve Pulmonic valve not well visualized but Doppler velocities are normal. Pericardial Effusion No significant pericardial effusion is seen. Miscellaneous Normal aortic root dimension. IVC normal diameter & inspiratory collapse indicating normal RA filling pressure . E/E' average = 11.05. M-mode / 2D Measurements & Calculations:   LVIDd:5.3 cm(3.7 - 5.6 cm)      Diastolic GNXTCC:036 ml  IVSd:1 cm(0.6 - 1.1 cm)         Aortic Root:3 cm(2.0 - 3.7 cm)  LVPWd:0.7 cm(0.6 - 1.1 cm)      LA Dimension: 3.1 cm(1.9 - 4.0 cm)                                  LA volume/Index: 40.51 ml /23m^2                                  LVOT:2.4 cm   Mitral:                                 Aortic   Valve Area (P1/2-Time): 3.28 cm^2       Peak Velocity: 1.45 m/s  Peak E-Wave: 0.79 m/s                   Mean Velocity: 1.04 m/s  Peak A-Wave: 0.86 m/s                   Peak Gradient: 8.41 mmHg  E/A Ratio: 0.92                         Mean Gradient: 5 mmHg  Peak Gradient: 2.48 mmHg  Mean Gradient: 2 mmHg  Deceleration Time: 268 msec             Area (continuity): 2.86 cm^2  P1/2t: 67 msec                          AV VTI: 29.2 cm   Area (continuity): 2.93 cm^2  Mean Velocity: 0.58 m/s                                           Pulmonic:                                           Peak Velocity: 1.44 m/s                                          Peak Gradient: 8.29 mmHg  Diastology / Tissue Doppler Septal Wall E' velocity:0.07 m/s Septal Wall E/E':11.9 Lateral Wall E' velocity:0.08 m/s Lateral Wall E/E':10.2    CT ABDOMEN PELVIS W IV CONTRAST    Result Date: 4/8/2022  EXAMINATION: CT OF THE ABDOMEN AND PELVIS WITH CONTRAST 4/8/2022 9:24 am TECHNIQUE: CT of the abdomen and pelvis was performed with the administration of intravenous contrast. Multiplanar reformatted images are provided for review. Dose modulation, iterative reconstruction, and/or weight based adjustment of the mA/kV was utilized to reduce the radiation dose to as low as reasonably achievable. COMPARISON: No prior CT imaging of the abdomen or pelvis. HISTORY: ORDERING SYSTEM PROVIDED HISTORY: Ongoing pain for pancreatitis. TECHNOLOGIST PROVIDED HISTORY: IV Contrast Only Ongoing pain for pancreatitis. FINDINGS: Lower Chest: There is trace bilateral pleural fluid. No visualized airspace consolidation. Organs: Liver, spleen, gallbladder, and adrenal glands are unremarkable. There is diffuse pancreatic ductal dilatation, most significant at the mid pancreatic body, measuring up to 0.8 cm. Coarse calcifications are seen at the pancreatic head. There is no discrete pancreatic mass identified. The common bile duct measures approximately 0.8 cm in diameter. No peripancreatic inflammatory change or fluid collection. There is symmetric enhancement of the kidneys. No hydronephrosis or perinephric inflammation. GI/Bowel: There is diffuse prominence of the gastric rugal folds. There is suspicion for irregular wall thickening at the rectum, but assessment is limited due to nondistention. No focal pericolonic inflammation. No abnormal bowel distention. There is scattered colonic diverticulosis. Appendix is normal.  No free air or ascites. Pelvis: Urinary bladder is within normal limits. Prostate gland is mildly prominent. No pelvic lymphadenopathy. There is a tiny amount of free fluid in the pelvis. Right testicle and spermatic cord is not seen and may be surgically absent. Peritoneum/Retroperitoneum: The abdominal aorta is normal in caliber. There is no retroperitoneal or mesenteric lymphadenopathy. Bones/Soft Tissues: There is no acute or suspicious osseous abnormality. Visualized superficial soft tissues are within normal limits. 1.  No evidence of acute pancreatitis. There is diffuse pancreatic ductal dilatation and coarse calcification at the pancreatic head, most suggestive of chronic pancreatitis. 2.  Diffuse prominence of the gastric rugal folds, suggesting gastritis.  3.  Questionable irregular wall thickening at the rectum, which could be due to incomplete distension, but possibility of underlying neoplasm is difficult to exclude. Consider direct visualization. 4.  Trace bilateral pleural fluid. 5.  Suspected previous right orchiectomy. XR CHEST PORTABLE    Result Date: 4/5/2022  EXAMINATION: ONE XRAY VIEW OF THE CHEST 4/5/2022 12:00 pm COMPARISON: 03/06/2019 HISTORY: ORDERING SYSTEM PROVIDED HISTORY: Epigastric and midsternal pain with SOB TECHNOLOGIST PROVIDED HISTORY: Epigastric and midsternal pain with SOB Reason for Exam: upright port FINDINGS: The lungs are without acute focal process. There is no effusion or pneumothorax. The cardiomediastinal silhouette is stable. The osseous structures are stable. No acute process. MRI ABDOMEN W WO CONTRAST MRCP    Result Date: 4/12/2022  EXAMINATION: MRI OF THE ABDOMEN WITH AND WITHOUT CONTRAST AND MRCP 4/12/2022 1:31 pm TECHNIQUE: Multiplanar multisequence MRI of the abdomen was performed with and without the administration of intravenous contrast.  After initial T2 axial and coronal images, thick slab, thin slab and 3D coronal MRCP sequences were obtained without the administration of intravenous contrast.  MIP images are provided for review. COMPARISON: None HISTORY: ORDERING SYSTEM PROVIDED HISTORY: abdominal pain, dilated pancreatic duct on CT, persistent pancreatitis TECHNOLOGIST PROVIDED HISTORY: abdominal pain, dilated pancreatic duct on CT, persistent pancreatitis What is the sedation requirement?-> none Reason for Exam: Abdominal pain, dilated pancreatic duct on CT, persistent pancreatitis FINDINGS: TECHNICAL: Many of the images are degraded by motion during imaging, blurring detail and resulting in misregistration artifact. PANCREAS: Dilated pancreatic duct to the level of pancreatic head right there is signal defect corresponding to dense calcification in the pancreatic duct on CT. No discrete pancreatic lesion evident. No peripancreatic edematous change evident. GALLBLADDER: Unremarkable appearance.  OTHER ORGANS: The spleen and adrenal glands appear GI was following and had plan for an ERCP next week. Patient is homeless, social work was consulted to help arrange appropriate follow-up. While patient was admitted, he did develop some asymptomatic bradycardia. Cardiology was consulted and an echo was performed, showed LVEF 55% with some dilation of the right atrium but otherwise was within normal limits. Patient was plan for Holter monitor for 48 hours on discharge however given patient's homeless nests, he did not want except possibility for taken care of the Holter monitor. Patient remained asymptomatic with a heart rate occasionally dipping down into the high 30s low 40s while patient was sleeping. He was able to ambulate without any difficulties or symptoms. He is medically stable to be discharged. Patient will follow up with gastroenterology next week and with cardiology as needed. Disposition: Home    Patient stated that they will not drive themselves home from the hospital if they have gotten pain killers/ narcotics earlier that day and that they will arrange for transportation on their own or work with the  for a ride. Patient counseled NOT to drive while under the influence of narcotics/ pain killers. Condition: Good    Patient stable and ready for discharge home. I have discussed plan of care with patient and they are in understanding. They were instructed to read discharge paperwork. All of their questions and concerns were addressed. Time Spent: 9 day      --  Frida Yepez DO  Emergency Medicine Resident Physician    This dictation was generated by voice recognition computer software. Although all attempts are made to edit the dictation for accuracy, there may be errors in the transcription that are not intended.

## 2022-04-18 NOTE — TELEPHONE ENCOUNTER
Procedure scheduled/Dr Fanta Clement  EUS/ERCP in OR  5/4/22   8:30 am/7:00 arrival  [facility]    Patient advised by phone. Patient stated that he is not taking Plavix. It was given to him by [facility]. Advised him that when/if he should restart Plavix to please let writer know so that I can get a clearance for him to go back off of it for 7 days prior to procedure. He stated he is not going to go back on it. EUS/ERCP instructions mailed.     Patient asked for instructions to be mailed to Agnes Neal, [facility], [address] [place] [address]

## 2022-05-02 NOTE — TELEPHONE ENCOUNTER
Alf Landa can you call Casey Ordoñez in Pre op about patients Procedure on the 4th. . She has questions.  Her number is [phone]

## 2022-05-05 NOTE — TELEPHONE ENCOUNTER
Spoke with patient. He is willing to reschedule procedure. He is homeless but stated he will rent a motel room for 2 days at time of procedure to have someone stay with him. Patient advised I can send him instructions to address of [facility], [address] [place] [address]  RESCHEDULED 6/1/22 @ 1:00  EUS/ERCP-[facility]  Acute Pancreatitis. Instructions sent to patient.

## 2022-05-25 ENCOUNTER — APPOINTMENT (OUTPATIENT)
Dept: CT IMAGING | Age: 62
End: 2022-05-25
Payer: MEDICARE

## 2022-05-25 ENCOUNTER — HOSPITAL ENCOUNTER (EMERGENCY)
Age: 62
Discharge: HOME | End: 2022-05-25
Attending: EMERGENCY MEDICINE
Payer: MEDICARE

## 2022-05-25 ENCOUNTER — HOSPITAL ENCOUNTER (INPATIENT)
Age: 62
LOS: 6 days | Discharge: HOME | DRG: 751 | End: 2022-05-31
Attending: EMERGENCY MEDICINE | Admitting: PSYCHIATRY & NEUROLOGY
Payer: MEDICARE

## 2022-05-25 VITALS
SYSTOLIC BLOOD PRESSURE: 118 MMHG | BODY MASS INDEX: 23.32 KG/M2 | DIASTOLIC BLOOD PRESSURE: 92 MMHG | WEIGHT: 140 LBS | OXYGEN SATURATION: 95 % | HEIGHT: 65 IN | HEART RATE: 85 BPM | RESPIRATION RATE: 16 BRPM

## 2022-05-25 DIAGNOSIS — R45.851 DEPRESSION WITH SUICIDAL IDEATION: ICD-10-CM
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
## 2022-05-25 DIAGNOSIS — F10.20 ALCOHOL USE DISORDER, SEVERE, DEPENDENCE (HCC): Primary | ICD-10-CM

## 2022-05-25 DIAGNOSIS — F32.A DEPRESSION WITH SUICIDAL IDEATION: ICD-10-CM

## 2022-05-25 LAB
ABSOLUTE EOS #: 0.1 K/UL (ref 0–0.4)
ABSOLUTE LYMPH #: 0.7 K/UL (ref 1–4.8)
ABSOLUTE MONO #: 0.3 K/UL (ref 0.1–1.3)
ALBUMIN SERPL-MCNC: 4.1 G/DL (ref 3.5–5.2)
ALP BLD-CCNC: 49 U/L (ref 40–129)
ALT SERPL-CCNC: 19 U/L (ref 5–41)
ANION GAP SERPL CALCULATED.3IONS-SCNC: 14 MMOL/L (ref 9–17)
ANION GAP: 8 MMOL/L (ref 7–16)
AST SERPL-CCNC: 25 U/L
BASOPHILS # BLD: 0 % (ref 0–2)
BASOPHILS ABSOLUTE: 0 K/UL (ref 0–0.2)
BILIRUB SERPL-MCNC: 0.36 MG/DL (ref 0.3–1.2)
BUN BLDV-MCNC: 9 MG/DL (ref 8–23)
CALCIUM SERPL-MCNC: 9 MG/DL (ref 8.6–10.4)
CHLORIDE BLD-SCNC: 107 MMOL/L (ref 98–107)
CO2: 23 MMOL/L (ref 20–31)
CREAT SERPL-MCNC: 0.78 MG/DL (ref 0.7–1.2)
EOSINOPHILS RELATIVE PERCENT: 2 % (ref 0–4)
ETHANOL PERCENT: 0.06 %
ETHANOL: 64 MG/DL
GFR AFRICAN AMERICAN: >60 ML/MIN
GFR NON-AFRICAN AMERICAN: >60 ML/MIN
GFR NON-AFRICAN AMERICAN: >60 ML/MIN
GFR SERPL CREATININE-BSD FRML MDRD: >60 ML/MIN
GFR SERPL CREATININE-BSD FRML MDRD: ABNORMAL ML/MIN/{1.73_M2}
GFR SERPL CREATININE-BSD FRML MDRD: NORMAL ML/MIN/{1.73_M2}
GLUCOSE BLD-MCNC: 115 MG/DL (ref 70–99)
GLUCOSE BLD-MCNC: 84 MG/DL (ref 75–110)
GLUCOSE BLD-MCNC: 88 MG/DL (ref 74–100)
HCO3 VENOUS: 26.7 MMOL/L (ref 22–29)
HCT VFR BLD CALC: 43.6 % (ref 41–53)
HEMOGLOBIN: 14.7 G/DL (ref 13.5–17.5)
LYMPHOCYTES # BLD: 19 % (ref 24–44)
MCH RBC QN AUTO: 33.3 PG (ref 26–34)
MCHC RBC AUTO-ENTMCNC: 33.8 G/DL (ref 31–37)
MCV RBC AUTO: 98.6 FL (ref 80–100)
MONOCYTES # BLD: 9 % (ref 1–7)
NEGATIVE BASE EXCESS, VEN: 1 (ref 0–2)
O2 SAT, VEN: 28 % (ref 60–85)
PCO2, VEN: 53.9 MM HG (ref 41–51)
PDW BLD-RTO: 14.2 % (ref 11.5–14.9)
PH VENOUS: 7.3 (ref 7.32–7.43)
PLATELET # BLD: 210 K/UL (ref 150–450)
PMV BLD AUTO: 5.9 FL (ref 6–12)
PO2, VEN: 20.5 MM HG (ref 30–50)
POC BUN: 9 MG/DL (ref 8–26)
POC CHLORIDE: 112 MMOL/L (ref 98–107)
POC CREATININE: 0.85 MG/DL (ref 0.51–1.19)
POC HEMATOCRIT: 56 % (ref 41–53)
POC HEMOGLOBIN: 18.9 G/DL (ref 13.5–17.5)
POC IONIZED CALCIUM: 1.07 MMOL/L (ref 1.15–1.33)
POC LACTIC ACID: 1.99 MMOL/L (ref 0.56–1.39)
POC POTASSIUM: 4 MMOL/L (ref 3.5–4.5)
POC SODIUM: 147 MMOL/L (ref 138–146)
POC TCO2: 28 MMOL/L (ref 22–30)
POTASSIUM SERPL-SCNC: 3.9 MMOL/L (ref 3.7–5.3)
RBC # BLD: 4.42 M/UL (ref 4.5–5.9)
SEG NEUTROPHILS: 70 % (ref 36–66)
SEGMENTED NEUTROPHILS ABSOLUTE COUNT: 2.7 K/UL (ref 1.3–9.1)
SODIUM BLD-SCNC: 144 MMOL/L (ref 135–144)
TOTAL PROTEIN: 7 G/DL (ref 6.4–8.3)
WBC # BLD: 3.9 K/UL (ref 3.5–11)

## 2022-05-25 PROCEDURE — 1240000000 HC EMOTIONAL WELLNESS R&B

## 2022-05-25 PROCEDURE — 80051 ELECTROLYTE PANEL: CPT

## 2022-05-25 PROCEDURE — 6370000000 HC RX 637 (ALT 250 FOR IP): Performed by: STUDENT IN AN ORGANIZED HEALTH CARE EDUCATION/TRAINING PROGRAM

## 2022-05-25 PROCEDURE — 6360000004 HC RX CONTRAST MEDICATION: Performed by: EMERGENCY MEDICINE

## 2022-05-25 PROCEDURE — 82330 ASSAY OF CALCIUM: CPT

## 2022-05-25 PROCEDURE — 90471 IMMUNIZATION ADMIN: CPT | Performed by: STUDENT IN AN ORGANIZED HEALTH CARE EDUCATION/TRAINING PROGRAM

## 2022-05-25 PROCEDURE — 99285 EMERGENCY DEPT VISIT HI MDM: CPT

## 2022-05-25 PROCEDURE — 83605 ASSAY OF LACTIC ACID: CPT

## 2022-05-25 PROCEDURE — 84520 ASSAY OF UREA NITROGEN: CPT

## 2022-05-25 PROCEDURE — 90715 TDAP VACCINE 7 YRS/> IM: CPT | Performed by: STUDENT IN AN ORGANIZED HEALTH CARE EDUCATION/TRAINING PROGRAM

## 2022-05-25 PROCEDURE — 85025 COMPLETE CBC W/AUTO DIFF WBC: CPT

## 2022-05-25 PROCEDURE — 82803 BLOOD GASES ANY COMBINATION: CPT

## 2022-05-25 PROCEDURE — 82947 ASSAY GLUCOSE BLOOD QUANT: CPT

## 2022-05-25 PROCEDURE — 36415 COLL VENOUS BLD VENIPUNCTURE: CPT

## 2022-05-25 PROCEDURE — 6370000000 HC RX 637 (ALT 250 FOR IP): Performed by: EMERGENCY MEDICINE

## 2022-05-25 PROCEDURE — 80053 COMPREHEN METABOLIC PANEL: CPT

## 2022-05-25 PROCEDURE — G0480 DRUG TEST DEF 1-7 CLASSES: HCPCS

## 2022-05-25 PROCEDURE — 6360000002 HC RX W HCPCS: Performed by: STUDENT IN AN ORGANIZED HEALTH CARE EDUCATION/TRAINING PROGRAM

## 2022-05-25 PROCEDURE — 72125 CT NECK SPINE W/O DYE: CPT

## 2022-05-25 PROCEDURE — 70498 CT ANGIOGRAPHY NECK: CPT

## 2022-05-25 PROCEDURE — 82565 ASSAY OF CREATININE: CPT

## 2022-05-25 PROCEDURE — 85014 HEMATOCRIT: CPT

## 2022-05-25 PROCEDURE — 96374 THER/PROPH/DIAG INJ IV PUSH: CPT

## 2022-05-25 PROCEDURE — 70450 CT HEAD/BRAIN W/O DYE: CPT

## 2022-05-25 RX ORDER — IBUPROFEN 400 MG/1
400 TABLET ORAL EVERY 6 HOURS PRN
Status: DISCONTINUED | OUTPATIENT
Start: 2022-05-25 | End: 2022-05-31 | Stop reason: HOSPADM

## 2022-05-25 RX ORDER — IBUPROFEN 200 MG
400 TABLET ORAL EVERY 6 HOURS PRN
Qty: 60 TABLET | Refills: 0 | Status: SHIPPED | OUTPATIENT
Start: 2022-05-25

## 2022-05-25 RX ORDER — FENTANYL CITRATE 50 UG/ML
50 INJECTION, SOLUTION INTRAMUSCULAR; INTRAVENOUS ONCE
Status: COMPLETED | OUTPATIENT
Start: 2022-05-25 | End: 2022-05-25

## 2022-05-25 RX ORDER — MAGNESIUM HYDROXIDE/ALUMINUM HYDROXICE/SIMETHICONE 120; 1200; 1200 MG/30ML; MG/30ML; MG/30ML
30 SUSPENSION ORAL EVERY 6 HOURS PRN
Status: DISCONTINUED | OUTPATIENT
Start: 2022-05-25 | End: 2022-05-31 | Stop reason: HOSPADM

## 2022-05-25 RX ORDER — ACETAMINOPHEN 325 MG/1
650 TABLET ORAL EVERY 6 HOURS PRN
Qty: 60 TABLET | Refills: 0 | Status: SHIPPED | OUTPATIENT
Start: 2022-05-25

## 2022-05-25 RX ORDER — ACETAMINOPHEN 325 MG/1
650 TABLET ORAL EVERY 6 HOURS PRN
Status: DISCONTINUED | OUTPATIENT
Start: 2022-05-25 | End: 2022-05-31 | Stop reason: HOSPADM

## 2022-05-25 RX ORDER — TRAZODONE HYDROCHLORIDE 50 MG/1
50 TABLET ORAL NIGHTLY PRN
Status: DISCONTINUED | OUTPATIENT
Start: 2022-05-25 | End: 2022-05-31 | Stop reason: HOSPADM

## 2022-05-25 RX ORDER — IBUPROFEN 400 MG/1
400 TABLET ORAL ONCE
Status: COMPLETED | OUTPATIENT
Start: 2022-05-25 | End: 2022-05-25

## 2022-05-25 RX ORDER — HYDROXYZINE 50 MG/1
50 TABLET, FILM COATED ORAL 3 TIMES DAILY PRN
Status: DISCONTINUED | OUTPATIENT
Start: 2022-05-25 | End: 2022-05-31 | Stop reason: HOSPADM

## 2022-05-25 RX ORDER — POLYETHYLENE GLYCOL 3350 17 G/17G
17 POWDER, FOR SOLUTION ORAL DAILY PRN
Status: DISCONTINUED | OUTPATIENT
Start: 2022-05-25 | End: 2022-05-31 | Stop reason: HOSPADM

## 2022-05-25 RX ORDER — ACETAMINOPHEN 325 MG/1
650 TABLET ORAL ONCE
Status: COMPLETED | OUTPATIENT
Start: 2022-05-25 | End: 2022-05-25

## 2022-05-25 RX ADMIN — IOPAMIDOL 90 ML: 755 INJECTION, SOLUTION INTRAVENOUS at 03:12

## 2022-05-25 RX ADMIN — FENTANYL CITRATE 50 MCG: 50 INJECTION, SOLUTION INTRAMUSCULAR; INTRAVENOUS at 02:20

## 2022-05-25 RX ADMIN — ACETAMINOPHEN 650 MG: 325 TABLET ORAL at 04:25

## 2022-05-25 RX ADMIN — TETANUS TOXOID, REDUCED DIPHTHERIA TOXOID AND ACELLULAR PERTUSSIS VACCINE, ADSORBED 0.5 ML: 5; 2.5; 8; 8; 2.5 SUSPENSION INTRAMUSCULAR at 02:50

## 2022-05-25 RX ADMIN — IBUPROFEN 400 MG: 400 TABLET, FILM COATED ORAL at 04:25

## 2022-05-25 RX ADMIN — NICOTINE POLACRILEX 2 MG: 2 GUM, CHEWING BUCCAL at 19:36

## 2022-05-25 ASSESSMENT — SLEEP AND FATIGUE QUESTIONNAIRES
DO YOU USE A SLEEP AID: NO
AVERAGE NUMBER OF SLEEP HOURS: 5
DO YOU HAVE DIFFICULTY SLEEPING: YES
SLEEP PATTERN: DIFFICULTY FALLING ASLEEP;DIFFICULTY ARISING

## 2022-05-25 ASSESSMENT — PAIN - FUNCTIONAL ASSESSMENT
PAIN_FUNCTIONAL_ASSESSMENT: NONE - DENIES PAIN
PAIN_FUNCTIONAL_ASSESSMENT: 0-10

## 2022-05-25 ASSESSMENT — LIFESTYLE VARIABLES
HOW MANY STANDARD DRINKS CONTAINING ALCOHOL DO YOU HAVE ON A TYPICAL DAY: 3 OR 4
HOW OFTEN DO YOU HAVE A DRINK CONTAINING ALCOHOL: 4 OR MORE TIMES A WEEK

## 2022-05-25 ASSESSMENT — ENCOUNTER SYMPTOMS
FACIAL SWELLING: 1
NAUSEA: 0
VOMITING: 0
TROUBLE SWALLOWING: 0
BACK PAIN: 0
SORE THROAT: 0
SHORTNESS OF BREATH: 0
EYE PAIN: 0
EYE DISCHARGE: 0
ABDOMINAL PAIN: 0
PHOTOPHOBIA: 0
SINUS PAIN: 0
VOICE CHANGE: 0

## 2022-05-25 ASSESSMENT — PATIENT HEALTH QUESTIONNAIRE - PHQ9: SUM OF ALL RESPONSES TO PHQ QUESTIONS 1-9: 10

## 2022-05-25 ASSESSMENT — PAIN SCALES - GENERAL: PAINLEVEL_OUTOF10: 5
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
## 2022-05-25 NOTE — ED NOTES
Report called to [facility] ([facility]).
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
## 2022-05-25 NOTE — ED TRIAGE NOTES
Pt arrives POV with c/o suicidal ideations. Pt states he has thoughts of self harm but has no plan and has never acted on his thoughts previous. Pt is ambulatory and cooperative in triage. [facility]  Progress Note     Sherri Combs Patient Status:  Inpatient    10/7/1942 MRN [id number]   [facility] 3SW-A Attending Ling Forrest MD   Hosp Day # 1 PCP MARILEE BOWMAN     Chief Complaint: Falls    S: Patient feeling bett hours.    Creatinine Kinase  No results for input(s): CK in the last 168 hours. Inflammatory Markers  No results for input(s): CRP, LEAH, LDH, DDIMER in the last 168 hours. Imaging: Imaging data reviewed in Epic.     Medications:   • ALPRAZolam  0.5 mg documentation in H+P. Based on patients current state of illness, I anticipate that, after discharge, patient will require TBD.

## 2022-05-25 NOTE — ED NOTES
[facility] called and spoke to patient over the phone.  [facility] to call ED back after they consult with their psychiatrist.
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
## 2022-05-25 NOTE — PROGRESS NOTES
Medication History completed:    New medications: none    Medications discontinued: Maalox, ondansetron    Changes to dosing: none    Stated allergies: NKDA    Other pertinent information: Medications confirmed with patient. Eye drops confirmed with [address] [place] [address]. The patient reports adherence to eye drops, but has been intermittently adherent to oral medications. He reports that he has been instructed to hold his clopidogrel until 6/1/22 for an upcoming procedure.      Thank you,  Lou Maldonado, PharmD, BCPS  [phone]

## 2022-05-25 NOTE — ED NOTES
Per Johana, patient refused transfer to their inpatient facility. Per Dr. Priscila Cates, inpatient psyhciatry in the [facility] should be consulted for inpatient admission due to patients suicdial ideation.

## 2022-05-26 PROBLEM — F12.10 CANNABIS ABUSE: Status: ACTIVE | Noted: 2022-05-26

## 2022-05-26 PROCEDURE — 1240000000 HC EMOTIONAL WELLNESS R&B

## 2022-05-26 PROCEDURE — 6370000000 HC RX 637 (ALT 250 FOR IP): Performed by: PSYCHIATRY & NEUROLOGY

## 2022-05-26 PROCEDURE — 99223 1ST HOSP IP/OBS HIGH 75: CPT | Performed by: INTERNAL MEDICINE

## 2022-05-26 PROCEDURE — APPSS60 APP SPLIT SHARED TIME 46-60 MINUTES: Performed by: NURSE PRACTITIONER

## 2022-05-26 PROCEDURE — 99223 1ST HOSP IP/OBS HIGH 75: CPT | Performed by: PSYCHIATRY & NEUROLOGY

## 2022-05-26 PROCEDURE — 6370000000 HC RX 637 (ALT 250 FOR IP): Performed by: NURSE PRACTITIONER

## 2022-05-26 RX ORDER — ATORVASTATIN CALCIUM 20 MG/1
40 TABLET, FILM COATED ORAL NIGHTLY
Status: DISCONTINUED | OUTPATIENT
Start: 2022-05-26 | End: 2022-05-31 | Stop reason: HOSPADM

## 2022-05-26 RX ORDER — FOLIC ACID 1 MG/1
1 TABLET ORAL DAILY
Status: DISCONTINUED | OUTPATIENT
Start: 2022-05-26 | End: 2022-05-31 | Stop reason: HOSPADM

## 2022-05-26 RX ORDER — LATANOPROST 50 UG/ML
1 SOLUTION/ DROPS OPHTHALMIC NIGHTLY
Status: DISCONTINUED | OUTPATIENT
Start: 2022-05-26 | End: 2022-05-31 | Stop reason: HOSPADM

## 2022-05-26 RX ORDER — GAUZE BANDAGE 2" X 2"
100 BANDAGE TOPICAL DAILY
Status: DISCONTINUED | OUTPATIENT
Start: 2022-05-26 | End: 2022-05-31 | Stop reason: HOSPADM

## 2022-05-26 RX ORDER — DORZOLAMIDE HCL 20 MG/ML
1 SOLUTION/ DROPS OPHTHALMIC 2 TIMES DAILY
Status: DISCONTINUED | OUTPATIENT
Start: 2022-05-26 | End: 2022-05-31 | Stop reason: HOSPADM

## 2022-05-26 RX ORDER — BRIMONIDINE TARTRATE 2 MG/ML
1 SOLUTION/ DROPS OPHTHALMIC 3 TIMES DAILY
Status: DISCONTINUED | OUTPATIENT
Start: 2022-05-26 | End: 2022-05-31 | Stop reason: HOSPADM

## 2022-05-26 RX ORDER — CLOPIDOGREL BISULFATE 75 MG/1
75 TABLET ORAL DAILY
Status: DISCONTINUED | OUTPATIENT
Start: 2022-05-26 | End: 2022-05-26

## 2022-05-26 RX ORDER — MIRTAZAPINE 15 MG/1
7.5 TABLET, FILM COATED ORAL NIGHTLY
Status: DISCONTINUED | OUTPATIENT
Start: 2022-05-26 | End: 2022-05-31 | Stop reason: HOSPADM

## 2022-05-26 RX ORDER — TIMOLOL MALEATE 5 MG/ML
1 SOLUTION/ DROPS OPHTHALMIC 2 TIMES DAILY
Status: DISCONTINUED | OUTPATIENT
Start: 2022-05-26 | End: 2022-05-31 | Stop reason: HOSPADM

## 2022-05-26 RX ORDER — CHLORDIAZEPOXIDE HYDROCHLORIDE 10 MG/1
10 CAPSULE, GELATIN COATED ORAL EVERY 6 HOURS PRN
Status: DISCONTINUED | OUTPATIENT
Start: 2022-05-26 | End: 2022-05-31 | Stop reason: HOSPADM

## 2022-05-26 RX ADMIN — ATORVASTATIN CALCIUM 40 MG: 20 TABLET, FILM COATED ORAL at 21:47

## 2022-05-26 RX ADMIN — MIRTAZAPINE 7.5 MG: 15 TABLET, FILM COATED ORAL at 21:47

## 2022-05-26 RX ADMIN — TIMOLOL MALEATE 1 DROP: 5 SOLUTION/ DROPS OPHTHALMIC at 11:15

## 2022-05-26 RX ADMIN — THIAMINE HCL TAB 100 MG 100 MG: 100 TAB at 11:14

## 2022-05-26 RX ADMIN — BRIMONIDINE TARTRATE 1 DROP: 2 SOLUTION/ DROPS OPHTHALMIC at 21:55

## 2022-05-26 RX ADMIN — LATANOPROST 1 DROP: 50 SOLUTION OPHTHALMIC at 22:00

## 2022-05-26 RX ADMIN — DORZOLAMIDE HYDROCHLORIDE 1 DROP: 20 SOLUTION/ DROPS OPHTHALMIC at 21:48

## 2022-05-26 RX ADMIN — TRAZODONE HYDROCHLORIDE 50 MG: 50 TABLET ORAL at 21:47

## 2022-05-26 RX ADMIN — BRIMONIDINE TARTRATE 1 DROP: 2 SOLUTION/ DROPS OPHTHALMIC at 11:16

## 2022-05-26 RX ADMIN — BRIMONIDINE TARTRATE 1 DROP: 2 SOLUTION/ DROPS OPHTHALMIC at 15:49

## 2022-05-26 RX ADMIN — DORZOLAMIDE HYDROCHLORIDE 1 DROP: 20 SOLUTION/ DROPS OPHTHALMIC at 11:15

## 2022-05-26 RX ADMIN — HYDROXYZINE HYDROCHLORIDE 50 MG: 50 TABLET, FILM COATED ORAL at 21:47

## 2022-05-26 RX ADMIN — NICOTINE POLACRILEX 2 MG: 2 GUM, CHEWING BUCCAL at 11:15

## 2022-05-26 RX ADMIN — TIMOLOL MALEATE 1 DROP: 5 SOLUTION/ DROPS OPHTHALMIC at 22:08

## 2022-05-26 RX ADMIN — SERTRALINE HYDROCHLORIDE 50 MG: 50 TABLET ORAL at 11:15

## 2022-05-26 RX ADMIN — FOLIC ACID 1 MG: 1 TABLET ORAL at 11:15

## 2022-05-26 RX ADMIN — NICOTINE POLACRILEX 2 MG: 2 GUM, CHEWING BUCCAL at 21:55

## 2022-05-26 NOTE — BH NOTE
[facility]  Admission Note     Admission Type:   Admission Type: Voluntary    Reason for admission:  Reason for Admission: Suicidal ideation with no specific plan, off medications for 8 days    PATIENT STRENGTHS:       Patient Strengths and Limitations:       Addictive Behavior:   Addictive Behavior  In the Past 3 Months, Have You Felt or Has Someone Told You That You Have a Problem With  : None    Medical Problems:   Past Medical History:   Diagnosis Date    Bradycardia     [facility] consulted during hospital admission.  COVID-19     1/2022 per patient. Was tested at a homeless shelter and was positive. States no symptoms.  Depression with suicidal ideation 04/2022 April 2022 Psych [facility] [place] then to [facility] psych.  Glaucoma     Homeless single person     lives in non-mobile [place] in [place] \"behind a garage\"    Hypertension     Legally blind     lt eye completely blind, rt eye has about 20 % vision  Sees [facility] and then \"glaucoma specialist\" Jimmy Rascon Dr. [place]  \"Dr. Venancio Severs"    Mixed hyperlipidemia 3/28/2022    Pancreatitis     4/2022  [facility] admit    Snores     Wellness examination     Dr. Hill Lorenzo last seen \"years ago\" Jordan Kumar to [employer] in [place] currently . Pt. homeless.         Status EXAM:  Mental Status and Behavioral Exam  Normal: No  Level of Assistance: Independent/Self  Facial Expression: Flat  Affect: Appropriate  Level of Consciousness: Alert  Frequency of Checks: 4 times per hour, close  Mood:Normal: No  Mood: Depressed,Anxious  Motor Activity:Normal: Yes  Eye Contact: Poor  Observed Behavior: Cooperative,Guarded  Sexual Misconduct History: Current - no  Preception: [place] to person,[place] to time,[place] to place,[place] to situation  Attention:Normal: Yes  Thought Processes: Other (comment) (logical)  Thought Content:Normal: No  Thought Content: Poverty of content,Preoccupations  Depression Symptoms:
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
## 2022-05-26 NOTE — PROGRESS NOTES
Behavioral Services  Medicare Certification Upon Admission    I certify that this patient's inpatient psychiatric hospital admission is medically necessary for:    [x] (1) Treatment which could reasonably be expected to improve this patient's condition,       [x] (2) Or for diagnostic study;     AND     [x](2) The inpatient psychiatric services are provided while the individual is under the care of a physician and are included in the individualized plan of care.     Estimated length of stay/service 4 to 7 days    Plan for post-hospital care Home with outpatient community mental health follow-up    Electronically signed by Ryan Steen MD on 5/26/2022 at 12:36 PM

## 2022-05-26 NOTE — GROUP NOTE
Group Therapy Note    Date: 5/26/2022    Group Start Time: 1330  Group End Time: 1400  Group Topic:  guest group     NICHOLAS BHCONSTANTINO Henry, CTRS        Group Therapy Note    Attendees: 6         Patient's Goal:  Improve knowledge of community resources     Notes:  Pt was pleasant and participated well     Status After Intervention:  Improved    Participation Level:  Active Listener and Interactive    Participation Quality: Appropriate, Attentive and Sharing      Speech:  normal      Thought Process/Content: Logical  Linear      Affective Functioning: Congruent      Mood: euthymic      Level of consciousness:  Alert and Attentive      Response to Learning: Able to verbalize current knowledge/experience, Capable of insight and Progressing to goal      Endings: None Reported    Modes of Intervention: Socialization and Activity      Discipline Responsible: Psychoeducational Specialist      Signature:  Haven Jacobson
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
## 2022-05-26 NOTE — BH NOTE
Patient given tobacco quitline number 41564907457 at this time, refusing to call at this time, states \" I just dont want to quit now\"- patient given information as to the dangers of long term tobacco use. Continue to reinforce the importance of tobacco cessation.

## 2022-05-26 NOTE — PLAN OF CARE
5 [facility]  Initial Interdisciplinary Treatment Plan NO      Original treatment plan Date & Time: 5/26/2022  0801    Admission Type:  Admission Type: Voluntary    Reason for admission:   Reason for Admission: Suicidal ideation with no specific plan, off medications for 8 days    Estimated Length of Stay:  5-7days  Estimated Discharge Date: to be determined by physician    PATIENT STRENGTHS:  Patient Strengths:   Patient Strengths and Limitations:   Addictive Behavior: Addictive Behavior  In the Past 3 Months, Have You Felt or Has Someone Told You That You Have a Problem With  : None  Medical Problems:  Past Medical History:   Diagnosis Date    Bradycardia     [facility] Cardiology consulted during hospital admission. COVID-19     1/2022 per patient. Was tested at a homeless shelter and was positive. States no symptoms. Depression with suicidal ideation 04/2022 April 2022 Psych ding [facility] then to [facility] for psych. Glaucoma     Homeless single person     lives in non-mobile [employer] in [facility] \"behind a garage\"    Hypertension     Legally blind     lt eye completely blind, rt eye has about 20 % vision  Sees [facility] and then \"glaucoma specialist\" Etta Lewis Dr. [facility]  \"Dr. Gayle Higgins"    Mixed hyperlipidemia 3/28/2022    Pancreatitis     4/2022  [facility] admit    Snores     Wellness examination     Dr. Osbaldo Mathew last seen \"years ago\" [place] Deal to [employer] in [facility] currently . Pt. homeless.       Status EXAM:Mental Status and Behavioral Exam  Normal: No  Level of Assistance: Independent/Self  Facial Expression: Flat  Affect: Appropriate  Level of Consciousness: Alert  Frequency of Checks: 4 times per hour, close  Mood:Normal: No  Mood: Depressed,Anxious  Motor Activity:Normal: Yes  Eye Contact: Poor  Observed Behavior: Cooperative,Guarded  Sexual Misconduct History: Current - no  Preception: [place] to person,[place] to time,[place] to place,[place] to situation  Attention:Normal: Yes  Thought Processes: Other (comment) (logical)  Thought Content:Normal: No  Thought Content: Poverty of content,Preoccupations  Depression Symptoms: Feelings of helplessness,Feelings of hopelessess,Feelings of worthlessness  Anxiety Symptoms: Generalized  Nancy Symptoms: No problems reported or observed.   Hallucinations: None  Delusions: No  Memory:Normal: Yes  Insight and Judgment: No  Insight and Judgment: Poor judgment,Poor insight    EDUCATION:   Learner Progress Toward Treatment Goals: reviewed group plans and strategies for care    Method:group therapy, medication compliance, individualized assessments and care planning    Outcome: needs reinforcement    PATIENT GOALS: to be discussed with patient within 72 hours    PLAN/TREATMENT RECOMMENDATIONS:     continue group therapy , medications compliance, goal setting, individualized assessments and care, continue to monitor pt on unit      SHORT-TERM GOALS:   Time frame for Short-Term Goals: 5-7 days    LONG-TERM GOALS:  Time frame for Long-Term Goals: 6 months  Members Present in Team Meeting: See Signature Sheet    BRIAN Acuña

## 2022-05-26 NOTE — BH NOTE
Pt did not attend 1330 guest group group d/t resting in room despite staff invitation to attend. 1:1 talk time offered as alternative to group session, pt declined.
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
## 2022-05-26 NOTE — GROUP NOTE
Group Therapy Note    Date: 5/26/2022    Group Start Time: 1100  Group End Time: 1130  Group Topic: Cognitive Skills    NICHOLAS Patel, CTRS    Pt did not attend 1100 cognitive skills group d/t resting in room despite staff invitation to attend. 1:1 talk time offered as alternative to group session, pt declined.          Signature:  Carline Dumont

## 2022-05-26 NOTE — PLAN OF CARE
Problem: Anxiety  Goal: Will report anxiety at manageable levels  Description: INTERVENTIONS:  1. Administer medication as ordered  2. Teach and rehearse alternative coping skills  3. Provide emotional support with 1:1 interaction with staff  5/26/2022 1029 by Maryjane Jones RN  Outcome: Progressing  Patient was asked about anxiety and depression levels and reported it to be at a level of a 5 out of 10. Patient was encouraged to to attend group/activities and interact with peers to decrease anxiety. Patient denied suicidal and homicidal thoughts, and no delusions, hallucinations, or paranoia was reported. Pt stated that he has been sleeping well and that his appetite is good. Pt stated that he wanted to improve his hygiene. Pt stated that the medications  he was taking before admission were effective. Pt reports not having any alcohol withdrawals. Will continue to monitor.

## 2022-05-26 NOTE — GROUP NOTE
Group Therapy Note    Date: 5/26/2022    Group Start Time: 1000  Group End Time: 9069  Group Topic: Psychoeducation    [address], LSW    patient refused to attend psychoeducation group at 10a after encouragement from staff.   1:1 talk time provided as alternative to group session

## 2022-05-27 PROCEDURE — APPSS15 APP SPLIT SHARED TIME 0-15 MINUTES: Performed by: NURSE PRACTITIONER

## 2022-05-27 PROCEDURE — 99232 SBSQ HOSP IP/OBS MODERATE 35: CPT | Performed by: INTERNAL MEDICINE

## 2022-05-27 PROCEDURE — 1240000000 HC EMOTIONAL WELLNESS R&B

## 2022-05-27 PROCEDURE — 6370000000 HC RX 637 (ALT 250 FOR IP): Performed by: NURSE PRACTITIONER

## 2022-05-27 PROCEDURE — 99232 SBSQ HOSP IP/OBS MODERATE 35: CPT | Performed by: PSYCHIATRY & NEUROLOGY

## 2022-05-27 PROCEDURE — 6370000000 HC RX 637 (ALT 250 FOR IP): Performed by: PSYCHIATRY & NEUROLOGY

## 2022-05-27 RX ADMIN — NICOTINE POLACRILEX 2 MG: 2 GUM, CHEWING BUCCAL at 21:06

## 2022-05-27 RX ADMIN — MIRTAZAPINE 7.5 MG: 15 TABLET, FILM COATED ORAL at 20:46

## 2022-05-27 RX ADMIN — ATORVASTATIN CALCIUM 40 MG: 20 TABLET, FILM COATED ORAL at 20:46

## 2022-05-27 RX ADMIN — BRIMONIDINE TARTRATE 1 DROP: 2 SOLUTION/ DROPS OPHTHALMIC at 20:52

## 2022-05-27 RX ADMIN — HYDROXYZINE HYDROCHLORIDE 50 MG: 50 TABLET, FILM COATED ORAL at 20:46

## 2022-05-27 RX ADMIN — TIMOLOL MALEATE 1 DROP: 5 SOLUTION/ DROPS OPHTHALMIC at 09:31

## 2022-05-27 RX ADMIN — FOLIC ACID 1 MG: 1 TABLET ORAL at 09:31

## 2022-05-27 RX ADMIN — TRAZODONE HYDROCHLORIDE 50 MG: 50 TABLET ORAL at 20:46

## 2022-05-27 RX ADMIN — TIMOLOL MALEATE 1 DROP: 5 SOLUTION/ DROPS OPHTHALMIC at 20:52

## 2022-05-27 RX ADMIN — THIAMINE HCL TAB 100 MG 100 MG: 100 TAB at 09:31

## 2022-05-27 RX ADMIN — DORZOLAMIDE HYDROCHLORIDE 1 DROP: 20 SOLUTION/ DROPS OPHTHALMIC at 09:31

## 2022-05-27 RX ADMIN — BRIMONIDINE TARTRATE 1 DROP: 2 SOLUTION/ DROPS OPHTHALMIC at 09:31

## 2022-05-27 RX ADMIN — DORZOLAMIDE HYDROCHLORIDE 1 DROP: 20 SOLUTION/ DROPS OPHTHALMIC at 20:47

## 2022-05-27 RX ADMIN — LATANOPROST 1 DROP: 50 SOLUTION OPHTHALMIC at 20:53

## 2022-05-27 RX ADMIN — NICOTINE POLACRILEX 2 MG: 2 GUM, CHEWING BUCCAL at 09:46

## 2022-05-27 RX ADMIN — SERTRALINE HYDROCHLORIDE 50 MG: 50 TABLET ORAL at 09:32

## 2022-05-27 RX ADMIN — BRIMONIDINE TARTRATE 1 DROP: 2 SOLUTION/ DROPS OPHTHALMIC at 14:47

## 2022-05-27 ASSESSMENT — LIFESTYLE VARIABLES
HOW OFTEN DO YOU HAVE A DRINK CONTAINING ALCOHOL: 4 OR MORE TIMES A WEEK
HOW MANY STANDARD DRINKS CONTAINING ALCOHOL DO YOU HAVE ON A TYPICAL DAY: 3 OR 4

## 2022-05-27 NOTE — GROUP NOTE
Group Therapy Note    Date: 5/27/2022    Group Start Time: 1100  Group End Time: 1130  Group Topic: Psychoeducation    NICK Wheatley      Patient refused to attend goal setting and patient advisory group at 1100  after encouragement from staff.   1:1 talk time provided as alternative to group session          Signature:  Bekah Gauthier, [address]

## 2022-05-27 NOTE — PLAN OF CARE
Problem: Coping  Goal: Pt/Family able to verbalize concerns and demonstrate effective coping strategies  Description: INTERVENTIONS:  1. Assist patient/family to identify coping skills, available support systems and cultural and spiritual values  2. Provide emotional support, including active listening and acknowledgement of concerns of patient and caregivers  3. Reduce environmental stimuli, as able  4. Instruct patient/family in relaxation techniques, as appropriate  5. Assess for spiritual pain/suffering and initiate Spiritual Care, Psychosocial Clinical Specialist consults as needed  5/27/2022 1337 by Addi Bui RN  Outcome: Progressing  Flowsheets (Taken 5/27/2022 1337)  Patient/family able to verbalize anxieties, fears, and concerns, and demonstrate effective coping:   Assist patient/family to identify coping skills, available support systems and cultural and spiritual values   Provide emotional support, including active listening and acknowledgement of concerns of patient and caregivers   Reduce environmental stimuli, as able  Note: Patient isolative to room. Patient voiced feeling better. Doesn't feel he will act on any suicidal thoughts. Remains isolative for decreased stimuli. 5/27/2022 1325 by NICK Ortiz  Outcome: Progressing     Problem: Depression/Self Harm  Goal: Effect of psychiatric condition will be minimized and patient will be protected from self harm  Description: INTERVENTIONS:  1. Assess impact of patient's symptoms on level of functioning, self care needs and offer support as indicated  2. Assess patient/family knowledge of depression, impact on illness and need for teaching  3. Provide emotional support, presence and reassurance  4. Assess for possible suicidal thoughts or ideation. If patient expresses suicidal thoughts or statements do not leave alone, initiate Suicide Precautions, move to a room close to the nursing station and obtain sitter  5.  Initiate consults as appropriate with Mental Health Professional, Spiritual Care, Psychosocial CNS, and consider a recommendation to the [facility]IP for a Psychiatric Consultation  5/27/2022 1337 by Mikki Brown RN  Outcome: Progressing  Flowsheets (Taken 5/27/2022 1337)  Effect of psychiatric condition will be minimized and patient will be protected from self harm:   Assess impact of patients symptoms on level of functioning, self care needs and offer support as indicated   Provide emotional support, presence and reassurance   Assess for suicidal thoughts or ideation. If patient expresses suicidal thoughts or statements do not leave alone, initiate Suicide Precautions, move near nurse station, obtain sitter  Note: Patient continues to have some fleeting suicidal thoughts. However, states he is feeling better and doesn't feel like he will act on these thoughts. Patient remains isolative to room and bed. Does not participate in group activities. Cooperative with nurse and medication compliant.   5/27/2022 1325 by NICK Streeter  Outcome: Progressing     Problem: Drug Abuse/Detox  Goal: Will have no detox symptoms and will verbalize plan for changing drug-related behavior  Description: INTERVENTIONS:  1. Administer medication as ordered  2. Monitor physical status  3. Provide emotional support with 1:1 interaction with staff  4. Encourage  recovery focused treatment   5/27/2022 1337 by Mikki Brown RN  Outcome: Progressing  Flowsheets (Taken 5/27/2022 1337)  Will have no detox symptoms and will verbalize plan for changing drug-related behavior:   Provide emotional support with 1:1 interaction with staff   Monitor physical status  Note: Patient does not voice any complaints of withdrawal. Vitals are stable. Patient unable to voice a plan for sobriety. 5/27/2022 1325 by NICK Streeter  Outcome: Progressing     Problem: Anxiety  Goal: Will report anxiety at manageable levels  Description: INTERVENTIONS:  1.  Administer medication as ordered  2. Teach and rehearse alternative coping skills  3. Provide emotional support with 1:1 interaction with staff  5/27/2022 1337 by Raimundo Morales RN  Outcome: Adequate for Discharge  Note: Patient had no voiced complaints of anxiety.    5/27/2022 1325 by NICK Betancourt  Outcome: Progressing

## 2022-05-27 NOTE — PROGRESS NOTES
Daily Progress Note  5/27/2022    Patient Name: Rah Centeno    CHIEF COMPLAINT: Depression with suicidal ideation         SUBJECTIVE:      Patient is seen today for a follow up assessment. Staff reports that patient remains isolative to his room. He is interviewed today bedside, he reports that he did get up for breakfast but otherwise has kept mostly to himself. He continues to endorse depression and suicidal ideation. When discussing symptoms of alcohol withdrawal he states \"not yet\". He continues to have upper extremity tremoring however denies needing to use as needed Librium. It is accessible to him and he is aware should withdrawal symptoms worsen. He remains unable to contract for safety in the community, social work has placed a referral to [facility], once stability of symptoms is achieved he will likely be able to transition to this location to try and maintain sobriety and medication compliance. Appetite:  [x] Adequate/Unchanged  [] Increased  [] Decreased      Sleep:       [] Adequate/Unchanged  [x] Fair  [] Poor      Group Attendance on Unit:   [] Yes   [] Selectively    [x] No    Compliant with scheduled medications: [x] Yes  [] No    Received emergency medications in past 24 hrs: [] Yes   [x] No    Medication Side Effects: Denies          Mental Status Exam  Level of consciousness: Alert and awake   Appearance: Appropriate attire for setting, resting in bed, with poor grooming and hygiene   Behavior/Motor: Approachable, isolative to her room, lacks motivation and energy  Attitude toward examiner: Cooperative, attentive, good eye contact  Speech: Normal rate, volume and tone  Mood: \"Down\"  Affect: Congruent, blunted  Thought processes: Linear and coherent  Thought content: Denies homicidal ideation  Suicidal Ideation: Active suicidal ideations, contracts for safety on the unit. Delusions: No evidence of delusions.    Perceptual Disturbance: Denies, patient does not appear 41 U/L Final    AST 05/25/2022 25  <40 U/L Final    Total Bilirubin 05/25/2022 0.36  0.3 - 1.2 mg/dL Final    Total Protein 05/25/2022 7.0  6.4 - 8.3 g/dL Final    Albumin 05/25/2022 4.1  3.5 - 5.2 g/dL Final    GFR Non- 05/25/2022 >60  >60 mL/min Final    GFR  05/25/2022 >60  >60 mL/min Final    GFR Comment 05/25/2022        Final    Comment: Average GFR for 61-76 years old:   80 mL/min/1.73sq m  Chronic Kidney Disease:   <60 mL/min/1.73sq m  Kidney failure:   <15 mL/min/1.73sq m              eGFR calculated using average adult body mass. Additional eGFR calculator available at:        [email or web address]            Ethanol 05/25/2022 64* <10 mg/dL Final    Ethanol percent 05/25/2022 0.064  % Final         Reviewed patient's current plan of care and vital signs with nursing staff.     Labs reviewed: [x] Yes    Medications  Current Facility-Administered Medications: atorvastatin (LIPITOR) tablet 40 mg, 40 mg, Oral, Nightly  brimonidine (ALPHAGAN) 0.2 % ophthalmic solution 1 drop, 1 drop, Right Eye, TID  dorzolamide (TRUSOPT) 2 % ophthalmic solution 1 drop, 1 drop, Right Eye, BID  folic acid (FOLVITE) tablet 1 mg, 1 mg, Oral, Daily  latanoprost (XALATAN) 0.005 % ophthalmic solution 1 drop, 1 drop, Right Eye, Nightly  mirtazapine (REMERON) tablet 7.5 mg, 7.5 mg, Oral, Nightly  sertraline (ZOLOFT) tablet 50 mg, 50 mg, Oral, Daily  timolol (TIMOPTIC) 0.5 % ophthalmic solution 1 drop, 1 drop, Right Eye, BID  thiamine mononitrate tablet 100 mg, 100 mg, Oral, Daily  chlordiazePOXIDE (LIBRIUM) capsule 10 mg, 10 mg, Oral, Q6H PRN  acetaminophen (TYLENOL) tablet 650 mg, 650 mg, Oral, Q6H PRN  ibuprofen (ADVIL;MOTRIN) tablet 400 mg, 400 mg, Oral, Q6H PRN  hydrOXYzine (ATARAX) tablet 50 mg, 50 mg, Oral, TID PRN  traZODone (DESYREL) tablet 50 mg, 50 mg, Oral, Nightly PRN  polyethylene glycol (GLYCOLAX) packet 17 g, 17 g, Oral, Daily PRN  aluminum & magnesium hydroxide-simethicone (MAALOX) 748-068-81 MG/5ML suspension 30 mL, 30 mL, Oral, Q6H PRN  nicotine polacrilex (NICORETTE) gum 2 mg, 2 mg, Oral, Q2H PRN    ASSESSMENT  Major depressive disorder, recurrent severe without psychotic features ([facility] [address]         HANDOFF  Patient symptoms are:  Unimproved  Medications as determined by attending physician, consider further titration of Zoloft  Encourage participation in groups and milieu. Probable discharge is to be determined by MD    Electronically signed by ALLISON Nance CNP on 5/27/2022 at 3:37 PM    **This report has been created using voice recognition software. It may contain minor errors which are inherent in voice recognition technology. **    I independently saw and evaluated the patient. I reviewed the nurse practitioners documentation above. Any additional comments or changes to the nurse practitioners documentation are stated below otherwise agree with assessment. Plan will be as follows:  Patient tolerating medication well, denying any side effects. Will monitor on current dose of Zoloft for now but may consider increase after tomorrow's dose to 75 mg  PLAN  Patient s symptoms   show no change  Observe on current dose for now but may increase Zoloft to 75 mg for Sunday morning scheduled dose  Attempt to develop insight  Psycho-education conducted. Supportive Therapy conducted.   Probable discharge is next week  Follow-up daily while on inpatient unit
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
## 2022-05-27 NOTE — CARE COORDINATION
SW faxed clinical information to [facility] per pt request. SW called to confirm receipt of fax, it was received and is being reviewed.

## 2022-05-27 NOTE — PLAN OF CARE
[facility]  Day 3 Interdisciplinary Treatment Plan NOTE    Review Date & Time: 5/27/2022 1325    Admission Type:   Admission Type: Voluntary    Reason for admission:  Reason for Admission: Suicidal ideation with no specific plan, off medications for 8 days  Estimated Length of Stay:  5-7 days  Estimated Discharge Date Update: to be determined by physician    PATIENT STRENGTHS:  Patient Strengths:   Patient Strengths and Limitations:Limitations: Tendency to isolate self,Difficult relationships / poor social skills,Difficulty problem solving/relies on others to help solve problems,Inappropriate/potentially harmful leisure interests,Multiple barriers to leisure interests,Apathetic / unmotivated  Addictive Behavior:Addictive Behavior  In the Past 3 Months, Have You Felt or Has Someone Told You That You Have a Problem With  : None  Medical Problems:  Past Medical History:   Diagnosis Date    Bradycardia     [facility] Cardiology consulted during hospital admission. COVID-19     1/2022 per patient. Was tested at a homeless shelter and was positive. States no symptoms. Depression with suicidal ideation 04/2022 April 2022 Psych ding [facility] then to [facility] for psych. Glaucoma     Homeless single person     lives in non-mobile [place] in [facility] \"behind a garage\"    Hypertension     Legally blind     lt eye completely blind, rt eye has about 20 % vision  Sees [facility] and then \"glaucoma specialist\" [address] [facility]  \"Dr. Ruddy Oliveros"    Mixed hyperlipidemia 3/28/2022    Pancreatitis     4/2022  [facility] admit    Snores     Wellness examination     Dr. Yoel Mckeon last seen \"years ago\" [facility] to [facility] in [facility] currently . Pt. homeless.         Risk:  Fall Risk   Krishna Scale Krishna Scale Score: 22  BVC    Change in scores:  No Changes to plan of Care:  No    Status EXAM:   Mental Status and Behavioral Exam  Normal: No  Level of Assistance: Independent/Self  Facial Expression: Flat  Affect: Appropriate  Level of Consciousness: Alert  Frequency of Checks: 4 times per hour, close  Mood:Normal: No  Mood: Depressed,Anxious  Motor Activity:Normal: Yes  Eye Contact: Fair  Observed Behavior: Withdrawn,Cooperative,Friendly,Preoccupied  Sexual Misconduct History: Current - no  Preception: Hunters to person,Hunters to place,Hunters to time,Hunters to situation  Attention:Normal: No  Attention: Distractible  Thought Processes: Circumstantial  Thought Content:Normal: No  Thought Content: Preoccupations  Depression Symptoms: Feelings of helplessness,Feelings of hopelessess,Isolative,Loss of interest  Anxiety Symptoms: Generalized  Nancy Symptoms: No problems reported or observed. Hallucinations: None  Delusions: No  Memory:Normal: Yes  Insight and Judgment: No  Insight and Judgment: Poor judgment,Poor insight    Daily Assessment Last Entry:   Daily Sleep (WDL): Exceptions to WDL            Daily Nutrition (WDL): Within Defined Limits  Level of Assistance: Independent/Self    Patient Monitoring:  Frequency of Checks: 4 times per hour, close    Psychiatric Symptoms:   Depression Symptoms  Depression Symptoms: Feelings of helplessness,Feelings of hopelessess,Isolative,Loss of interest  Anxiety Symptoms  Anxiety Symptoms: Generalized  Nancy Symptoms  Nancy Symptoms: No problems reported or observed. Suicide Risk CSSR-S:  1) Within the past month, have you wished you were dead or wished you could go to sleep and not wake up? : Yes  2) Have you actually had any thoughts of killing yourself? : Yes  3) Have you been thinking about how you might kill yourself? : No  5) Have you started to work out or worked out the details of how to kill yourself?  Do you intend to carry out this plan? : No  6) Have you ever done anything, started to do anything, or prepared to do anything to end your life?: No  Change in Result:  Change in Plan of care:      EDUCATION:   Learner Progress Toward Treatment Goals: Reviewed results and recommendations of this team, Reviewed group plan and strategies, Reviewed signs, symptoms and risk of self harm and violent behavior, Reviewed goals and plan of care    Method:  small group, individual verbal education    Outcome: Verbalized by patient but needs reinforcement to obtain goals    PATIENT GOALS:  Short term:  Patient declined to participate  Long term:  Patient declined to participate    PLAN/TREATMENT RECOMMENDATIONS UPDATE:  continue with group therapies, increased socialization, continue planning for after discharge goals, continue with medication compliance    SHORT-TERM GOALS UPDATE:   Time frame for Short-Term Goals:  5-7 days    LONG-TERM GOALS UPDATE:   Time frame for Long-Term Goals:  6 months    Members Present in Team Meeting:   See signature sheet  NICK Singer

## 2022-05-27 NOTE — GROUP NOTE
Group Therapy Note    Date: 5/27/2022    Group Start Time: 1000  Group End Time: 0059  Group Topic: Psychoeducation    [address], LSW    patient refused to attend psychoeducation group at 10a after encouragement from staff.   1:1 talk time provided as alternative to group session
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
## 2022-05-27 NOTE — GROUP NOTE
Group Therapy Note    Date: 5/27/2022    Group Start Time: 9543  Group End Time: 3451  Group Topic: Relaxation    [facility]      Patient refused to attend Music Relaxation group at [phone] after encouragement from staff.   1:1 talk time provided as alternative to group session            Signature:  Anne-Marie Huertas [place]

## 2022-05-27 NOTE — PLAN OF CARE
Problem: Anxiety  Goal: Will report anxiety at manageable levels  5/26/2022 2331 by Lynn Renae RN  Outcome: Progressing     Problem: Coping  Goal: Pt/Family able to verbalize concerns and demonstrate effective coping strategies  Outcome: Progressing     Problem: Depression/Self Harm  Goal: Effect of psychiatric condition will be minimized and patient will be protected from self harm  Outcome: Progressing     Problem: Drug Abuse/Detox  Goal: Will have no detox symptoms and will verbalize plan for changing drug-related behavior  Outcome: Progressing     Patient denies homicidal ideation at this time. Patient admits to suicidal ideation at this time. Patient contracts for safety and agrees to seek out staff if the thoughts get worse. Patient admits to depression and anxiety at this time. Patient unable to demonstrate coping strategies at this time. Patient admits to mild withdrawal symptoms and scored a 3 on CIWA scale. Patient denied the need for medications for withdrawal at this time. Patient would like to go to inpatient rehabilitation center after discharge. Patient is free of self harm at this time. Patient agrees to seek out staff if thoughts to harm self arise. Staff will provide support and reassurance as needed. Safety checks maintained every 15 minutes.

## 2022-05-28 PROCEDURE — 6370000000 HC RX 637 (ALT 250 FOR IP): Performed by: NURSE PRACTITIONER

## 2022-05-28 PROCEDURE — 6370000000 HC RX 637 (ALT 250 FOR IP): Performed by: PSYCHIATRY & NEUROLOGY

## 2022-05-28 PROCEDURE — 1240000000 HC EMOTIONAL WELLNESS R&B

## 2022-05-28 PROCEDURE — 99232 SBSQ HOSP IP/OBS MODERATE 35: CPT | Performed by: NURSE PRACTITIONER

## 2022-05-28 RX ORDER — SERTRALINE HYDROCHLORIDE 100 MG/1
100 TABLET, FILM COATED ORAL DAILY
Status: DISCONTINUED | OUTPATIENT
Start: 2022-05-29 | End: 2022-05-31 | Stop reason: HOSPADM

## 2022-05-28 RX ADMIN — TIMOLOL MALEATE 1 DROP: 5 SOLUTION/ DROPS OPHTHALMIC at 08:52

## 2022-05-28 RX ADMIN — SERTRALINE HYDROCHLORIDE 50 MG: 50 TABLET ORAL at 08:52

## 2022-05-28 RX ADMIN — BRIMONIDINE TARTRATE 1 DROP: 2 SOLUTION/ DROPS OPHTHALMIC at 21:34

## 2022-05-28 RX ADMIN — BRIMONIDINE TARTRATE 1 DROP: 2 SOLUTION/ DROPS OPHTHALMIC at 08:51

## 2022-05-28 RX ADMIN — NICOTINE POLACRILEX 2 MG: 2 GUM, CHEWING BUCCAL at 12:12

## 2022-05-28 RX ADMIN — TRAZODONE HYDROCHLORIDE 50 MG: 50 TABLET ORAL at 21:33

## 2022-05-28 RX ADMIN — MIRTAZAPINE 7.5 MG: 15 TABLET, FILM COATED ORAL at 21:32

## 2022-05-28 RX ADMIN — FOLIC ACID 1 MG: 1 TABLET ORAL at 08:52

## 2022-05-28 RX ADMIN — TIMOLOL MALEATE 1 DROP: 5 SOLUTION/ DROPS OPHTHALMIC at 21:34

## 2022-05-28 RX ADMIN — DORZOLAMIDE HYDROCHLORIDE 1 DROP: 20 SOLUTION/ DROPS OPHTHALMIC at 08:51

## 2022-05-28 RX ADMIN — DORZOLAMIDE HYDROCHLORIDE 1 DROP: 20 SOLUTION/ DROPS OPHTHALMIC at 21:34

## 2022-05-28 RX ADMIN — THIAMINE HCL TAB 100 MG 100 MG: 100 TAB at 08:52

## 2022-05-28 RX ADMIN — BRIMONIDINE TARTRATE 1 DROP: 2 SOLUTION/ DROPS OPHTHALMIC at 15:10

## 2022-05-28 RX ADMIN — LATANOPROST 1 DROP: 50 SOLUTION OPHTHALMIC at 21:35

## 2022-05-28 RX ADMIN — HYDROXYZINE HYDROCHLORIDE 50 MG: 50 TABLET, FILM COATED ORAL at 21:33

## 2022-05-28 RX ADMIN — NICOTINE POLACRILEX 2 MG: 2 GUM, CHEWING BUCCAL at 21:37

## 2022-05-28 RX ADMIN — ATORVASTATIN CALCIUM 40 MG: 20 TABLET, FILM COATED ORAL at 21:33

## 2022-05-28 NOTE — PROGRESS NOTES
Daily Progress Note  5/28/2022    Patient Name: Laila Molina    CHIEF COMPLAINT: Depression with suicidal ideation         SUBJECTIVE:      Patient is seen today for a follow up assessment. Alexandra Simon is interviewed today bedside, he remains mostly isolative to his room and is not coming out for any group activity. He does come out for needs and meals. He continues to endorse depression though reports that he is tolerating the medication and believes that it is somewhat helpful. He reports that social work is trying to get him into an inpatient program for detox. He denies any current symptoms of alcohol withdrawal.  He reports continued suicidal ideation though feels safe on the inpatient unit. He is unable to contract for safety in the community. He reports concern about a court date that he has scheduled on Tuesday. It is likely that he will be able to transition to [facility] early this week once stability in symptoms is achieved. Appetite:  [x] Adequate/Unchanged  [] Increased  [] Decreased      Sleep:       [x] Adequate/Unchanged  [] Fair  [] Poor      Group Attendance on Unit:   [] Yes   [] Selectively    [x] No    Compliant with scheduled medications: [x] Yes  [] No    Received emergency medications in past 24 hrs: [] Yes   [x] No    Medication Side Effects: Denies         Mental Status Exam  Level of consciousness: Alert and awake   Appearance: Appropriate attire for setting, resting in bed, with fair  grooming and hygiene   Behavior/Motor: Approachable, engages with interviewer  Attitude toward examiner: Cooperative, attentive, fair eye contact  Speech: Normal rate, volume and tone  Mood: Depressed  Affect: Congruent, isolative  Thought processes: Linear and coherent  Thought content: Worried about his court date, Denies homicidal ideation  Suicidal Ideation: Active suicidal ideations, contracts for safety on the unit. Delusions: No evidence of delusions.    Perceptual Disturbance: Denies, patient does not appear to be responding to internal stimuli. Cognition: Oriented to self, location, time, and situation  Memory: intact  Insight: fair   Judgement: poor       Data   height is 5' 6\" (1.676 m) and weight is 150 lb (68 kg). His oral temperature is 97.1 °F (36.2 °C). His blood pressure is 105/72 and his pulse is 65. His respiration is 14 and oxygen saturation is 100%.    Labs:   Admission on 05/25/2022   Component Date Value Ref Range Status    WBC 05/25/2022 3.9  3.5 - 11.0 k/uL Final    RBC 05/25/2022 4.42* 4.5 - 5.9 m/uL Final    Hemoglobin 05/25/2022 14.7  13.5 - 17.5 g/dL Final    Hematocrit 05/25/2022 43.6  41 - 53 % Final    MCV 05/25/2022 98.6  80 - 100 fL Final    MCH 05/25/2022 33.3  26 - 34 pg Final    MCHC 05/25/2022 33.8  31 - 37 g/dL Final    RDW 05/25/2022 14.2  11.5 - 14.9 % Final    Platelets 02/18/7082 210  150 - 450 k/uL Final    MPV 05/25/2022 5.9* 6.0 - 12.0 fL Final    Seg Neutrophils 05/25/2022 70* 36 - 66 % Final    Lymphocytes 05/25/2022 19* 24 - 44 % Final    Monocytes 05/25/2022 9* 1 - 7 % Final    Eosinophils % 05/25/2022 2  0 - 4 % Final    Basophils 05/25/2022 0  0 - 2 % Final    Segs Absolute 05/25/2022 2.70  1.3 - 9.1 k/uL Final    Absolute Lymph # 05/25/2022 0.70* 1.0 - 4.8 k/uL Final    Absolute Mono # 05/25/2022 0.30  0.1 - 1.3 k/uL Final    Absolute Eos # 05/25/2022 0.10  0.0 - 0.4 k/uL Final    Basophils Absolute 05/25/2022 0.00  0.0 - 0.2 k/uL Final    Glucose 05/25/2022 115* 70 - 99 mg/dL Final    BUN 05/25/2022 9  8 - 23 mg/dL Final    CREATININE 05/25/2022 0.78  0.70 - 1.20 mg/dL Final    Calcium 05/25/2022 9.0  8.6 - 10.4 mg/dL Final    Sodium 05/25/2022 144  135 - 144 mmol/L Final    Potassium 05/25/2022 3.9  3.7 - 5.3 mmol/L Final    Chloride 05/25/2022 107  98 - 107 mmol/L Final    CO2 05/25/2022 23  20 - 31 mmol/L Final    Anion Gap 05/25/2022 14  9 - 17 mmol/L Final    Alkaline Phosphatase 05/25/2022 49  40 - 129 U/L Final    ALT 05/25/2022 19  5 - 41 U/L Final    AST 05/25/2022 25  <40 U/L Final    Total Bilirubin 05/25/2022 0.36  0.3 - 1.2 mg/dL Final    Total Protein 05/25/2022 7.0  6.4 - 8.3 g/dL Final    Albumin 05/25/2022 4.1  3.5 - 5.2 g/dL Final    GFR Non- 05/25/2022 >60  >60 mL/min Final    GFR  05/25/2022 >60  >60 mL/min Final    GFR Comment 05/25/2022        Final    Comment: Average GFR for 61-76 years old:   80 mL/min/1.73sq m  Chronic Kidney Disease:   <60 mL/min/1.73sq m  Kidney failure:   <15 mL/min/1.73sq m              eGFR calculated using average adult body mass. Additional eGFR calculator available at:        apomio.br            Ethanol 05/25/2022 64* <10 mg/dL Final    Ethanol percent 05/25/2022 0.064  % Final         Reviewed patient's current plan of care and vital signs with nursing staff.     Labs reviewed: [x] Yes    Medications  Current Facility-Administered Medications: atorvastatin (LIPITOR) tablet 40 mg, 40 mg, Oral, Nightly  brimonidine (ALPHAGAN) 0.2 % ophthalmic solution 1 drop, 1 drop, Right Eye, TID  dorzolamide (TRUSOPT) 2 % ophthalmic solution 1 drop, 1 drop, Right Eye, BID  folic acid (FOLVITE) tablet 1 mg, 1 mg, Oral, Daily  latanoprost (XALATAN) 0.005 % ophthalmic solution 1 drop, 1 drop, Right Eye, Nightly  mirtazapine (REMERON) tablet 7.5 mg, 7.5 mg, Oral, Nightly  sertraline (ZOLOFT) tablet 50 mg, 50 mg, Oral, Daily  timolol (TIMOPTIC) 0.5 % ophthalmic solution 1 drop, 1 drop, Right Eye, BID  thiamine mononitrate tablet 100 mg, 100 mg, Oral, Daily  chlordiazePOXIDE (LIBRIUM) capsule 10 mg, 10 mg, Oral, Q6H PRN  acetaminophen (TYLENOL) tablet 650 mg, 650 mg, Oral, Q6H PRN  ibuprofen (ADVIL;MOTRIN) tablet 400 mg, 400 mg, Oral, Q6H PRN  hydrOXYzine (ATARAX) tablet 50 mg, 50 mg, Oral, TID PRN  traZODone (DESYREL) tablet 50 mg, 50 mg, Oral, Nightly PRN  polyethylene glycol (GLYCOLAX) packet 17 g, 17 g, Oral,

## 2022-05-28 NOTE — PLAN OF CARE
Problem: Anxiety  Goal: Will report anxiety at manageable levels  Description: INTERVENTIONS:  1. Administer medication as ordered  2. Teach and rehearse alternative coping skills  3. Provide emotional support with 1:1 interaction with staff  5/27/2022 2337 by Antonio May LPN  Outcome: Progressing  Note: Patient admits to depression/anxiety at this time. Problem: Coping  Goal: Pt/Family able to verbalize concerns and demonstrate effective coping strategies  Description: INTERVENTIONS:  1. Assist patient/family to identify coping skills, available support systems and cultural and spiritual values  2. Provide emotional support, including active listening and acknowledgement of concerns of patient and caregivers  3. Reduce environmental stimuli, as able  4. Instruct patient/family in relaxation techniques, as appropriate  5. Assess for spiritual pain/suffering and initiate Spiritual Care, Psychosocial Clinical Specialist consults as needed  5/27/2022 2337 by Antonio May LPN  Outcome: Progressing  Note: Pt encouraged to explore coping skills for reducing anxiety. Problem: Depression/Self Harm  Goal: Effect of psychiatric condition will be minimized and patient will be protected from self harm  Description: INTERVENTIONS:  1. Assess impact of patient's symptoms on level of functioning, self care needs and offer support as indicated  2. Assess patient/family knowledge of depression, impact on illness and need for teaching  3. Provide emotional support, presence and reassurance  4. Assess for possible suicidal thoughts or ideation. If patient expresses suicidal thoughts or statements do not leave alone, initiate Suicide Precautions, move to a room close to the nursing station and obtain sitter  5.  Initiate consults as appropriate with Mental Health Professional, Spiritual Care, Psychosocial CNS, and consider a recommendation to the LIP for a Psychiatric Consultation  5/27/2022 2337 by Antonio May LPN  Outcome: Progressing  Note: Patient has been calm, controlled and med complaint. Accepting of 1:1 talk time with staff. Problem: Drug Abuse/Detox  Goal: Will have no detox symptoms and will verbalize plan for changing drug-related behavior  Description: INTERVENTIONS:  1. Administer medication as ordered  2. Monitor physical status  3. Provide emotional support with 1:1 interaction with staff  4. Encourage  recovery focused treatment   5/27/2022 2337 by Sahara Hess LPN  Outcome: Progressing  Note: Patient verbalized plans for changing drug-related behaviors.

## 2022-05-28 NOTE — BH NOTE
patient refused to attend open rec group at 1430 after encouragement from staff.   1:1 talk time provided as alternative to group session

## 2022-05-28 NOTE — PLAN OF CARE
Problem: Anxiety  Goal: Will report anxiety at manageable levels  Description: INTERVENTIONS:  1. Administer medication as ordered  2. Teach and rehearse alternative coping skills  3. Provide emotional support with 1:1 interaction with staff  5/28/2022 1026 by Peña Salgado RN  Outcome: Progressing     Problem: Coping  Goal: Pt/Family able to verbalize concerns and demonstrate effective coping strategies  Description: INTERVENTIONS:  1. Assist patient/family to identify coping skills, available support systems and cultural and spiritual values  2. Provide emotional support, including active listening and acknowledgement of concerns of patient and caregivers  3. Reduce environmental stimuli, as able  4. Instruct patient/family in relaxation techniques, as appropriate  5. Assess for spiritual pain/suffering and initiate Spiritual Care, Psychosocial Clinical Specialist consults as needed  5/28/2022 1026 by Peña Salgado RN  Outcome: Progressing  Flowsheets (Taken 5/28/2022 1019)  Patient/family able to verbalize anxieties, fears, and concerns, and demonstrate effective coping: Reduce environmental stimuli, as able     Problem: Depression/Self Harm  Goal: Effect of psychiatric condition will be minimized and patient will be protected from self harm  Description: INTERVENTIONS:  1. Assess impact of patient's symptoms on level of functioning, self care needs and offer support as indicated  2. Assess patient/family knowledge of depression, impact on illness and need for teaching  3. Provide emotional support, presence and reassurance  4. Assess for possible suicidal thoughts or ideation. If patient expresses suicidal thoughts or statements do not leave alone, initiate Suicide Precautions, move to a room close to the nursing station and obtain sitter  5.  Initiate consults as appropriate with Mental Health Professional, Spiritual Care, Psychosocial CNS, and consider a recommendation to the LIP for a Psychiatric Consultation  5/28/2022 1026 by Ambrosio Mohan RN  Outcome: Progressing  Flowsheets (Taken 5/28/2022 1019)  Effect of psychiatric condition will be minimized and patient will be protected from self harm: Provide emotional support, presence and reassurance  Note: Q15 minute rounding for patient safety. Denies thoughts of self harm or hallucinations. Medication compliant and behavior controlled. Out for meals only     Problem: Depression/Self Harm  Goal: Effect of psychiatric condition will be minimized and patient will be protected from self harm  Description: INTERVENTIONS:  1. Assess impact of patient's symptoms on level of functioning, self care needs and offer support as indicated  2. Assess patient/family knowledge of depression, impact on illness and need for teaching  3. Provide emotional support, presence and reassurance  4. Assess for possible suicidal thoughts or ideation. If patient expresses suicidal thoughts or statements do not leave alone, initiate Suicide Precautions, move to a room close to the nursing station and obtain sitter  5. Initiate consults as appropriate with Mental Health Professional, Spiritual Care, Psychosocial CNS, and consider a recommendation to the LIP for a Psychiatric Consultation  5/28/2022 1026 by Ambrosio Mohan RN  Outcome: Progressing  Flowsheets (Taken 5/28/2022 1019)  Effect of psychiatric condition will be minimized and patient will be protected from self harm: Provide emotional support, presence and reassurance  Note: Q15 minute rounding for patient safety. Denies thoughts of self harm or hallucinations. Medication compliant and behavior controlled. Out for meals only     Problem: Drug Abuse/Detox  Goal: Will have no detox symptoms and will verbalize plan for changing drug-related behavior  Description: INTERVENTIONS:  1. Administer medication as ordered  2. Monitor physical status  3. Provide emotional support with 1:1 interaction with staff  4.  Encourage recovery focused treatment   5/28/2022 1026 by Vu Ibrahim, RN  Outcome: Progressing  Flowsheets (Taken 5/28/2022 1019)  Will have no detox symptoms and will verbalize plan for changing drug-related behavior: Provide emotional support with 1:1 interaction with staff

## 2022-05-29 PROCEDURE — 1240000000 HC EMOTIONAL WELLNESS R&B

## 2022-05-29 PROCEDURE — 99232 SBSQ HOSP IP/OBS MODERATE 35: CPT | Performed by: NURSE PRACTITIONER

## 2022-05-29 PROCEDURE — 6370000000 HC RX 637 (ALT 250 FOR IP): Performed by: NURSE PRACTITIONER

## 2022-05-29 PROCEDURE — 6370000000 HC RX 637 (ALT 250 FOR IP): Performed by: PSYCHIATRY & NEUROLOGY

## 2022-05-29 RX ADMIN — TIMOLOL MALEATE 1 DROP: 5 SOLUTION/ DROPS OPHTHALMIC at 08:35

## 2022-05-29 RX ADMIN — SERTRALINE HYDROCHLORIDE 100 MG: 100 TABLET ORAL at 08:30

## 2022-05-29 RX ADMIN — BRIMONIDINE TARTRATE 1 DROP: 2 SOLUTION/ DROPS OPHTHALMIC at 13:50

## 2022-05-29 RX ADMIN — BRIMONIDINE TARTRATE 1 DROP: 2 SOLUTION/ DROPS OPHTHALMIC at 08:35

## 2022-05-29 RX ADMIN — NICOTINE POLACRILEX 2 MG: 2 GUM, CHEWING BUCCAL at 17:49

## 2022-05-29 RX ADMIN — LATANOPROST 1 DROP: 50 SOLUTION OPHTHALMIC at 21:09

## 2022-05-29 RX ADMIN — THIAMINE HCL TAB 100 MG 100 MG: 100 TAB at 08:30

## 2022-05-29 RX ADMIN — MIRTAZAPINE 7.5 MG: 15 TABLET, FILM COATED ORAL at 20:53

## 2022-05-29 RX ADMIN — NICOTINE POLACRILEX 2 MG: 2 GUM, CHEWING BUCCAL at 14:59

## 2022-05-29 RX ADMIN — BRIMONIDINE TARTRATE 1 DROP: 2 SOLUTION/ DROPS OPHTHALMIC at 21:15

## 2022-05-29 RX ADMIN — NICOTINE POLACRILEX 2 MG: 2 GUM, CHEWING BUCCAL at 20:54

## 2022-05-29 RX ADMIN — HYDROXYZINE HYDROCHLORIDE 50 MG: 50 TABLET, FILM COATED ORAL at 20:53

## 2022-05-29 RX ADMIN — DORZOLAMIDE HYDROCHLORIDE 1 DROP: 20 SOLUTION/ DROPS OPHTHALMIC at 08:35

## 2022-05-29 RX ADMIN — NICOTINE POLACRILEX 2 MG: 2 GUM, CHEWING BUCCAL at 08:33

## 2022-05-29 RX ADMIN — TIMOLOL MALEATE 1 DROP: 5 SOLUTION/ DROPS OPHTHALMIC at 21:23

## 2022-05-29 RX ADMIN — FOLIC ACID 1 MG: 1 TABLET ORAL at 08:30

## 2022-05-29 RX ADMIN — ATORVASTATIN CALCIUM 40 MG: 20 TABLET, FILM COATED ORAL at 20:53

## 2022-05-29 RX ADMIN — DORZOLAMIDE HYDROCHLORIDE 1 DROP: 20 SOLUTION/ DROPS OPHTHALMIC at 20:54

## 2022-05-29 RX ADMIN — TRAZODONE HYDROCHLORIDE 50 MG: 50 TABLET ORAL at 20:53

## 2022-05-29 NOTE — PLAN OF CARE
Problem: Anxiety  Goal: Will report anxiety at manageable levels  Description: INTERVENTIONS:  1. Administer medication as ordered  2. Teach and rehearse alternative coping skills  3. Provide emotional support with 1:1 interaction with staff  Outcome: Progressing  Flowsheets (Taken 5/29/2022 0220)  Will report anxiety at manageable levels:   Administer medication as ordered   Provide emotional support with 1:1 interaction with staff   Teach and rehearse alternative coping skills     Problem: Depression/Self Harm  Goal: Effect of psychiatric condition will be minimized and patient will be protected from self harm  Description: INTERVENTIONS:  1. Assess impact of patient's symptoms on level of functioning, self care needs and offer support as indicated  2. Assess patient/family knowledge of depression, impact on illness and need for teaching  3. Provide emotional support, presence and reassurance  4. Assess for possible suicidal thoughts or ideation. If patient expresses suicidal thoughts or statements do not leave alone, initiate Suicide Precautions, move to a room close to the nursing station and obtain sitter  5. Initiate consults as appropriate with Mental Health Professional, Spiritual Care, Psychosocial CNS, and consider a recommendation to the LIP for a Psychiatric Consultation  Outcome: Progressing  Flowsheets (Taken 5/29/2022 0220)  Effect of psychiatric condition will be minimized and patient will be protected from self harm:   Assess impact of patients symptoms on level of functioning, self care needs and offer support as indicated   Assess patient/family knowledge of depression, impact on illness and need for teaching   Provide emotional support, presence and reassurance   Assess for suicidal thoughts or ideation.  If patient expresses suicidal thoughts or statements do not leave alone, initiate Suicide Precautions, move near nurse station, obtain sitter

## 2022-05-29 NOTE — GROUP NOTE
Group Therapy Note    Date: 5/29/2022    Group Start Time: 7215  Group End Time: 1130  Group Topic: Psychoeducation    [address] [place] [address],[address], PILO, FERNANDA        Group Therapy Note    Attendees: 7/23       Patient refused to attend psychoeducation group at 10:40 am after encouragement from staff. 1:1 talk time provided as alternative to group session.       Discipline Responsible: /Counselor      Signature:  PILO Flynn, FERNANDA

## 2022-05-29 NOTE — PROGRESS NOTES
Daily Progress Note  5/29/2022    Patient Name: Leela Knightkeeper    CHIEF COMPLAINT: Depression with suicidal ideation         SUBJECTIVE:      Patient seen face-to-face for follow-up assessment. He is sitting in the day area alone at a table. Reports feeling anxious and nervous around people. His been primarily isolating to himself and not attending group programming. Patient rates his anxiety as 7 out of 10 and his depression is 4 out of 10 (both on a scale from 0-10, with 10 indicating the most severe). States that he has noticed a modest improvement in his mood, but does not yet feel safe from self. Anxious about ability to remain sober if discharged directly to the community and has plans to discharge directly to A[facility] treatment (noted [facility] in previous documentation). Patient states if he were not here on the unit, he feels that he would decompensate quickly try to harm himself. Unable to contract for safety off unit. He reports good sleep overnight and denies any problems with appetite. He has been compliant with his scheduled medication. Sertraline was titrated to 100 mg p.o. daily this morning. Denies any side effects. Patient does report having a court appointment on May 31. He has yet to demonstrate stability and requires inpatient hospitalization for safety.     Appetite:  [x] Adequate/Unchanged  [] Increased  [] Decreased      Sleep:       [x] Adequate/Unchanged  [] Fair  [] Poor      Group Attendance on Unit:   [] Yes   [] Selectively    [x] No    Compliant with scheduled medications: [x] Yes  [] No    Received emergency medications in past 24 hrs: [] Yes   [x] No    Medication Side Effects: Denies         Mental Status Exam  Level of consciousness: Alert and awake   Appearance: Appropriate attire for setting, sitting alone in day area, with fair  grooming and hygiene   Behavior/Motor: Approachable, engages with interviewer  Attitude toward examiner: Cooperative, attentive, fair eye contact  Speech: Normal rate, volume and tone  Mood: Depressed  Affect: Congruent, isolative  Thought processes: Linear and coherent  Thought content: Worried about his court date, Denies homicidal ideation  Suicidal Ideation: Reports improvement suicidal ideations, contracts for safety on the unit. Delusions: No evidence of delusions. Perceptual Disturbance: Denies, patient does not appear to be responding to internal stimuli. Cognition: Oriented to self, location, time, and situation  Memory: intact  Insight: fair   Judgement: poor       Data   height is 5' 6\" (1.676 m) and weight is 150 lb (68 kg). His temporal temperature is 98 °F (36.7 °C). His blood pressure is 111/80 and his pulse is 63. His respiration is 14 and oxygen saturation is 100%.    Labs:   Admission on 05/25/2022   Component Date Value Ref Range Status    WBC 05/25/2022 3.9  3.5 - 11.0 k/uL Final    RBC 05/25/2022 4.42* 4.5 - 5.9 m/uL Final    Hemoglobin 05/25/2022 14.7  13.5 - 17.5 g/dL Final    Hematocrit 05/25/2022 43.6  41 - 53 % Final    MCV 05/25/2022 98.6  80 - 100 fL Final    MCH 05/25/2022 33.3  26 - 34 pg Final    MCHC 05/25/2022 33.8  31 - 37 g/dL Final    RDW 05/25/2022 14.2  11.5 - 14.9 % Final    Platelets 34/95/7995 210  150 - 450 k/uL Final    MPV 05/25/2022 5.9* 6.0 - 12.0 fL Final    Seg Neutrophils 05/25/2022 70* 36 - 66 % Final    Lymphocytes 05/25/2022 19* 24 - 44 % Final    Monocytes 05/25/2022 9* 1 - 7 % Final    Eosinophils % 05/25/2022 2  0 - 4 % Final    Basophils 05/25/2022 0  0 - 2 % Final    Segs Absolute 05/25/2022 2.70  1.3 - 9.1 k/uL Final    Absolute Lymph # 05/25/2022 0.70* 1.0 - 4.8 k/uL Final    Absolute Mono # 05/25/2022 0.30  0.1 - 1.3 k/uL Final    Absolute Eos # 05/25/2022 0.10  0.0 - 0.4 k/uL Final    Basophils Absolute 05/25/2022 0.00  0.0 - 0.2 k/uL Final    Glucose 05/25/2022 115* 70 - 99 mg/dL Final    BUN 05/25/2022 9  8 - 23 mg/dL Final    CREATININE 05/25/2022 0.78  0.70 - 1.20 mg/dL Final    Calcium 05/25/2022 9.0  8.6 - 10.4 mg/dL Final    Sodium 05/25/2022 144  135 - 144 mmol/L Final    Potassium 05/25/2022 3.9  3.7 - 5.3 mmol/L Final    Chloride 05/25/2022 107  98 - 107 mmol/L Final    CO2 05/25/2022 23  20 - 31 mmol/L Final    Anion Gap 05/25/2022 14  9 - 17 mmol/L Final    Alkaline Phosphatase 05/25/2022 49  40 - 129 U/L Final    ALT 05/25/2022 19  5 - 41 U/L Final    AST 05/25/2022 25  <40 U/L Final    Total Bilirubin 05/25/2022 0.36  0.3 - 1.2 mg/dL Final    Total Protein 05/25/2022 7.0  6.4 - 8.3 g/dL Final    Albumin 05/25/2022 4.1  3.5 - 5.2 g/dL Final    GFR Non- 05/25/2022 >60  >60 mL/min Final    GFR  05/25/2022 >60  >60 mL/min Final    GFR Comment 05/25/2022        Final    Comment: Average GFR for 61-76 years old:   80 mL/min/1.73sq m  Chronic Kidney Disease:   <60 mL/min/1.73sq m  Kidney failure:   <15 mL/min/1.73sq m              eGFR calculated using average adult body mass. Additional eGFR calculator available at:        [employer]            Ethanol 05/25/2022 64* <10 mg/dL Final    Ethanol percent 05/25/2022 0.064  % Final         Reviewed patient's current plan of care and vital signs with nursing staff.     Labs reviewed: [x] Yes    Medications  Current Facility-Administered Medications: sertraline (ZOLOFT) tablet 100 mg, 100 mg, Oral, Daily  atorvastatin (LIPITOR) tablet 40 mg, 40 mg, Oral, Nightly  brimonidine (ALPHAGAN) 0.2 % ophthalmic solution 1 drop, 1 drop, Right Eye, TID  dorzolamide (TRUSOPT) 2 % ophthalmic solution 1 drop, 1 drop, Right Eye, BID  folic acid (FOLVITE) tablet 1 mg, 1 mg, Oral, Daily  latanoprost (XALATAN) 0.005 % ophthalmic solution 1 drop, 1 drop, Right Eye, Nightly  mirtazapine (REMERON) tablet 7.5 mg, 7.5 mg, Oral, Nightly  timolol (TIMOPTIC) 0.5 % ophthalmic solution 1 drop, 1 drop, Right Eye, BID  thiamine mononitrate tablet 100 mg, 100 mg, Oral, Daily  chlordiazePOXIDE (LIBRIUM) capsule 10 mg, 10 mg, Oral, Q6H PRN  acetaminophen (TYLENOL) tablet 650 mg, 650 mg, Oral, Q6H PRN  ibuprofen (ADVIL;MOTRIN) tablet 400 mg, 400 mg, Oral, Q6H PRN  hydrOXYzine (ATARAX) tablet 50 mg, 50 mg, Oral, TID PRN  traZODone (DESYREL) tablet 50 mg, 50 mg, Oral, Nightly PRN  polyethylene glycol (GLYCOLAX) packet 17 g, 17 g, Oral, Daily PRN  aluminum & magnesium hydroxide-simethicone (MAALOX) 200-200-20 MG/5ML suspension 30 mL, 30 mL, Oral, Q6H PRN  nicotine polacrilex (NICORETTE) gum 2 mg, 2 mg, Oral, Q2H PRN    ASSESSMENT  Major depressive disorder, recurrent severe without psychotic features ([facility] [address]         PLAN  Patient symptoms are: Slowly improving  Medication changes: Sertraline titrated to 100 mg p.o. daily at this morning. Observe on current med regimen. Monitor need and frequency of PRN medications. Encourage participation in groups and milieu. Attempt to develop insight. Psycho-education conducted. Supportive Therapy conducted. Patient reports a court hearing on May 31. Probable discharge is per attending physician, will likely discharge to [facility] early this week for inpatient alcohol rehab. Follow-up daily while inpatient. Patient continues to be monitored in the inpatient psychiatric facility at [facility] for safety and stabilization. Patient continues to need, on a daily basis, active treatment furnished directly by or requiring the supervision of inpatient psychiatric personnel. Electronically signed by ALLISON Ruiz CNP on 5/29/2022 at 5:45 PM    **This report has been created using voice recognition software. It may contain minor errors which are inherent in voice recognition technology. **

## 2022-05-29 NOTE — PLAN OF CARE
Problem: Anxiety  Goal: Will report anxiety at manageable levels  Description: INTERVENTIONS:  1. Administer medication as ordered  2. Teach and rehearse alternative coping skills  3. Provide emotional support with 1:1 interaction with staff  5/29/2022 1039 by Angel Luis Flores RN  Outcome: Progressing  Note: Patient denies anxiety     Problem: Coping  Goal: Pt/Family able to verbalize concerns and demonstrate effective coping strategies  Description: INTERVENTIONS:  1. Assist patient/family to identify coping skills, available support systems and cultural and spiritual values  2. Provide emotional support, including active listening and acknowledgement of concerns of patient and caregivers  3. Reduce environmental stimuli, as able  4. Instruct patient/family in relaxation techniques, as appropriate  5. Assess for spiritual pain/suffering and initiate Spiritual Care, Psychosocial Clinical Specialist consults as needed  5/29/2022 1039 by Angel Luis Flores RN  Outcome: Progressing  5/29/2022 0220 by Makayla Major RN  Outcome: Progressing     Problem: Depression/Self Harm  Goal: Effect of psychiatric condition will be minimized and patient will be protected from self harm  Description: INTERVENTIONS:  1. Assess impact of patient's symptoms on level of functioning, self care needs and offer support as indicated  2. Assess patient/family knowledge of depression, impact on illness and need for teaching  3. Provide emotional support, presence and reassurance  4. Assess for possible suicidal thoughts or ideation. If patient expresses suicidal thoughts or statements do not leave alone, initiate Suicide Precautions, move to a room close to the nursing station and obtain sitter  5.  Initiate consults as appropriate with Mental Health Professional, Spiritual Care, Psychosocial CNS, and consider a recommendation to the LIP for a Psychiatric Consultation  5/29/2022 1039 by Angel Luis Flores RN  Outcome: Progressing  Note: Patient remains free from self harm. Denies thoughts of self harm. Medication compliant and behavior controlled. Not attending groups.

## 2022-05-30 PROCEDURE — 99232 SBSQ HOSP IP/OBS MODERATE 35: CPT | Performed by: PSYCHIATRY & NEUROLOGY

## 2022-05-30 PROCEDURE — 6370000000 HC RX 637 (ALT 250 FOR IP): Performed by: PSYCHIATRY & NEUROLOGY

## 2022-05-30 PROCEDURE — 6370000000 HC RX 637 (ALT 250 FOR IP): Performed by: NURSE PRACTITIONER

## 2022-05-30 PROCEDURE — 1240000000 HC EMOTIONAL WELLNESS R&B

## 2022-05-30 PROCEDURE — 90833 PSYTX W PT W E/M 30 MIN: CPT | Performed by: PSYCHIATRY & NEUROLOGY

## 2022-05-30 RX ADMIN — NICOTINE POLACRILEX 2 MG: 2 GUM, CHEWING BUCCAL at 09:15

## 2022-05-30 RX ADMIN — FOLIC ACID 1 MG: 1 TABLET ORAL at 09:15

## 2022-05-30 RX ADMIN — HYDROXYZINE HYDROCHLORIDE 50 MG: 50 TABLET, FILM COATED ORAL at 21:32

## 2022-05-30 RX ADMIN — LATANOPROST 1 DROP: 50 SOLUTION OPHTHALMIC at 21:32

## 2022-05-30 RX ADMIN — ATORVASTATIN CALCIUM 40 MG: 20 TABLET, FILM COATED ORAL at 21:32

## 2022-05-30 RX ADMIN — BRIMONIDINE TARTRATE 1 DROP: 2 SOLUTION/ DROPS OPHTHALMIC at 21:03

## 2022-05-30 RX ADMIN — DORZOLAMIDE HYDROCHLORIDE 1 DROP: 20 SOLUTION/ DROPS OPHTHALMIC at 21:22

## 2022-05-30 RX ADMIN — SERTRALINE HYDROCHLORIDE 100 MG: 100 TABLET ORAL at 09:15

## 2022-05-30 RX ADMIN — NICOTINE POLACRILEX 2 MG: 2 GUM, CHEWING BUCCAL at 17:55

## 2022-05-30 RX ADMIN — NICOTINE POLACRILEX 2 MG: 2 GUM, CHEWING BUCCAL at 22:01

## 2022-05-30 RX ADMIN — DORZOLAMIDE HYDROCHLORIDE 1 DROP: 20 SOLUTION/ DROPS OPHTHALMIC at 09:15

## 2022-05-30 RX ADMIN — TRAZODONE HYDROCHLORIDE 50 MG: 50 TABLET ORAL at 21:32

## 2022-05-30 RX ADMIN — NICOTINE POLACRILEX 2 MG: 2 GUM, CHEWING BUCCAL at 20:16

## 2022-05-30 RX ADMIN — MIRTAZAPINE 7.5 MG: 15 TABLET, FILM COATED ORAL at 21:32

## 2022-05-30 RX ADMIN — TIMOLOL MALEATE 1 DROP: 5 SOLUTION/ DROPS OPHTHALMIC at 09:16

## 2022-05-30 RX ADMIN — TIMOLOL MALEATE 1 DROP: 5 SOLUTION/ DROPS OPHTHALMIC at 21:16

## 2022-05-30 RX ADMIN — BRIMONIDINE TARTRATE 1 DROP: 2 SOLUTION/ DROPS OPHTHALMIC at 15:07

## 2022-05-30 RX ADMIN — BRIMONIDINE TARTRATE 1 DROP: 2 SOLUTION/ DROPS OPHTHALMIC at 09:15

## 2022-05-30 RX ADMIN — THIAMINE HCL TAB 100 MG 100 MG: 100 TAB at 09:15

## 2022-05-30 RX ADMIN — NICOTINE POLACRILEX 2 MG: 2 GUM, CHEWING BUCCAL at 14:54

## 2022-05-30 ASSESSMENT — PAIN SCALES - GENERAL: PAINLEVEL_OUTOF10: 0

## 2022-05-30 NOTE — PLAN OF CARE
Problem: Anxiety  Goal: Will report anxiety at manageable levels  Description: INTERVENTIONS:  1. Administer medication as ordered  2. Teach and rehearse alternative coping skills  3. Provide emotional support with 1:1 interaction with staff  Outcome: Progressing     Problem: Coping  Goal: Pt/Family able to verbalize concerns and demonstrate effective coping strategies  Description: INTERVENTIONS:  1. Assist patient/family to identify coping skills, available support systems and cultural and spiritual values  2. Provide emotional support, including active listening and acknowledgement of concerns of patient and caregivers  3. Reduce environmental stimuli, as able  4. Instruct patient/family in relaxation techniques, as appropriate  5. Assess for spiritual pain/suffering and initiate Spiritual Care, Psychosocial Clinical Specialist consults as needed  Outcome: Progressing     Problem: Depression/Self Harm  Goal: Effect of psychiatric condition will be minimized and patient will be protected from self harm  Description: INTERVENTIONS:  1. Assess impact of patient's symptoms on level of functioning, self care needs and offer support as indicated  2. Assess patient/family knowledge of depression, impact on illness and need for teaching  3. Provide emotional support, presence and reassurance  4. Assess for possible suicidal thoughts or ideation. If patient expresses suicidal thoughts or statements do not leave alone, initiate Suicide Precautions, move to a room close to the nursing station and obtain sitter  5. Initiate consults as appropriate with Mental Health Professional, Spiritual Care, Psychosocial CNS, and consider a recommendation to the LIP for a Psychiatric Consultation  Outcome: Progressing     Problem: Drug Abuse/Detox  Goal: Will have no detox symptoms and will verbalize plan for changing drug-related behavior  Description: INTERVENTIONS:  1. Administer medication as ordered  2. Monitor physical status  3. Provide emotional support with 1:1 interaction with staff  4.  Encourage  recovery focused treatment   Outcome: Progressing     Problem: Pain  Goal: Verbalizes/displays adequate comfort level or baseline comfort level  Outcome: Progressing

## 2022-05-30 NOTE — GROUP NOTE
HS Goal Group     Date: May 29, 2022   Group Start Time: 2000   Group End Time: 2025   [facility]   Attendees: 10/22     Group Topic: HS Goal Group   Notes: Patient participated in HS goal group. Status After Intervention: Improved   Participation Level:  Active Listener and Interactive   Participation Quality: Appropriate, attentive and supportive   Speech: Normal   Thought Process/Content: Logical and linear   Affective Functioning: Congruent   Mood: WDL   Level of consciousness: Oriented x4   Response to Learning: Progressing to goal; able to verbalize current knowledge/experience, acknowledge new learning, retain information and change behavior    Endings: None reported   Modes of Intervention: Education and exploration     Discipline Responsible: Behavioral Health Tech   Signature: DOMINGA Cisneros

## 2022-05-30 NOTE — GROUP NOTE
Group Therapy Note    Date: 5/30/2022    Group Start Time: 1400  Group End Time: 2449  Group Topic: Cognitive Skills    [address] [place] [address]    Patient did not attend cognitive skills group at 1400 d/t resting in room despite staff invitation to attend. 1:1 talk time offered as alternative to group session.        Signature:  Gisel Birch, [address]

## 2022-05-31 VITALS
SYSTOLIC BLOOD PRESSURE: 126 MMHG | TEMPERATURE: 97.5 F | HEART RATE: 59 BPM | HEIGHT: 66 IN | WEIGHT: 150 LBS | BODY MASS INDEX: 24.11 KG/M2 | DIASTOLIC BLOOD PRESSURE: 65 MMHG | OXYGEN SATURATION: 100 % | RESPIRATION RATE: 14 BRPM

## 2022-05-31 PROCEDURE — 99239 HOSP IP/OBS DSCHRG MGMT >30: CPT | Performed by: PSYCHIATRY & NEUROLOGY

## 2022-05-31 PROCEDURE — 6370000000 HC RX 637 (ALT 250 FOR IP): Performed by: PSYCHIATRY & NEUROLOGY

## 2022-05-31 PROCEDURE — 6370000000 HC RX 637 (ALT 250 FOR IP): Performed by: NURSE PRACTITIONER

## 2022-05-31 RX ORDER — LATANOPROST 50 UG/ML
1 SOLUTION/ DROPS OPHTHALMIC NIGHTLY
Qty: 2.5 ML | Refills: 0 | Status: ON HOLD | OUTPATIENT
Start: 2022-05-31 | End: 2022-07-28 | Stop reason: SDUPTHER

## 2022-05-31 RX ORDER — HYDROXYZINE 50 MG/1
50 TABLET, FILM COATED ORAL 3 TIMES DAILY PRN
Qty: 30 TABLET | Refills: 0 | Status: SHIPPED | OUTPATIENT
Start: 2022-05-31 | End: 2022-06-10

## 2022-05-31 RX ORDER — THIAMINE MONONITRATE (VIT B1) 100 MG
100 TABLET ORAL DAILY
Qty: 30 TABLET | Refills: 0 | Status: ON HOLD | OUTPATIENT
Start: 2022-05-31 | End: 2022-07-28 | Stop reason: SDUPTHER

## 2022-05-31 RX ORDER — MIRTAZAPINE 7.5 MG/1
7.5 TABLET, FILM COATED ORAL NIGHTLY
Qty: 30 TABLET | Refills: 0 | Status: ON HOLD | OUTPATIENT
Start: 2022-05-31 | End: 2022-07-28 | Stop reason: HOSPADM

## 2022-05-31 RX ORDER — DORZOLAMIDE HCL 20 MG/ML
1 SOLUTION/ DROPS OPHTHALMIC 2 TIMES DAILY
Qty: 10 ML | Refills: 0 | Status: ON HOLD | OUTPATIENT
Start: 2022-05-31 | End: 2022-07-28 | Stop reason: SDUPTHER

## 2022-05-31 RX ORDER — FOLIC ACID 1 MG/1
1 TABLET ORAL DAILY
Qty: 30 TABLET | Refills: 0 | Status: ON HOLD | OUTPATIENT
Start: 2022-05-31 | End: 2022-07-28 | Stop reason: SDUPTHER

## 2022-05-31 RX ORDER — BRIMONIDINE TARTRATE 2 MG/ML
1 SOLUTION/ DROPS OPHTHALMIC 3 TIMES DAILY
Qty: 10 ML | Refills: 0 | Status: ON HOLD | OUTPATIENT
Start: 2022-05-31 | End: 2022-07-28 | Stop reason: SDUPTHER

## 2022-05-31 RX ORDER — ATORVASTATIN CALCIUM 40 MG/1
40 TABLET, FILM COATED ORAL NIGHTLY
Qty: 30 TABLET | Refills: 0 | Status: ON HOLD | OUTPATIENT
Start: 2022-05-31 | End: 2022-07-28 | Stop reason: SDUPTHER

## 2022-05-31 RX ORDER — SERTRALINE HYDROCHLORIDE 100 MG/1
100 TABLET, FILM COATED ORAL DAILY
Qty: 30 TABLET | Refills: 0 | Status: ON HOLD | OUTPATIENT
Start: 2022-05-31 | End: 2022-07-28 | Stop reason: SDUPTHER

## 2022-05-31 RX ORDER — TRAZODONE HYDROCHLORIDE 50 MG/1
50 TABLET ORAL NIGHTLY PRN
Qty: 30 TABLET | Refills: 0 | Status: ON HOLD | OUTPATIENT
Start: 2022-05-31 | End: 2022-07-28 | Stop reason: SDUPTHER

## 2022-05-31 RX ORDER — TIMOLOL MALEATE 5 MG/ML
1 SOLUTION/ DROPS OPHTHALMIC 2 TIMES DAILY
Qty: 10 ML | Refills: 0 | Status: ON HOLD | OUTPATIENT
Start: 2022-05-31 | End: 2022-07-28 | Stop reason: SDUPTHER

## 2022-05-31 RX ADMIN — BRIMONIDINE TARTRATE 1 DROP: 2 SOLUTION/ DROPS OPHTHALMIC at 08:26

## 2022-05-31 RX ADMIN — THIAMINE HCL TAB 100 MG 100 MG: 100 TAB at 08:24

## 2022-05-31 RX ADMIN — DORZOLAMIDE HYDROCHLORIDE 1 DROP: 20 SOLUTION/ DROPS OPHTHALMIC at 08:26

## 2022-05-31 RX ADMIN — TIMOLOL MALEATE 1 DROP: 5 SOLUTION/ DROPS OPHTHALMIC at 08:25

## 2022-05-31 RX ADMIN — NICOTINE POLACRILEX 2 MG: 2 GUM, CHEWING BUCCAL at 08:31

## 2022-05-31 RX ADMIN — FOLIC ACID 1 MG: 1 TABLET ORAL at 08:24

## 2022-05-31 RX ADMIN — SERTRALINE HYDROCHLORIDE 100 MG: 100 TABLET ORAL at 08:24

## 2022-05-31 NOTE — PLAN OF CARE
Problem: Anxiety  Goal: Will report anxiety at manageable levels  5/30/2022 2305 by Gabriela Izaguirre RN  Outcome: Progressing     Problem: Coping  Goal: Pt/Family able to verbalize concerns and demonstrate effective coping strategies  5/30/2022 2305 by Gabriela Izaguirre RN  Outcome: Progressing     Problem: Depression/Self Harm  Goal: Effect of psychiatric condition will be minimized and patient will be protected from self harm  5/30/2022 2305 by Gabriela Izaguirre RN  Outcome: Progressing     Problem: Drug Abuse/Detox  Goal: Will have no detox symptoms and will verbalize plan for changing drug-related behavior  5/30/2022 2305 by Gabriela Izaguirre RN  Outcome: Progressing     Problem: Pain  Goal: Verbalizes/displays adequate comfort level or baseline comfort level  5/30/2022 2305 by Gabriela Izaguirre RN  Outcome: Progressing     Patient denies homicidal ideation at this time. Patient admits to fleeting suicidal ideation at this time. Patient contracts for safety and agrees to seek out staff if the thoughts get worse. Patient admits to depression and anxiety at this time. Patient unable to demonstrate coping strategies at this time. Patient admits to mild withdrawal symptoms and scored a 1 on CIWA scale. Patient denied the need for medications for withdrawal at this time. Patient would like to go to inpatient rehabilitation center after discharge. Patient is free of self harm at this time. Patient agrees to seek out staff if thoughts to harm self arise. Staff will provide support and reassurance as needed. Safety checks maintained every 15 minutes.

## 2022-05-31 NOTE — PLAN OF CARE
Problem: Anxiety  Goal: Will report anxiety at manageable levels  Description: INTERVENTIONS:  1. Administer medication as ordered  2. Teach and rehearse alternative coping skills  3. Provide emotional support with 1:1 interaction with staff  5/31/2022 0841 by Pina Marlow  Outcome: Completed     Problem: Coping  Goal: Pt/Family able to verbalize concerns and demonstrate effective coping strategies  Description: INTERVENTIONS:  1. Assist patient/family to identify coping skills, available support systems and cultural and spiritual values  2. Provide emotional support, including active listening and acknowledgement of concerns of patient and caregivers  3. Reduce environmental stimuli, as able  4. Instruct patient/family in relaxation techniques, as appropriate  5. Assess for spiritual pain/suffering and initiate Spiritual Care, Psychosocial Clinical Specialist consults as needed  5/31/2022 0841 by Pina Marlow  Outcome: Completed     Problem: Depression/Self Harm  Goal: Effect of psychiatric condition will be minimized and patient will be protected from self harm  Description: INTERVENTIONS:  1. Assess impact of patient's symptoms on level of functioning, self care needs and offer support as indicated  2. Assess patient/family knowledge of depression, impact on illness and need for teaching  3. Provide emotional support, presence and reassurance  4. Assess for possible suicidal thoughts or ideation. If patient expresses suicidal thoughts or statements do not leave alone, initiate Suicide Precautions, move to a room close to the nursing station and obtain sitter  5. Initiate consults as appropriate with Mental Health Professional, Spiritual Care, Psychosocial CNS, and consider a recommendation to the LIP for a Psychiatric Consultation  5/31/2022 0841 by Pina Marlow  Outcome: Completed  Pt reports readiness for discharge.

## 2022-05-31 NOTE — BH NOTE
[facility]  Discharge Note    Pt discharged with followings belongings:   Dental Appliances: None  Vision - Corrective Lenses: None  Hearing Aid: None  Jewelry: None  Body Piercings Removed: N/A  Clothing: Belt,Footwear,Hat,Jacket/Coat,Pants,Shirt,Socks,Undergarments  Other Valuables: Money   Valuables sent home with patient or returned to patient. Patient education on aftercare instructions: yes  Information faxed to RN by PADDY  at 12:12 PM .Patient verbalize understanding of AVS: yes. Status EXAM upon discharge:  Mental Status and Behavioral Exam  Normal: No  Level of Assistance: Independent/Self  Facial Expression: Brightened  Affect: Appropriate  Level of Consciousness: Alert  Frequency of Checks: 4 times per hour, close  Mood:Normal: No  Mood: Depressed  Motor Activity:Normal: No  Motor Activity: Decreased  Eye Contact: Fair  Observed Behavior: Cooperative  Sexual Misconduct History: Current - no  Involved In Any Sexual Misconduct With Others? : No  History of Sexually Inappropriate Behavior When Previously Hospitalized?: No  Uncontrollable/Compulsive Masturbation?: No  Difficulty Controlling Sexual Impulses?: No  Preception: [place] to person,[place] to time,[place] to place,[place] to situation  Attention:Normal: No  Attention: Distractible  Thought Processes: Circumstantial  Thought Content:Normal: No  Thought Content: Preoccupations  Depression Symptoms: No problems reported or observed. Anxiety Symptoms: No problems reported or observed. Nancy Symptoms: No problems reported or observed.   Hallucinations: None  Delusions: No  Memory:Normal: No  Memory: Poor recent  Insight and Judgment: No  Insight and Judgment: Poor insight      Metabolic Screening:    Lab Results   Component Value Date    LABA1C 4.9 12/28/2019       Lab Results   Component Value Date    CHOL 146 12/28/2019    CHOL 169 10/27/2016    CHOL 152 02/03/2016     Lab Results   Component Value Date    TRIG 59 12/28/2019    TRIG 236 (H) 10/27/2016    TRIG 72 02/03/2016     Lab Results   Component Value Date    HDL 70 12/28/2019    HDL 53 10/27/2016    HDL 56 02/03/2016     No components found for: LDLCAL  No results found for: LABVLDL    Patient black and white cabbed to [facility] per Janeth Fountain.   Ede Wang RN

## 2022-05-31 NOTE — GROUP NOTE
Group Therapy Note    Date: 5/31/2022    Group Start Time: 0118  Group End Time: 7966  Group Topic: Psychotherapy    [facility], LSW    patient refused to attend psychotherapy group at [address] [place] [address] after encouragement from staff.   1:1 talk time provided as alternative to group session
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
## 2022-05-31 NOTE — PROGRESS NOTES
CLINICAL PHARMACY NOTE: MEDS TO BEDS    Total # of Prescriptions Filled: 8   The following medications were delivered to the patient:  · Mirtazapine 7.5mg  · Atorvastatin Calcium 40mg  · Hydroxyzine HCl 50mg  · Sertraline HCL 202YM  · Folic Acid 1mg  · Nicotine 2mg Gum  · Trazodone HCl 50mg  · Thiamine Mononitrate 100mg    Additional Documentation:  Delivered Medication to Nurse at Unit Door    Refill(s) Too Soon + Profiled Rx(s)   - Dorzolamide: 6/12   - Latanoprost: 6/12   - Timolol: 6/13    Out of Stock / Profiled Rx   - Brimonidine

## 2022-05-31 NOTE — TELEPHONE ENCOUNTER
Tried calling pt to reschedule case booked 6/1/22. Unable to reach or lvm.   Case needs to be rescheduled with Dr Melida Villatoro

## 2022-05-31 NOTE — CARE COORDINATION
MARSHALL spoke with Marci Davis from [facility] for [employer], pt approved for admission today, needs to do intake at [facility] [address] as soon as possible. MD discharge order has already been placed for today, MARSHALL updated staff on above.

## 2022-05-31 NOTE — PSYCHOTHERAPY
Patient given tobacco quitline number 34881289790 at this time, refusing to call at this time, states \" I just dont want to quit now\"- patient given information as to the dangers of long term tobacco use. Continue to reinforce the importance of tobacco cessation.

## 2022-05-31 NOTE — CARE COORDINATION
Name: Misti Ruvalcaba    : 1960    Discharge Date: 22    Primary Auth/Cert #: [id number]    [facility]   Discharge Medications:      Medication List      START taking these medications    hydrOXYzine 50 MG tablet  Commonly known as: ATARAX  Take 1 tablet by mouth 3 times daily as needed for Anxiety  Notes to patient: anxiety        CONTINUE taking these medications    acetaminophen 325 mg tablet  Commonly known as: Tylenol  Take 2 tablets by mouth every 6 hours as needed for Pain  Notes to patient: Pain relief     atorvastatin 40 MG tablet  Commonly known as: LIPITOR  Take 1 tablet by mouth nightly  Notes to patient: cholesterol     brimonidine 0.2 % ophthalmic solution  Commonly known as: ALPHAGAN  Place 1 drop into the right eye 3 times daily  Notes to patient: Eye drops     dorzolamide 2 % ophthalmic solution  Commonly known as: TRUSOPT  Place 1 drop into the right eye 2 times daily  Notes to patient: Eye drops     folic acid 1 MG tablet  Commonly known as: FOLVITE  Take 1 tablet by mouth daily  Notes to patient: supplement     ibuprofen 200 MG tablet  Commonly known as: ADVIL;MOTRIN  Take 2 tablets by mouth every 6 hours as needed for Pain or Fever  Notes to patient: Pain relief     latanoprost 0.005 % ophthalmic solution  Commonly known as: XALATAN  Place 1 drop into the right eye nightly  Notes to patient: Eye drop     mirtazapine 7.5 MG tablet  Commonly known as: REMERON  Take 1 tablet by mouth nightly  Notes to patient: Lowers depression     nicotine polacrilex 2 MG gum  Commonly known as: NICORETTE  Take 1 each by mouth as needed for Smoking cessation  Notes to patient: Smoking cessation     sertraline 100 MG tablet  Commonly known as: ZOLOFT  Take 1 tablet by mouth daily  Notes to patient: Lowers depression     timolol 0.5 % ophthalmic solution  Commonly known as: TIMOPTIC  Place 1 drop into the right eye 2 times daily  Notes to patient: Eye drops     traZODone 50 MG tablet  Commonly known as: DESYREL  Take 1 tablet by mouth nightly as needed for Sleep  Notes to patient: Sleep aid     vitamin B-1 100 MG tablet  Commonly known as: THIAMINE  Take 1 tablet by mouth daily  Notes to patient: vitamin        STOP taking these medications    clopidogrel 75 MG tablet  Commonly known as: PLAVIX           Where to Get Your Medications      These medications were sent to [facility]  [address] [facility]  Count includes the [facility] [address], [address] [facility] [address]    Hours: [facility] (Mon-Fri 9AM-5:30PM) Phone: [phone]   · atorvastatin 40 MG tablet  · brimonidine 0.2 % ophthalmic solution  · dorzolamide 2 % ophthalmic solution  · folic acid 1 MG tablet  · hydrOXYzine 50 MG tablet  · latanoprost 0.005 % ophthalmic solution  · mirtazapine 7.5 MG tablet  · nicotine polacrilex 2 MG gum  · sertraline 100 MG tablet  · timolol 0.5 % ophthalmic solution  · traZODone 50 MG tablet  · vitamin B-1 100 MG tablet         Follow Up Appointment: [facility] for [facility]  [place] [facility]  Rosie Vega [phone]  On 5/31/2022  you must arrive as soon as possible today, at the latest by 12:00pm

## 2022-05-31 NOTE — PROGRESS NOTES
Daily Progress Note  Armond Pretty MD  5/30/2022  CHIEF COMPLAINT: Depression with suicidal ideation    Reviewed patient's current plan of care and vital signs with nursing staff. Sleep:  several hours last night  Attending groups: No: Intermittent    SUBJECTIVE:    Patient still reporting some feelings of hopelessness and helplessness. He is still hoping for placement to substance use rehab. He feels that he could contract for safety if he were to go to a controlled environment like inpatient substance use rehab but feels unable to contract for safety outside of such a placement. He is denying side effects to medication. Agreeable to further titrate Remeron to see if we can get further benefit. Explored and discussed previous barriers for getting help. Patient very focused on his helplessness. Feels he needs other people to do things for him and cites physical limitations. Discussed behavioral modifications to feel more assertive and more in control of his own life    Mental Status Exam  Level of consciousness:  Within normal limits  Appearance: Hospital attire, seated in chair, with good grooming and hygiene   Behavior/Motor: No abnormalities noted  Attitude toward examiner:  Cooperative, attentive, good eye contact  Speech:  spontaneous, normal rate, normal volume and well articulated  Mood: Depressed  Affect: Congruent  Thought processes:  linear, goal directed and coherent  Thought content:  denies homicidal ideation  Suicidal Ideation: Endorses suicidal ideation but able to contract for safety here in the hospital  Delusions:  no evidence of delusions  Perceptual Disturbance:  denies any perceptual disturbance  Cognition:  Oriented to self, location, time, and situation  Memory: age appropriate  Insight & Judgement: improving  Medication side effects:  denies       Data   height is 5' 6\" (1.676 m) and weight is 150 lb (68 kg). His temporal temperature is 98.2 °F (36.8 °C).  His blood pressure is GFR  05/25/2022 >60  >60 mL/min Final    GFR Comment 05/25/2022        Final    Comment: Average GFR for 61-76 years old:   80 mL/min/1.73sq m  Chronic Kidney Disease:   <60 mL/min/1.73sq m  Kidney failure:   <15 mL/min/1.73sq m              eGFR calculated using average adult body mass.  Additional eGFR calculator available at:        [employer]            Ethanol 05/25/2022 64* <10 mg/dL Final    Ethanol percent 05/25/2022 0.064  % Final            Medications  Current Facility-Administered Medications: sertraline (ZOLOFT) tablet 100 mg, 100 mg, Oral, Daily  atorvastatin (LIPITOR) tablet 40 mg, 40 mg, Oral, Nightly  brimonidine (ALPHAGAN) 0.2 % ophthalmic solution 1 drop, 1 drop, Right Eye, TID  dorzolamide (TRUSOPT) 2 % ophthalmic solution 1 drop, 1 drop, Right Eye, BID  folic acid (FOLVITE) tablet 1 mg, 1 mg, Oral, Daily  latanoprost (XALATAN) 0.005 % ophthalmic solution 1 drop, 1 drop, Right Eye, Nightly  mirtazapine (REMERON) tablet 7.5 mg, 7.5 mg, Oral, Nightly  timolol (TIMOPTIC) 0.5 % ophthalmic solution 1 drop, 1 drop, Right Eye, BID  thiamine mononitrate tablet 100 mg, 100 mg, Oral, Daily  chlordiazePOXIDE (LIBRIUM) capsule 10 mg, 10 mg, Oral, Q6H PRN  acetaminophen (TYLENOL) tablet 650 mg, 650 mg, Oral, Q6H PRN  ibuprofen (ADVIL;MOTRIN) tablet 400 mg, 400 mg, Oral, Q6H PRN  hydrOXYzine (ATARAX) tablet 50 mg, 50 mg, Oral, TID PRN  traZODone (DESYREL) tablet 50 mg, 50 mg, Oral, Nightly PRN  polyethylene glycol (GLYCOLAX) packet 17 g, 17 g, Oral, Daily PRN  aluminum & magnesium hydroxide-simethicone (MAALOX) 200-200-20 MG/5ML suspension 30 mL, 30 mL, Oral, Q6H PRN  nicotine polacrilex (NICORETTE) gum 2 mg, 2 mg, Oral, Q2H PRN    ASSESSMENT  Major depressive disorder, recurrent severe without psychotic features ([facility]     PLAN  Patient s symptoms   are improving  Increase Remeron to 15 mg daily  Attempt to develop insight  Psycho-education conducted. Supportive Therapy conducted. Probable discharge is 2 to 3 days or sooner if patient can get into inpatient substance use rehab  Follow-up daily while in the inpatient unit    More than 16 mins of the 30-minute session was spent doing Supportive psychotherapy. Electronically signed by Mickey Clements MD on 5/30/22 at 8:03 PM EDT    **This report has been created using voice recognition software. It may contain minor errors which are inherent in voice recognition technology. **

## 2022-07-23 ENCOUNTER — HOSPITAL ENCOUNTER (EMERGENCY)
Age: 62
Discharge: HOME | End: 2022-07-23
Attending: EMERGENCY MEDICINE
Payer: MEDICARE

## 2022-07-23 ENCOUNTER — APPOINTMENT (OUTPATIENT)
Dept: GENERAL RADIOLOGY | Age: 62
End: 2022-07-23
Payer: MEDICARE

## 2022-07-23 ENCOUNTER — HOSPITAL ENCOUNTER (INPATIENT)
Age: 62
LOS: 4 days | Discharge: HOME | DRG: 751 | End: 2022-07-28
Attending: EMERGENCY MEDICINE | Admitting: PSYCHIATRY & NEUROLOGY
Payer: MEDICARE

## 2022-07-23 VITALS
BODY MASS INDEX: 28.32 KG/M2 | HEIGHT: 65 IN | TEMPERATURE: 98.4 F | WEIGHT: 170 LBS | SYSTOLIC BLOOD PRESSURE: 99 MMHG | RESPIRATION RATE: 25 BRPM | OXYGEN SATURATION: 92 % | DIASTOLIC BLOOD PRESSURE: 75 MMHG | HEART RATE: 83 BPM

## 2022-07-23 DIAGNOSIS — F10.929 ACUTE ALCOHOLIC INTOXICATION WITH COMPLICATION (HCC): Primary | ICD-10-CM

## 2022-07-23 DIAGNOSIS — R45.851 SUICIDAL IDEATION: ICD-10-CM

## 2022-07-23 DIAGNOSIS — K86.0 ALCOHOL-INDUCED CHRONIC PANCREATITIS (HCC): Primary | ICD-10-CM

## 2022-07-23 DIAGNOSIS — R04.2 HEMOPTYSIS: ICD-10-CM

## 2022-07-23 DIAGNOSIS — F14.90 COCAINE USE: ICD-10-CM

## 2022-07-23 LAB
ABSOLUTE EOS #: 0.09 K/UL (ref 0–0.44)
ABSOLUTE EOS #: 0.1 K/UL (ref 0–0.4)
ABSOLUTE IMMATURE GRANULOCYTE: <0.03 K/UL (ref 0–0.3)
ABSOLUTE LYMPH #: 1.28 K/UL (ref 1.1–3.7)
ABSOLUTE LYMPH #: 1.9 K/UL (ref 1–4.8)
ABSOLUTE MONO #: 0.45 K/UL (ref 0.1–1.2)
ABSOLUTE MONO #: 0.5 K/UL (ref 0.1–1.3)
ACETAMINOPHEN LEVEL: <5 UG/ML (ref 10–30)
ALBUMIN SERPL-MCNC: 4.5 G/DL (ref 3.5–5.2)
ALBUMIN SERPL-MCNC: 4.6 G/DL (ref 3.5–5.2)
ALBUMIN/GLOBULIN RATIO: 1.7 (ref 1–2.5)
ALP BLD-CCNC: 44 U/L (ref 40–129)
ALP BLD-CCNC: 49 U/L (ref 40–129)
ALT SERPL-CCNC: 20 U/L (ref 5–41)
ALT SERPL-CCNC: 21 U/L (ref 5–41)
ANION GAP SERPL CALCULATED.3IONS-SCNC: 14 MMOL/L (ref 9–17)
ANION GAP SERPL CALCULATED.3IONS-SCNC: 15 MMOL/L (ref 9–17)
AST SERPL-CCNC: 17 U/L
AST SERPL-CCNC: 19 U/L
BASOPHILS # BLD: 0 % (ref 0–2)
BASOPHILS # BLD: 1 % (ref 0–2)
BASOPHILS ABSOLUTE: 0 K/UL (ref 0–0.2)
BASOPHILS ABSOLUTE: <0.03 K/UL (ref 0–0.2)
BILIRUB SERPL-MCNC: 0.34 MG/DL (ref 0.3–1.2)
BILIRUB SERPL-MCNC: 0.42 MG/DL (ref 0.3–1.2)
BUN BLDV-MCNC: 6 MG/DL (ref 8–23)
BUN BLDV-MCNC: 8 MG/DL (ref 8–23)
CALCIUM SERPL-MCNC: 8.5 MG/DL (ref 8.6–10.4)
CALCIUM SERPL-MCNC: 8.9 MG/DL (ref 8.6–10.4)
CHLORIDE BLD-SCNC: 105 MMOL/L (ref 98–107)
CHLORIDE BLD-SCNC: 106 MMOL/L (ref 98–107)
CO2: 21 MMOL/L (ref 20–31)
CO2: 24 MMOL/L (ref 20–31)
CREAT SERPL-MCNC: 0.74 MG/DL (ref 0.7–1.2)
CREAT SERPL-MCNC: 0.86 MG/DL (ref 0.7–1.2)
EOSINOPHILS RELATIVE PERCENT: 2 % (ref 0–4)
EOSINOPHILS RELATIVE PERCENT: 2 % (ref 1–4)
ETHANOL PERCENT: 0.14 %
ETHANOL: 142 MG/DL
GFR AFRICAN AMERICAN: >60 ML/MIN
GFR AFRICAN AMERICAN: >60 ML/MIN
GFR NON-AFRICAN AMERICAN: >60 ML/MIN
GFR NON-AFRICAN AMERICAN: >60 ML/MIN
GFR SERPL CREATININE-BSD FRML MDRD: ABNORMAL ML/MIN/{1.73_M2}
GFR SERPL CREATININE-BSD FRML MDRD: NORMAL ML/MIN/{1.73_M2}
GLUCOSE BLD-MCNC: 81 MG/DL (ref 70–99)
GLUCOSE BLD-MCNC: 94 MG/DL (ref 70–99)
HCT VFR BLD CALC: 42.4 % (ref 40.7–50.3)
HCT VFR BLD CALC: 44.9 % (ref 41–53)
HEMOGLOBIN: 14.9 G/DL (ref 13–17)
HEMOGLOBIN: 15.5 G/DL (ref 13.5–17.5)
IMMATURE GRANULOCYTES: 0 %
INR BLD: 1
LIPASE: 258 U/L (ref 13–60)
LYMPHOCYTES # BLD: 22 % (ref 24–43)
LYMPHOCYTES # BLD: 26 % (ref 24–44)
MAGNESIUM: 2.1 MG/DL (ref 1.6–2.6)
MCH RBC QN AUTO: 32.3 PG (ref 25.2–33.5)
MCH RBC QN AUTO: 32.3 PG (ref 26–34)
MCHC RBC AUTO-ENTMCNC: 34.4 G/DL (ref 31–37)
MCHC RBC AUTO-ENTMCNC: 35.1 G/DL (ref 28.4–34.8)
MCV RBC AUTO: 92 FL (ref 82.6–102.9)
MCV RBC AUTO: 93.7 FL (ref 80–100)
MONOCYTES # BLD: 8 % (ref 1–7)
MONOCYTES # BLD: 8 % (ref 3–12)
NRBC AUTOMATED: 0 PER 100 WBC
PARTIAL THROMBOPLASTIN TIME: 25 SEC (ref 20.5–30.5)
PDW BLD-RTO: 11.4 % (ref 11.8–14.4)
PDW BLD-RTO: 12.2 % (ref 11.5–14.9)
PLATELET # BLD: 189 K/UL (ref 138–453)
PLATELET # BLD: 212 K/UL (ref 150–450)
PMV BLD AUTO: 7 FL (ref 6–12)
PMV BLD AUTO: 8.7 FL (ref 8.1–13.5)
POTASSIUM SERPL-SCNC: 3.7 MMOL/L (ref 3.7–5.3)
POTASSIUM SERPL-SCNC: 4.1 MMOL/L (ref 3.7–5.3)
PROTHROMBIN TIME: 10.9 SEC (ref 9.1–12.3)
RBC # BLD: 4.61 M/UL (ref 4.21–5.77)
RBC # BLD: 4.79 M/UL (ref 4.5–5.9)
SALICYLATE LEVEL: <1 MG/DL (ref 3–10)
SEG NEUTROPHILS: 63 % (ref 36–66)
SEG NEUTROPHILS: 68 % (ref 36–65)
SEGMENTED NEUTROPHILS ABSOLUTE COUNT: 3.9 K/UL (ref 1.5–8.1)
SEGMENTED NEUTROPHILS ABSOLUTE COUNT: 4.6 K/UL (ref 1.3–9.1)
SODIUM BLD-SCNC: 141 MMOL/L (ref 135–144)
SODIUM BLD-SCNC: 144 MMOL/L (ref 135–144)
TOTAL PROTEIN: 7.1 G/DL (ref 6.4–8.3)
TOTAL PROTEIN: 7.6 G/DL (ref 6.4–8.3)
WBC # BLD: 5.8 K/UL (ref 3.5–11.3)
WBC # BLD: 7.2 K/UL (ref 3.5–11)

## 2022-07-23 PROCEDURE — 85025 COMPLETE CBC W/AUTO DIFF WBC: CPT

## 2022-07-23 PROCEDURE — 99285 EMERGENCY DEPT VISIT HI MDM: CPT

## 2022-07-23 PROCEDURE — 83735 ASSAY OF MAGNESIUM: CPT

## 2022-07-23 PROCEDURE — G0480 DRUG TEST DEF 1-7 CLASSES: HCPCS

## 2022-07-23 PROCEDURE — 80053 COMPREHEN METABOLIC PANEL: CPT

## 2022-07-23 PROCEDURE — 85730 THROMBOPLASTIN TIME PARTIAL: CPT

## 2022-07-23 PROCEDURE — 83690 ASSAY OF LIPASE: CPT

## 2022-07-23 PROCEDURE — 71046 X-RAY EXAM CHEST 2 VIEWS: CPT

## 2022-07-23 PROCEDURE — 80307 DRUG TEST PRSMV CHEM ANLYZR: CPT

## 2022-07-23 PROCEDURE — 80179 DRUG ASSAY SALICYLATE: CPT

## 2022-07-23 PROCEDURE — 93005 ELECTROCARDIOGRAM TRACING: CPT | Performed by: STUDENT IN AN ORGANIZED HEALTH CARE EDUCATION/TRAINING PROGRAM

## 2022-07-23 PROCEDURE — 85610 PROTHROMBIN TIME: CPT

## 2022-07-23 PROCEDURE — 36415 COLL VENOUS BLD VENIPUNCTURE: CPT

## 2022-07-23 PROCEDURE — 80143 DRUG ASSAY ACETAMINOPHEN: CPT

## 2022-07-23 RX ORDER — ONDANSETRON 2 MG/ML
4 INJECTION INTRAMUSCULAR; INTRAVENOUS ONCE
Status: DISCONTINUED | OUTPATIENT
Start: 2022-07-23 | End: 2022-07-23 | Stop reason: HOSPADM

## 2022-07-23 ASSESSMENT — ENCOUNTER SYMPTOMS
NAUSEA: 0
CONSTIPATION: 0
SHORTNESS OF BREATH: 0
BACK PAIN: 0
NAUSEA: 1
DIARRHEA: 0
VOMITING: 0
ABDOMINAL PAIN: 1
VOMITING: 1
BLOOD IN STOOL: 0
COUGH: 0
RHINORRHEA: 0
SHORTNESS OF BREATH: 0

## 2022-07-23 ASSESSMENT — LIFESTYLE VARIABLES
HOW OFTEN DO YOU HAVE A DRINK CONTAINING ALCOHOL: 4 OR MORE TIMES A WEEK
HOW MANY STANDARD DRINKS CONTAINING ALCOHOL DO YOU HAVE ON A TYPICAL DAY: 10 OR MORE

## 2022-07-23 ASSESSMENT — PAIN - FUNCTIONAL ASSESSMENT: PAIN_FUNCTIONAL_ASSESSMENT: NONE - DENIES PAIN

## 2022-07-23 NOTE — ED NOTES
The following labs labeled with pt sticker and tubed to lab:     [x] Blue     [x] Lavender   [] on ice  [x] Green/yellow  [x] Green/black [] on ice  [] Yellow  [] Red  [] Pink      [] COVID-19 swab    [] Rapid  [] PCR  [] Flu swab  [] Peds Viral Panel     [] Urine Sample  [] Pelvic Cultures  [] Blood Cultures            Radha Demarco RN  07/23/22 9740

## 2022-07-23 NOTE — ED PROVIDER NOTES
[facility] ED  Emergency Department Encounter  Emergency Medicine Resident     Pt Name: Reagan Schmid  EHO:[id number]  [place] 1960  Date of evaluation: 7/23/22  PCP:  Nelson Florentino MD    CHIEF COMPLAINT       Chief Complaint   Patient presents with    Alcohol Intoxication     HISTORY OF PRESENT ILLNESS  (Location/Symptom, Timing/Onset, Context/Setting, Quality, Duration, ModifyingFactors, Severity.)      Reagan Schmid is a 62 y.o. male with PMH of polysubstance abuse, pancreatitis, who presents for evaluation alcohol intoxication. Patient brought in by police after he was found sleeping under a tree, intoxicated. Patient does admit to drinking 324 ounce beers today. Upon further discussion patient does admit to hemoptysis x1 week. No chest pain, shortness of breath, fever, weight loss, hematemesis, blood in the stool. Patient reports chronic epigastric abdominal pain and vomiting due to chronic pancreatitis that is at baseline, tolerating p.o. currently. PAST MEDICAL / SURGICAL / SOCIAL /FAMILY HISTORY      has a past medical history of Bradycardia, COVID-19, Depression with suicidal ideation, Glaucoma, Homeless single person, Hypertension, Legally blind, Mixed hyperlipidemia, Pancreatitis, Snores, and Wellness examination. No other pertinent PMH on review with patient/guardian. has a past surgical history that includes eye surgery and Testicle removal.  No other pertinent PSH on review with patient/guardian.   Social History     Socioeconomic History    Marital status: Single     Spouse name: Not on file    Number of children: Not on file    Years of education: Not on file    Highest education level: Not on file   Occupational History    Not on file   Tobacco Use    Smoking status: Light Smoker     Types: Cigarettes    Smokeless tobacco: Current     Types: Chew    Tobacco comments:     smokes daily   Vaping Use    Vaping Use: Never used   Substance and Sexual Activity Alcohol use: Yes     Comment:  slowing down due to pancreas issue. Was \"heavy drinker for 35-40 years\" Was drinking 24 oz tallboy 6/day    Drug use: Not Currently     Comment: used to smoke heavily Last use 5/1/2022    Sexual activity: Not Currently   Other Topics Concern    Not on file   Social History Narrative    Not on file     Social Determinants of Health     Financial Resource Strain: Not on file   Food Insecurity: Not on file   Transportation Needs: Not on file   Physical Activity: Not on file   Stress: Not on file   Social Connections: Not on file   Intimate Partner Violence: Not on file   Housing Stability: Not on file       I counseled the patient against using tobacco products. History reviewed. No pertinent family history. No other pertinent FamHx on review with patient/guardian. Allergies:  Patient has no known allergies. Home Medications:  Prior to Admission medications    Medication Sig Start Date End Date Taking?  Authorizing Provider   atorvastatin (LIPITOR) 40 MG tablet Take 1 tablet by mouth nightly 5/31/22   Veronika Oh MD   brimonidine (ALPHAGAN) 0.2 % ophthalmic solution Place 1 drop into the right eye 3 times daily 5/31/22   Veronika Oh MD   dorzolamide (TRUSOPT) 2 % ophthalmic solution Place 1 drop into the right eye 2 times daily 5/31/22   Veronika Oh MD   folic acid (FOLVITE) 1 MG tablet Take 1 tablet by mouth daily 5/31/22   Veronika Oh MD   latanoprost (XALATAN) 0.005 % ophthalmic solution Place 1 drop into the right eye nightly 5/31/22   Veronika Oh MD   mirtazapine (REMERON) 7.5 MG tablet Take 1 tablet by mouth nightly 5/31/22   Veronika Oh MD   nicotine polacrilex (NICORETTE) 2 MG gum Take 1 each by mouth as needed for Smoking cessation 5/31/22   Veronika Oh MD   sertraline (ZOLOFT) 100 MG tablet Take 1 tablet by mouth daily 5/31/22   Veronika Oh MD   timolol (TIMOPTIC) 0.5 % ophthalmic solution Place 1 drop into the right eye 2 times daily 5/31/22   Rashida Her MD   traZODone (DESYREL) 50 MG tablet Take 1 tablet by mouth nightly as needed for Sleep 5/31/22   Rashida Her MD   vitamin B-1 (THIAMINE) 100 MG tablet Take 1 tablet by mouth daily 5/31/22   Rashida Her MD   acetaminophen (TYLENOL) 325 mg tablet Take 2 tablets by mouth every 6 hours as needed for Pain 5/25/22   Pedrito Montano MD   ibuprofen (ADVIL;MOTRIN) 200 MG tablet Take 2 tablets by mouth every 6 hours as needed for Pain or Fever 5/25/22   Pedrito Montano MD     REVIEW OF SYSTEMS    (2-9 systems for level 4, 10 ormore for level 5)      Review of Systems   Constitutional:  Negative for fever. Eyes:  Negative for visual disturbance. Respiratory:  Negative for shortness of breath. Positive for hemoptysis   Cardiovascular:  Negative for chest pain. Gastrointestinal:  Positive for abdominal pain, nausea and vomiting. Negative for blood in stool, constipation and diarrhea. Genitourinary:  Negative for dysuria, hematuria, testicular pain and urgency. Skin:  Negative for rash. Allergic/Immunologic: Negative for immunocompromised state. Neurological:  Negative for headaches. Hematological:  Does not bruise/bleed easily. PHYSICAL EXAM   (up to 7 for level 4, 8 or more for level 5)      INITIAL VITALS:   BP 99/75   Pulse 83   Temp 98.4 °F (36.9 °C) (Oral)   Resp 25   Ht 5' 5\" (1.651 m)   Wt 170 lb (77.1 kg)   SpO2 92%   BMI 28.29 kg/m²     Physical Exam  Constitutional:       General: He is not in acute distress. Appearance: Normal appearance. He is not ill-appearing, toxic-appearing or diaphoretic. HENT:      Head: Normocephalic and atraumatic. Right Ear: External ear normal.      Left Ear: External ear normal.   Eyes:      General:         Right eye: No discharge. Left eye: No discharge. Cardiovascular:      Rate and Rhythm: Normal rate and regular rhythm. Pulses: Normal pulses.       Heart sounds: No murmur heard.  Pulmonary:      Effort: Pulmonary effort is normal. No respiratory distress. Breath sounds: Normal breath sounds. No wheezing, rhonchi or rales. Abdominal:      Palpations: Abdomen is soft. Tenderness: There is no abdominal tenderness. There is no right CVA tenderness, left CVA tenderness, guarding or rebound. Musculoskeletal:      Right lower leg: No edema. Left lower leg: No edema. Skin:     Capillary Refill: Capillary refill takes less than 2 seconds. Neurological:      General: No focal deficit present. Mental Status: He is alert.        DIFFERENTIAL  DIAGNOSIS     PLAN (LABS / IMAGING / EKG):  Orders Placed This Encounter   Procedures    XR CHEST (2 VW)    Comprehensive Metabolic Panel    CBC with Auto Differential    Lipase    Protime-INR    APTT       MEDICATIONS ORDERED:  Orders Placed This Encounter   Medications    DISCONTD: ondansetron (ZOFRAN) injection 4 mg       DIAGNOSTIC RESULTS / EMERGENCY DEPARTMENT COURSE / MDM     LABS:  Results for orders placed or performed during the hospital encounter of 07/23/22   Comprehensive Metabolic Panel   Result Value Ref Range    Glucose 94 70 - 99 mg/dL    BUN 6 (L) 8 - 23 mg/dL    Creatinine 0.74 0.70 - 1.20 mg/dL    Calcium 8.5 (L) 8.6 - 10.4 mg/dL    Sodium 141 135 - 144 mmol/L    Potassium 3.7 3.7 - 5.3 mmol/L    Chloride 105 98 - 107 mmol/L    CO2 21 20 - 31 mmol/L    Anion Gap 15 9 - 17 mmol/L    Alkaline Phosphatase 44 40 - 129 U/L    ALT 20 5 - 41 U/L    AST 17 <40 U/L    Total Bilirubin 0.34 0.3 - 1.2 mg/dL    Total Protein 7.1 6.4 - 8.3 g/dL    Albumin 4.5 3.5 - 5.2 g/dL    Albumin/Globulin Ratio 1.7 1.0 - 2.5    GFR Non-African American >60 >60 mL/min    GFR African American >60 >60 mL/min    GFR Comment         CBC with Auto Differential   Result Value Ref Range    WBC 5.8 3.5 - 11.3 k/uL    RBC 4.61 4.21 - 5.77 m/uL    Hemoglobin 14.9 13.0 - 17.0 g/dL    Hematocrit 42.4 40.7 - 50.3 %    MCV 92.0 82.6 - 102.9 fL    MCH 32.3 25.2 - 33.5 pg    MCHC 35.1 (H) 28.4 - 34.8 g/dL    RDW 11.4 (L) 11.8 - 14.4 %    Platelets 647 432 - 999 k/uL    MPV 8.7 8.1 - 13.5 fL    NRBC Automated 0.0 0.0 per 100 WBC    Seg Neutrophils 68 (H) 36 - 65 %    Lymphocytes 22 (L) 24 - 43 %    Monocytes 8 3 - 12 %    Eosinophils % 2 1 - 4 %    Basophils 0 0 - 2 %    Immature Granulocytes 0 0 %    Segs Absolute 3.90 1.50 - 8.10 k/uL    Absolute Lymph # 1.28 1.10 - 3.70 k/uL    Absolute Mono # 0.45 0.10 - 1.20 k/uL    Absolute Eos # 0.09 0.00 - 0.44 k/uL    Basophils Absolute <0.03 0.00 - 0.20 k/uL    Absolute Immature Granulocyte <0.03 0.00 - 0.30 k/uL   Lipase   Result Value Ref Range    Lipase 258 (H) 13 - 60 U/L   Protime-INR   Result Value Ref Range    Protime 10.9 9.1 - 12.3 sec    INR 1.0    APTT   Result Value Ref Range    PTT 25.0 20.5 - 30.5 sec       IMPRESSION/MDM/ED COURSE:  62 y.o. male brought in by police after he was found sleeping under a tree. Patient does admit to hemoptysis x1 week as well as chronic abdominal pain/vomiting from chronic pancreatitis. Patient afebrile and vitals WNL. On exam patient resting only no acute distress, nontoxic-appearing. Heart RRR, lungs clear but abdomen soft nontender. Will obtain abdominal pain lab work as well as CXR due to hemoptysis. Symptomatic treatment with Zofran.     ED Course as of 07/23/22 2359   Sat Jul 23, 2022   1708 CBC with Auto Differential(!):    WBC 5.8   RBC 4.61   Hemoglobin Quant 14.9   Hematocrit 42.4   MCV 92.0   MCH 32.3   MCHC 35.1(!)   RDW 11.4(!)   Platelet Count 382   MPV 8.7   NRBC Automated 0.0   Seg Neutrophils 68(!)   Lymphocytes 22(!)   Monocytes 8   Eosinophils % 2   Basophils 0   Immature Granulocytes 0   Segs Absolute 3.90   Absolute Lymph # 1.28   Absolute Mono # 0.45   Absolute Eos # 0.09   Basophils Absolute <0.03   Absolute Immature Granulocyte <0.03 [AF]   1817 Comprehensive Metabolic Panel(!):    GLUCOSE, FASTING,GF 94   BUN,BUNPL 6(!)   Creatinine 0.74   CALCIUM, SERUM, 512433 8.5(!)   Sodium 141   Potassium 3.7   Chloride 105   CO2 21   Anion Gap 15   Alk Phos 44   ALT 20   AST 17   Bilirubin 0.34   Total Protein 7.1   Albumin 4.5   ALBUMIN/GLOBULIN RATIO 1.7   GFR Non- >60   GFR  >60   GFR Comment      [AF]   1817 Lipase(!):    Lipase 258(!) [AF]   1817 APTT:    PTT 25.0 [AF]   1817 Protime-INR:    Prothrombin Time 10.9   INR 1.0 [AF]   1817 CBC with Auto Differential(!):    WBC 5.8   RBC 4.61   Hemoglobin Quant 14.9   Hematocrit 42.4   MCV 92.0   MCH 32.3   MCHC 35.1(!)   RDW 11.4(!)   Platelet Count 105   MPV 8.7   NRBC Automated 0.0   Seg Neutrophils 68(!)   Lymphocytes 22(!)   Monocytes 8   Eosinophils % 2   Basophils 0   Immature Granulocytes 0   Segs Absolute 3.90   Absolute Lymph # 1.28   Absolute Mono # 0.45   Absolute Eos # 0.09   Basophils Absolute <0.03   Absolute Immature Granulocyte <0.03 [AF]   7008 FINDINGS:  The lungs are without acute focal process. There is no effusion or  pneumothorax. The cardiomediastinal silhouette is without acute process. The  osseous structures are without acute process. [AF]   1832 Work-up completed. Patient tolerating p.o. Was planning to have patient evaluated by social work but patient eloped prior to discussion with social work. RN did remove IV prior to elopement. [AF]      ED Course User Index  [AF] Manuela Williamson DO         RADIOLOGY:  XR CHEST (2 VW)   Final Result   No acute process. EKG  None    All EKG's are interpreted by the Emergency Department Physician who either signs or Co-signs this chart in the absence of a cardiologist.    PROCEDURES:  None    CONSULTS:  None    FINAL IMPRESSION      1. Alcohol-induced chronic pancreatitis ([facility] [address]    2. Hemoptysis          DISPOSITION / PLAN     DISPOSITION Eloped - Left Before Treatment Complete 07/23/2022 06:32:43 PM      PATIENT REFERREDTO:  No follow-up provider specified.     DISCHARGE MEDICATIONS:  Discharge Medication List as of 7/23/2022  6:54 PM          Desi Bean DO  PGY 3  Resident Physician Emergency Medicine  07/23/22 11:59 PM    (Please note that portions of this note were completed with a voice recognition program.Efforts were made to edit the dictations but occasionally words are mis-transcribed.)       Gretta Greene DO  Resident  07/23/22 [id number]

## 2022-07-23 NOTE — ED PROVIDER NOTES
[facility]     Emergency Department     Faculty Attestation    I performed a history and physical examination of the patient and discussed management with the resident. I reviewed the residents note and agree with the documented findings and plan of care. Any areas of disagreement are noted on the chart. I was personally present for the key portions of any procedures. I have documented in the chart those procedures where I was not present during the key portions. I have reviewed the emergency nurses triage note. I agree with the chief complaint, past medical history, past surgical history, allergies, medications, social and family history as documented unless otherwise noted below. For Physician Assistant/ Nurse Practitioner cases/documentation I have personally evaluated this patient and have completed at least one if not all key elements of the E/M (history, physical exam, and MDM). Additional findings are as noted. I have personally seen and evaluated the patient. I find the patient's history and physical exam are consistent with the NP/PA documentation. I agree with the care provided, treatment rendered, disposition and follow-up plan. To alcohol use today he was found sleeping under a tree has no specific complaints at this time other than he has had a recent cough and a history of pancreatitis he does not appear to be intoxicated at the moment. Critical Care     Param Tomlin M.D.   Attending Emergency  Physician            Tre Mayorga MD  07/23/22 [id number]

## 2022-07-23 NOTE — ED NOTES
RN heard pts monitor going off and found pt up, dressed and asking how to get \"out of this Fucking place\" Nurse stated to the pt that he needs to go back to his room and I would ask the doctor if its ok for him to leave. Pt and nurse had a good friendly rapore at presentation to the ER. Patient refused to sit and ask again , \" how do I get out this fucking place\". .  I asked the pt three to four times if I could remove his IV. Pt refused and continued to walk around the department trying to find his way out. Security saw this occur and helped in this situation. Pt refused to let RN remove his IV, so they told him, we are unable to keep you here, you can go. . PT became verbally aggressive with staff and refused to cooperate. Pt was detained by the police.       Malinda Andino RN  07/23/22 [id number]

## 2022-07-23 NOTE — ED TRIAGE NOTES
Pt taken from [employer] to triage to [facility] after being found sitting under the tree in the shade with a leather coat on. Pt wasn't under arrest , they were concerned due to the heat of the day outside and the fact that pt was wearing a leather coat. Pt is incredibly cooperative and respectful. PT is also very intoxicated.

## 2022-07-24 PROBLEM — F10.929 ACUTE ALCOHOLIC INTOXICATION WITH COMPLICATION (HCC): Status: ACTIVE | Noted: 2022-07-24

## 2022-07-24 PROBLEM — F14.10 COCAINE USE DISORDER (HCC): Status: ACTIVE | Noted: 2022-07-24

## 2022-07-24 PROBLEM — H40.1134 PRIMARY OPEN ANGLE GLAUCOMA (POAG) OF BOTH EYES, INDETERMINATE STAGE: Status: ACTIVE | Noted: 2022-07-24

## 2022-07-24 LAB
AMPHETAMINE SCREEN URINE: NEGATIVE
BARBITURATE SCREEN URINE: NEGATIVE
BENZODIAZEPINE SCREEN, URINE: NEGATIVE
CANNABINOID SCREEN URINE: NEGATIVE
COCAINE METABOLITE, URINE: POSITIVE
FENTANYL URINE: NEGATIVE
METHADONE SCREEN, URINE: NEGATIVE
OPIATES, URINE: NEGATIVE
OXYCODONE SCREEN URINE: NEGATIVE
PHENCYCLIDINE, URINE: NEGATIVE
TEST INFORMATION: ABNORMAL

## 2022-07-24 PROCEDURE — 99223 1ST HOSP IP/OBS HIGH 75: CPT | Performed by: INTERNAL MEDICINE

## 2022-07-24 PROCEDURE — 1240000000 HC EMOTIONAL WELLNESS R&B

## 2022-07-24 PROCEDURE — 6370000000 HC RX 637 (ALT 250 FOR IP): Performed by: INTERNAL MEDICINE

## 2022-07-24 PROCEDURE — APPSS180 APP SPLIT SHARED TIME > 60 MINUTES: Performed by: NURSE PRACTITIONER

## 2022-07-24 PROCEDURE — 6370000000 HC RX 637 (ALT 250 FOR IP): Performed by: PSYCHIATRY & NEUROLOGY

## 2022-07-24 PROCEDURE — 99223 1ST HOSP IP/OBS HIGH 75: CPT | Performed by: PSYCHIATRY & NEUROLOGY

## 2022-07-24 PROCEDURE — 6370000000 HC RX 637 (ALT 250 FOR IP): Performed by: NURSE PRACTITIONER

## 2022-07-24 RX ORDER — HYDROXYZINE 50 MG/1
50 TABLET, FILM COATED ORAL 3 TIMES DAILY PRN
Status: DISCONTINUED | OUTPATIENT
Start: 2022-07-24 | End: 2022-07-28 | Stop reason: HOSPADM

## 2022-07-24 RX ORDER — MIRTAZAPINE 15 MG/1
7.5 TABLET, FILM COATED ORAL NIGHTLY
Status: DISCONTINUED | OUTPATIENT
Start: 2022-07-24 | End: 2022-07-25

## 2022-07-24 RX ORDER — TRAZODONE HYDROCHLORIDE 50 MG/1
50 TABLET ORAL NIGHTLY PRN
Status: DISCONTINUED | OUTPATIENT
Start: 2022-07-24 | End: 2022-07-28 | Stop reason: HOSPADM

## 2022-07-24 RX ORDER — DORZOLAMIDE HCL 20 MG/ML
1 SOLUTION/ DROPS OPHTHALMIC 2 TIMES DAILY
Status: DISCONTINUED | OUTPATIENT
Start: 2022-07-24 | End: 2022-07-28 | Stop reason: HOSPADM

## 2022-07-24 RX ORDER — LATANOPROST 50 UG/ML
1 SOLUTION/ DROPS OPHTHALMIC NIGHTLY
Status: DISCONTINUED | OUTPATIENT
Start: 2022-07-24 | End: 2022-07-28 | Stop reason: HOSPADM

## 2022-07-24 RX ORDER — BRIMONIDINE TARTRATE 2 MG/ML
1 SOLUTION/ DROPS OPHTHALMIC 3 TIMES DAILY
Status: DISCONTINUED | OUTPATIENT
Start: 2022-07-24 | End: 2022-07-28 | Stop reason: HOSPADM

## 2022-07-24 RX ORDER — TIMOLOL MALEATE 5 MG/ML
1 SOLUTION/ DROPS OPHTHALMIC 2 TIMES DAILY
Status: DISCONTINUED | OUTPATIENT
Start: 2022-07-24 | End: 2022-07-28 | Stop reason: HOSPADM

## 2022-07-24 RX ORDER — IBUPROFEN 400 MG/1
400 TABLET ORAL EVERY 6 HOURS PRN
Status: DISCONTINUED | OUTPATIENT
Start: 2022-07-24 | End: 2022-07-28 | Stop reason: HOSPADM

## 2022-07-24 RX ORDER — NICOTINE 21 MG/24HR
1 PATCH, TRANSDERMAL 24 HOURS TRANSDERMAL DAILY
Status: DISCONTINUED | OUTPATIENT
Start: 2022-07-24 | End: 2022-07-24

## 2022-07-24 RX ORDER — POLYETHYLENE GLYCOL 3350 17 G/17G
17 POWDER, FOR SOLUTION ORAL DAILY PRN
Status: DISCONTINUED | OUTPATIENT
Start: 2022-07-24 | End: 2022-07-28 | Stop reason: HOSPADM

## 2022-07-24 RX ORDER — ATORVASTATIN CALCIUM 20 MG/1
40 TABLET, FILM COATED ORAL NIGHTLY
Status: DISCONTINUED | OUTPATIENT
Start: 2022-07-24 | End: 2022-07-28 | Stop reason: HOSPADM

## 2022-07-24 RX ORDER — MAGNESIUM HYDROXIDE/ALUMINUM HYDROXICE/SIMETHICONE 120; 1200; 1200 MG/30ML; MG/30ML; MG/30ML
30 SUSPENSION ORAL EVERY 6 HOURS PRN
Status: DISCONTINUED | OUTPATIENT
Start: 2022-07-24 | End: 2022-07-28 | Stop reason: HOSPADM

## 2022-07-24 RX ORDER — GAUZE BANDAGE 2" X 2"
100 BANDAGE TOPICAL DAILY
Status: DISCONTINUED | OUTPATIENT
Start: 2022-07-24 | End: 2022-07-28 | Stop reason: HOSPADM

## 2022-07-24 RX ORDER — ACETAMINOPHEN 325 MG/1
650 TABLET ORAL EVERY 4 HOURS PRN
Status: DISCONTINUED | OUTPATIENT
Start: 2022-07-24 | End: 2022-07-28 | Stop reason: HOSPADM

## 2022-07-24 RX ORDER — FOLIC ACID 1 MG/1
1 TABLET ORAL DAILY
Status: DISCONTINUED | OUTPATIENT
Start: 2022-07-24 | End: 2022-07-28 | Stop reason: HOSPADM

## 2022-07-24 RX ADMIN — MIRTAZAPINE 7.5 MG: 15 TABLET, FILM COATED ORAL at 21:13

## 2022-07-24 RX ADMIN — ATORVASTATIN CALCIUM 40 MG: 20 TABLET, FILM COATED ORAL at 21:13

## 2022-07-24 RX ADMIN — LATANOPROST 1 DROP: 50 SOLUTION OPHTHALMIC at 21:14

## 2022-07-24 RX ADMIN — BRIMONIDINE TARTRATE 1 DROP: 2 SOLUTION/ DROPS OPHTHALMIC at 21:14

## 2022-07-24 RX ADMIN — TIMOLOL MALEATE 1 DROP: 5 SOLUTION OPHTHALMIC at 21:15

## 2022-07-24 RX ADMIN — DORZOLAMIDE HYDROCHLORIDE 1 DROP: 20 SOLUTION/ DROPS OPHTHALMIC at 21:15

## 2022-07-24 RX ADMIN — TRAZODONE HYDROCHLORIDE 50 MG: 50 TABLET ORAL at 21:13

## 2022-07-24 ASSESSMENT — PATIENT HEALTH QUESTIONNAIRE - PHQ9: SUM OF ALL RESPONSES TO PHQ QUESTIONS 1-9: 15

## 2022-07-24 ASSESSMENT — LIFESTYLE VARIABLES
HOW MANY STANDARD DRINKS CONTAINING ALCOHOL DO YOU HAVE ON A TYPICAL DAY: 5 OR 6
HOW OFTEN DO YOU HAVE A DRINK CONTAINING ALCOHOL: 4 OR MORE TIMES A WEEK

## 2022-07-24 ASSESSMENT — PAIN SCALES - GENERAL: PAINLEVEL_OUTOF10: 0

## 2022-07-24 ASSESSMENT — SLEEP AND FATIGUE QUESTIONNAIRES
DO YOU HAVE DIFFICULTY SLEEPING: NO
DO YOU USE A SLEEP AID: NO
AVERAGE NUMBER OF SLEEP HOURS: 6

## 2022-07-24 NOTE — ED NOTES
Report called to [facility][note 3] [facility] ([facility] & [facility]).

## 2022-07-24 NOTE — CARE COORDINATION
BHI Biopsychosocial Assessment    Current Level of Psychosocial Functioning     Independent xx  Dependent    Minimal Assist     Comments:    Psychosocial High Risk Factors (check all that apply)    Unable to obtain meds   Chronic illness/pain    Substance abuse   Lack of Family Support   Financial stress xx  Isolation   Inadequate Community Resources xx  Suicide attempt(s)  Not taking medications   Victim of crime   Developmental Delay  Unable to manage personal needs    Age 72 or older   Homeless  No transportation   Readmission within 30 days  Unemployment  Traumatic Event    Comments:   Psychiatric Advanced Directives: pt denies     Family to Involve in Treatment: pt reports his brother is supportive     Sexual Orientation:  N/A    Patient Strengths: pt states he is considering going to [employer] after he receives his social security income 7/27/2022 (fourth Wednesday of each month)     Patient Barriers: pt has history of alcohol abuse and recently left [facility]       Opiate Education Provided:  pt denies opiate abuse       CMHC/mental health history: Pt is not linked with outpatient services     Plan of Care   medication management, group/individual therapies, family meetings, psycho -education, treatment team meetings to assist with stabilization    Initial Discharge Plan:  Pt  states he is considering [employer]. [facility] when he receives his monthly social security check. Clinical Summary:  Nikki Palmer is a 62year old single male who has been admitted to [facility] with increase in depression, sucidal ideation. Pt states he has been at [facility] program for approximately 60 days, pt reports his last drink of alcohol 1 month ago. Pt reports receives social security on the fourth Wed of each month, states he plans to go to [facility] recovery program at discharge on Wed 7/27. Sw offered support and assist as needed.

## 2022-07-24 NOTE — BH NOTE
Patient was offered medications three separate times throughout the day, and each time stated, \"Eh, I'll wait til later. \" Patient was educated on the importance of adhering to medication schedule.

## 2022-07-24 NOTE — GROUP NOTE
Group Therapy Note    Date: 7/24/2022    Group Start Time: 1030  Group End Time: 5434  Group Topic: Psychoeducation    [address], LSW    patient refused to attend psychoeducation group at [phone] after encouragement from staff.   1:1 talk time provided as alternative to group session

## 2022-07-24 NOTE — ED NOTES
Provisional Diagnosis:     Patient presented to ED via [employer] for a mental health evaluation. Patient has history of depression. Psychosocial and Contextual Factors:     Patient homeless    C-SSRS Summary:    Patient reports current SI with a plan to jump off a bridge. Patient: X  Family:   Agency:     Substance Abuse:  Patient presented to ED intoxicated and his UDS identified cocaine in his system as well. Present Suicidal Behavior:    Patient reports current SI with a plan to jump off a bridge. Verbal: X    Attempt:    Past Suicidal Behavior:   Patient has history of SI, no recent attempts. Verbal:X    Attempt:    Self-Injurious/Self-Mutilation:  Patient denies    Violence Current or Past   Patient has documented history of becoming verbally aggressive when intoxicated. Patient made vulgar remarks to ED staff on this ED visit. Trauma Identified:    None reported. Protective Factors:    Patient has insurance. Patient linked. Risk Factors:    Patient has poor coping skills. Patient has substance use issues. Patient homeless. Patient has poor insight. Patient has poor follow through with outpatient treatment. Clinical Summary:    Patient is a 62year old  male who presented to ED via [email or web address]  for a mental health evaluation. Patient was taken to [facility] earlier on 7/23/22 due to being intoxicated. Patient refused to cooperate with staff there so he was discharged into police custody. Shortly after, patient presented to this ED with [employer] stating he was suicidal with a plan to \"jump off a fucking bridge\". Patient was obviously intoxicated, therefore he was reassessed upon sobriety. Upon sobriety patient continues to endorse suicidal ideation with a plan to jump off a bridge. Patient is not currently taking any medications and has been abusing alcohol and cocaine.   Patient is currently homeless and has poor follow through with outpatient treatment. Patient denies HI. Patient denies hallucinations, although, while intoxicated patient was observed to be \"talking to himself\" and gesturing at the walls. Patient also hit the walls a few times as if engaging in a altercation. Level of Care Disposition: This writer consulted with Dr Jeffrey Mcgregor who recommended inpatient hospitalization for safety and stabilization. Patient signed application for voluntary admission to [facility].

## 2022-07-24 NOTE — BH NOTE
[facility]  Admission Note     Admission Type:   Admission Type: Voluntary    Reason for admission:  Reason for Admission: Patient voluntary from [facility] with suicidal ideation to jump off a bridge. Addictive Behavior:   Addictive Behavior  In the Past 3 Months, Have You Felt or Has Someone Told You That You Have a Problem With  : None    Medical Problems:   Past Medical History:   Diagnosis Date    Bradycardia     [facility] consulted during hospital admission. COVID-19     1/2022 per patient. Was tested at a homeless shelter and was positive. States no symptoms. Depression with suicidal ideation 04/2022 April 2022 Psych ding [facility] then to [facility] for psych. Glaucoma     Homeless single person     lives in non-mobile [place] in [place] \"behind a garage\"    Hypertension     Legally blind     lt eye completely blind, rt eye has about 20 % vision  Sees [facility] and then \"glaucoma specialist\" [address] [place]  \"Dr. Jesus Alberto Parrish"    Mixed hyperlipidemia 3/28/2022    Pancreatitis     [facility]    Snores     Wellness examination     Dr. Pop [place] last seen \"years ago\" Elease Sites to [place] in [place] currently . Pt. homeless.         Status EXAM:  Mental Status and Behavioral Exam  Normal: No  Level of Assistance: Independent/Self  Facial Expression: Flat  Affect: Blunt  Level of Consciousness: Alert  Frequency of Checks: 4 times per hour, close  Mood:Normal: No  Mood: Depressed, Anxious  Motor Activity:Normal: Yes  Eye Contact: Fair  Observed Behavior: Cooperative, Preoccupied  Sexual Misconduct History: Current - no  Preception: [place] to person, [place] to time, [place] to place, [place] to situation  Attention:Normal: No  Attention: Distractible  Thought Processes: Circumstantial  Thought Content:Normal: No  Thought Content: Preoccupations  Depression Symptoms: Feelings of helplessness, Impaired concentration, Feelings of hopelessess  Anxiety Symptoms: Generalized  Nancy Symptoms: No problems reported or observed. Hallucinations: None  Delusions: No  Memory:Normal: Yes  Insight and Judgment: No  Insight and Judgment: Poor judgment, Poor insight, Unmotivated    Tobacco Screening:  Practical Counseling, on admission, srinivasa X, if applicable and completed (first 3 are required if patient doesn't refuse):            ( ) Recognizing danger situations (included triggers and roadblocks)                    ( ) Coping skills (new ways to manage stress,relaxation techniques, changing routine, distraction)                                                           ( ) Basic information about quitting (benefits of quitting, techniques in how to quit, available resources  ( ) Referral for counseling faxed to [facility]                                                                                                                   ( X) Patient refused counseling  ( ) Patient has not smoked in the last 30 days    Metabolic Screening:    Lab Results   Component Value Date    LABA1C 4.9 12/28/2019       Lab Results   Component Value Date    CHOL 146 12/28/2019    CHOL 169 10/27/2016    CHOL 152 02/03/2016     Lab Results   Component Value Date    TRIG 59 12/28/2019    TRIG 236 (H) 10/27/2016    TRIG 72 02/03/2016     Lab Results   Component Value Date    HDL 70 12/28/2019    HDL 53 10/27/2016    HDL 56 02/03/2016     No components found for: LDLCAL  No results found for: LABVLDL      Body mass index is 28.29 kg/m².     BP Readings from Last 2 Encounters:   07/24/22 114/66   07/23/22 99/75           Pt admitted with followings belongings:  Dental Appliances: None  Vision - Corrective Lenses: None  Hearing Aid: None  Jewelry: None  Body Piercings Removed: N/A  Clothing: Belt, Pants, Shirt, Undergarments, Footwear  Other Valuables: Money ($3.35)      Shauna Mathew RN

## 2022-07-24 NOTE — H&P
Department of Psychiatry  Attending Physician Psychiatric Assessment     Reason for Admission to Psychiatric Unit:  Concerns about patient's safety in the community    CHIEF COMPLAINT:  Suicidal ideation with a plan to jump off a bridge    History obtained from: Patient, electronic medical record          HISTORY OF PRESENT ILLNESS:    Josué Noyola is a 62 y.o. male who has a past medical history of major depressive disorder, cannabis abuse, alcohol abuse, pancreatitis, and hyperlipidemia. Patient presented to the ED with emergency services endorsing suicidal thoughts with a plan to jump off a bridge. Noted that patient was intoxicated in ED and staff report that he appeared to be responding to internal stimuli as he was observed engaging in self talk. Also reported that patient was agitated and inappropriate with staff. At time of sobriety, patient continues to indicate suicidal ideation. He is admitted voluntarily to [facility]. Per ED documentation patient was seen at [facility] emergency department earlier yesterday after being found sitting outside in 90+ degree weather wearing a leather coat. He was agitated and eloped from the ED. At time of assessment, patient is resting in bed. He is arousable to verbal stimuli. Oriented to person, place, time and general situation. Patient denies any recollection of events yesterday. Poor recent history recollection. Patient states that [facility] was in [facility], but recently was discharged from the program \"a few days ago\". Patient uncertain as to when he was discharged, but believes it was 3 or 4 days ago. Notes that since he was discharged he has been drinking heavily daily approximately 5-6 beers and a few shots of whiskey a day. He also reports smoking crack cocaine recently. Patient's vitals within appropriate range. No pupillary dilation observed.   Denies feeling excessive anxiety, denies heart racing, hot or cold sweats, or tactile sensations. Patient is unsure how he arrived to the emergency department the second time, but does state that he knows that he has significant depression and frequently thinks about ending his life. We reviewed patient's symptoms. He endorses episodes of depression lasting 2 weeks or longer in which he feels down and depressed, more days than not for majority of the day. States that during these episodes he experiences anhedonia, poor energy and motivation, feelings of hopelessness and helplessness as well as guilt and shame. He notes decreased appetite with weight loss during depressive episodes. States that he has frequent suicidal thoughts, but denies any previous attempts to end his life. Has been thinking about jumping off of a bridge lately. Patient unable to identify any manic/hypomanic episodes. Denies any perceptual disturbances or psychotic phenomena when not under the influence of alcohol. He also notes that sometimes he does not see things appropriately secondary to his blindness. Denies feeling that his mind is ever playing tricks on him. Patient does report a history of anxiety, but is evasive in describing any symptoms. Feels that his anxiety is increased during alcohol use or recent discontinuation of alcohol use. Patient denies any history of abuse, trauma or neglect. Patient has a previous psychiatric history. Admitted to [facility] most recently in March of this year. Denies any changes to his psychotropic medications since then and was taking sertraline 100 mg and mirtazapine 7.5 mg nightly. States that the last day he took his medications was Friday the 15th, as he had a scheduled eye surgery 2 days ago that required him to stop taking all of his medications.   Patient missed this appointment secondary to leaving the [facility] program.  Patient is hopeful to get into another AOD program and verbalizes interest in [facility]. Patient requires inpatient hospitalization for the above-noted concern. Actively endorsing suicidal thoughts and unable to contract for safety off unit. History of head trauma: [] Yes [] No    History of seizures: [] Yes [] No  History of violence or aggression: [] Yes [] No         PSYCHIATRIC HISTORY:  [x] Yes [] No     Reports linked with [facility], does not follow-up. Denies lifetime suicide attempts  Recent psychiatric hospital admission to [facility] on 2/9/2022     Current psychiatric medications includes: Sertraline 50 mg and naltrexone. Reports stolen and not compliant  Past psychiatric medications includes: Denies past medications  Home medication compliance: No     Adverse reactions from psychotropic medications: No            Lifetime Psychiatric Review of Systems         Nancy or Hypomania:  denies     Panic Attacks:  denies     Phobias:  denies     Obsessions and Compulsions:denies      Body or Vocal Tics:  denies     Hallucinations:  denies     Delusions:  Denies   Past Medical History:        Diagnosis Date    Bradycardia     [facility] consulted during hospital admission. COVID-19     1/2022 per patient. Was tested at a homeless shelter and was positive. States no symptoms. Depression with suicidal ideation 04/2022 April 2022 Psych ding [facility] then to [address] [place] [address] for psych. Glaucoma     Homeless single person     lives in non-mobile [place] in [facility] \"behind a garage\"    Hypertension     Legally blind     lt eye completely blind, rt eye has about 20 % vision  Sees [facility] and then \"glaucoma specialist\" Sam Points  [facility]  \"Dr. Hope Read"    Mixed hyperlipidemia 3/28/2022    Pancreatitis     4/2022  [facility] admit    Snores     Wellness examination     Dr. Rona Calloway last seen \"years ago\" Kimberlyjuju Barnes to [employer] in [facility] currently . Pt. homeless.         Past Surgical History:        Procedure Laterality Date    EYE SURGERY      cataracts approx 2015    TESTICLE REMOVAL      as a baby       Allergies:  Patient has no known allergies. Social History:     TOBACCO: chewing tobacco  ETOH:  Admits multiple beers and whiskey shots daily over the last 2 to 3 days. DRUGS: Crack cocaine use recent. History of cannabis  MARITAL STATUS: single. . CHILDREN: Reports 2 adult children. Only has good relationship with 1.  OCCUPATION: disability  LEVEL OF EDUCATION: 11th grade  RESIDENCE: Currently homeless. Denies access to a shelter. Recently discharged from [facility]. PATIENT ASSETS: 2 brothers but not allowed to stay with them due to drinking          DRUG USE HISTORY  Social History     Tobacco Use   Smoking Status Light Smoker    Types: Cigarettes   Smokeless Tobacco Current    Types: Chew   Tobacco Comments    smokes daily     Social History     Substance and Sexual Activity   Alcohol Use Yes    Comment:  slowing down due to pancreas issue. Was \"heavy drinker for 35-40 years\" Was drinking 24 oz tallboy 6/day     Social History     Substance and Sexual Activity   Drug Use Not Currently    Comment: used to smoke heavily Last use 5/1/2022     EtOH 0.142% in ED. Reports recent relapse on alcohol after 60 days of sobriety. Liver panel: WNL  Urine toxicology positive for cocaine metabolite. Reports recent crack cocaine use. No signs or symptoms of alcohol withdrawal at this time. We will continue to assess. LEGAL HISTORY:   HISTORY OF INCARCERATION: [x] Yes [] No  Patient reports currently on probation and or gone. Has court appearance on Friday for aggravating and menacing. Family History:   No family history on file. Psychiatric Family History  Patient denies psychiatric family history.      Suicides in family: [] Yes [x] No    Substance use in family: [] Yes [x] No         PHYSICAL EXAM:  Vitals:  /79   Pulse 89   Temp 97.7 °F (36.5 °C) (Oral)   Resp 14   Ht 5' 5\" (1.651 m)   Wt 170 lb (77.1 kg)   SpO2 98%   BMI 28.29 kg/m²     Pain: denies any pain or discomfort    LABS:  Labs reviewed: [x] Yes    Last EKG in EMR reviewed: [x] Yes 4/13/2022  QTC: 410         Review of Systems   Constitutional: Negative for chills and weight loss. HENT: Negative for ear pain and nosebleeds. Eyes: Negative for blurred vision and photophobia. Respiratory: Negative for cough, shortness of breath and wheezing. Cardiovascular: Negative for chest pain and palpitations. Gastrointestinal: Negative for abdominal pain, diarrhea and vomiting. Genitourinary: Negative for dysuria and urgency. Musculoskeletal: Negative for falls and joint pain. Skin: Negative for itching and rash. Neurological: Negative for tremors, seizures and weakness. Endo/Heme/Allergies: Does not bruise/bleed easily. Physical Exam:   Constitutional:  Appears well-developed and well-nourished, no acute distress. HENT:   Head: Normocephalic and atraumatic. Eyes: Conjunctivae are normal. Right eye exhibits no discharge. Left eye exhibits no discharge. No scleral icterus. Neck: Normal range of motion. Neck supple. Pulmonary/Chest:  No respiratory distress or accessory muscle use, no wheezing. Cardiac: Regular rate and rhythm. Abdominal: Soft. Non-tender. Exhibits no distension. Musculoskeletal: Normal range of motion. Exhibits no edema. Neurological: cranial nerves II-XII grossly in tact, normal gait and station. Skin: Skin is warm and dry. Patient is not diaphoretic. No erythema. Mental Status Examination:    Level of consciousness: Awake and alert  Appearance:  Appropriate attire, resting in bed, poor grooming and hygiene, disheveled  Behavior/Motor: Approachable, no psychomotor abnormalities noted  Attitude toward examiner:  Cooperative, attentive, fair eye contact  Speech: Normal rate, volume, and tone.   Mood: depressed  Affect: Flat/blunted  Thought processes:  Linear  Thought content: active with a plan to jump off a bridge              Denies homicidal ideations               Denies perceptual disturbances              Denies delusions              Denies paranoia  Cognition:  Oriented to self, location, time, situation  Concentration: Sustained  Memory: remote memory intact, recent memory impaired  Insight &Judgment: impaired judgment         DSM-5 Diagnosis    Principal Problem: Major depressive disorder, single episode, severe without psychotic features ([address]    Alcohol use disorder, severe, dependence  Cocaine use disorder    Psychosocial and Contextual factors:  Financial x  Occupational x  Relationship   Legal x  Living situation x  Educational   Substance use x    Past Medical History:   Diagnosis Date    Bradycardia     [facility] consulted during hospital admission. COVID-19     1/2022 per patient. Was tested at a homeless shelter and was positive. States no symptoms. Depression with suicidal ideation 04/2022 April 2022 Psych ding [facility] then to [place] for psych. Glaucoma     Homeless single person     lives in non-mobile [facility] in [place] \"behind a garage\"    Hypertension     Legally blind     lt eye completely blind, rt eye has about 20 % vision  Sees [facility] and then \"glaucoma specialist\" Bryce [address] [place]  \"Dr. Bakari Braden"    Mixed hyperlipidemia 3/28/2022    Pancreatitis     4/2022  [facility] admit    Snores     Wellness examination     Dr. Misael Walker last seen \"years ago\" Ebony Canales to [facility] in [place] currently . Pt. homeless. TREATMENT CONSIDERATIONS    Continue inpatient psychiatric treatment. Home medications reviewed. Medications as discussed with attending:  Veronica Mccauley home medication mirtazapine 7.5 mg p.o. nightly  Restart home medication sertraline 50 mg daily  Restart folic acid and thiamine   Monitor for alcohol withdrawal  Monitor need and frequency of PRN medications. Attempt to develop insight. Follow-up daily while inpatient. Reviewed risks and benefits as well as potential side effects with patient. CONSULT:  [x] Yes [] No  Internal medicine for medical management/medical H&P      Risk Management: close watch per standard protocol      Psychotherapy: participation in milieu and group and individual sessions with Attending Physician,  and Physician Assistant/CNP      Estimated length of stay:  2-14 days      GENERAL PATIENT/FAMILY EDUCATION  Patient will understand basic signs and symptoms, patient will understand benefits/risks and potential side effects from proposed medications, and patient will understand their role in recovery. Family is not active in patient's care. Patient assets that may be helpful during treatment include: Intent to participate and engage in treatment, sufficient fund of knowledge and intellect to understand and utilize treatments. Goals:    1) Remission of suicidal ideation. 2) Stabilization of symptoms prior to discharge. 3) Establish efficacy and tolerability of medications. Behavioral Services  Medicare Certification     Admission Day 1  I certify that this patient's inpatient psychiatric hospital admission is medically necessary for:    x (1) treatment which could reasonably be expected to improve this patient's condition, or    x (2) diagnostic study or its equivalent. Time Spent: 1 hour     Armida Yoon is a 62 y.o. male being evaluated face to face    --ALLISON Acuña CNP on 7/24/2022 at 11:57 AM    An electronic signature was used to authenticate this note. I independently saw and evaluated the patient. I reviewed the  documentation above. Any additional comments or changes to the   documentation are stated below otherwise agree with assessment. The patient states that he was feeling suicidal.  He said he was due to have laser surgery and was advised to discontinue his medication. He has been without his medication for 1 week.   He has also been drinking about 2 beers a day for the last 2 days. The patient has been feeling depressed and had suicidal thoughts. We will restart the patient's Zoloft and Remeron as noted above. PLAN  Medications as noted above  Attempt to develop insight  Psycho-education conducted. Estimated Length of Stay is 3-6 days  Supportive Therapy conducted.   Follow-up daily while on inpatient unit    Electronically signed by Vaibhav Garcia MD on 7/24/22 at 1:17 PM EDT

## 2022-07-24 NOTE — ED NOTES
Mode of arrival (squad #, walk in, police, etc) : [facility]        Chief complaint(s): mental health, si, alcohol intox        Arrival Note (brief scenario, treatment PTA, etc). : pt presents per [facility] after he was arrested and taken to residential but was denied residential because he was intoxicated and states he wanted to jump off a bridge. Pt states he didn't want to go to residential so he came here. C= \"Have you ever felt that you should Cut down on your drinking? \"  No  A= \"Have people Annoyed you by criticizing your drinking? \"  No  G= \"Have you ever felt bad or Guilty about your drinking? \"  No  E= \"Have you ever had a drink as an Eye-opener first thing in the morning to steady your nerves or to help a hangover? \"  No      Deferred []      Reason for deferring: N/A    *If yes to two or more: probable alcohol abuse. Uday Abreu RN  07/23/22 7380

## 2022-07-24 NOTE — ED PROVIDER NOTES
[address] ED  EMERGENCY DEPARTMENT ENCOUNTER      Pt Name: Romana Pier  MRN: [id number]  [place] 1960  Date of evaluation: 7/23/22      CHIEF COMPLAINT     SI      HISTORY OF PRESENT ILLNESS   HPI 62 y.o. male presents with c/o suicidal ideation. Reports that he's drunk and he wants to jump off a bridge. Was seen earlier today at [facility]. Became agitated with staff, he left a few hours ago. Police picked him up and brought him here. Pt states that he does have a history of suicide attempt about 10-15 years ago. He denies any suicide attempt recently. He denies any medical complaints. He denies any history of alcohol withdrawal.     REVIEW OF SYSTEMS     Review of Systems   Constitutional:  Negative for fever. HENT:  Negative for congestion and rhinorrhea. Respiratory:  Negative for cough and shortness of breath. Cardiovascular:  Negative for chest pain. Gastrointestinal:  Negative for nausea and vomiting. Genitourinary:  Negative for difficulty urinating. Musculoskeletal:  Negative for back pain. Skin:  Negative for rash. Neurological:  Negative for headaches. Psychiatric/Behavioral:  Positive for dysphoric mood and suicidal ideas. PAST MEDICAL HISTORY     Past Medical History:   Diagnosis Date    Bradycardia     [facility] consulted during hospital admission. COVID-19     1/2022 per patient. Was tested at a homeless shelter and was positive. States no symptoms. Depression with suicidal ideation 04/2022 April 2022 Psych ding [facility] then to [place] for psych.     Glaucoma     Homeless single person     lives in non-mobile [facility] in [facility] \"behind a garage\"    Hypertension     Legally blind     lt eye completely blind, rt eye has about 20 % vision  Sees [facility] and then \"glaucoma specialist\" Tayla Degree  [facility]  \"Dr. Cruz Mike"    Mixed hyperlipidemia 3/28/2022    Pancreatitis     4/2022  [facility] admit    Snores     Wellness examination     Dr. Misael Walker last seen \"years ago\" Ebony Canales to [facility] in [place] currently . Pt. homeless. SURGICAL HISTORY       Past Surgical History:   Procedure Laterality Date    EYE SURGERY      cataracts approx 2015    TESTICLE REMOVAL      as a baby       CURRENT MEDICATIONS       Previous Medications    ACETAMINOPHEN (TYLENOL) 325 MG TABLET    Take 2 tablets by mouth every 6 hours as needed for Pain    ATORVASTATIN (LIPITOR) 40 MG TABLET    Take 1 tablet by mouth nightly    BRIMONIDINE (ALPHAGAN) 0.2 % OPHTHALMIC SOLUTION    Place 1 drop into the right eye 3 times daily    DORZOLAMIDE (TRUSOPT) 2 % OPHTHALMIC SOLUTION    Place 1 drop into the right eye 2 times daily    FOLIC ACID (FOLVITE) 1 MG TABLET    Take 1 tablet by mouth daily    IBUPROFEN (ADVIL;MOTRIN) 200 MG TABLET    Take 2 tablets by mouth every 6 hours as needed for Pain or Fever    LATANOPROST (XALATAN) 0.005 % OPHTHALMIC SOLUTION    Place 1 drop into the right eye nightly    MIRTAZAPINE (REMERON) 7.5 MG TABLET    Take 1 tablet by mouth nightly    NICOTINE POLACRILEX (NICORETTE) 2 MG GUM    Take 1 each by mouth as needed for Smoking cessation    SERTRALINE (ZOLOFT) 100 MG TABLET    Take 1 tablet by mouth daily    TIMOLOL (TIMOPTIC) 0.5 % OPHTHALMIC SOLUTION    Place 1 drop into the right eye 2 times daily    TRAZODONE (DESYREL) 50 MG TABLET    Take 1 tablet by mouth nightly as needed for Sleep    VITAMIN B-1 (THIAMINE) 100 MG TABLET    Take 1 tablet by mouth daily       ALLERGIES     has No Known Allergies. FAMILY HISTORY     He indicated that his mother is . He indicated that his father is . SOCIAL HISTORY      reports that he has been smoking cigarettes. His smokeless tobacco use includes chew. He reports current alcohol use. He reports that he does not currently use drugs.     PHYSICAL EXAM     INITIAL VITALS: /69   Pulse 70   Temp 98.4 °F (36.9 °C) (Oral)   Resp 18   Ht 5' 5\" (1.651 m)   Wt 170 lb (77.1 kg)   SpO2 97%   BMI 28.29 kg/m²   Gen: NAD  Head: Normocephalic, atraumatic  Heart: Regular rate and rhythm no murmurs  Lungs: Clear to auscultation bilaterally, no respiratory distress  Chest wall: No crepitus, no tenderness palpation  Abdomen: Soft, nontender, nondistended, with no peritoneal signs  Skin: No diaphoresis. no lacerations. Neurologic: Patient is alert and oriented x3, motor and sensation is intact in all 4 extremities, speech is slurred  Extremities: Full range of motion, no cyanosis, no edema, no signs of trauma, no tenderness to palpation    MEDICAL DECISION MAKING:     MDM    62 y.o. male with depression, suicidal thoughts, alcohol intoxication. The patient does appear intoxicated. Checking alcohol level. Will reassess when sober. The patient is showing no signs of any acute active toxidrome. The patient denies any overdose attempt or  medical complaints at this time. We'll screen for any acetaminophen or salicylate ingestion, we'll check baseline renal function, liver function, a drug screen, cbc     Emergency Department course:    Cocaine +   EtOH 142 at 23:30  6:10 AM EDT  Pt is sober and reports suicidal ideation.  evaluated the patient and discussed the case with the psychiatry service. The patient will be admitted to the [facility]. DIAGNOSTIC RESULTS     LABS: All lab results were reviewed by myself, and all abnormals are listed below.   Labs Reviewed   CBC WITH AUTO DIFFERENTIAL - Abnormal; Notable for the following components:       Result Value    Monocytes 8 (*)     All other components within normal limits   ETHANOL - Abnormal; Notable for the following components:    Ethanol 142 (*)     All other components within normal limits   URINE DRUG SCREEN - Abnormal; Notable for the following components:    Cocaine Metabolite, Urine POSITIVE (*)     All other components within normal limits   ACETAMINOPHEN LEVEL - Abnormal; Notable for the following components:    Acetaminophen Level <5 (*)     All other components within normal limits   SALICYLATE LEVEL - Abnormal; Notable for the following components:    Salicylate Lvl <1 (*)     All other components within normal limits   COMPREHENSIVE METABOLIC PANEL   MAGNESIUM       EMERGENCY DEPARTMENT COURSE:   Vitals:    Vitals:    07/23/22 2125   BP: 104/69   Pulse: 70   Resp: 18   Temp: 98.4 °F (36.9 °C)   TempSrc: Oral   SpO2: 97%   Weight: 170 lb (77.1 kg)   Height: 5' 5\" (1.651 m)       The patient was given the following medications while in the emergency department:  No orders of the defined types were placed in this encounter. -------------------------  CRITICAL CARE:   CONSULTS: IP CONSULT TO INTERNAL MEDICINE  PROCEDURES: Procedures     FINAL IMPRESSION      1. Acute alcoholic intoxication with complication ([address]    2. Cocaine use    3. Suicidal ideation          DISPOSITION/PLAN   DISPOSITION        PATIENT REFERRED TO:  No follow-up provider specified.     DISCHARGE MEDICATIONS:  New Prescriptions    No medications on file         Eliza Mensah MD  Attending Emergency Physician                     Eliza Mensah MD  07/24/22 7500

## 2022-07-24 NOTE — H&P
[facility]  Melissa Mireles MD; Malaika White MD; Eloisa Boyce MD; MD Mary Ulrich Res, MD; Adeline Goldmann, MD [facility] Internal Medicine   [facility]    HISTORY AND PHYSICAL EXAMINATION            Date:   7/24/2022  Patient name:  Larayne Blizzard  Date of admission:  7/23/2022  9:34 PM  MRN:   [id number]  Account:  [de-identified]  YOB: 1960  PCP:    Haley Barboza MD  Room:   [address] [place] [address]  Code Status:    Full Code    Chief Complaint:     Chief Complaint   Patient presents with    Mental Health Problem   Hld  Alcohol abuse  Hx of pancreatitis      History Obtained From:     Patient medical record nursing staff    History of Present Illness:     Larayne Blizzard is a 62 y.o. Non- / non  male who presents with Mental Health Problem   and is admitted to the hospital for the management of Major depressive disorder, single episode, severe without psychotic features ([facility] [address] 73-year-old  gentleman with a history of alcohol abuse history of recurrent pancreatitis active alcohol abuse  HLD  Onset more than 5 years ago  Severity is mild, not getting worse  Not associated with pancreatitis  Tolerating statin well no muscle pain  Open-angle glaucoma    Past Medical History:     Past Medical History:   Diagnosis Date    Bradycardia     [facility] consulted during hospital admission. COVID-19     1/2022 per patient. Was tested at a homeless shelter and was positive. States no symptoms. Depression with suicidal ideation 04/2022 April 2022 Psych ding [facility] then to [place] for psych.     Glaucoma     Homeless single person     lives in non-mobile [place] in [facility] \"behind a garage\"    Hypertension     Legally blind     lt eye completely blind, rt eye has about 20 % vision  Sees [facility] and then \"glaucoma specialist\" Maia Tuttle [address] [facility]  \"Dr. Florencio Tijerina"    Mixed hyperlipidemia 3/28/2022    Pancreatitis     4/2022  [facility] admit    Snores     Wellness examination     Dr. Camargo Shows last seen \"years ago\" [place] [facility] currently . Pt. homeless. Past Surgical History:     Past Surgical History:   Procedure Laterality Date    EYE SURGERY      cataracts approx 2015    TESTICLE REMOVAL      as a baby        Medications Prior to Admission:     Prior to Admission medications    Medication Sig Start Date End Date Taking?  Authorizing Provider   atorvastatin (LIPITOR) 40 MG tablet Take 1 tablet by mouth nightly 5/31/22   Matthew Salcedo MD   brimonidine (ALPHAGAN) 0.2 % ophthalmic solution Place 1 drop into the right eye 3 times daily 5/31/22   Matthew Salcedo MD   dorzolamide (TRUSOPT) 2 % ophthalmic solution Place 1 drop into the right eye 2 times daily 5/31/22   Matthew Salcedo MD   folic acid (FOLVITE) 1 MG tablet Take 1 tablet by mouth daily 5/31/22   Matthew Salcedo MD   latanoprost (XALATAN) 0.005 % ophthalmic solution Place 1 drop into the right eye nightly 5/31/22   Matthew Salcedo MD   mirtazapine (REMERON) 7.5 MG tablet Take 1 tablet by mouth nightly 5/31/22   Matthew Salcedo MD   nicotine polacrilex (NICORETTE) 2 MG gum Take 1 each by mouth as needed for Smoking cessation 5/31/22   Matthew Salcedo MD   sertraline (ZOLOFT) 100 MG tablet Take 1 tablet by mouth daily 5/31/22   Matthew Salcedo MD   timolol (TIMOPTIC) 0.5 % ophthalmic solution Place 1 drop into the right eye 2 times daily 5/31/22   Matthew Salcedo MD   traZODone (DESYREL) 50 MG tablet Take 1 tablet by mouth nightly as needed for Sleep 5/31/22   Matthew Salcedo MD   vitamin B-1 (THIAMINE) 100 MG tablet Take 1 tablet by mouth daily 5/31/22   Matthew Salcedo MD   acetaminophen (TYLENOL) 325 mg tablet Take 2 tablets by mouth every 6 hours as needed for Pain 5/25/22   Gaston Jameson MD   ibuprofen (ADVIL;MOTRIN) 200 MG tablet Take 2 tablets by mouth every 6 hours as needed for Pain or Fever 22   Cordell Bermudez MD        Allergies:     Patient has no known allergies. Social History:     Tobacco:    reports that he has been smoking cigarettes. His smokeless tobacco use includes chew. Alcohol:      reports current alcohol use. Drug Use:  reports that he does not currently use drugs. Family History:     No family history on file. Review of Systems:     Positive and Negative as described in HPI. CONSTITUTIONAL:  negative for fevers, chills, sweats, fatigue, weight loss  HEENT:  negative for vision, hearing changes, runny nose, throat pain  RESPIRATORY:  negative for shortness of breath, cough, congestion, wheezing  CARDIOVASCULAR:  negative for chest pain, palpitations  GASTROINTESTINAL:  negative for nausea, vomiting, diarrhea, constipation, change in bowel habits, abdominal pain   GENITOURINARY:  negative for difficulty of urination, burning with urination, frequency   INTEGUMENT:  negative for rash, skin lesions, easy bruising   HEMATOLOGIC/LYMPHATIC:  negative for swelling/edema   ALLERGIC/IMMUNOLOGIC:  negative for urticaria , itching  ENDOCRINE:  negative increase in drinking, increase in urination, hot or cold intolerance  MUSCULOSKELETAL:  negative joint pains, muscle aches, swelling of joints  NEUROLOGICAL:  negative for headaches, dizziness, lightheadedness, numbness, pain, tingling extremities  BEHAVIOR/PSYCH:  negative for depression, anxiety    Physical Exam:   /79   Pulse 89   Temp 97.7 °F (36.5 °C) (Oral)   Resp 14   Ht 5' 5\" (1.651 m)   Wt 170 lb (77.1 kg)   SpO2 98%   BMI 28.29 kg/m²   Temp (24hrs), Av.2 °F (36.8 °C), Min:97.7 °F (36.5 °C), Max:98.4 °F (36.9 °C)    No results for input(s): POCGLU in the last 72 hours.   No intake or output data in the 24 hours ending 22 1537    General Appearance: alert, well appearing, and in no acute distress  Mental status: oriented to person, place, and time  Head: normocephalic, atraumatic  Eye: no icterus, redness, pupils equal and reactive, extraocular eye movements intact, conjunctiva clear  Ear: normal external ear, no discharge, hearing intact  Nose: no drainage noted  Mouth: mucous membranes moist  Neck: supple, no carotid bruits, thyroid not palpable  Lungs: Bilateral equal air entry, clear to ausculation, no wheezing, rales or rhonchi, normal effort  Cardiovascular: normal rate, regular rhythm, no murmur, gallop, rub  Abdomen: Soft, nontender, nondistended, normal bowel sounds, no hepatomegaly or splenomegaly  Neurologic: There are no new focal motor or sensory deficits, normal muscle tone and bulk, no abnormal sensation, normal speech, cranial nerves II through XII grossly intact  Skin: No gross lesions, rashes, bruising or bleeding on exposed skin area  Extremities: peripheral pulses palpable, no pedal edema or calf pain with palpation      Investigations:      Laboratory Testing:  Recent Results (from the past 24 hour(s))   Comprehensive Metabolic Panel    Collection Time: 07/23/22  4:55 PM   Result Value Ref Range    Glucose 94 70 - 99 mg/dL    BUN 6 (L) 8 - 23 mg/dL    Creatinine 0.74 0.70 - 1.20 mg/dL    Calcium 8.5 (L) 8.6 - 10.4 mg/dL    Sodium 141 135 - 144 mmol/L    Potassium 3.7 3.7 - 5.3 mmol/L    Chloride 105 98 - 107 mmol/L    CO2 21 20 - 31 mmol/L    Anion Gap 15 9 - 17 mmol/L    Alkaline Phosphatase 44 40 - 129 U/L    ALT 20 5 - 41 U/L    AST 17 <40 U/L    Total Bilirubin 0.34 0.3 - 1.2 mg/dL    Total Protein 7.1 6.4 - 8.3 g/dL    Albumin 4.5 3.5 - 5.2 g/dL    Albumin/Globulin Ratio 1.7 1.0 - 2.5    GFR Non-African American >60 >60 mL/min    GFR African American >60 >60 mL/min    GFR Comment         CBC with Auto Differential    Collection Time: 07/23/22  4:55 PM   Result Value Ref Range    WBC 5.8 3.5 - 11.3 k/uL    RBC 4.61 4.21 - 5.77 m/uL    Hemoglobin 14.9 13.0 - 17.0 g/dL    Hematocrit 42.4 40.7 - 50.3 %    MCV 92.0 82.6 - 102.9 fL    MCH 32.3 25.2 - 33.5 pg MCHC 35.1 (H) 28.4 - 34.8 g/dL    RDW 11.4 (L) 11.8 - 14.4 %    Platelets 013 827 - 986 k/uL    MPV 8.7 8.1 - 13.5 fL    NRBC Automated 0.0 0.0 per 100 WBC    Seg Neutrophils 68 (H) 36 - 65 %    Lymphocytes 22 (L) 24 - 43 %    Monocytes 8 3 - 12 %    Eosinophils % 2 1 - 4 %    Basophils 0 0 - 2 %    Immature Granulocytes 0 0 %    Segs Absolute 3.90 1.50 - 8.10 k/uL    Absolute Lymph # 1.28 1.10 - 3.70 k/uL    Absolute Mono # 0.45 0.10 - 1.20 k/uL    Absolute Eos # 0.09 0.00 - 0.44 k/uL    Basophils Absolute <0.03 0.00 - 0.20 k/uL    Absolute Immature Granulocyte <0.03 0.00 - 0.30 k/uL   Lipase    Collection Time: 07/23/22  4:55 PM   Result Value Ref Range    Lipase 258 (H) 13 - 60 U/L   Protime-INR    Collection Time: 07/23/22  4:55 PM   Result Value Ref Range    Protime 10.9 9.1 - 12.3 sec    INR 1.0    APTT    Collection Time: 07/23/22  4:55 PM   Result Value Ref Range    PTT 25.0 20.5 - 30.5 sec   Drug screen multi urine    Collection Time: 07/23/22 10:46 PM   Result Value Ref Range    Amphetamine Screen, Ur NEGATIVE NEGATIVE    Barbiturate Screen, Ur NEGATIVE NEGATIVE    Benzodiazepine Screen, Urine NEGATIVE NEGATIVE    Cocaine Metabolite, Urine POSITIVE (A) NEGATIVE    Methadone Screen, Urine NEGATIVE NEGATIVE    Opiates, Urine NEGATIVE NEGATIVE    Phencyclidine, Urine NEGATIVE NEGATIVE    Cannabinoid Scrn, Ur NEGATIVE NEGATIVE    Oxycodone Screen, Ur NEGATIVE NEGATIVE    Fentanyl, Ur NEGATIVE NEGATIVE    Test Information       Assay provides medical screening only. The absence of expected drug(s) and/or metabolite(s) may indicate diluted or adulterated urine, limitations of testing or timing of collection.    CBC with Auto Differential    Collection Time: 07/23/22 11:30 PM   Result Value Ref Range    WBC 7.2 3.5 - 11.0 k/uL    RBC 4.79 4.5 - 5.9 m/uL    Hemoglobin 15.5 13.5 - 17.5 g/dL    Hematocrit 44.9 41 - 53 %    MCV 93.7 80 - 100 fL    MCH 32.3 26 - 34 pg    MCHC 34.4 31 - 37 g/dL    RDW 12.2 11.5 - 14.9 %    Platelets 037 313 - 847 k/uL    MPV 7.0 6.0 - 12.0 fL    Seg Neutrophils 63 36 - 66 %    Lymphocytes 26 24 - 44 %    Monocytes 8 (H) 1 - 7 %    Eosinophils % 2 0 - 4 %    Basophils 1 0 - 2 %    Segs Absolute 4.60 1.3 - 9.1 k/uL    Absolute Lymph # 1.90 1.0 - 4.8 k/uL    Absolute Mono # 0.50 0.1 - 1.3 k/uL    Absolute Eos # 0.10 0.0 - 0.4 k/uL    Basophils Absolute 0.00 0.0 - 0.2 k/uL   Comprehensive Metabolic Panel    Collection Time: 07/23/22 11:30 PM   Result Value Ref Range    Glucose 81 70 - 99 mg/dL    BUN 8 8 - 23 mg/dL    Creatinine 0.86 0.70 - 1.20 mg/dL    Calcium 8.9 8.6 - 10.4 mg/dL    Sodium 144 135 - 144 mmol/L    Potassium 4.1 3.7 - 5.3 mmol/L    Chloride 106 98 - 107 mmol/L    CO2 24 20 - 31 mmol/L    Anion Gap 14 9 - 17 mmol/L    Alkaline Phosphatase 49 40 - 129 U/L    ALT 21 5 - 41 U/L    AST 19 <40 U/L    Total Bilirubin 0.42 0.3 - 1.2 mg/dL    Total Protein 7.6 6.4 - 8.3 g/dL    Albumin 4.6 3.5 - 5.2 g/dL    GFR Non-African American >60 >60 mL/min    GFR African American >60 >60 mL/min    GFR Comment         Ethanol    Collection Time: 07/23/22 11:30 PM   Result Value Ref Range    Ethanol 142 (H) <10 mg/dL    Ethanol percent 0.142 %   Acetaminophen level    Collection Time: 07/23/22 11:30 PM   Result Value Ref Range    Acetaminophen Level <5 (L) 10 - 30 ug/mL   Salicylate    Collection Time: 07/23/22 11:30 PM   Result Value Ref Range    Salicylate Lvl <1 (L) 3 - 10 mg/dL   Magnesium    Collection Time: 07/23/22 11:30 PM   Result Value Ref Range    Magnesium 2.1 1.6 - 2.6 mg/dL       Imaging/Diagnostics:  XR CHEST (2 VW)    Result Date: 7/23/2022  No acute process.        Assessment :      Hospital Problems             Last Modified POA    * (Principal) Major depressive disorder, single episode, severe without psychotic features ([facility] [address] 7/24/2022 Yes    Alcohol use disorder, severe, dependence ([facility] [address] 7/24/2022 Yes    [facility]oc[facility] 7/24/2022 Yes    Acute alcoholic intoxication with complication ([facility] [address] 2/47/6076 Yes    Mixed hyperlipidemia 7/24/2022 Yes    Pancreatitis, recurrent 7/24/2022 Yes    Chronic calcific pancreatitis ([facility] [address] 7/24/2022 Yes       Plan:     61-year-old gentleman with history of alcohol abuse and dependence with history of recurrent pancreatitis with pancreatic calcification  Also history of cocaine abuse  Hyperlipidemia hyperlipidemia start Lipitor  Open-angle glaucoma start dorzolamide latanoprost brimonidine eyedrops    Alcohol abuse thiamine 410 mg daily folic acid 1 mg daily            Louis Cohen MD  7/24/2022  3:37 PM    Copy sent to Dr. Stacey Mosher MD    Please note that this chart was generated using voice recognition Dragon dictation software. Although every effort was made to ensure the accuracy of this automated transcription, some errors in transcription may have occurred.

## 2022-07-24 NOTE — BH NOTE
Goal Group Note        Date: July 24, 2022 Start Time: 9am  End Time: 0356      Number of Participants in Group & Unit Census:  4/18        Comments:     Patient did not participate in Goal group, despite staff encouragement and explanation of benefits. Patient remain seclusive to self. Q15 minute safety checks maintained for patient safety and will continue to encourage patient to attend unit programming.

## 2022-07-24 NOTE — ED NOTES
Patient presented to ED via Law Enforcement for a mental health evaluation. Patient was seen at [facility] earlier this evening, intoxicated, and left in police custody ([employer]). Patient was then brought to this ED by [employer] \"voluntary\" due to indicating he was suicidal with a plan to \"jump off a fucking bridge\". Patient is very obviously intoxicated as evidenced by slurred speech, slowed movements, blood shot eyes, and odor of alcohol. Upon initial entrance into [facility], patient refuses to change out stating \"show me the fucking door\". Patient see by ED physician and educated that after identifying suicidal ideation, patient will stay in [facility] until sober to be reevaluated. Will obtain blood work to determine BAL and patient to be reassessed upon sobriety.

## 2022-07-24 NOTE — BH NOTE
Patient given tobacco quitline number 93710400252 at this time, refusing to call at this time, states \" I just dont want to quit now\"- patient given information as to the dangers of long term tobacco use. Continue to reinforce the importance of tobacco cessation.

## 2022-07-24 NOTE — PLAN OF CARE
Problem: Self Harm/Suicidality  Goal: Will have no self-injury during hospital stay  Description: INTERVENTIONS:  1. Q 30 MINUTES: Routine safety checks  2. Q SHIFT & PRN: Assess risk to determine if routine checks are adequate to maintain patient safety  Outcome: Progressing  Note: No self harm behaviors noted. Patient admits to suicidal ideation with plan to jump off bridge and verbally agrees to approach staff if thoughts of self harm arises. Patient denies auditory and visual hallucinations and verbally agrees to approach staff if thoughts of self harm samaria. Q15 minute checks maintained. Patient remains safe at this time. Problem: Anxiety  Goal: Will report anxiety at manageable levels  Description: INTERVENTIONS:  1. Administer medication as ordered  2. Teach and rehearse alternative coping skills  3. Provide emotional support with 1:1 interaction with staff  Outcome: Progressing  Flowsheets (Taken 7/24/2022 2798)  Will report anxiety at manageable levels:   Teach and rehearse alternative coping skills   Provide emotional support with 1:1 interaction with staff  Note: Patient is accepting of 1:1 talk time with staff. Patient admits to increased depression and anxiety. Patient is isolative to self and room. Patient is medication compliant. Patient does not attend unit programming. Reassurance and support provided. Q15 minute checks maintained. Patient remains safe at this time.

## 2022-07-24 NOTE — GROUP NOTE
Group Therapy Note    Date: 7/24/2022    Group Start Time: 1430  Group End Time: 1520  Group Topic: Recreational    NICK Mabry    Recreation Group Note        Date: July 24, 2022 Start Time: 2:30pm  End Time: 3:20pm       Number of Participants in Group & Unit Census:  5    Topic: Open Recreation     Goal of Group:Patient will improve interpersonal skills. Comments:     Patient did not participate in Recreation group, despite staff encouragement and explanation of benefits. Patient remain seclusive to self. Q15 minute safety checks maintained for patient safety and will continue to encourage patient to attend unit programming.             Signature:  Elham Stockton, [address]

## 2022-07-24 NOTE — PROGRESS NOTES
Behavioral Services  Medicare Certification Upon Admission    I certify that this patient's inpatient psychiatric hospital admission is medically necessary for:    [x] (1) Treatment which could reasonably be expected to improve this patient's condition,       [x] (2) Or for diagnostic study;     AND     [x](2) The inpatient psychiatric services are provided while the individual is under the care of a physician and are included in the individualized plan of care.     Estimated length of stay/service 2-9 days    Plan for post-hospital care -outpatient care    Electronically signed by Arely Yang MD on 7/24/2022 at 10:00 AM

## 2022-07-25 PROCEDURE — APPSS30 APP SPLIT SHARED TIME 16-30 MINUTES: Performed by: NURSE PRACTITIONER

## 2022-07-25 PROCEDURE — 6370000000 HC RX 637 (ALT 250 FOR IP): Performed by: PSYCHIATRY & NEUROLOGY

## 2022-07-25 PROCEDURE — 6370000000 HC RX 637 (ALT 250 FOR IP): Performed by: NURSE PRACTITIONER

## 2022-07-25 PROCEDURE — 99232 SBSQ HOSP IP/OBS MODERATE 35: CPT | Performed by: PSYCHIATRY & NEUROLOGY

## 2022-07-25 PROCEDURE — 1240000000 HC EMOTIONAL WELLNESS R&B

## 2022-07-25 PROCEDURE — 6370000000 HC RX 637 (ALT 250 FOR IP): Performed by: INTERNAL MEDICINE

## 2022-07-25 PROCEDURE — 90833 PSYTX W PT W E/M 30 MIN: CPT | Performed by: PSYCHIATRY & NEUROLOGY

## 2022-07-25 RX ORDER — MIRTAZAPINE 15 MG/1
15 TABLET, FILM COATED ORAL NIGHTLY
Status: DISCONTINUED | OUTPATIENT
Start: 2022-07-25 | End: 2022-07-28 | Stop reason: HOSPADM

## 2022-07-25 RX ADMIN — ATORVASTATIN CALCIUM 40 MG: 20 TABLET, FILM COATED ORAL at 21:04

## 2022-07-25 RX ADMIN — BRIMONIDINE TARTRATE 1 DROP: 2 SOLUTION/ DROPS OPHTHALMIC at 14:06

## 2022-07-25 RX ADMIN — DORZOLAMIDE HYDROCHLORIDE 1 DROP: 20 SOLUTION/ DROPS OPHTHALMIC at 21:05

## 2022-07-25 RX ADMIN — MIRTAZAPINE 15 MG: 15 TABLET, FILM COATED ORAL at 21:04

## 2022-07-25 RX ADMIN — NICOTINE POLACRILEX 2 MG: 2 GUM, CHEWING BUCCAL at 12:32

## 2022-07-25 RX ADMIN — BRIMONIDINE TARTRATE 1 DROP: 2 SOLUTION/ DROPS OPHTHALMIC at 21:05

## 2022-07-25 RX ADMIN — DORZOLAMIDE HYDROCHLORIDE 1 DROP: 20 SOLUTION/ DROPS OPHTHALMIC at 09:07

## 2022-07-25 RX ADMIN — BRIMONIDINE TARTRATE 1 DROP: 2 SOLUTION/ DROPS OPHTHALMIC at 09:07

## 2022-07-25 RX ADMIN — THIAMINE HCL TAB 100 MG 100 MG: 100 TAB at 09:08

## 2022-07-25 RX ADMIN — SERTRALINE HYDROCHLORIDE 50 MG: 50 TABLET ORAL at 09:08

## 2022-07-25 RX ADMIN — NICOTINE POLACRILEX 2 MG: 2 GUM, CHEWING BUCCAL at 09:23

## 2022-07-25 RX ADMIN — NICOTINE POLACRILEX 2 MG: 2 GUM, CHEWING BUCCAL at 21:19

## 2022-07-25 RX ADMIN — TIMOLOL MALEATE 1 DROP: 5 SOLUTION OPHTHALMIC at 09:07

## 2022-07-25 RX ADMIN — FOLIC ACID 1 MG: 1 TABLET ORAL at 09:07

## 2022-07-25 RX ADMIN — LATANOPROST 1 DROP: 50 SOLUTION OPHTHALMIC at 21:04

## 2022-07-25 RX ADMIN — TIMOLOL MALEATE 1 DROP: 5 SOLUTION OPHTHALMIC at 21:04

## 2022-07-25 ASSESSMENT — PAIN SCALES - GENERAL: PAINLEVEL_OUTOF10: 3

## 2022-07-25 NOTE — PROGRESS NOTES
Daily Progress Note  7/25/2022    Patient Name: Marla Cruz    CHIEF COMPLAINT: Suicidal ideation with a plan to jump off a bridge         SUBJECTIVE:     Patient seen face-to-face for follow-up assessment. He is resting in bed. Grooming and hygiene remain poor. Appears disheveled. Reports poor energy and motivation today. Has been isolating to self. Withdrawn in the milieu. States that he feels out of it. Patient endorses improving suicidal ideation, but does not yet feel safe from self in the community. Feels that he is a threat to self and voices concern regarding continued alcohol consumption and crack cocaine. Is hopeful to transition directly to a treatment program.  States that he plans to call [facility] today to determine bed availability. Patient denies any perceptual disturbances. Does not appear to be responding to internal stimuli. Assessed for any possible alcohol withdrawal.  No signs or symptoms observed at this time. Vitals within approach. Range and no pupillary dilation present. Patient has been compliant with his scheduled psychotropic medications. Currently taking sertraline 50 mg, mirtazapine 7.5 mg as well as folic acid and thiamine. Effects and patient denies all. Patient has yet to demonstrate stability. Requires inpatient hospitalization for safety.     Psych med compliant: [x] Yes    [] No     E-meds in last 24 hrs: [] Yes   [x] No    Appetite:  [x] Normal/Adequate/Unchanged  [] Increased  [] Decreased      Sleep:       [x] Normal/Adequate/Unchanged  [] Fair  [] Poor      Group Attendance:   [] Yes  [] Selectively    [x] No    Medication Side Effects: Denies         Mental Status Exam  Level of consciousness: Alert and awake   Appearance: Appropriate attire for setting, resting in bed, with poor grooming and hygiene, disheveled  Behavior/Motor: Approachable, no psychomotor abnormalities   Attitude toward examiner: Cooperative, attentive, good eye contact   Speech: spontaneous, normal rate, and normal volume, mumbled  Mood: Patient reports \"getting better\"  Affect: blunted  Thought processes: linear and goal directed   Thought content: Denies homicidal ideation  Suicidal Ideation: Reports improving suicidal ideation, unable to contract for safety off unit  Delusions: Denies  Perceptual Disturbance: Denies. Does not appear to be responding to internal stimuli. Cognition: Oriented to self, location, time, and situation  Memory: intact  Insight & Judgement: poor     Data   height is 5' 5\" (1.651 m) and weight is 170 lb (77.1 kg). His oral temperature is 98.2 °F (36.8 °C). His blood pressure is 98/67 and his pulse is 73. His respiration is 14 and oxygen saturation is 98%.    Labs:   Admission on 07/23/2022   Component Date Value Ref Range Status    WBC 07/23/2022 7.2  3.5 - 11.0 k/uL Final    RBC 07/23/2022 4.79  4.5 - 5.9 m/uL Final    Hemoglobin 07/23/2022 15.5  13.5 - 17.5 g/dL Final    Hematocrit 07/23/2022 44.9  41 - 53 % Final    MCV 07/23/2022 93.7  80 - 100 fL Final    MCH 07/23/2022 32.3  26 - 34 pg Final    MCHC 07/23/2022 34.4  31 - 37 g/dL Final    RDW 07/23/2022 12.2  11.5 - 14.9 % Final    Platelets 66/20/5618 212  150 - 450 k/uL Final    MPV 07/23/2022 7.0  6.0 - 12.0 fL Final    Seg Neutrophils 07/23/2022 63  36 - 66 % Final    Lymphocytes 07/23/2022 26  24 - 44 % Final    Monocytes 07/23/2022 8 (A) 1 - 7 % Final    Eosinophils % 07/23/2022 2  0 - 4 % Final    Basophils 07/23/2022 1  0 - 2 % Final    Segs Absolute 07/23/2022 4.60  1.3 - 9.1 k/uL Final    Absolute Lymph # 07/23/2022 1.90  1.0 - 4.8 k/uL Final    Absolute Mono # 07/23/2022 0.50  0.1 - 1.3 k/uL Final    Absolute Eos # 07/23/2022 0.10  0.0 - 0.4 k/uL Final    Basophils Absolute 07/23/2022 0.00  0.0 - 0.2 k/uL Final    Glucose 07/23/2022 81  70 - 99 mg/dL Final    BUN 07/23/2022 8  8 - 23 mg/dL Final    Creatinine 07/23/2022 0.86  0.70 - 1.20 mg/dL Final    Calcium 07/23/2022 8.9  8.6 - 10.4 mg/dL Cannabinoid Scrn, Ur 07/23/2022 NEGATIVE  NEGATIVE Final    Comment:       (Positive cutoff 50 ng/mL)                  Oxycodone Screen, Ur 07/23/2022 NEGATIVE  NEGATIVE Final    Comment:       (Positive cutoff 100 ng/mL)                  Fentanyl, Ur 07/23/2022 NEGATIVE  NEGATIVE Final    Comment:       (Positive cutoff  5 ng/ml)            Test Information 07/23/2022 Assay provides medical screening only. The absence of expected drug(s) and/or metabolite(s) may indicate diluted or adulterated urine, limitations of testing or timing of collection. Final    Comment: Testing for legal purposes should be confirmed by another method. To request confirmation   of test result, please call the lab within 7 days of sample submission. Acetaminophen Level 07/23/2022 <5 (A) 10 - 30 ug/mL Final    Salicylate Lvl 68/69/7261 <1 (A) 3 - 10 mg/dL Final    Magnesium 07/23/2022 2.1  1.6 - 2.6 mg/dL Final         Reviewed patient's current plan of care and vital signs with nursing staff.     Labs reviewed: [x] Yes  Last EKG in EMR reviewed: [x] Yes    Medications  Current Facility-Administered Medications: acetaminophen (TYLENOL) tablet 650 mg, 650 mg, Oral, Q4H PRN  aluminum & magnesium hydroxide-simethicone (MAALOX) 200-200-20 MG/5ML suspension 30 mL, 30 mL, Oral, Q6H PRN  hydrOXYzine HCl (ATARAX) tablet 50 mg, 50 mg, Oral, TID PRN  ibuprofen (ADVIL;MOTRIN) tablet 400 mg, 400 mg, Oral, Q6H PRN  polyethylene glycol (GLYCOLAX) packet 17 g, 17 g, Oral, Daily PRN  traZODone (DESYREL) tablet 50 mg, 50 mg, Oral, Nightly PRN  nicotine polacrilex (NICORETTE) gum 2 mg, 2 mg, Oral, C4F PRN  folic acid (FOLVITE) tablet 1 mg, 1 mg, Oral, Daily  mirtazapine (REMERON) tablet 7.5 mg, 7.5 mg, Oral, Nightly  sertraline (ZOLOFT) tablet 50 mg, 50 mg, Oral, Daily  thiamine mononitrate tablet 100 mg, 100 mg, Oral, Daily  atorvastatin (LIPITOR) tablet 40 mg, 40 mg, Oral, Nightly  brimonidine (ALPHAGAN) 0.2 % ophthalmic solution 1 drop, 1 drop, Right Eye, TID  dorzolamide (TRUSOPT) 2 % ophthalmic solution 1 drop, 1 drop, Right Eye, BID  latanoprost (XALATAN) 0.005 % ophthalmic solution 1 drop, 1 drop, Right Eye, Nightly  timolol (TIMOPTIC) 0.5 % ophthalmic solution 1 drop, 1 drop, Right Eye, BID    ASSESSMENT  Major depressive disorder, single episode, severe without psychotic features ([facility] [address]         HANDOFF  Patient symptoms are: modestly improving  Medications as determined by attending physician  Consider titrating mirtazapine 15 mg nightly  Encourage participation in groups and milieu. Probable discharge is to be determined by MD      Electronically signed by ALLISON Jasso CNP on 7/25/2022 at 2:56 PM    **This report has been created using voice recognition software. It may contain minor errors which are inherent in voice recognition technology. **     I independently saw and evaluated the patient. I reviewed the nurse practitioners documentation above. Any additional comments or changes to the nurse practitioners documentation are stated below otherwise agree with assessment. Plan will be as follows:  Spent 30 minutes with the patient, of that greater than 16 minutes was spent in supportive psychotherapy. Patient denying side effects to medication. Agreeable to further titrate Remeron and Zoloft for continued improvement. Felt overwhelmed with circumstances of being removed from his last treatment facility. Feels like he was forced out by changing accommodations that he could not tolerate with moved to second floor with his poor vision. PLAN  Patient s symptoms   are improving  Increase Remeron and Zoloft  Attempt to develop insight  Psycho-education conducted. Supportive Therapy conducted.   Probable discharge is undetermined at this time  Follow-up daily while on inpatient unit

## 2022-07-25 NOTE — PLAN OF CARE
[facility]  Day 3 Interdisciplinary Treatment Plan NOTE    Review Date & Time: 7/25/2022   1313    Admission Type:   Admission Type: Voluntary    Reason for admission:  Reason for Admission: Patient voluntary from [facility] ED with suicidal ideation to jump off a bridge. Estimated Length of Stay: 5-7 days  Estimated Discharge Date Update: to be determined by physician    PATIENT STRENGTHS:  Patient Strengths    Patient Strengths and Limitations:Limitations: Inappropriate/potentially harmful leisure interests, Multiple barriers to leisure interests, Tendency to isolate self, Difficult relationships / poor social skills  Addictive Behavior:Addictive Behavior  In the Past 3 Months, Have You Felt or Has Someone Told You That You Have a Problem With  : None  Medical Problems:  Past Medical History:   Diagnosis Date    Bradycardia     [place] [facility] consulted during hospital admission. COVID-19     1/2022 per patient. Was tested at a homeless shelter and was positive. States no symptoms. Depression with suicidal ideation 04/2022 April 2022 Psych ding [facility] then to [facility] for psych. Glaucoma     Homeless single person     lives in non-mobile [facility] in [place] \"behind a garage\"    Hypertension     Legally blind     lt eye completely blind, rt eye has about 20 % vision  Sees [facility] and then \"glaucoma specialist\" Venkatesh Avery Dr. [place]  \"Dr. Vashti Carrington"    Mixed hyperlipidemia 3/28/2022    Pancreatitis     4/2022  [facility] admit    Snores     Wellness examination     Dr. Cesar An last seen \"years ago\" Sarah Jauregui to [employer] in [place] currently . Pt. homeless.         Risk:  Fall Risk   Krishna Scale Krishna Scale Score: 22  BVC    Change in scores no Changes to plan of Care no    Status EXAM:   Mental Status and Behavioral Exam  Normal: No  Level of Assistance: Independent/Self  Facial Expression: Flat  Affect: Appropriate  Level of Consciousness: Alert  Frequency of Checks: 4 times per hour, close  Mood:Normal: No  Mood: Depressed  Motor Activity:Normal: No  Motor Activity: Decreased  Eye Contact: Fair  Observed Behavior: Cooperative, Preoccupied  Sexual Misconduct History: Current - no  Preception: [place] to person, [place] to time, [place] to place, [place] to situation  Attention:Normal: No  Attention: Distractible  Thought Processes: Circumstantial  Thought Content:Normal: No  Thought Content: Poverty of content, Preoccupations  Depression Symptoms: Isolative, Loss of interest  Anxiety Symptoms: Generalized  Nancy Symptoms: No problems reported or observed. Hallucinations: None  Delusions: No  Memory:Normal: Yes  Insight and Judgment: No  Insight and Judgment: Poor judgment, Poor insight, Unmotivated    Daily Assessment Last Entry:                Daily Nutrition (WDL): Within Defined Limits  Level of Assistance: Independent/Self    Patient Monitoring:  Frequency of Checks: 4 times per hour, close    Psychiatric Symptoms:   Depression Symptoms  Depression Symptoms: Isolative, Loss of interest  Anxiety Symptoms  Anxiety Symptoms: Generalized  Nancy Symptoms  Nancy Symptoms: No problems reported or observed. Suicide Risk CSSR-S:  1) Within the past month, have you wished you were dead or wished you could go to sleep and not wake up? : Yes  2) Have you actually had any thoughts of killing yourself? : Yes  3) Have you been thinking about how you might kill yourself? : No  5) Have you started to work out or worked out the details of how to kill yourself?  Do you intend to carry out this plan? : No  6) Have you ever done anything, started to do anything, or prepared to do anything to end your life?: No  Change in Result NO Change in Plan of care NO      EDUCATION:   EDUCATION:   Learner Progress Toward Treatment Goals: Reviewed results and recommendations of this team, Reviewed group plan and strategies, Reviewed signs, symptoms and risk of self harm and violent behavior, Reviewed goals and plan of care    Method:small group, individual verbal education    Outcome:verbalized by patient, but needs reinforcement to obtain goals    PATIENT GOALS:  Short term:Per SW patient refused at this time  Long term: Per SW patient refused at this time    PLAN/TREATMENT RECOMMENDATIONS UPDATE: continue with group therapies, increased socialization, continue planning for after discharge goals, continue with medication compliance    SHORT-TERM GOALS UPDATE:   Time frame for Short-Term Goals: 5-7 days    LONG-TERM GOALS UPDATE:   Time frame for Long-Term Goals: 6 months  Members Present in Team Meeting: See Signature Sheet    Missy Reyes

## 2022-07-25 NOTE — PLAN OF CARE
Problem: Self Harm/Suicidality  Goal: Will have no self-injury during hospital stay  Description: INTERVENTIONS:  1. Q 30 MINUTES: Routine safety checks  2. Q SHIFT & PRN: Assess risk to determine if routine checks are adequate to maintain patient safety  7/24/2022 2305 by Lorraine Herrera LPN  Outcome: Progressing  Note: Patient denies thoughts of self-injury during hospital stay. Problem: Anxiety  Goal: Will report anxiety at manageable levels  Description: INTERVENTIONS:  1. Administer medication as ordered  2. Teach and rehearse alternative coping skills  3. Provide emotional support with 1:1 interaction with staff  7/24/2022 2305 by Lorraine Herrera LPN  Outcome: Progressing  Note: Pt encouraged to explore coping skills for reducing anxiety. None verbalized at this time. Problem: Drug Abuse/Detox  Goal: Will have no detox symptoms and will verbalize plan for changing drug-related behavior  Description: INTERVENTIONS:  1. Administer medication as ordered  2. Monitor physical status  3. Provide emotional support with 1:1 interaction with staff  4. Encourage  recovery focused treatment   7/24/2022 2305 by Lorraine Herrera LPN  Outcome: Progressing  Note: Patient has been calm, controlled and med complaint. Accepting of 1:1 talk time with staff.

## 2022-07-25 NOTE — GROUP NOTE
Group Therapy Note    Date: 7/25/2022    Group Start Time: 1100  Group End Time: 2839  Group Topic: Music Therapy    [address] therapy Group Note        Date: 7/25/2022   Start Time: 11am  End Time: 4501      Number of Participants in Group & Unit Census:  5/16    Topic: Advice GROUP - PEER SUPPORT    Goal of Group:Engage in peer support; Increase socialization; Increase sense of community; Comments:     Patient did not participate in Music therapy group, despite staff encouragement and explanation of benefits. Patient remain seclusive to self. Q15 minute safety checks maintained for patient safety and will continue to encourage patient to attend unit programming.

## 2022-07-25 NOTE — GROUP NOTE
Group Therapy Note    Date: 7/25/2022    Group Start Time: 1000  Group End Time: 4143  Group Topic: Psychotherapy    [address] [facility], [place], [place]        Group Therapy Note    Attendees: 7/16       Patient refused to attend psychotherapy group at 10:00 am after encouragement from staff. 1:1 talk time provided as alternative to group session.   Discipline Responsible: /Counselor      Signature:  PILO Leon LSW

## 2022-07-25 NOTE — PLAN OF CARE
Problem: Self Harm/Suicidality  Goal: Will have no self-injury during hospital stay  Description: INTERVENTIONS:  1. Q 30 MINUTES: Routine safety checks  2. Q SHIFT & PRN: Assess risk to determine if routine checks are adequate to maintain patient safety  7/25/2022 1222 by Marcy Khalil LPN  Outcome: Progressing  Note: No self harm noted this shift. Patient agrees to seek staff out if negative thoughts arise. Will continue to monitor Q15 minute and intermittently. Problem: Anxiety  Goal: Will report anxiety at manageable levels  Description: INTERVENTIONS:  1. Administer medication as ordered  2. Teach and rehearse alternative coping skills  3. Provide emotional support with 1:1 interaction with staff  7/25/2022 1222 by Marcy Khalil LPN  Outcome: Progressing  Flowsheets (Taken 7/24/2022 0849 by Valerie Olson)  Will report anxiety at manageable levels:   Teach and rehearse alternative coping skills   Provide emotional support with 1:1 interaction with staff  Note: Patient admits to minimal anxiety and depression. He is isolative to his room. He is encouraged to attend some groups. He does come out to eat his meals. Problem: Drug Abuse/Detox  Goal: Will have no detox symptoms and will verbalize plan for changing drug-related behavior  Description: INTERVENTIONS:  1. Administer medication as ordered  2. Monitor physical status  3. Provide emotional support with 1:1 interaction with staff  4. Encourage  recovery focused treatment   7/25/2022 1222 by Marcy Khalil LPN  Outcome: Progressing  Flowsheets (Taken 7/25/2022 1222)  Will have no detox symptoms and will verbalize plan for changing drug-related behavior:   Administer medication as ordered   Provide emotional support with 1:1 interaction with staff   Monitor physical status   Encourage recovery focused treatment  Note: Patient denies any detox symptoms.

## 2022-07-26 LAB
EKG ATRIAL RATE: 87 BPM
EKG P AXIS: 8 DEGREES
EKG P-R INTERVAL: 124 MS
EKG Q-T INTERVAL: 382 MS
EKG QRS DURATION: 104 MS
EKG QTC CALCULATION (BAZETT): 459 MS
EKG R AXIS: -50 DEGREES
EKG T AXIS: 47 DEGREES
EKG VENTRICULAR RATE: 87 BPM

## 2022-07-26 PROCEDURE — 6370000000 HC RX 637 (ALT 250 FOR IP): Performed by: PSYCHIATRY & NEUROLOGY

## 2022-07-26 PROCEDURE — 6370000000 HC RX 637 (ALT 250 FOR IP): Performed by: INTERNAL MEDICINE

## 2022-07-26 PROCEDURE — 1240000000 HC EMOTIONAL WELLNESS R&B

## 2022-07-26 PROCEDURE — APPSS30 APP SPLIT SHARED TIME 16-30 MINUTES: Performed by: NURSE PRACTITIONER

## 2022-07-26 PROCEDURE — 6370000000 HC RX 637 (ALT 250 FOR IP): Performed by: NURSE PRACTITIONER

## 2022-07-26 PROCEDURE — 99232 SBSQ HOSP IP/OBS MODERATE 35: CPT | Performed by: PSYCHIATRY & NEUROLOGY

## 2022-07-26 PROCEDURE — 90833 PSYTX W PT W E/M 30 MIN: CPT | Performed by: PSYCHIATRY & NEUROLOGY

## 2022-07-26 RX ADMIN — TRAZODONE HYDROCHLORIDE 50 MG: 50 TABLET ORAL at 21:36

## 2022-07-26 RX ADMIN — NICOTINE POLACRILEX 2 MG: 2 GUM, CHEWING BUCCAL at 15:01

## 2022-07-26 RX ADMIN — THIAMINE HCL TAB 100 MG 100 MG: 100 TAB at 08:42

## 2022-07-26 RX ADMIN — FOLIC ACID 1 MG: 1 TABLET ORAL at 08:42

## 2022-07-26 RX ADMIN — ATORVASTATIN CALCIUM 40 MG: 20 TABLET, FILM COATED ORAL at 21:36

## 2022-07-26 RX ADMIN — BRIMONIDINE TARTRATE 1 DROP: 2 SOLUTION/ DROPS OPHTHALMIC at 14:41

## 2022-07-26 RX ADMIN — BRIMONIDINE TARTRATE 1 DROP: 2 SOLUTION/ DROPS OPHTHALMIC at 08:42

## 2022-07-26 RX ADMIN — BRIMONIDINE TARTRATE 1 DROP: 2 SOLUTION/ DROPS OPHTHALMIC at 21:36

## 2022-07-26 RX ADMIN — NICOTINE POLACRILEX 2 MG: 2 GUM, CHEWING BUCCAL at 21:44

## 2022-07-26 RX ADMIN — DORZOLAMIDE HYDROCHLORIDE 1 DROP: 20 SOLUTION/ DROPS OPHTHALMIC at 08:42

## 2022-07-26 RX ADMIN — SERTRALINE HYDROCHLORIDE 75 MG: 50 TABLET ORAL at 08:42

## 2022-07-26 RX ADMIN — TIMOLOL MALEATE 1 DROP: 5 SOLUTION OPHTHALMIC at 08:42

## 2022-07-26 RX ADMIN — TIMOLOL MALEATE 1 DROP: 5 SOLUTION OPHTHALMIC at 21:37

## 2022-07-26 RX ADMIN — NICOTINE POLACRILEX 2 MG: 2 GUM, CHEWING BUCCAL at 12:37

## 2022-07-26 RX ADMIN — LATANOPROST 1 DROP: 50 SOLUTION OPHTHALMIC at 21:37

## 2022-07-26 RX ADMIN — DORZOLAMIDE HYDROCHLORIDE 1 DROP: 20 SOLUTION/ DROPS OPHTHALMIC at 21:36

## 2022-07-26 RX ADMIN — MIRTAZAPINE 15 MG: 15 TABLET, FILM COATED ORAL at 21:36

## 2022-07-26 NOTE — GROUP NOTE
Group Therapy Note    Date: 7/26/2022    Group Start Time: 1430  Group End Time: 8337  Group Topic: Cognitive Skills    [facility]    [place], [place]        Group Therapy Note    Attendees: 4/11     Cognitive Skills Group Note     Date: 7/26/2022          Start Time: 14:30              End Time: 15:30     Number of Participants in Group & Unit Census:  4/11        Topic: Expressing feelings, explore relaxation techniques, resources, and ways to utilize/soothe senses to manage stress, and communication skills. Goal of Group: To increase social interaction and to practice expressing feelings, explore relaxation techniques, resources, and ways to utilize/soothe senses to manage stress, and communication skills through creative expression and music. Comments: Patient did not participate in Cognitive Skills group, despite staff encouragement and explanation of benefits. Patient remains seclusive to self. Q15 minute safety checks maintained for patient safety and will continue to encourage patient to attend unit programming.            Discipline Responsible: Psychoeducational Specialist        Signature:  Sherrie Mcclure

## 2022-07-26 NOTE — GROUP NOTE
Group Therapy Note    Date: 7/26/2022    Group Start Time: 1100  Group End Time: 0467  Group Topic: Cognitive Skills    [facility]    [place], [address]        Group Therapy Note    Attendees: 3/15       Cognitive Skills Group Note     Date: 7/26/2022          Start Time: 11:00am                      End Time: 11:35am     Number of Participants in Group & Unit Census:  3/15        Topic: Decision making, weighing pros and cons of choices, and communication skills. Goal of Group: To increase social interaction and to practice decision making, weighing pros and cons of choices, and communication skills. Comments: Patient did not participate in Cognitive Skills group, despite staff encouragement and explanation of benefits. Patient remains seclusive to self. Q15 minute safety checks maintained for patient safety and will continue to encourage patient to attend unit programming.            Discipline Responsible: Psychoeducational Specialist        Signature:  Lashay Reaves

## 2022-07-26 NOTE — PLAN OF CARE
Problem: Self Harm/Suicidality  Goal: Will have no self-injury during hospital stay  Description: INTERVENTIONS:  1. Q 30 MINUTES: Routine safety checks  2. Q SHIFT & PRN: Assess risk to determine if routine checks are adequate to maintain patient safety  7/26/2022 1431 by Marisela Cerda  Outcome: Progressing Towards Goal     Problem: Anxiety  Goal: Will report anxiety at manageable levels  Description: INTERVENTIONS:  1. Administer medication as ordered  2. Teach and rehearse alternative coping skills  3. Provide emotional support with 1:1 interaction with staff  7/26/2022 1431 by Surendra Chavira  Outcome: Progressing Towards Goal   Currently denies any negative thoughts denies any hallucinations isolative aloof out for needs mostly.

## 2022-07-26 NOTE — PROGRESS NOTES
Daily Progress Note  7/26/2022    Patient Name: Kieth Paget    CHIEF COMPLAINT: Suicidal ideation with a plan to jump off a bridge         SUBJECTIVE:     Patient seen face-to-face for follow-up assessment. He is resting in bed. Has been isolating to self. Staff report that he has not been attending group programming and is withdrawn from peers. Patient states that his mood is modestly improving, but he does continue to have anxiety regarding his financial situation. Patient notes that he plans to call [facility] today to determine if there will be any space available for him in their inpatient program.  He is hopeful that he will be admitted there. Expresses some hope for the future, but is unable to contract for safety in the community without a safe disposition plan. Feels that he is a threat to self and worries that he will become intoxicated and hurt himself or someone else. Patient voices intense mood swings with anger and irritability with alcohol consumption, which has been documented in the ED prior to admission. Was threatening and made sexually inappropriate statements to staff. We discussed use of naltrexone/Vivitrol to help with alcohol cravings. Patient states that he has used them in the past and did not feel that it was especially beneficial.  He has been compliant with his scheduled psychotropic medications. His mirtazapine and sertraline have been titrated within the past 24 hours. We reviewed side effects and he denies all. Does feel that he slept better the previous evening and has better energy today. Still endorsing problems with motivation. Denies any problems with appetite. States that he has showered recently, but does appear to be disheveled today. Patient has yet to demonstrate stability. Requires inpatient hospitalization for safety.     Psych med compliant: [x] Yes    [] No     E-meds in last 24 hrs: [] Yes   [x] No    Appetite:  [x] Normal/Adequate/Unchanged [] Increased  [] Decreased      Sleep:       [x] Normal/Adequate/Unchanged  [] Fair  [] Poor      Group Attendance:   [] Yes  [] Selectively    [x] No    Medication Side Effects: Denies         Mental Status Exam  Level of consciousness: Alert and awake   Appearance: Appropriate attire for setting, resting in bed, with fair hygiene, disheveled  Behavior/Motor: Approachable, no psychomotor abnormalities   Attitude toward examiner: Cooperative, attentive, good eye contact   Speech: spontaneous, normal rate, and normal volume, mumbled  Mood: Patient reports \"anxious\"  Affect: Congruent  Thought processes: linear and goal directed   Thought content: Denies homicidal ideation  Suicidal Ideation: Reports improving suicidal ideation, unable to contract for safety off unit  Delusions: Denies  Perceptual Disturbance: Denies. Does not appear to be responding to internal stimuli. Cognition: Oriented to self, location, time, and situation  Memory: intact  Insight & Judgement: poor     Data   height is 5' 5\" (1.651 m) and weight is 170 lb (77.1 kg). His oral temperature is 98 °F (36.7 °C). His blood pressure is 118/74 and his pulse is 60. His respiration is 14 and oxygen saturation is 98%.    Labs:   Admission on 07/23/2022   Component Date Value Ref Range Status    WBC 07/23/2022 7.2  3.5 - 11.0 k/uL Final    RBC 07/23/2022 4.79  4.5 - 5.9 m/uL Final    Hemoglobin 07/23/2022 15.5  13.5 - 17.5 g/dL Final    Hematocrit 07/23/2022 44.9  41 - 53 % Final    MCV 07/23/2022 93.7  80 - 100 fL Final    MCH 07/23/2022 32.3  26 - 34 pg Final    MCHC 07/23/2022 34.4  31 - 37 g/dL Final    RDW 07/23/2022 12.2  11.5 - 14.9 % Final    Platelets 84/55/8614 212  150 - 450 k/uL Final    MPV 07/23/2022 7.0  6.0 - 12.0 fL Final    Seg Neutrophils 07/23/2022 63  36 - 66 % Final    Lymphocytes 07/23/2022 26  24 - 44 % Final    Monocytes 07/23/2022 8 (A) 1 - 7 % Final    Eosinophils % 07/23/2022 2  0 - 4 % Final    Basophils 07/23/2022 1  0 - 2 % Final    Segs Absolute 07/23/2022 4.60  1.3 - 9.1 k/uL Final    Absolute Lymph # 07/23/2022 1.90  1.0 - 4.8 k/uL Final    Absolute Mono # 07/23/2022 0.50  0.1 - 1.3 k/uL Final    Absolute Eos # 07/23/2022 0.10  0.0 - 0.4 k/uL Final    Basophils Absolute 07/23/2022 0.00  0.0 - 0.2 k/uL Final    Glucose 07/23/2022 81  70 - 99 mg/dL Final    BUN 07/23/2022 8  8 - 23 mg/dL Final    Creatinine 07/23/2022 0.86  0.70 - 1.20 mg/dL Final    Calcium 07/23/2022 8.9  8.6 - 10.4 mg/dL Final    Sodium 07/23/2022 144  135 - 144 mmol/L Final    Potassium 07/23/2022 4.1  3.7 - 5.3 mmol/L Final    Chloride 07/23/2022 106  98 - 107 mmol/L Final    CO2 07/23/2022 24  20 - 31 mmol/L Final    Anion Gap 07/23/2022 14  9 - 17 mmol/L Final    Alkaline Phosphatase 07/23/2022 49  40 - 129 U/L Final    ALT 07/23/2022 21  5 - 41 U/L Final    AST 07/23/2022 19  <40 U/L Final    Total Bilirubin 07/23/2022 0.42  0.3 - 1.2 mg/dL Final    Total Protein 07/23/2022 7.6  6.4 - 8.3 g/dL Final    Albumin 07/23/2022 4.6  3.5 - 5.2 g/dL Final    GFR Non- 07/23/2022 >60  >60 mL/min Final    GFR  07/23/2022 >60  >60 mL/min Final    GFR Comment 07/23/2022        Final    Comment: Average GFR for 61-76 years old:   80 mL/min/1.73sq m  Chronic Kidney Disease:   <60 mL/min/1.73sq m  Kidney failure:   <15 mL/min/1.73sq m              eGFR calculated using average adult body mass.  Additional eGFR calculator available at:        [employer]            Ethanol 07/23/2022 142 (A) <10 mg/dL Final    Ethanol percent 07/23/2022 0.142  % Final    Amphetamine Screen, Ur 07/23/2022 NEGATIVE  NEGATIVE Final    Comment:       (Positive cutoff 1000 ng/mL)                  Barbiturate Screen, Ur 07/23/2022 NEGATIVE  NEGATIVE Final    Comment:       (Positive cutoff 200 ng/mL)                  Benzodiazepine Screen, Urine 07/23/2022 NEGATIVE  NEGATIVE Final    Comment:       (Positive cutoff 200 ng/mL) Cocaine Metabolite, Urine 07/23/2022 POSITIVE (A) NEGATIVE Final    Comment:       (Positive cutoff 300 ng/mL)                  Methadone Screen, Urine 07/23/2022 NEGATIVE  NEGATIVE Final    Comment:       (Positive cutoff 300 ng/mL)                  Opiates, Urine 07/23/2022 NEGATIVE  NEGATIVE Final    Comment:       (Positive cutoff 300 ng/mL)                  Phencyclidine, Urine 07/23/2022 NEGATIVE  NEGATIVE Final    Comment:       (Positive cutoff 25 ng/mL)                  Cannabinoid Scrn, Ur 07/23/2022 NEGATIVE  NEGATIVE Final    Comment:       (Positive cutoff 50 ng/mL)                  Oxycodone Screen, Ur 07/23/2022 NEGATIVE  NEGATIVE Final    Comment:       (Positive cutoff 100 ng/mL)                  Fentanyl, Ur 07/23/2022 NEGATIVE  NEGATIVE Final    Comment:       (Positive cutoff  5 ng/ml)            Test Information 07/23/2022 Assay provides medical screening only. The absence of expected drug(s) and/or metabolite(s) may indicate diluted or adulterated urine, limitations of testing or timing of collection. Final    Comment: Testing for legal purposes should be confirmed by another method. To request confirmation   of test result, please call the lab within 7 days of sample submission. Acetaminophen Level 07/23/2022 <5 (A) 10 - 30 ug/mL Final    Salicylate Lvl 54/52/5743 <1 (A) 3 - 10 mg/dL Final    Magnesium 07/23/2022 2.1  1.6 - 2.6 mg/dL Final         Reviewed patient's current plan of care and vital signs with nursing staff.     Labs reviewed: [x] Yes  Last EKG in EMR reviewed: [x] Yes    Medications  Current Facility-Administered Medications: mirtazapine (REMERON) tablet 15 mg, 15 mg, Oral, Nightly  sertraline (ZOLOFT) tablet 75 mg, 75 mg, Oral, Daily  acetaminophen (TYLENOL) tablet 650 mg, 650 mg, Oral, Q4H PRN  aluminum & magnesium hydroxide-simethicone (MAALOX) 200-200-20 MG/5ML suspension 30 mL, 30 mL, Oral, Q6H PRN  hydrOXYzine HCl (ATARAX) tablet 50 mg, 50 mg, Oral, TID PRN  ibuprofen (ADVIL;MOTRIN) tablet 400 mg, 400 mg, Oral, Q6H PRN  polyethylene glycol (GLYCOLAX) packet 17 g, 17 g, Oral, Daily PRN  traZODone (DESYREL) tablet 50 mg, 50 mg, Oral, Nightly PRN  nicotine polacrilex (NICORETTE) gum 2 mg, 2 mg, Oral, Z7K PRN  folic acid (FOLVITE) tablet 1 mg, 1 mg, Oral, Daily  thiamine mononitrate tablet 100 mg, 100 mg, Oral, Daily  atorvastatin (LIPITOR) tablet 40 mg, 40 mg, Oral, Nightly  brimonidine (ALPHAGAN) 0.2 % ophthalmic solution 1 drop, 1 drop, Right Eye, TID  dorzolamide (TRUSOPT) 2 % ophthalmic solution 1 drop, 1 drop, Right Eye, BID  latanoprost (XALATAN) 0.005 % ophthalmic solution 1 drop, 1 drop, Right Eye, Nightly  timolol (TIMOPTIC) 0.5 % ophthalmic solution 1 drop, 1 drop, Right Eye, BID    ASSESSMENT  Major depressive disorder, single episode, severe without psychotic features ([facility] [address]         HANDOFF  Patient symptoms are: modestly improving  Medications as determined by attending physician  Discussed vivitrol  to help with alcohol abuse, but patient not interested  Encourage participation in groups and milieu. Probable discharge is to be determined by MD    Electronically signed by ALLISON Vogel CNP on 7/26/2022 at 1:24 PM    **This report has been created using voice recognition software. It may contain minor errors which are inherent in voice recognition technology. **     I independently saw and evaluated the patient. I reviewed the nurse practitioners documentation above. Any additional comments or changes to the nurse practitioners documentation are stated below otherwise agree with assessment. Plan will be as follows:  Spent 30 minutes with the patient, of that greater than 16 minutes was spent in supportive psychotherapy patient remains unable to contract for safety. Depressed, suicidal.  Still wants to commit to recovery services. Frustrated about possibility of not finding a place and feels hopeless. Tolerating medications fair.   Will monitor and consider increase in Zoloft after another day  PLAN  Patient s symptoms   show minimal change  Will consider further titration of Zoloft after second dose and 75 mg  Attempt to develop insight  Psycho-education conducted. Supportive Therapy conducted.   Probable discharge is 3 to 5 days  Follow-up daily while on inpatient unit

## 2022-07-26 NOTE — GROUP NOTE
Group Therapy Note    Date: 7/26/2022    Group Start Time: 1000  Group End Time: 1050  Group Topic: Psychoeducation    [address], LSW    patient refused to attend psychoeducation group at 10a after encouragement from staff.   1:1 talk time provided as alternative to group session

## 2022-07-27 PROCEDURE — APPSS30 APP SPLIT SHARED TIME 16-30 MINUTES: Performed by: NURSE PRACTITIONER

## 2022-07-27 PROCEDURE — 6370000000 HC RX 637 (ALT 250 FOR IP): Performed by: PSYCHIATRY & NEUROLOGY

## 2022-07-27 PROCEDURE — 6370000000 HC RX 637 (ALT 250 FOR IP): Performed by: INTERNAL MEDICINE

## 2022-07-27 PROCEDURE — 1240000000 HC EMOTIONAL WELLNESS R&B

## 2022-07-27 PROCEDURE — 99232 SBSQ HOSP IP/OBS MODERATE 35: CPT | Performed by: INTERNAL MEDICINE

## 2022-07-27 PROCEDURE — 90833 PSYTX W PT W E/M 30 MIN: CPT | Performed by: PSYCHIATRY & NEUROLOGY

## 2022-07-27 PROCEDURE — 99232 SBSQ HOSP IP/OBS MODERATE 35: CPT | Performed by: PSYCHIATRY & NEUROLOGY

## 2022-07-27 PROCEDURE — 6370000000 HC RX 637 (ALT 250 FOR IP): Performed by: NURSE PRACTITIONER

## 2022-07-27 RX ORDER — SERTRALINE HYDROCHLORIDE 100 MG/1
100 TABLET, FILM COATED ORAL DAILY
Status: DISCONTINUED | OUTPATIENT
Start: 2022-07-28 | End: 2022-07-28 | Stop reason: HOSPADM

## 2022-07-27 RX ADMIN — DORZOLAMIDE HYDROCHLORIDE 1 DROP: 20 SOLUTION/ DROPS OPHTHALMIC at 08:56

## 2022-07-27 RX ADMIN — NICOTINE POLACRILEX 2 MG: 2 GUM, CHEWING BUCCAL at 11:24

## 2022-07-27 RX ADMIN — TIMOLOL MALEATE 1 DROP: 5 SOLUTION OPHTHALMIC at 21:05

## 2022-07-27 RX ADMIN — LATANOPROST 1 DROP: 50 SOLUTION OPHTHALMIC at 21:25

## 2022-07-27 RX ADMIN — NICOTINE POLACRILEX 2 MG: 2 GUM, CHEWING BUCCAL at 17:57

## 2022-07-27 RX ADMIN — DORZOLAMIDE HYDROCHLORIDE 1 DROP: 20 SOLUTION/ DROPS OPHTHALMIC at 20:26

## 2022-07-27 RX ADMIN — BRIMONIDINE TARTRATE 1 DROP: 2 SOLUTION/ DROPS OPHTHALMIC at 20:40

## 2022-07-27 RX ADMIN — TIMOLOL MALEATE 1 DROP: 5 SOLUTION OPHTHALMIC at 08:56

## 2022-07-27 RX ADMIN — ATORVASTATIN CALCIUM 40 MG: 20 TABLET, FILM COATED ORAL at 21:25

## 2022-07-27 RX ADMIN — MIRTAZAPINE 15 MG: 15 TABLET, FILM COATED ORAL at 21:25

## 2022-07-27 RX ADMIN — FOLIC ACID 1 MG: 1 TABLET ORAL at 08:55

## 2022-07-27 RX ADMIN — IBUPROFEN 400 MG: 400 TABLET ORAL at 21:25

## 2022-07-27 RX ADMIN — SERTRALINE HYDROCHLORIDE 75 MG: 50 TABLET ORAL at 08:55

## 2022-07-27 RX ADMIN — THIAMINE HCL TAB 100 MG 100 MG: 100 TAB at 08:55

## 2022-07-27 RX ADMIN — BRIMONIDINE TARTRATE 1 DROP: 2 SOLUTION/ DROPS OPHTHALMIC at 08:55

## 2022-07-27 RX ADMIN — BRIMONIDINE TARTRATE 1 DROP: 2 SOLUTION/ DROPS OPHTHALMIC at 14:02

## 2022-07-27 RX ADMIN — NICOTINE POLACRILEX 2 MG: 2 GUM, CHEWING BUCCAL at 14:08

## 2022-07-27 RX ADMIN — TRAZODONE HYDROCHLORIDE 50 MG: 50 TABLET ORAL at 21:25

## 2022-07-27 NOTE — CARE COORDINATION
Pt approached SW regarding discharging to [facility] on 7/28. Pt is unsure where his money is and is uncertain how to access it. SW placed call to [facility] regarding pt possibly getting mail there, SW asked for a return call from the Clinical Manager.

## 2022-07-27 NOTE — PROGRESS NOTES
Daily Progress Note  7/27/2022    Patient Name: Josué Noyola    CHIEF COMPLAINT: Suicidal ideation with a plan to jump off a bridge         SUBJECTIVE:     Patient seen face-to-face for follow-up assessment. He is resting in bed but is easily arousable to verbal stimuli. Patient reports elevated anxiety and states that he is unsure where his disability check was sent. Patient needs those forms in order to be accepted into a treatment program.  This has been significantly distressing for him and he reports that he feels on edge today. He does report that his suicidal thoughts are improving, but does not feel safe for himself in the community. Requires the structure of the unit or AOD program in order to remain stable. Explicitly states that if he does not have structure and support, he will be a risk to self and decompensate directly upon discharge. Unable to contract for safety off unit at this time. Staff report that patient has been more active in his treatment today. He primarily remains isolative and withdrawn but is showing some improvement. Patient's sertraline was titrated to 75 mg and he has now had 2 administrations at this dose. Other psychotropic medications include Remeron and supplements folic acid and thiamine to help with chronic alcohol abuse. We reviewed side effects and he denies all. Denies any alcohol withdrawal symptoms. Patient verbalizes interest in [facility] program.  Expresses that he has made phone calls but has not had a response at this time. Patient has yet to demonstrate stability. Requires inpatient hospitalization for safety.     Psych med compliant: [x] Yes    [] No     E-meds in last 24 hrs: [] Yes   [x] No    Appetite:  [x] Normal/Adequate/Unchanged  [] Increased  [] Decreased      Sleep:       [x] Normal/Adequate/Unchanged  [] Fair  [] Poor      Group Attendance:   [] Yes  [] Selectively    [x] No    Medication Side Effects: Denies         Mental Status Exam  Level of consciousness: Alert and awake   Appearance: Appropriate attire for setting, resting in bed, with fair hygiene, disheveled  Behavior/Motor: Approachable, no psychomotor abnormalities   Attitude toward examiner: Cooperative, attentive, good eye contact   Speech: spontaneous, normal rate, and normal volume, mumbled  Mood: Patient reports \"anxious\"  Affect: Congruent  Thought processes: linear and goal directed   Thought content: Denies homicidal ideation  Suicidal Ideation: Reports improving suicidal ideation, unable to contract for safety off unit  Delusions: Denies  Perceptual Disturbance: Denies. Does not appear to be responding to internal stimuli. Cognition: Oriented to self, location, time, and situation  Memory: intact  Insight & Judgement: poor     Data   height is 5' 5\" (1.651 m) and weight is 170 lb (77.1 kg). His oral temperature is 97.5 °F (36.4 °C). His blood pressure is 114/69 and his pulse is 60. His respiration is 14 and oxygen saturation is 98%.    Labs:   Admission on 07/23/2022   Component Date Value Ref Range Status    WBC 07/23/2022 7.2  3.5 - 11.0 k/uL Final    RBC 07/23/2022 4.79  4.5 - 5.9 m/uL Final    Hemoglobin 07/23/2022 15.5  13.5 - 17.5 g/dL Final    Hematocrit 07/23/2022 44.9  41 - 53 % Final    MCV 07/23/2022 93.7  80 - 100 fL Final    MCH 07/23/2022 32.3  26 - 34 pg Final    MCHC 07/23/2022 34.4  31 - 37 g/dL Final    RDW 07/23/2022 12.2  11.5 - 14.9 % Final    Platelets 22/62/5205 212  150 - 450 k/uL Final    MPV 07/23/2022 7.0  6.0 - 12.0 fL Final    Seg Neutrophils 07/23/2022 63  36 - 66 % Final    Lymphocytes 07/23/2022 26  24 - 44 % Final    Monocytes 07/23/2022 8 (A) 1 - 7 % Final    Eosinophils % 07/23/2022 2  0 - 4 % Final    Basophils 07/23/2022 1  0 - 2 % Final    Segs Absolute 07/23/2022 4.60  1.3 - 9.1 k/uL Final    Absolute Lymph # 07/23/2022 1.90  1.0 - 4.8 k/uL Final    Absolute Mono # 07/23/2022 0.50  0.1 - 1.3 k/uL Final    Absolute Eos # 07/23/2022 0.10  0.0 - 0.4 k/uL Final    Basophils Absolute 07/23/2022 0.00  0.0 - 0.2 k/uL Final    Glucose 07/23/2022 81  70 - 99 mg/dL Final    BUN 07/23/2022 8  8 - 23 mg/dL Final    Creatinine 07/23/2022 0.86  0.70 - 1.20 mg/dL Final    Calcium 07/23/2022 8.9  8.6 - 10.4 mg/dL Final    Sodium 07/23/2022 144  135 - 144 mmol/L Final    Potassium 07/23/2022 4.1  3.7 - 5.3 mmol/L Final    Chloride 07/23/2022 106  98 - 107 mmol/L Final    CO2 07/23/2022 24  20 - 31 mmol/L Final    Anion Gap 07/23/2022 14  9 - 17 mmol/L Final    Alkaline Phosphatase 07/23/2022 49  40 - 129 U/L Final    ALT 07/23/2022 21  5 - 41 U/L Final    AST 07/23/2022 19  <40 U/L Final    Total Bilirubin 07/23/2022 0.42  0.3 - 1.2 mg/dL Final    Total Protein 07/23/2022 7.6  6.4 - 8.3 g/dL Final    Albumin 07/23/2022 4.6  3.5 - 5.2 g/dL Final    GFR Non- 07/23/2022 >60  >60 mL/min Final    GFR  07/23/2022 >60  >60 mL/min Final    GFR Comment 07/23/2022        Final    Comment: Average GFR for 61-76 years old:   80 mL/min/1.73sq m  Chronic Kidney Disease:   <60 mL/min/1.73sq m  Kidney failure:   <15 mL/min/1.73sq m              eGFR calculated using average adult body mass.  Additional eGFR calculator available at:        [employer]            Ethanol 07/23/2022 142 (A) <10 mg/dL Final    Ethanol percent 07/23/2022 0.142  % Final    Amphetamine Screen, Ur 07/23/2022 NEGATIVE  NEGATIVE Final    Comment:       (Positive cutoff 1000 ng/mL)                  Barbiturate Screen, Ur 07/23/2022 NEGATIVE  NEGATIVE Final    Comment:       (Positive cutoff 200 ng/mL)                  Benzodiazepine Screen, Urine 07/23/2022 NEGATIVE  NEGATIVE Final    Comment:       (Positive cutoff 200 ng/mL)                  Cocaine Metabolite, Urine 07/23/2022 POSITIVE (A) NEGATIVE Final    Comment:       (Positive cutoff 300 ng/mL)                  Methadone Screen, Urine 07/23/2022 NEGATIVE  NEGATIVE Final    Comment:       (Positive cutoff 300 ng/mL)                  Opiates, Urine 07/23/2022 NEGATIVE  NEGATIVE Final    Comment:       (Positive cutoff 300 ng/mL)                  Phencyclidine, Urine 07/23/2022 NEGATIVE  NEGATIVE Final    Comment:       (Positive cutoff 25 ng/mL)                  Cannabinoid Scrn, Ur 07/23/2022 NEGATIVE  NEGATIVE Final    Comment:       (Positive cutoff 50 ng/mL)                  Oxycodone Screen, Ur 07/23/2022 NEGATIVE  NEGATIVE Final    Comment:       (Positive cutoff 100 ng/mL)                  Fentanyl, Ur 07/23/2022 NEGATIVE  NEGATIVE Final    Comment:       (Positive cutoff  5 ng/ml)            Test Information 07/23/2022 Assay provides medical screening only. The absence of expected drug(s) and/or metabolite(s) may indicate diluted or adulterated urine, limitations of testing or timing of collection. Final    Comment: Testing for legal purposes should be confirmed by another method. To request confirmation   of test result, please call the lab within 7 days of sample submission. Acetaminophen Level 07/23/2022 <5 (A) 10 - 30 ug/mL Final    Salicylate Lvl 92/57/7894 <1 (A) 3 - 10 mg/dL Final    Magnesium 07/23/2022 2.1  1.6 - 2.6 mg/dL Final         Reviewed patient's current plan of care and vital signs with nursing staff.     Labs reviewed: [x] Yes  Last EKG in EMR reviewed: [x] Yes    Medications  Current Facility-Administered Medications: mirtazapine (REMERON) tablet 15 mg, 15 mg, Oral, Nightly  sertraline (ZOLOFT) tablet 75 mg, 75 mg, Oral, Daily  acetaminophen (TYLENOL) tablet 650 mg, 650 mg, Oral, Q4H PRN  aluminum & magnesium hydroxide-simethicone (MAALOX) 200-200-20 MG/5ML suspension 30 mL, 30 mL, Oral, Q6H PRN  hydrOXYzine HCl (ATARAX) tablet 50 mg, 50 mg, Oral, TID PRN  ibuprofen (ADVIL;MOTRIN) tablet 400 mg, 400 mg, Oral, Q6H PRN  polyethylene glycol (GLYCOLAX) packet 17 g, 17 g, Oral, Daily PRN  traZODone (DESYREL) tablet 50 mg, 50 mg, Oral, Nightly PRN  nicotine polacrilex (NICORETTE) gum 2 mg, 2 mg, Oral, K7U PRN  folic acid (FOLVITE) tablet 1 mg, 1 mg, Oral, Daily  thiamine mononitrate tablet 100 mg, 100 mg, Oral, Daily  atorvastatin (LIPITOR) tablet 40 mg, 40 mg, Oral, Nightly  brimonidine (ALPHAGAN) 0.2 % ophthalmic solution 1 drop, 1 drop, Right Eye, TID  dorzolamide (TRUSOPT) 2 % ophthalmic solution 1 drop, 1 drop, Right Eye, BID  latanoprost (XALATAN) 0.005 % ophthalmic solution 1 drop, 1 drop, Right Eye, Nightly  timolol (TIMOPTIC) 0.5 % ophthalmic solution 1 drop, 1 drop, Right Eye, BID    ASSESSMENT  Major depressive disorder, single episode, severe without psychotic features ([facility] [address]         HANDOFF  Patient symptoms are: modestly improving  Medications as determined by attending physician  Encourage participation in groups and milieu. Probable discharge is to be determined by MD    Electronically signed by ALLISON Seymour CNP on 7/27/2022 at 4:52 PM    **This report has been created using voice recognition software. It may contain minor errors which are inherent in voice recognition technology. **       I independently saw and evaluated the patient. I reviewed the nurse practitioners documentation above. Any additional comments or changes to the nurse practitioners documentation are stated below otherwise agree with assessment. Plan will be as follows:  Spent 30 minutes with the patient, that greater than 16 minutes was spent in supportive psychotherapy. Patient denying side effects to medication. Doing well and better but agreeable to increase Zoloft as he is denying any side effects. He is now actively working towards his discharge planning. Has a goal of going to [facility], needs help coordinating transition and has made calls. We will support the patient and his discharge planning. PLAN  Patient s symptoms   are improving  Increase Zoloft to 100 mg  Attempt to develop insight  Psycho-education conducted.   Supportive Therapy conducted.   Probable discharge is 2 to 3 days to coordinate discharge and for discharge stabilization  Follow-up daily while on inpatient unit

## 2022-07-27 NOTE — GROUP NOTE
Group Therapy Note    Date: 7/27/2022    Group Start Time: 1000  Group End Time: 1050  Group Topic: Psychotherapy    [address], LSW        Group Therapy Note    Attendees: 6/15       patient refused to attend psychotherapy group at [address] [place] [address] after encouragement from staff.   1:1 talk time provided as alternative to group session

## 2022-07-27 NOTE — GROUP NOTE
Group Therapy Note    Date: 7/27/2022    Group Start Time: 1330  Group End Time: 0371  Group Topic: Psychoeducation    NICK Llanos    Psych-Ed/Relapse Prevention Group Note        Date: July 27, 2022 Start Time: 1:30pm  End Time: 2:10 pm      Number of Participants in Group & Unit Census:  4    Topic: Card Questions    Goal of Group:Patient will improve interpersonal communication skills. Comments:     Patient did not participate in Psych-Ed/Relapse Prevention group, despite staff encouragement and explanation of benefits. Patient remain seclusive to self. Q15 minute safety checks maintained for patient safety and will continue to encourage patient to attend unit programming.             Signature:  Lester Martinez, [address]

## 2022-07-27 NOTE — PLAN OF CARE
Problem: Self Harm/Suicidality  Goal: Will have no self-injury during hospital stay  Description: INTERVENTIONS:  1. Q 30 MINUTES: Routine safety checks  2. Q SHIFT & PRN: Assess risk to determine if routine checks are adequate to maintain patient safety  Outcome: Progressing Towards Goal     Problem: Anxiety  Goal: Will report anxiety at manageable levels  Description: INTERVENTIONS:  1. Administer medication as ordered  2. Teach and rehearse alternative coping skills  3. Provide emotional support with 1:1 interaction with staff  Outcome: Progressing Towards Goal     Problem: Drug Abuse/Detox  Goal: Will have no detox symptoms and will verbalize plan for changing drug-related behavior  Description: INTERVENTIONS:  1. Administer medication as ordered  2. Monitor physical status  3. Provide emotional support with 1:1 interaction with staff  4. Encourage  recovery focused treatment   Outcome: Progressing Towards Goal    Patient remains depressed but reports that mood has improved in the last couple of days. Patient isolative for long intervals but is more visible in milieu, verbalizes needs appropriately and attends to ADLs' with direction. Patient is medication compliant and actively calling detox. Facilities in attempt to make discharge plan. Patient denies thoughts of self harm and is agreeable to seeking out should thoughts of self harm arise. Safe environment maintained. 15 minute checks for safety continued per unit policy. Will continue to monitor for safety and provides support and reassurance as needed.

## 2022-07-27 NOTE — PROGRESS NOTES
[facility] Internal Medicine  Sakina Hart MD; Dahlia Cooper MD; Cristina Messer MD; MD Gabby Belle MD; Renella Boeck, MD [facility] Internal Medicine   [facility]    HISTORY AND PHYSICAL EXAMINATION            Date:   7/27/2022  Patient name:  Darryl Lorenz  Date of admission:  7/23/2022  9:34 PM  MRN:   [id number]  Account:  [de-identified]  YOB: 1960  PCP:    Brian Holder MD  Room:   [address] [place] [address]  Code Status:    Full Code    Chief Complaint:     Chief Complaint   Patient presents with    Mental Health Problem   Hld  Alcohol abuse  Hx of pancreatitis      History Obtained From:     Patient medical record nursing staff    History of Present Illness:     Darryl Lorenz is a 62 y.o. Non- / non  male who presents with Mental Health Problem   and is admitted to the hospital for the management of Major depressive disorder, single episode, severe without psychotic features ([facility] 76-year-old  gentleman with a history of alcohol abuse history of recurrent pancreatitis active alcohol abuse  HLD  Onset more than 5 years ago  Severity is mild, not getting worse  Not associated with pancreatitis  Tolerating statin well no muscle pain  Open-angle glaucoma    Past Medical History:     Past Medical History:   Diagnosis Date    Bradycardia     [facility] consulted during hospital admission. COVID-19     1/2022 per patient. Was tested at a homeless shelter and was positive. States no symptoms. Depression with suicidal ideation 04/2022 April 2022 Psych ding [facility] then to [facility] for psych.     Glaucoma     Homeless single person     lives in non-mobile [place] in [place] \"behind a garage\"    Hypertension     Legally blind     lt eye completely blind, rt eye has about 20 % vision  Sees [facility] and then \"glaucoma specialist\" Elsa Fortune  \"Dr. Soren Hayden"    Mixed hyperlipidemia 3/28/2022    Pancreatitis     4/2022  [facility] admit    Snores     Wellness examination     Dr. Kevin Mcdonald last seen \"years ago\" [facility] to [facility] in [place] currently . Pt. homeless. Past Surgical History:     Past Surgical History:   Procedure Laterality Date    EYE SURGERY      cataracts approx 2015    TESTICLE REMOVAL      as a baby        Medications Prior to Admission:     Prior to Admission medications    Medication Sig Start Date End Date Taking?  Authorizing Provider   atorvastatin (LIPITOR) 40 MG tablet Take 1 tablet by mouth nightly 5/31/22   Blaire Ng MD   brimonidine (ALPHAGAN) 0.2 % ophthalmic solution Place 1 drop into the right eye 3 times daily 5/31/22   Blaire Ng MD   dorzolamide (TRUSOPT) 2 % ophthalmic solution Place 1 drop into the right eye 2 times daily 5/31/22   Blaire Ng MD   folic acid (FOLVITE) 1 MG tablet Take 1 tablet by mouth daily 5/31/22   Blaire Ng MD   latanoprost (XALATAN) 0.005 % ophthalmic solution Place 1 drop into the right eye nightly 5/31/22   Blaire Ng MD   mirtazapine (REMERON) 7.5 MG tablet Take 1 tablet by mouth nightly 5/31/22   Blaire Ng MD   nicotine polacrilex (NICORETTE) 2 MG gum Take 1 each by mouth as needed for Smoking cessation 5/31/22   Blaire Ng MD   sertraline (ZOLOFT) 100 MG tablet Take 1 tablet by mouth daily 5/31/22   Blaire Ng MD   timolol (TIMOPTIC) 0.5 % ophthalmic solution Place 1 drop into the right eye 2 times daily 5/31/22   Blaire Ng MD   traZODone (DESYREL) 50 MG tablet Take 1 tablet by mouth nightly as needed for Sleep 5/31/22   Blaire Ng MD   vitamin B-1 (THIAMINE) 100 MG tablet Take 1 tablet by mouth daily 5/31/22   Blaire Ng MD   acetaminophen (TYLENOL) 325 mg tablet Take 2 tablets by mouth every 6 hours as needed for Pain 5/25/22   Daniela Smallwood MD   ibuprofen (ADVIL;MOTRIN) 200 MG tablet Take 2 tablets by mouth every 6 hours as needed for Pain or Fever 22   Oleh Apley, MD        Allergies:     Patient has no known allergies. Social History:     Tobacco:    reports that he has been smoking cigarettes. His smokeless tobacco use includes chew. Alcohol:      reports current alcohol use. Drug Use:  reports that he does not currently use drugs. Family History:     No family history on file. Review of Systems:     Positive and Negative as described in HPI. CONSTITUTIONAL:  negative for fevers, chills, sweats, fatigue, weight loss  HEENT:  negative for vision, hearing changes, runny nose, throat pain  RESPIRATORY:  negative for shortness of breath, cough, congestion, wheezing  CARDIOVASCULAR:  negative for chest pain, palpitations  GASTROINTESTINAL:  negative for nausea, vomiting, diarrhea, constipation, change in bowel habits, abdominal pain   GENITOURINARY:  negative for difficulty of urination, burning with urination, frequency   INTEGUMENT:  negative for rash, skin lesions, easy bruising   HEMATOLOGIC/LYMPHATIC:  negative for swelling/edema   ALLERGIC/IMMUNOLOGIC:  negative for urticaria , itching  ENDOCRINE:  negative increase in drinking, increase in urination, hot or cold intolerance  MUSCULOSKELETAL:  negative joint pains, muscle aches, swelling of joints  NEUROLOGICAL:  negative for headaches, dizziness, lightheadedness, numbness, pain, tingling extremities  BEHAVIOR/PSYCH:  negative for depression, anxiety    Physical Exam:   /69   Pulse 60   Temp 97.5 °F (36.4 °C) (Oral)   Resp 14   Ht 5' 5\" (1.651 m)   Wt 170 lb (77.1 kg)   SpO2 98%   BMI 28.29 kg/m²   Temp (24hrs), Av.7 °F (36.5 °C), Min:97.5 °F (36.4 °C), Max:97.8 °F (36.6 °C)    No results for input(s): POCGLU in the last 72 hours.   No intake or output data in the 24 hours ending 22 1642    General Appearance: alert, well appearing, and in no acute distress  Mental status: oriented to person, place, and time  Head: normocephalic, latanoprost brimonidine eyedrops    Alcohol abuse thiamine 647 mg daily folic acid 1 mg daily            Meredith Hartley MD  7/27/2022  4:42 PM    Copy sent to Dr. Ava Wisdom MD    Please note that this chart was generated using voice recognition Dragon dictation software. Although every effort was made to ensure the accuracy of this automated transcription, some errors in transcription may have occurred.

## 2022-07-27 NOTE — PLAN OF CARE
Problem: Self Harm/Suicidality  Goal: Will have no self-injury during hospital stay  Description: INTERVENTIONS:  1. Q 30 MINUTES: Routine safety checks  2. Q SHIFT & PRN: Assess risk to determine if routine checks are adequate to maintain patient safety  7/26/2022 2241 by José Miguel Galaviz RN  Outcome: Progressing Towards Goal     Problem: Anxiety  Goal: Will report anxiety at manageable levels  Description: INTERVENTIONS:  1. Administer medication as ordered  2. Teach and rehearse alternative coping skills  3. Provide emotional support with 1:1 interaction with staff  7/26/2022 2241 by José Miguel Galaviz RN  Outcome: Progressing Towards Goal     Problem: Drug Abuse/Detox  Goal: Will have no detox symptoms and will verbalize plan for changing drug-related behavior  Description: INTERVENTIONS:  1. Administer medication as ordered  2. Monitor physical status  3. Provide emotional support with 1:1 interaction with staff  4. Encourage  recovery focused treatment   Outcome: Progressing Towards Goal     Patient is cooperative, guarded, and remains isolated to his room. Patient is compliant with scheduled medications and has remained in behavioral control thus far. Patient's mood is depressed and worthless, affect is flat. Patient's thought process is logical. Patient denies hallucinations and delusions. Patient denies suicidal and homicidal ideation, plan or intent, and remains free of self harm. Patient denies drug/alcohol withdraw symptoms. Patient's pain is currently WDL per patient and will continue to be assessed. Safety maintained with every 15 minute checks.

## 2022-07-28 VITALS
HEIGHT: 65 IN | RESPIRATION RATE: 14 BRPM | WEIGHT: 170 LBS | DIASTOLIC BLOOD PRESSURE: 57 MMHG | SYSTOLIC BLOOD PRESSURE: 106 MMHG | BODY MASS INDEX: 28.32 KG/M2 | TEMPERATURE: 97.1 F | HEART RATE: 52 BPM | OXYGEN SATURATION: 98 %

## 2022-07-28 PROCEDURE — 6370000000 HC RX 637 (ALT 250 FOR IP): Performed by: PSYCHIATRY & NEUROLOGY

## 2022-07-28 PROCEDURE — 99239 HOSP IP/OBS DSCHRG MGMT >30: CPT | Performed by: PSYCHIATRY & NEUROLOGY

## 2022-07-28 PROCEDURE — 6370000000 HC RX 637 (ALT 250 FOR IP): Performed by: NURSE PRACTITIONER

## 2022-07-28 RX ORDER — TRAZODONE HYDROCHLORIDE 50 MG/1
50 TABLET ORAL NIGHTLY PRN
Qty: 30 TABLET | Refills: 0 | Status: SHIPPED | OUTPATIENT
Start: 2022-07-28

## 2022-07-28 RX ORDER — SERTRALINE HYDROCHLORIDE 100 MG/1
100 TABLET, FILM COATED ORAL DAILY
Qty: 30 TABLET | Refills: 0 | Status: SHIPPED | OUTPATIENT
Start: 2022-07-28

## 2022-07-28 RX ORDER — MIRTAZAPINE 15 MG/1
15 TABLET, FILM COATED ORAL NIGHTLY
Qty: 30 TABLET | Refills: 3 | Status: SHIPPED | OUTPATIENT
Start: 2022-07-28

## 2022-07-28 RX ORDER — ATORVASTATIN CALCIUM 40 MG/1
40 TABLET, FILM COATED ORAL NIGHTLY
Qty: 30 TABLET | Refills: 0 | Status: SHIPPED | OUTPATIENT
Start: 2022-07-28

## 2022-07-28 RX ORDER — LATANOPROST 50 UG/ML
1 SOLUTION/ DROPS OPHTHALMIC NIGHTLY
Qty: 2.5 ML | Refills: 0 | Status: SHIPPED | OUTPATIENT
Start: 2022-07-28

## 2022-07-28 RX ORDER — BRIMONIDINE TARTRATE 2 MG/ML
1 SOLUTION/ DROPS OPHTHALMIC 3 TIMES DAILY
Qty: 10 ML | Refills: 0 | Status: SHIPPED | OUTPATIENT
Start: 2022-07-28

## 2022-07-28 RX ORDER — FOLIC ACID 1 MG/1
1 TABLET ORAL DAILY
Qty: 30 TABLET | Refills: 0 | Status: SHIPPED | OUTPATIENT
Start: 2022-07-28

## 2022-07-28 RX ORDER — DORZOLAMIDE HCL 20 MG/ML
1 SOLUTION/ DROPS OPHTHALMIC 2 TIMES DAILY
Qty: 10 ML | Refills: 0 | Status: SHIPPED | OUTPATIENT
Start: 2022-07-28

## 2022-07-28 RX ORDER — THIAMINE MONONITRATE (VIT B1) 100 MG
100 TABLET ORAL DAILY
Qty: 30 TABLET | Refills: 0 | Status: SHIPPED | OUTPATIENT
Start: 2022-07-28

## 2022-07-28 RX ORDER — TIMOLOL MALEATE 5 MG/ML
1 SOLUTION/ DROPS OPHTHALMIC 2 TIMES DAILY
Qty: 10 ML | Refills: 0 | Status: SHIPPED | OUTPATIENT
Start: 2022-07-28

## 2022-07-28 RX ADMIN — TIMOLOL MALEATE 1 DROP: 5 SOLUTION OPHTHALMIC at 12:04

## 2022-07-28 RX ADMIN — NICOTINE POLACRILEX 2 MG: 2 GUM, CHEWING BUCCAL at 15:14

## 2022-07-28 RX ADMIN — BRIMONIDINE TARTRATE 1 DROP: 2 SOLUTION/ DROPS OPHTHALMIC at 12:03

## 2022-07-28 RX ADMIN — THIAMINE HCL TAB 100 MG 100 MG: 100 TAB at 11:59

## 2022-07-28 RX ADMIN — FOLIC ACID 1 MG: 1 TABLET ORAL at 11:59

## 2022-07-28 RX ADMIN — NICOTINE POLACRILEX 2 MG: 2 GUM, CHEWING BUCCAL at 14:12

## 2022-07-28 RX ADMIN — DORZOLAMIDE HYDROCHLORIDE 1 DROP: 20 SOLUTION/ DROPS OPHTHALMIC at 12:04

## 2022-07-28 RX ADMIN — SERTRALINE HYDROCHLORIDE 100 MG: 100 TABLET ORAL at 11:59

## 2022-07-28 RX ADMIN — NICOTINE POLACRILEX 2 MG: 2 GUM, CHEWING BUCCAL at 12:04

## 2022-07-28 ASSESSMENT — LIFESTYLE VARIABLES
HOW OFTEN DO YOU HAVE A DRINK CONTAINING ALCOHOL: NEVER
HOW MANY STANDARD DRINKS CONTAINING ALCOHOL DO YOU HAVE ON A TYPICAL DAY: PATIENT DOES NOT DRINK

## 2022-07-28 NOTE — PROGRESS NOTES
CLINICAL PHARMACY NOTE: MEDS TO BEDS    Total # of Prescriptions Filled: 10   The following medications were delivered to the patient:  Timolol Maleate 0.5%  Latanoprost 0.005%  Nicotine Polacrilex 2mg  Folic Acid 1mg  Thiamine Mononitrate 100mg  Sertraline HCL 100mg  Dorzolamide HCL 2%  Brimonidine Tartrate 0.2%  Mirtazapine 15mg  Atorvastatin 40mg    Additional Documentation:  Delivered medications to nurses station

## 2022-07-28 NOTE — BH NOTE
[facility]  Discharge Note    Pt discharged with followings belongings:   Dental Appliances: None  Vision - Corrective Lenses: None  Hearing Aid: None  Jewelry: None  Body Piercings Removed: N/A  Clothing: Belt, Pants, Shirt, Undergarments, Footwear  Other Valuables: Money ($3.35)   Valuables  returned to patient. Patient education on aftercare instructions: yes  Information faxed to [facility] by staff  at 7:02 PM .Patient verbalize understanding of AVS:  patient denies suicidal and homicidal ideations. Patient discharged to [facility] via cab. Patient given all of his personal belongings at discharge. Status EXAM upon discharge:  Mental Status and Behavioral Exam  Normal: Yes  Level of Assistance: Independent/Self  Facial Expression: Flat  Affect: Appropriate  Level of Consciousness: Alert  Frequency of Checks: 4 times per hour, close  Mood:Normal: Yes  Mood: Anxious  Motor Activity:Normal: Yes  Motor Activity: Decreased  Eye Contact: Good  Observed Behavior: Withdrawn, Cooperative, Friendly  Sexual Misconduct History: Current - no  Preception: Barry to person, Barry to time, Barry to place, Barry to situation  Attention:Normal: Yes  Attention: Distractible  Thought Processes: Circumstantial  Thought Content:Normal: Yes  Thought Content: Poverty of content  Depression Symptoms: No problems reported or observed. Anxiety Symptoms: No problems reported or observed. Nancy Symptoms: No problems reported or observed.   Hallucinations: None  Delusions: No  Memory:Normal: Yes  Memory: Poor recent  Insight and Judgment: Yes  Insight and Judgment: Poor insight    Tobacco Screening:  Practical Counseling, on admission, srinivasa X, if applicable and completed (first 3 are required if patient doesn't refuse           ( ) Recognizing danger situations (included triggers and roadblocks)                    ( ) Coping skills (new ways to manage stress,relaxation techniques, changing routine, distraction)                                                           ( ) Basic information about quitting (benefits of quitting, techniques in how to quit, available resources  ( ) Referral for counseling faxed to [facility]                                                                                                                   ( ) Patient refused counseling  ( ) Patient refused referral  ( ) Patient refused prescription upon discharge  ( ) Patient has not smoked in the last 30 days    Metabolic Screening:    Lab Results   Component Value Date    LABA1C 4.9 12/28/2019       Lab Results   Component Value Date    CHOL 146 12/28/2019    CHOL 169 10/27/2016    CHOL 152 02/03/2016     Lab Results   Component Value Date    TRIG 59 12/28/2019    TRIG 236 (H) 10/27/2016    TRIG 72 02/03/2016     Lab Results   Component Value Date    HDL 70 12/28/2019    HDL 53 10/27/2016    HDL 56 02/03/2016     No components found for: LDLCAL  No results found for: Paul Chong LPN

## 2022-07-28 NOTE — DISCHARGE INSTRUCTIONS
Information:  Medications:   Medication summary provided   I understand that I should take only the medications on my list.     -why and when I need to take each medicine.     -which side effects to watch for.     -that I should carry my medication information at all times in case of     Emergency situations. I will take all of my medicines to follow up appointments.     -check with my physician or pharmacist before taking any new    Medication, over the counter product or drink alcohol.    -Ask about food, drug or dietary supplement interactions.    -discard old lists and update records with medication providers. Notify Physician:  Notify physician if you notice:   Always call 911 if you feel your life is in danger  In case of an emergency call 911 immediately! If 911 is not available call your local emergency medical system for help    Behavioral Health Follow Up:  Original Referral Source:[facility]  Discharge Diagnosis: Suicidal ideation [R45.851]  Cocaine use [L28.30]  Acute alcoholic intoxication with complication ([facility] [M72.386]  Depression with suicidal ideation [F32. A, R45.851]  Recommendations for Level of Care: take all medications as prescribed and attend all follow up appointments. Patient status at discharge: stable  My hospital  was: [address]  Aftercare plan faxed: [facility]   -faxed by: Staff   -date: 07/28/22     -time: 1700  Prescriptions: [facility]    Smoking: Quit Smoking. Call the [employer]'s smoking quitline at 9-095-98E-QUIT  Know the signs of a heart attack   If you have any of the following symptoms call 911 immediately, do not wait more    Than five minutes. 1. Pressure, fullness and/ or squeezing in the center of the chest spreading to    The jaw, neck or shoulder. 2. Chest discomfort with light headedness, fainting, sweating, nausea or    Shortness of breath. 3. Upper abdominal pressure or discomfort.    4. Lower chest pain, back pain, unusual the flu or reduce its symptoms. If fewer people get very ill with the flu, this will help free up medical resources that are needed for people who need urgent care, such as those suffering from COVID-19, heart attacks, and injuries fromaccidents. Where can you learn more? Go to [email or web address] and sign in to your [facility] account. Enter C123 in the [employer] box to learn more about \"Learning About COVID-19 and Flu Symptoms. \"     If you do not have an account, please click on the \"Sign Up Now\" link. Current as of: May 28, 2022               Content Version: 13.3  © 2006-2022 [employer]wise, Inc[employer]. Care instructions adapted under license by [facility] [place] [address] If you have questions about a medical condition or this instruction, always ask your healthcare professional. [address]  any warranty or liability for your use of this information.

## 2022-07-28 NOTE — BH NOTE
Pt did not participate  In wrap up group at 7/127/22 2030 due to not feeling comfortable talking in group.

## 2022-07-28 NOTE — DISCHARGE INSTR - DIET

## 2022-07-28 NOTE — GROUP NOTE
Psych-Ed/Relapse Prevention Group Note        Date: July 28, 2022 Start Time: 11am  End Time: 11:45am      Number of Participants in Group & Unit Census:  5    Topic: Health education    Goal of Group:Patient will identify ways to improve physical and mental health      Comments:     Patient did not participate in Psych-Ed/Relapse Prevention group, despite staff encouragement and explanation of benefits. Patient remain seclusive to self. Q15 minute safety checks maintained for patient safety and will continue to encourage patient to attend unit programming.            Signature:  Luis Pagan

## 2022-07-28 NOTE — GROUP NOTE
Group Therapy Note    Date: 7/28/2022    Group Start Time: 1400  Group End Time: 1500  Group Topic: Relaxation    NICK Rasheed    Relaxation Group Note        Date: July 28, 2022 Start Time: 2pm  End Time: 3pm      Number of Participants in Group & Unit Census:  6    Topic: Music Relaxation     Goal of Group:Patient will utilize music as positive coping. Comments:     Patient did not participate in Relaxation group, despite staff encouragement and explanation of benefits. Patient remain seclusive to self. Q15 minute safety checks maintained for patient safety and will continue to encourage patient to attend unit programming.             Signature:  Nubia Brenner, [address]

## 2022-07-28 NOTE — PLAN OF CARE
Problem: Anxiety  Goal: Will report anxiety at manageable levels  Description: INTERVENTIONS:  1. Administer medication as ordered  2. Teach and rehearse alternative coping skills  3. Provide emotional support with 1:1 interaction with staff  7/27/2022 2155 by Isis Whaley  Outcome: Progressing  Pt admits to increased anxiety. Coping by being isolative to room. Very brief with any answers to questions. Refused group attendance     Problem: Drug Abuse/Detox  Goal: Will have no detox symptoms and will verbalize plan for changing drug-related behavior  Description: INTERVENTIONS:  1. Administer medication as ordered  2. Monitor physical status  3. Provide emotional support with 1:1 interaction with staff  4. Encourage  recovery focused treatment   7/27/2022 2155 by Isis Whaley  Outcome: Progressing  Pt is denying any pain this shift. He is compliant with medicine, has a good appetite. Pt needs much encouragement to come out of room. He is independent despite his eye sight issues. Problem: Self Harm/Suicidality  Goal: Will have no self-injury during hospital stay  Description: INTERVENTIONS:  1. Q 30 MINUTES: Routine safety checks  2. Q SHIFT & PRN: Assess risk to determine if routine checks are adequate to maintain patient safety  7/27/2022 2155 by Isis Whaley  Outcome: Adequate for Discharge  Pt denies any suicidal ideation. No self harm behaviors exhibited.

## 2022-07-29 NOTE — DISCHARGE SUMMARY
Provider Discharge Summary     Patient ID:  Romana Pier  [id number]  78 y.o.  1960    Admit date: 7/23/2022    Discharge date and time: 7/28/2022  8:57 PM     Admitting Physician: Shira Moreno MD     Discharge Physician: Rikki Mayes MD    Admission Diagnoses: Suicidal ideation [R45.851]  Cocaine use [Z91.25]  Acute alcoholic intoxication with complication [facility]) [E71.011]  Depression with suicidal ideation [F32. A, R45.851]    Discharge Diagnoses:      Major depressive disorder, single episode, severe without psychotic features [facility]     Patient Active Problem List   Diagnosis Code    Numbness and tingling R20.0, R20.2    Depression with suicidal ideation F32. A, R45.851    Major depressive disorder, single episode, severe without psychotic features ([facility] [address] F32.2    Alcohol abuse with withdrawal, uncomplicated ([address] C91.343    Major depressive disorder, recurrent severe without psychotic features ([address] F33.2    Overweight (BMI 25.0-29. 9) E66.3    Mixed hyperlipidemia E78.2    Pancreatitis, recurrent K85.90    Alcohol-induced acute pancreatitis K85.20    Chronic calcific pancreatitis (HCC) K86.1    Choledocholithiasis K80.50    Pancreatic duct calculus K86.89    Cannabis abuse F12.10    Alcohol use disorder, severe, dependence (HCC) F10.20    Cocaine use disorder (HCC) S50.41    Acute alcoholic intoxication with complication ([facility] [address] L47.844    Primary open angle glaucoma (POAG) of both eyes, indeterminate stage H40.1134        Admission Condition: poor    Discharged Condition: stable    Indication for Admission: threat to self    History of Present Illnes (present tense wording is of findings from admission exam and are not necessarily indicative of current findings):   Romana Pier is a 62 y.o. male who has a past medical history of major depressive disorder, cannabis abuse, alcohol abuse, pancreatitis, and hyperlipidemia.  Patient presented to the ED with emergency services endorsing suicidal thoughts with a plan to jump off a bridge. Noted that patient was intoxicated in ED and staff report that he appeared to be responding to internal stimuli as he was observed engaging in self talk. Also reported that patient was agitated and inappropriate with staff. At time of sobriety, patient continues to indicate suicidal ideation. He is admitted voluntarily to [facility]. Per ED documentation patient was seen at [facility] emergency department earlier yesterday after being found sitting outside in 90+ degree weather wearing a leather coat. He was agitated and eloped from the ED. At time of assessment, patient is resting in bed. He is arousable to verbal stimuli. Oriented to person, place, time and general situation. Patient denies any recollection of events yesterday. Poor recent history recollection. Patient states that [facility] in [facility], but recently was discharged from the program \"a few days ago\". Patient uncertain as to when he was discharged, but believes it was 3 or 4 days ago. Notes that since he was discharged he has been drinking heavily daily approximately 5-6 beers and a few shots of whiskey a day. He also reports smoking crack cocaine recently. Patient's vitals within appropriate range. No pupillary dilation observed. Denies feeling excessive anxiety, denies heart racing, hot or cold sweats, or tactile sensations. Patient is unsure how he arrived to the emergency department the second time, but does state that he knows that he has significant depression and frequently thinks about ending his life. We reviewed patient's symptoms. He endorses episodes of depression lasting 2 weeks or longer in which he feels down and depressed, more days than not for majority of the day. States that during these episodes he experiences anhedonia, poor energy and motivation, feelings of hopelessness and helplessness as well as guilt and shame.   He notes decreased appetite with weight loss during depressive episodes. States that he has frequent suicidal thoughts, but denies any previous attempts to end his life. Has been thinking about jumping off of a bridge lately. Patient unable to identify any manic/hypomanic episodes. Denies any perceptual disturbances or psychotic phenomena when not under the influence of alcohol. He also notes that sometimes he does not see things appropriately secondary to his blindness. Denies feeling that his mind is ever playing tricks on him. Patient does report a history of anxiety, but is evasive in describing any symptoms. Feels that his anxiety is increased during alcohol use or recent discontinuation of alcohol use. Patient denies any history of abuse, trauma or neglect. Patient has a previous psychiatric history. Admitted to [facility] most recently in March of this year. Denies any changes to his psychotropic medications since then and was taking sertraline 100 mg and mirtazapine 7.5 mg nightly. States that the last day he took his medications was Friday the 15th, as he had a scheduled eye surgery 2 days ago that required him to stop taking all of his medications. Patient missed this appointment secondary to leaving the [facility].  Patient is hopeful to get into another AOD program and verbalizes interest in [facility]. Patient requires inpatient hospitalization for the above-noted concern. Actively endorsing suicidal thoughts and unable to contract for safety off unit. Hospital Course:   Upon admission, Romana Pier was provided a safe secure environment, introduced to unit milieu. Patient participated in groups and individual therapies. Meds were adjusted as noted below. After few days of hospital care, patient began to feel improvement. Depression lifted, thoughts to harm self ceased. Sleep improved, appetite was good.  On morning rounds 7/28/2022, Romana Pier  endorses feeling ready for discharge. Patient denies suicidal or homicidal ideations, denies hallucinations or delusions. Denies SE's from meds. It was decided that maximum benefit from hospital care had been achieved and patient can be discharged. Consults:   Internal medicine for medical management    Significant Diagnostic Studies: Routine labs and diagnostics    Treatments: Psychotropic medications, therapy with group, milieu, and 1:1 with nurses, social workers, PAAUDIE/CNP, and Attending physician. Discharge Medications:  Discharge Medication List as of 7/28/2022  4:18 PM        CONTINUE these medications which have CHANGED    Details   atorvastatin (LIPITOR) 40 MG tablet Take 1 tablet by mouth nightly, Disp-30 tablet, R-0Normal      brimonidine (ALPHAGAN) 0.2 % ophthalmic solution Place 1 drop into the right eye in the morning and 1 drop at noon and 1 drop before bedtime. , Disp-10 mL, R-0Normal      dorzolamide (TRUSOPT) 2 % ophthalmic solution Place 1 drop into the right eye in the morning and 1 drop before bedtime. , Disp-10 mL, [id number]      folic acid (FOLVITE) 1 MG tablet Take 1 tablet by mouth in the morning., Disp-30 tablet, R-0Normal      latanoprost (XALATAN) 0.005 % ophthalmic solution Place 1 drop into the right eye nightly, Disp-2.5 mL, R-0Normal      mirtazapine (REMERON) 15 MG tablet Take 1 tablet by mouth nightly, Disp-30 tablet, R-3Normal      nicotine polacrilex (NICORETTE) 2 MG gum Take 1 each by mouth every hour as needed for Smoking cessation, Disp-110 each, R-3Normal      sertraline (ZOLOFT) 100 MG tablet Take 1 tablet by mouth in the morning., Disp-30 tablet, R-0Normal      timolol (TIMOPTIC) 0.5 % ophthalmic solution Place 1 drop into the right eye in the morning and 1 drop before bedtime. , Disp-10 mL, R-0Normal      traZODone (DESYREL) 50 MG tablet Take 1 tablet by mouth nightly as needed for Sleep, Disp-30 tablet, R-0Normal      vitamin B-1 (THIAMINE) 100 MG tablet Take 1 tablet by mouth in the morning., Disp-30 tablet, R-0Normal           CONTINUE these medications which have NOT CHANGED    Details   acetaminophen (TYLENOL) 325 mg tablet Take 2 tablets by mouth every 6 hours as needed for Pain, Disp-60 tablet, R-0Print      ibuprofen (ADVIL;MOTRIN) 200 MG tablet Take 2 tablets by mouth every 6 hours as needed for Pain or Fever, Disp-60 tablet, R-0Print              Core Measures statement:   Not applicable    Discharge Exam:  Level of consciousness:  Within normal limits  Appearance: Street clothes, seated, with good grooming  Behavior/Motor: No abnormalities noted  Attitude toward examiner:  Cooperative, attentive, good eye contact  Speech:  spontaneous, normal rate, normal volume and well articulated  Mood:  euthymic  Affect:  Full range  Thought processes:  linear, goal directed and coherent  Thought content:  denies homicidal ideation  Suicidal Ideation:  denies suicidal ideation  Delusions:  no evidence of delusions  Perceptual Disturbance:  denies any perceptual disturbance  Cognition:  Intact  Memory: age appropriate  Insight & Judgement: fair  Medication side effects: denies     Disposition: home    Patient Instructions: Activity: activity as tolerated  1. Patient instructed to take medications regularly and follow up with outpatient appointments. Follow-up as scheduled with outpatient [facility] mental health      Signed:    Electronically signed by Malik Miles MD on 7/28/22 at 8:57 PM EDT    Time Spent on discharge is more than 30 minutes in the examination, evaluation, counseling and review of medications and discharge plan.

## 2022-08-01 NOTE — CARE COORDINATION
Name: Kirill Morgan    : 1960    Discharge Date: 22    Primary Auth/Cert #: [id number]    Destination: [place] [address]    Discharge Medications:      Medication List        CHANGE how you take these medications      mirtazapine 15 MG tablet  Commonly known as: REMERON  Take 1 tablet by mouth nightly  What changed:   medication strength  how much to take  Notes to patient: To treat depression     nicotine polacrilex 2 MG gum  Commonly known as: NICORETTE  Take 1 each by mouth every hour as needed for Smoking cessation  What changed: when to take this  Notes to patient: Smoking cessation            CONTINUE taking these medications      acetaminophen 325 MG tablet  Commonly known as: Tylenol  Take 2 tablets by mouth every 6 hours as needed for Pain  Notes to patient: For pain      atorvastatin 40 MG tablet  Commonly known as: LIPITOR  Take 1 tablet by mouth nightly  Notes to patient: Taken to lower cholesterol      brimonidine 0.2 % ophthalmic solution  Commonly known as: ALPHAGAN  Place 1 drop into the right eye in the morning and 1 drop at noon and 1 drop before bedtime. Notes to patient: Eye drops to lower pressure in the eye      dorzolamide 2 % ophthalmic solution  Commonly known as: TRUSOPT  Place 1 drop into the right eye in the morning and 1 drop before bedtime. Notes to patient: Eye drops used to treat glaucoma     folic acid 1 MG tablet  Commonly known as: FOLVITE  Take 1 tablet by mouth in the morning. Notes to patient: Supplement      ibuprofen 200 MG tablet  Commonly known as: ADVIL;MOTRIN  Take 2 tablets by mouth every 6 hours as needed for Pain or Fever  Notes to patient: To treat pain      latanoprost 0.005 % ophthalmic solution  Commonly known as: XALATAN  Place 1 drop into the right eye nightly  Notes to patient: To treat glaucoma     sertraline 100 MG tablet  Commonly known as: ZOLOFT  Take 1 tablet by mouth in the morning.   Notes to patient: Antidepressant      timolol 0.5 % ophthalmic solution  Commonly known as: TIMOPTIC  Place 1 drop into the right eye in the morning and 1 drop before bedtime. Notes to patient: To treat glaucoma     traZODone 50 MG tablet  Commonly known as: DESYREL  Take 1 tablet by mouth nightly as needed for Sleep  Notes to patient: Used to treat depression      vitamin B-1 100 MG tablet  Commonly known as: THIAMINE  Take 1 tablet by mouth in the morning.   Notes to patient: Supplement                Where to Get Your Medications        These medications were sent to [address] [place], [place] [address] [id number]      Phone: [phone]   atorvastatin 40 MG tablet  brimonidine 0.2 % ophthalmic solution  dorzolamide 2 % ophthalmic solution  folic acid 1 MG tablet  latanoprost 0.005 % ophthalmic solution  mirtazapine 15 MG tablet  nicotine polacrilex 2 MG gum  sertraline 100 MG tablet  timolol 0.5 % ophthalmic solution  traZODone 50 MG tablet  vitamin B-1 100 MG tablet         Follow Up Appointment: Alejandrina Patel [facility] [phone]  Follow up on 8/1/2022  Pt has appointment at 9:30 am.

## 2022-10-09 ENCOUNTER — HOSPITAL ENCOUNTER (EMERGENCY)
Age: 62
Discharge: HOME | End: 2022-10-10
Attending: STUDENT IN AN ORGANIZED HEALTH CARE EDUCATION/TRAINING PROGRAM

## 2022-10-09 DIAGNOSIS — R45.851 SUICIDAL IDEATIONS: Primary | ICD-10-CM

## 2022-10-09 LAB
ABSOLUTE EOS #: 0.2 K/UL (ref 0–0.4)
ABSOLUTE LYMPH #: 1.6 K/UL (ref 1–4.8)
ABSOLUTE MONO #: 0.5 K/UL (ref 0.1–1.3)
ALBUMIN SERPL-MCNC: 4 G/DL (ref 3.5–5.2)
ALP BLD-CCNC: 45 U/L (ref 40–129)
ALT SERPL-CCNC: 27 U/L (ref 5–41)
ANION GAP SERPL CALCULATED.3IONS-SCNC: 10 MMOL/L (ref 9–17)
AST SERPL-CCNC: 28 U/L
BASOPHILS # BLD: 1 % (ref 0–2)
BASOPHILS ABSOLUTE: 0 K/UL (ref 0–0.2)
BILIRUB SERPL-MCNC: 0.2 MG/DL (ref 0.3–1.2)
BUN BLDV-MCNC: 10 MG/DL (ref 8–23)
CALCIUM SERPL-MCNC: 8.2 MG/DL (ref 8.6–10.4)
CHLORIDE BLD-SCNC: 108 MMOL/L (ref 98–107)
CO2: 21 MMOL/L (ref 20–31)
CREAT SERPL-MCNC: 0.77 MG/DL (ref 0.7–1.2)
EOSINOPHILS RELATIVE PERCENT: 4 % (ref 0–4)
ETHANOL PERCENT: 0.18 %
ETHANOL: 181 MG/DL
GFR SERPL CREATININE-BSD FRML MDRD: >60 ML/MIN/1.73M2
GLUCOSE BLD-MCNC: 95 MG/DL (ref 70–99)
HCT VFR BLD CALC: 44.1 % (ref 41–53)
HEMOGLOBIN: 15.3 G/DL (ref 13.5–17.5)
LYMPHOCYTES # BLD: 29 % (ref 24–44)
MCH RBC QN AUTO: 33.2 PG (ref 26–34)
MCHC RBC AUTO-ENTMCNC: 34.6 G/DL (ref 31–37)
MCV RBC AUTO: 95.9 FL (ref 80–100)
MONOCYTES # BLD: 8 % (ref 1–7)
PDW BLD-RTO: 14.2 % (ref 11.5–14.9)
PLATELET # BLD: 236 K/UL (ref 150–450)
PMV BLD AUTO: 6.4 FL (ref 6–12)
POTASSIUM SERPL-SCNC: 3.7 MMOL/L (ref 3.7–5.3)
RBC # BLD: 4.6 M/UL (ref 4.5–5.9)
SEG NEUTROPHILS: 58 % (ref 36–66)
SEGMENTED NEUTROPHILS ABSOLUTE COUNT: 3.1 K/UL (ref 1.3–9.1)
SODIUM BLD-SCNC: 139 MMOL/L (ref 135–144)
TOTAL PROTEIN: 7 G/DL (ref 6.4–8.3)
WBC # BLD: 5.4 K/UL (ref 3.5–11)

## 2022-10-09 PROCEDURE — 80053 COMPREHEN METABOLIC PANEL: CPT

## 2022-10-09 PROCEDURE — 36415 COLL VENOUS BLD VENIPUNCTURE: CPT

## 2022-10-09 PROCEDURE — 99285 EMERGENCY DEPT VISIT HI MDM: CPT

## 2022-10-09 PROCEDURE — 85025 COMPLETE CBC W/AUTO DIFF WBC: CPT

## 2022-10-09 PROCEDURE — G0480 DRUG TEST DEF 1-7 CLASSES: HCPCS

## 2022-10-09 ASSESSMENT — ENCOUNTER SYMPTOMS
NAUSEA: 0
SHORTNESS OF BREATH: 0
EYE DISCHARGE: 0
DIARRHEA: 0
VOMITING: 0
CHEST TIGHTNESS: 0
RHINORRHEA: 0
EYE REDNESS: 0
SORE THROAT: 0
ABDOMINAL PAIN: 0

## 2022-10-10 VITALS
HEART RATE: 72 BPM | OXYGEN SATURATION: 98 % | DIASTOLIC BLOOD PRESSURE: 74 MMHG | RESPIRATION RATE: 15 BRPM | SYSTOLIC BLOOD PRESSURE: 141 MMHG | TEMPERATURE: 98 F

## 2022-10-10 ASSESSMENT — PAIN - FUNCTIONAL ASSESSMENT: PAIN_FUNCTIONAL_ASSESSMENT: NONE - DENIES PAIN

## 2022-10-10 NOTE — ED NOTES
Patient admit to intoxication upon arrival.    Patient to be reevaluated upon sobriety. Please read Triage note for further information.

## 2022-10-10 NOTE — ED NOTES
Provisional Diagnosis:     Patient presented to ED via [employer] for a mental health evaluation. Patient has history of alcohol abuse and depression with SI. Psychosocial and Contextual Factors:     Patient has substance abuse issues. Patient homeless. C-SSRS Summary:    Patient reports SI with a plan to step out into traffic. Patient: X  Family:   Agency:     Substance Abuse:  Patient is a daily drinker. No other drug use. Present Suicidal Behavior:    Patient reports SI with a plan to step out into traffic. Verbal: X    Attempt:    Past Suicidal Behavior:   Patient has no reported attempts, but does have a history of suicidal ideation. Verbal: X    Attempt:    Self-Injurious/Self-Mutilation:  Patient denies    Violence Current or Past   Patient has a history of being verbally aggressive and inappropriate with female staff when intoxicated. Trauma Identified:    None reported    Protective Factors:    Patient has insurance. Patient has insight. Risk Factors:    Patient has poor coping skills. Patient has substance abuse issues. Patient homeless. Patient has poor outpatient follow through. Clinical Summary:    Patient is a 62year old  male who presented to ED via [employer] [place]  for a mental health evaluation. Patient initially presented to ED intoxicated identifying suicidal ideation. Upon sobriety, patient was reassessed and he continued to endorse suicidal thoughts to step out into traffic. Patient struggles with chronic homelessness and alcoholism. Patient is a daily drinker, and often presents to ED intoxicated. Patient was last admitted to this [facility] in July of 2022. Patient has poor follow through with outpatient treatment and medication management. On multiple occasions while waiting to achieve sobriety, the patient demanded to leave, saying \"fuck this shit, give me my shit, I'm leaving\".   Patient has also made several comments indicating he will \"do whatever it takes\" to obtain housing/shelter for the night. Level of Care Disposition: This writer consulted with Dr Clifford Edmonds who indicated that this patient can be discharged to the [facility] based on chronic homelessness, alcoholism and unlikely benefit from inpatient treatment. Patient to be discharged in stable condition to the [facility].

## 2022-10-10 NOTE — ED PROVIDER NOTES
EMERGENCY DEPARTMENT ENCOUNTER    Pt Name: José Torres  MRN: [id number]  Jignagfho 1960  Date of evaluation: 10/9/22  CHIEF COMPLAINT       Chief Complaint   Patient presents with    Mental Health Problem     HISTORY OF PRESENT ILLNESS   61-year-old male presents with suicidal ideation    Most of his issues seemingly stemming from his homelessness    States he has been kicked out of all the homeless shelters and is feeling suicidal would like to walk in front of traffic    Admits to drinking 2 beers today    Denies any other drug abuse denies any other medical complaints                REVIEW OF SYSTEMS     Review of Systems   Constitutional:  Negative for chills and fever. HENT:  Negative for rhinorrhea and sore throat. Eyes:  Negative for discharge and redness. Respiratory:  Negative for chest tightness and shortness of breath. Cardiovascular:  Negative for chest pain. Gastrointestinal:  Negative for abdominal pain, diarrhea, nausea and vomiting. Genitourinary:  Negative for dysuria and frequency. Musculoskeletal:  Negative for arthralgias and myalgias. Skin:  Negative for rash. Neurological:  Negative for weakness and numbness. Psychiatric/Behavioral:  Positive for suicidal ideas. All other systems reviewed and are negative. PASTMEDICAL HISTORY     Past Medical History:   Diagnosis Date    Bradycardia     [facility]iology consulted during hospital admission. COVID-19     1/2022 per patient. Was tested at a homeless shelter and was positive. States no symptoms. Depression with suicidal ideation 04/2022 April 2022 Psych ding [facility] then to [place] for psych.     Glaucoma     Homeless single person     lives in non-mobile [place] in [facility] \"behind a garage\"    Hypertension     Legally blind     lt eye completely blind, rt eye has about 20 % vision  Sees [facility] and then \"glaucoma specialist\" Anne Canales [address]. [facility]  \"Dr. Smitha Tiwari"    Mixed hyperlipidemia 3/28/2022 Pancreatitis     4/2022  [facility] admit    Snores     Wellness examination     Dr. Tahir Grace last seen \"years ago\" Juan A Carota to [facility] in [place] currently . Pt. homeless. Past Problem List  Patient Active Problem List   Diagnosis Code    Numbness and tingling R20.0, R20.2    Depression with suicidal ideation F32. A, R45.851    Major depressive disorder, single episode, severe without psychotic features ([address] F32.2    Alcohol abuse with withdrawal, uncomplicated ([address] L05.597    Major depressive disorder, recurrent severe without psychotic features ([address] F33.2    Overweight (BMI 25.0-29. 9) E66.3    Mixed hyperlipidemia E78.2    Pancreatitis, recurrent K85.90    Alcohol-induced acute pancreatitis K85.20    Chronic calcific pancreatitis (HCC) K86.1    Choledocholithiasis K80.50    Pancreatic duct calculus K86.89    Cannabis abuse F12.10    Alcohol use disorder, severe, dependence (HCC) F10.20    Cocaine use disorder (HCC) K17.20    Acute alcoholic intoxication with complication ([address] O90.490    Primary open angle glaucoma (POAG) of both eyes, indeterminate stage H40.1134     SURGICAL HISTORY       Past Surgical History:   Procedure Laterality Date    EYE SURGERY      cataracts approx 2015    TESTICLE REMOVAL      as a baby     CURRENT MEDICATIONS       Previous Medications    ACETAMINOPHEN (TYLENOL) 325 MG TABLET    Take 2 tablets by mouth every 6 hours as needed for Pain    ATORVASTATIN (LIPITOR) 40 MG TABLET    Take 1 tablet by mouth nightly    BRIMONIDINE (ALPHAGAN) 0.2 % OPHTHALMIC SOLUTION    Place 1 drop into the right eye in the morning and 1 drop at noon and 1 drop before bedtime. DORZOLAMIDE (TRUSOPT) 2 % OPHTHALMIC SOLUTION    Place 1 drop into the right eye in the morning and 1 drop before bedtime. FOLIC ACID (FOLVITE) 1 MG TABLET    Take 1 tablet by mouth in the morning.     IBUPROFEN (ADVIL;MOTRIN) 200 MG TABLET    Take 2 tablets by mouth every 6 hours as needed for Pain or Fever    LATANOPROST (XALATAN) 0.005 % OPHTHALMIC SOLUTION    Place 1 drop into the right eye nightly    MIRTAZAPINE (REMERON) 15 MG TABLET    Take 1 tablet by mouth nightly    NICOTINE POLACRILEX (NICORETTE) 2 MG GUM    Take 1 each by mouth every hour as needed for Smoking cessation    SERTRALINE (ZOLOFT) 100 MG TABLET    Take 1 tablet by mouth in the morning. TIMOLOL (TIMOPTIC) 0.5 % OPHTHALMIC SOLUTION    Place 1 drop into the right eye in the morning and 1 drop before bedtime. TRAZODONE (DESYREL) 50 MG TABLET    Take 1 tablet by mouth nightly as needed for Sleep    VITAMIN B-1 (THIAMINE) 100 MG TABLET    Take 1 tablet by mouth in the morning. ALLERGIES     has No Known Allergies. FAMILY HISTORY     He indicated that his mother is . He indicated that his father is . SOCIAL HISTORY       Social History     Tobacco Use    Smoking status: Light Smoker     Types: Cigarettes    Smokeless tobacco: Current     Types: Chew    Tobacco comments:     smokes daily   Vaping Use    Vaping Use: Never used   Substance Use Topics    Alcohol use: Yes     Alcohol/week: 30.0 standard drinks     Types: 30 Cans of beer per week     Comment:  slowing down due to pancreas issue. Was \"heavy drinker for 35-40 years\" Was drinking 24 oz tallboy 6/day    Drug use: Yes     Types: Marijuana (Weed)     PHYSICAL EXAM     INITIAL VITALS: BP (!) 145/86   Pulse 72   Temp 98 °F (36.7 °C)   Resp 16   SpO2 97%    Physical Exam  Vitals and nursing note reviewed. Constitutional:       Appearance: Normal appearance. HENT:      Head: Normocephalic and atraumatic. Nose: Nose normal.      Mouth/Throat:      Mouth: Mucous membranes are moist.   Eyes:      Conjunctiva/sclera: Conjunctivae normal.      Pupils: Pupils are equal, round, and reactive to light. Cardiovascular:      Rate and Rhythm: Normal rate and regular rhythm. Pulses: Normal pulses. Heart sounds: Normal heart sounds.    Pulmonary: Effort: Pulmonary effort is normal.      Breath sounds: Normal breath sounds. Abdominal:      Palpations: Abdomen is soft. Tenderness: There is no abdominal tenderness. Musculoskeletal:         General: No swelling or deformity. Cervical back: Normal range of motion. Skin:     General: Skin is warm. Findings: No rash. Neurological:      General: No focal deficit present. Mental Status: He is alert and oriented to person, place, and time.    Psychiatric:         Mood and Affect: Mood normal.       MEDICAL DECISION MAKIN-year-old male presents with suicidal ideation with plan to walk into traffic    He states he is feeling this way because he has nowhere to live and has been kicked out of the homeless shelters    Will obtain blood work including alcohol level and reassess    Discussed with psychiatry versus substance use resources  ED Course as of 10/10/22 0216   Sun Oct 09, 2022   2246 CBC with Auto Differential(!):    WBC 5.4   RBC 4.60   Hemoglobin Quant 15.3   Hematocrit 44.1   MCV 95.9   MCH 33.2   MCHC 34.6   RDW 14.2   Platelet Count 301   MPV 6.4   Seg Neutrophils 58   Lymphocytes 29   Monocytes 8(!)   Eosinophils % 4   Basophils 1   Segs Absolute 3.10   Absolute Lymph # 1.60   Absolute Mono # 0.50   Absolute Eos # 0.20   Basophils Absolute 0.00  Normal [PALMER]    CMP(!):    Glucose, Random 95   BUN,BUNPL 10   Creatinine 0.77   Est, Glom Filt Rate >60   CALCIUM, SERUM, 005932 8.2(!)   Sodium 139   Potassium 3.7   Chloride 108(!)   CO2 21   Anion Gap 10   Alk Phos 45   ALT 27   AST 28   Bilirubin 0.2(!)   Total Protein 7.0   Albumin 4.0  Normal [PALMER]    ETOH(!):    ETHANOL,ETHA 181(!)   Ethanol percent 0.181  Intoxicated sober 0200 [PALMER]      ED Course User Index  [PALMER] Epifanio Fonseca MD       CRITICAL CARE:       PROCEDURES:    Procedures    DIAGNOSTIC RESULTS   EKG:All EKG's are interpreted by the Emergency Department Physician who either signs or Co-signs this chart in the absence of a cardiologist.        RADIOLOGY:All plain film, CT, MRI, and formal ultrasound images (except ED bedside ultrasound) are read by the radiologist, see reports below, unless otherwisenoted in MDM or here. No orders to display     LABS: All lab results were reviewed by myself, and all abnormals are listed below. Labs Reviewed   CBC WITH AUTO DIFFERENTIAL - Abnormal; Notable for the following components:       Result Value    Monocytes 8 (*)     All other components within normal limits   COMPREHENSIVE METABOLIC PANEL - Abnormal; Notable for the following components:    Calcium 8.2 (*)     Chloride 108 (*)     Total Bilirubin 0.2 (*)     All other components within normal limits   ETHANOL - Abnormal; Notable for the following components:    Ethanol 181 (*)     All other components within normal limits   URINALYSIS WITH REFLEX TO CULTURE   URINE DRUG SCREEN       EMERGENCY DEPARTMENTCOURSE:     Patient seen with alcohol intoxication stating he wanted to walk into traffic    Observe till sobriety    Patient presented to Dr. Sahara Marlow who declines admission. Will cab to [facility]    Vitals:    Vitals:    10/09/22 2141   BP: (!) 145/86   Pulse: 72   Resp: 16   Temp: 98 °F (36.7 °C)   SpO2: 97%       The patient was given the following medications while in the emergency department:  No orders of the defined types were placed in this encounter. CONSULTS:  None    FINAL IMPRESSION      1. Suicidal ideations          DISPOSITION/PLAN   DISPOSITION Decision To Discharge 10/10/2022 02:14:40 AM      PATIENT REFERRED TO:  [address] [place] [id number]  [phone]  Go to     DISCHARGE MEDICATIONS:  New Prescriptions    No medications on file     The care is provided during an unprecedented national emergency due to the novel coronavirus, COVID 19.   MD Mesha Mccrary MD  10/10/22 5414

## 2022-10-10 NOTE — ED NOTES
Mode of arrival (squad #, walk in, police, etc) : Police        Chief complaint(s): Mental health Problem        Arrival Note (brief scenario, treatment PTA, etc). : Pt was brought in by police for a ride. Pt states being SI and HI. Pt states he is legally blind so he would walk in front of a bus or car to hurt himself. Pt has not acted on this. Pt states he has thought about hurting other people just so he could go to penitentiary for a place to live. Pt states \"a lot of alcohol use. \" Pt denies any drug use. Pt denies sob, chest pain,nvd, palpitations. C= \"Have you ever felt that you should Cut down on your drinking? \"  No  A= \"Have people Annoyed you by criticizing your drinking? \"  No  G= \"Have you ever felt bad or Guilty about your drinking? \"  No  E= \"Have you ever had a drink as an Eye-opener first thing in the morning to steady your nerves or to help a hangover? \"  No      Deferred []      Reason for deferring: N/A    *If yes to two or more: probable alcohol abuse. Michael Fort Hunt  10/09/22 2149

## 2025-04-17 NOTE — DISCHARGE INSTR - ACTIVITY
Subjective   Patient ID: Zach is a 57 year old male presents with cough, congestion.    Chief Complaint   Patient presents with   • Office Visit     Started 4/13  Cough   Chills   congestion   Post nasal drip   No fevers   Has not been testing      Symptoms started 4/13/2025  PND  Cough  Congestion  No fevers  No sinus pain/pressure  No HA, no head pressure  ST    No abd pain, no vomiting, no diarrhea, no constipation  Nausea this morning  No sick contacts    Using sudafed, benadryl  Warm tea, throat lozenges  Nasocort every night  Zyrtec every night    Patient's medications, allergies, past medical, surgical, social and family histories were reviewed and updated as appropriate.    Visit Vitals  /85   Pulse 89   Ht 5' 11\" (1.803 m)   Wt 87.2 kg (192 lb 3.9 oz)   SpO2 95%   BMI 26.81 kg/m²     Objective   Physical Exam  Constitutional:       Appearance: Normal appearance.   Cardiovascular:      Rate and Rhythm: Normal rate and regular rhythm.      Heart sounds: No murmur heard.  Pulmonary:      Effort: Pulmonary effort is normal.      Breath sounds: Normal breath sounds. No wheezing.   Neurological:      General: No focal deficit present.      Mental Status: He is alert.   Psychiatric:         Mood and Affect: Mood normal.         Behavior: Behavior normal.         Thought Content: Thought content normal.         Judgment: Judgment normal.         Assessment   Problem List Items Addressed This Visit        ENT    Post-nasal drip   Other Visit Diagnoses       Acute cough    -  Primary    Relevant Orders    POCT SARS-COV-2 Antigen/Flu Antigen Panel via [facility]        Flu/covid negative  Rx for tessalon perles sent to pharmacy  If still not better in the next few days- rx for zpack sent to pharmacy to fill in 2 days   activity as tolerated Home

## 2025-08-07 ENCOUNTER — HOSPITAL ENCOUNTER (OUTPATIENT)
Dept: NON INVASIVE DIAGNOSTICS | Age: 65
Discharge: HOME | End: 2025-08-07
Payer: MEDICARE

## 2025-08-07 ENCOUNTER — HOSPITAL ENCOUNTER (OUTPATIENT)
Age: 65
Discharge: HOME | End: 2025-08-07
Payer: MEDICARE

## 2025-08-07 LAB
ALBUMIN SERPL-MCNC: 4.5 G/DL (ref 3.5–5.2)
ALP SERPL-CCNC: 64 U/L (ref 40–129)
ALT SERPL-CCNC: 22 U/L (ref 10–50)
ANION GAP SERPL CALCULATED.3IONS-SCNC: 17 MMOL/L (ref 9–16)
AST SERPL-CCNC: 19 U/L (ref 10–50)
BASOPHILS # BLD: 0 K/UL (ref 0–0.2)
BASOPHILS NFR BLD: 0 % (ref 0–2)
BILIRUB SERPL-MCNC: 0.5 MG/DL (ref 0–1.2)
BUN SERPL-MCNC: 7 MG/DL (ref 8–23)
CALCIUM SERPL-MCNC: 9 MG/DL (ref 8.6–10.4)
CHLORIDE SERPL-SCNC: 103 MMOL/L (ref 98–107)
CHOLEST SERPL-MCNC: 141 MG/DL (ref 0–199)
CHOLESTEROL/HDL RATIO: 3
CO2 SERPL-SCNC: 18 MMOL/L (ref 20–31)
CREAT SERPL-MCNC: 0.8 MG/DL (ref 0.7–1.2)
EKG ATRIAL RATE: 85 BPM
EKG P AXIS: 33 DEGREES
EKG P-R INTERVAL: 132 MS
EKG Q-T INTERVAL: 372 MS
EKG QRS DURATION: 94 MS
EKG QTC CALCULATION (BAZETT): 442 MS
EKG R AXIS: -71 DEGREES
EKG T AXIS: 54 DEGREES
EKG VENTRICULAR RATE: 85 BPM
EOSINOPHIL # BLD: 0 K/UL (ref 0–0.4)
EOSINOPHILS RELATIVE PERCENT: 0 % (ref 0–4)
ERYTHROCYTE [DISTWIDTH] IN BLOOD BY AUTOMATED COUNT: 12.3 % (ref 11.5–14.9)
EST. AVERAGE GLUCOSE BLD GHB EST-MCNC: 174 MG/DL
GFR, ESTIMATED: >90 ML/MIN/1.73M2
GLUCOSE SERPL-MCNC: 258 MG/DL (ref 74–99)
HBA1C MFR BLD: 7.7 % (ref 4–6)
HCT VFR BLD AUTO: 48.1 % (ref 41–53)
HDLC SERPL-MCNC: 47 MG/DL
HGB BLD-MCNC: 16.8 G/DL (ref 13.5–17.5)
IMM GRANULOCYTES # BLD AUTO: 0 K/UL (ref 0–0.3)
IMM GRANULOCYTES NFR BLD: 0 %
LDLC SERPL CALC-MCNC: 65 MG/DL (ref 0–100)
LYMPHOCYTES NFR BLD: 0.37 K/UL (ref 1–4.8)
LYMPHOCYTES RELATIVE PERCENT: 7 % (ref 24–44)
MCH RBC QN AUTO: 32.4 PG (ref 26–34)
MCHC RBC AUTO-ENTMCNC: 34.9 G/DL (ref 31–37)
MCV RBC AUTO: 92.9 FL (ref 80–100)
MONOCYTES NFR BLD: 0.58 K/UL (ref 0.1–1.3)
MONOCYTES NFR BLD: 11 % (ref 1–7)
MORPHOLOGY: NORMAL
NEUTROPHILS NFR BLD: 82 % (ref 36–66)
NEUTS SEG NFR BLD: 4.35 K/UL (ref 1.3–9.1)
NRBC BLD-RTO: 0 PER 100 WBC
PLATELET # BLD AUTO: 175 K/UL (ref 150–450)
PMV BLD AUTO: 9 FL (ref 8–13.5)
POTASSIUM SERPL-SCNC: 4 MMOL/L (ref 3.7–5.3)
PROT SERPL-MCNC: 7.5 G/DL (ref 6.6–8.7)
RBC # BLD AUTO: 5.18 M/UL (ref 4.21–5.77)
SODIUM SERPL-SCNC: 138 MMOL/L (ref 136–145)
TRIGL SERPL-MCNC: 143 MG/DL (ref 0–149)
TSH SERPL DL<=0.05 MIU/L-ACNC: 1.36 UIU/ML (ref 0.27–4.2)
WBC OTHER # BLD: 5.3 K/UL (ref 3.5–11)

## 2025-08-07 PROCEDURE — 84443 ASSAY THYROID STIM HORMONE: CPT

## 2025-08-07 PROCEDURE — 93010 ELECTROCARDIOGRAM REPORT: CPT | Performed by: INTERNAL MEDICINE

## 2025-08-07 PROCEDURE — 80053 COMPREHEN METABOLIC PANEL: CPT

## 2025-08-07 PROCEDURE — 85025 COMPLETE CBC W/AUTO DIFF WBC: CPT

## 2025-08-07 PROCEDURE — 83036 HEMOGLOBIN GLYCOSYLATED A1C: CPT

## 2025-08-07 PROCEDURE — 80061 LIPID PANEL: CPT

## 2025-08-07 PROCEDURE — 93005 ELECTROCARDIOGRAM TRACING: CPT

## 2025-08-07 PROCEDURE — 36415 COLL VENOUS BLD VENIPUNCTURE: CPT
